# Patient Record
Sex: MALE | Race: WHITE | NOT HISPANIC OR LATINO | Employment: UNEMPLOYED
[De-identification: names, ages, dates, MRNs, and addresses within clinical notes are randomized per-mention and may not be internally consistent; named-entity substitution may affect disease eponyms.]

---

## 2017-02-20 ENCOUNTER — BH HISTORICAL (OUTPATIENT)
Dept: OTHER | Age: 42
End: 2017-02-20

## 2017-03-08 ENCOUNTER — HOSPITAL (OUTPATIENT)
Dept: OTHER | Age: 42
End: 2017-03-08

## 2017-03-11 ENCOUNTER — HOSPITAL (OUTPATIENT)
Dept: OTHER | Age: 42
End: 2017-03-11
Attending: PSYCHIATRY & NEUROLOGY

## 2017-03-11 ENCOUNTER — DIAGNOSTIC TRANS (OUTPATIENT)
Dept: OTHER | Age: 42
End: 2017-03-11

## 2017-03-11 ENCOUNTER — IMAGING SERVICES (OUTPATIENT)
Dept: OTHER | Age: 42
End: 2017-03-11

## 2017-03-11 LAB
ALBUMIN SERPL-MCNC: 4 GM/DL (ref 3.6–5.1)
ALBUMIN SERPL-MCNC: 4 GM/DL (ref 3.6–5.1)
ALBUMIN/GLOB SERPL: 1.2 {RATIO} (ref 1–2.4)
ALP SERPL-CCNC: 110 UNIT/L (ref 45–117)
ALP SERPL-CCNC: 110 UNIT/L (ref 45–117)
ALT SERPL-CCNC: 128 UNIT/L
ALT SERPL-CCNC: 129 UNIT/L
AMPHETAMINES UR QL SCN>500 NG/ML: NEGATIVE
ANALYZER ANC (IANC): ABNORMAL
ANION GAP SERPL CALC-SCNC: 14 MMOL/L (ref 10–20)
ANION GAP SERPL CALC-SCNC: 16 MMOL/L (ref 10–20)
AST SERPL-CCNC: 290 UNIT/L
AST SERPL-CCNC: 294 UNIT/L
BARBITURATES UR QL SCN>200 NG/ML: NEGATIVE
BASOPHILS # BLD: 0 THOUSAND/MCL (ref 0–0.3)
BASOPHILS NFR BLD: 0 %
BENZODIAZ UR QL SCN>200 NG/ML: NEGATIVE
BILIRUB CONJ SERPL-MCNC: 0.1 MG/DL (ref 0–0.2)
BILIRUB SERPL-MCNC: 0.4 MG/DL (ref 0.2–1)
BILIRUB SERPL-MCNC: 0.4 MG/DL (ref 0.2–1)
BUN SERPL-MCNC: 13 MG/DL (ref 6–20)
BUN SERPL-MCNC: 13 MG/DL (ref 6–20)
BUN/CREAT SERPL: 13 (ref 7–25)
BUN/CREAT SERPL: 14 (ref 7–25)
BZE UR QL SCN>150 NG/ML: NEGATIVE
CALCIUM SERPL-MCNC: 8.8 MG/DL (ref 8.4–10.2)
CALCIUM SERPL-MCNC: 8.9 MG/DL (ref 8.4–10.2)
CANNABINOIDS UR QL SCN>50 NG/ML: NEGATIVE
CHLORIDE: 103 MMOL/L (ref 98–107)
CHLORIDE: 103 MMOL/L (ref 98–107)
CHOLEST SERPL-MCNC: 132 MG/DL
CHOLEST/HDLC SERPL: 2.6 {RATIO}
CO2 SERPL-SCNC: 24 MMOL/L (ref 21–32)
CO2 SERPL-SCNC: 26 MMOL/L (ref 21–32)
CREAT SERPL-MCNC: 0.95 MG/DL (ref 0.67–1.17)
CREAT SERPL-MCNC: 0.98 MG/DL (ref 0.67–1.17)
D DIMER PPP FEU-MCNC: 0.83 MG/L FEU
DIFFERENTIAL METHOD BLD: ABNORMAL
EOSINOPHIL # BLD: 0.1 THOUSAND/MCL (ref 0.1–0.5)
EOSINOPHIL NFR BLD: 1 %
ERYTHROCYTE [DISTWIDTH] IN BLOOD: 12.7 % (ref 11–15)
ETHANOL SERPL-MCNC: NORMAL MG/DL
GLOBULIN SER-MCNC: 3.3 GM/DL (ref 2–4)
GLUCOSE SERPL-MCNC: 102 MG/DL (ref 65–99)
GLUCOSE SERPL-MCNC: 105 MG/DL (ref 65–99)
GLYCOHEMOGLOBIN: 5.1 % (ref 4.5–5.6)
HDLC SERPL-MCNC: 51 MG/DL
HEMATOCRIT: 39 % (ref 39–51)
HGB BLD-MCNC: 13.5 GM/DL (ref 13–17)
LDLC SERPL CALC-MCNC: 68 MG/DL
LYMPHOCYTES # BLD: 1.7 THOUSAND/MCL (ref 1–4.8)
LYMPHOCYTES NFR BLD: 15 %
MCH RBC QN AUTO: 32.8 PG (ref 26–34)
MCHC RBC AUTO-ENTMCNC: 34.6 GM/DL (ref 32–36.5)
MCV RBC AUTO: 94.7 FL (ref 78–100)
MONOCYTES # BLD: 1.1 THOUSAND/MCL (ref 0.3–0.9)
MONOCYTES NFR BLD: 10 %
NEUTROPHILS # BLD: 8.3 THOUSAND/MCL (ref 1.8–7.7)
NEUTROPHILS NFR BLD: 74 %
NEUTS SEG NFR BLD: ABNORMAL %
NONHDLC SERPL-MCNC: 81 MG/DL
OPIATES UR QL SCN>300 NG/ML: POSITIVE
PCP UR QL SCN>25 NG/ML: NEGATIVE
PERCENT NRBC: ABNORMAL
PLATELET # BLD: 232 THOUSAND/MCL (ref 140–450)
POTASSIUM SERPL-SCNC: 3.9 MMOL/L (ref 3.4–5.1)
POTASSIUM SERPL-SCNC: 4 MMOL/L (ref 3.4–5.1)
PROT SERPL-MCNC: 7.3 GM/DL (ref 6.4–8.2)
PROT SERPL-MCNC: 7.3 GM/DL (ref 6.4–8.2)
RBC # BLD: 4.12 MILLION/MCL (ref 4.5–5.9)
SODIUM SERPL-SCNC: 139 MMOL/L (ref 135–145)
SODIUM SERPL-SCNC: 139 MMOL/L (ref 135–145)
T3 SERPL-MCNC: 1.16 NG/ML (ref 0.6–1.81)
T4 SERPL-MCNC: 8.6 MCG/DL (ref 4.7–13.3)
TRIGL SERPL-MCNC: 65 MG/DL
TROPONIN I SERPL HS-MCNC: <0.02 NG/ML
TSH SERPL-ACNC: 0.78 MCUNIT/ML (ref 0.35–5)
WBC # BLD: 11.2 THOUSAND/MCL (ref 4.2–11)

## 2017-03-12 LAB
ALBUMIN SERPL-MCNC: 3.6 GM/DL (ref 3.6–5.1)
ALBUMIN/GLOB SERPL: 1.1 {RATIO} (ref 1–2.4)
ALP SERPL-CCNC: 95 UNIT/L (ref 45–117)
ALT SERPL-CCNC: 101 UNIT/L
ANION GAP SERPL CALC-SCNC: 13 MMOL/L (ref 10–20)
AST SERPL-CCNC: 148 UNIT/L
BILIRUB SERPL-MCNC: 0.4 MG/DL (ref 0.2–1)
BUN SERPL-MCNC: 11 MG/DL (ref 6–20)
BUN/CREAT SERPL: 11 (ref 7–25)
CALCIUM SERPL-MCNC: 8.8 MG/DL (ref 8.4–10.2)
CHLORIDE: 104 MMOL/L (ref 98–107)
CO2 SERPL-SCNC: 27 MMOL/L (ref 21–32)
CREAT SERPL-MCNC: 0.97 MG/DL (ref 0.67–1.17)
GLOBULIN SER-MCNC: 3.4 GM/DL (ref 2–4)
GLUCOSE SERPL-MCNC: 98 MG/DL (ref 65–99)
POTASSIUM SERPL-SCNC: 4.5 MMOL/L (ref 3.4–5.1)
PROT SERPL-MCNC: 7 GM/DL (ref 6.4–8.2)
SODIUM SERPL-SCNC: 139 MMOL/L (ref 135–145)

## 2017-03-13 ENCOUNTER — IMAGING SERVICES (OUTPATIENT)
Dept: OTHER | Age: 42
End: 2017-03-13

## 2017-03-16 LAB
ALBUMIN SERPL-MCNC: 4 GM/DL (ref 3.6–5.1)
ALP SERPL-CCNC: 116 UNIT/L (ref 45–117)
ALT SERPL-CCNC: 87 UNIT/L
AST SERPL-CCNC: 57 UNIT/L
BILIRUB CONJ SERPL-MCNC: 0.1 MG/DL (ref 0–0.2)
BILIRUB SERPL-MCNC: 0.3 MG/DL (ref 0.2–1)
HBV SURFACE AB SER QL: NEGATIVE
HBV SURFACE AG SER QL: NEGATIVE
HCV AB SERPL QL IA: NEGATIVE
PROT SERPL-MCNC: 7.4 GM/DL (ref 6.4–8.2)

## 2017-03-23 ENCOUNTER — HOSPITAL (OUTPATIENT)
Dept: OTHER | Age: 42
End: 2017-03-23
Attending: EMERGENCY MEDICINE

## 2017-04-13 ENCOUNTER — HOSPITAL (OUTPATIENT)
Dept: OTHER | Age: 42
End: 2017-04-13
Attending: PSYCHIATRY & NEUROLOGY

## 2017-04-13 LAB
AMPHETAMINES UR QL SCN>500 NG/ML: NEGATIVE
ANALYZER ANC (IANC): ABNORMAL
ANION GAP SERPL CALC-SCNC: 14 MMOL/L (ref 10–20)
BARBITURATES UR QL SCN>200 NG/ML: NEGATIVE
BASOPHILS # BLD: 0 THOUSAND/MCL (ref 0–0.3)
BASOPHILS NFR BLD: 0 %
BENZODIAZ UR QL SCN>200 NG/ML: NEGATIVE
BUN SERPL-MCNC: 13 MG/DL (ref 6–20)
BUN/CREAT SERPL: 14 (ref 7–25)
BZE UR QL SCN>150 NG/ML: NEGATIVE
CALCIUM SERPL-MCNC: 8.6 MG/DL (ref 8.4–10.2)
CANNABINOIDS UR QL SCN>50 NG/ML: NEGATIVE
CHLORIDE: 108 MMOL/L (ref 98–107)
CHOLEST SERPL-MCNC: 162 MG/DL
CHOLEST/HDLC SERPL: 4.3 {RATIO}
CO2 SERPL-SCNC: 25 MMOL/L (ref 21–32)
CREAT SERPL-MCNC: 0.9 MG/DL (ref 0.67–1.17)
DIFFERENTIAL METHOD BLD: ABNORMAL
EOSINOPHIL # BLD: 0.1 THOUSAND/MCL (ref 0.1–0.5)
EOSINOPHIL NFR BLD: 1 %
ERYTHROCYTE [DISTWIDTH] IN BLOOD: 13.5 % (ref 11–15)
ETHANOL SERPL-MCNC: NORMAL MG/DL
GLUCOSE SERPL-MCNC: 105 MG/DL (ref 65–99)
HDLC SERPL-MCNC: 38 MG/DL
HEMATOCRIT: 40.1 % (ref 39–51)
HGB BLD-MCNC: 13.2 GM/DL (ref 13–17)
LDLC SERPL CALC-MCNC: 112 MG/DL
LYMPHOCYTES # BLD: 1.6 THOUSAND/MCL (ref 1–4.8)
LYMPHOCYTES NFR BLD: 16 %
MCH RBC QN AUTO: 31.8 PG (ref 26–34)
MCHC RBC AUTO-ENTMCNC: 32.9 GM/DL (ref 32–36.5)
MCV RBC AUTO: 96.6 FL (ref 78–100)
MONOCYTES # BLD: 0.7 THOUSAND/MCL (ref 0.3–0.9)
MONOCYTES NFR BLD: 7 %
NEUTROPHILS # BLD: 7.7 THOUSAND/MCL (ref 1.8–7.7)
NEUTROPHILS NFR BLD: 76 %
NEUTS SEG NFR BLD: ABNORMAL %
NONHDLC SERPL-MCNC: 124 MG/DL
OPIATES UR QL SCN>300 NG/ML: NEGATIVE
PCP UR QL SCN>25 NG/ML: NEGATIVE
PERCENT NRBC: ABNORMAL
PLATELET # BLD: 264 THOUSAND/MCL (ref 140–450)
POTASSIUM SERPL-SCNC: 4.1 MMOL/L (ref 3.4–5.1)
RBC # BLD: 4.15 MILLION/MCL (ref 4.5–5.9)
SODIUM SERPL-SCNC: 143 MMOL/L (ref 135–145)
T3 SERPL-MCNC: 0.82 NG/ML (ref 0.6–1.81)
T4 SERPL-MCNC: 6.7 MCG/DL (ref 4.7–13.3)
TRIGLYCERIDE (TRIGP): 59 MG/DL
TSH SERPL-ACNC: 0.79 MCUNIT/ML (ref 0.35–5)
WBC # BLD: 10.1 THOUSAND/MCL (ref 4.2–11)

## 2017-04-16 LAB
AMORPH SED URNS QL MICRO: ABNORMAL
APPEARANCE UR: CLEAR
BILIRUB UR QL: NEGATIVE
CAOX CRY URNS QL MICRO: ABNORMAL
COLOR UR: ABNORMAL
EPITH CASTS #/AREA URNS LPF: ABNORMAL /[LPF]
FATTY CASTS #/AREA URNS LPF: ABNORMAL /[LPF]
GLUCOSE UR-MCNC: NEGATIVE MG/DL
GRAN CASTS #/AREA URNS LPF: ABNORMAL /[LPF]
HGB UR QL: NEGATIVE
HYALINE CASTS #/AREA URNS LPF: ABNORMAL /[LPF]
KETONES UR-MCNC: NEGATIVE MG/DL
LEUKOCYTE ESTERASE UR QL STRIP: NEGATIVE
MICROSCOPIC (MT): ABNORMAL
MIXED CELL CASTS #/AREA URNS LPF: ABNORMAL /[LPF]
MUCOUS THREADS URNS QL MICRO: ABNORMAL
NITRITE UR QL: NEGATIVE
PH UR: 8 UNIT (ref 5–7)
PROT UR QL: NEGATIVE MG/DL
RBC CASTS #/AREA URNS LPF: ABNORMAL /[LPF]
RENAL EPI CELLS #/AREA URNS HPF: ABNORMAL /[HPF]
SP GR UR: 1.01 (ref 1–1.03)
SPECIMEN SOURCE: ABNORMAL
SPERM URNS QL MICRO: ABNORMAL
T VAGINALIS URNS QL MICRO: ABNORMAL
TRI-PHOS CRY URNS QL MICRO: ABNORMAL
URATE CRY URNS QL MICRO: ABNORMAL
URNS CMNT MICRO: ABNORMAL
UROBILINOGEN UR QL: 0.2 MG/DL (ref 0–1)
WAXY CASTS #/AREA URNS LPF: ABNORMAL /[LPF]
WBC CASTS #/AREA URNS LPF: ABNORMAL /[LPF]
YEAST HYPHAE URNS QL MICRO: ABNORMAL
YEAST URNS QL MICRO: ABNORMAL

## 2017-04-24 ENCOUNTER — HOSPITAL (OUTPATIENT)
Dept: OTHER | Age: 42
End: 2017-04-24
Attending: EMERGENCY MEDICINE

## 2017-04-24 LAB
ALBUMIN SERPL-MCNC: 4.3 GM/DL (ref 3.6–5.1)
ALP SERPL-CCNC: 153 UNIT/L (ref 45–117)
ALT SERPL-CCNC: 37 UNIT/L
AMPHETAMINES UR QL SCN>500 NG/ML: NEGATIVE
ANALYZER ANC (IANC): ABNORMAL
ANION GAP SERPL CALC-SCNC: 16 MMOL/L (ref 10–20)
APAP SERPL-MCNC: <2 MCG/ML (ref 10–30)
AST SERPL-CCNC: 28 UNIT/L
BARBITURATES UR QL SCN>200 NG/ML: NEGATIVE
BASOPHILS # BLD: 0 THOUSAND/MCL (ref 0–0.3)
BASOPHILS NFR BLD: 0 %
BENZODIAZ UR QL SCN>200 NG/ML: POSITIVE
BILIRUB CONJ SERPL-MCNC: 0.1 MG/DL (ref 0–0.2)
BILIRUB SERPL-MCNC: 0.4 MG/DL (ref 0.2–1)
BUN SERPL-MCNC: 14 MG/DL (ref 6–20)
BUN/CREAT SERPL: 17 (ref 7–25)
BZE UR QL SCN>150 NG/ML: NEGATIVE
CALCIUM SERPL-MCNC: 9.7 MG/DL (ref 8.4–10.2)
CANNABINOIDS UR QL SCN>50 NG/ML: NEGATIVE
CHLORIDE: 101 MMOL/L (ref 98–107)
CO2 SERPL-SCNC: 26 MMOL/L (ref 21–32)
CREAT SERPL-MCNC: 0.82 MG/DL (ref 0.67–1.17)
DIFFERENTIAL METHOD BLD: ABNORMAL
EOSINOPHIL # BLD: 0 THOUSAND/MCL (ref 0.1–0.5)
EOSINOPHIL NFR BLD: 0 %
ERYTHROCYTE [DISTWIDTH] IN BLOOD: 13 % (ref 11–15)
ETHANOL SERPL-MCNC: NORMAL MG/DL
GLUCOSE SERPL-MCNC: 102 MG/DL (ref 65–99)
HEMATOCRIT: 46.7 % (ref 39–51)
HGB BLD-MCNC: 16.5 GM/DL (ref 13–17)
LIPASE SERPL-CCNC: 110 UNIT/L (ref 73–393)
LYMPHOCYTES # BLD: 1.8 THOUSAND/MCL (ref 1–4.8)
LYMPHOCYTES NFR BLD: 18 %
MCH RBC QN AUTO: 32.9 PG (ref 26–34)
MCHC RBC AUTO-ENTMCNC: 35.3 GM/DL (ref 32–36.5)
MCV RBC AUTO: 93.2 FL (ref 78–100)
MONOCYTES # BLD: 1.2 THOUSAND/MCL (ref 0.3–0.9)
MONOCYTES NFR BLD: 12 %
NEUTROPHILS # BLD: 7.1 THOUSAND/MCL (ref 1.8–7.7)
NEUTROPHILS NFR BLD: 70 %
NEUTS SEG NFR BLD: ABNORMAL %
OPIATES UR QL SCN>300 NG/ML: NEGATIVE
PCP UR QL SCN>25 NG/ML: NEGATIVE
PERCENT NRBC: ABNORMAL
PLATELET # BLD: 276 THOUSAND/MCL (ref 140–450)
POTASSIUM SERPL-SCNC: 4.3 MMOL/L (ref 3.4–5.1)
PROT SERPL-MCNC: 8.1 GM/DL (ref 6.4–8.2)
RBC # BLD: 5.01 MILLION/MCL (ref 4.5–5.9)
SALICYLATES SERPL-MCNC: 3.7 MG/DL
SODIUM SERPL-SCNC: 139 MMOL/L (ref 135–145)
WBC # BLD: 10.2 THOUSAND/MCL (ref 4.2–11)

## 2017-04-26 ENCOUNTER — CHARTING TRANS (OUTPATIENT)
Dept: OTHER | Age: 42
End: 2017-04-26

## 2017-05-02 ENCOUNTER — BH HISTORICAL (OUTPATIENT)
Dept: OTHER | Age: 42
End: 2017-05-02

## 2017-05-03 ENCOUNTER — HOSPITAL (OUTPATIENT)
Dept: OTHER | Age: 42
End: 2017-05-03
Attending: EMERGENCY MEDICINE

## 2017-05-14 ENCOUNTER — HOSPITAL (OUTPATIENT)
Dept: OTHER | Age: 42
End: 2017-05-14
Attending: EMERGENCY MEDICINE

## 2017-05-14 LAB
ANALYZER ANC (IANC): ABNORMAL
ANION GAP SERPL CALC-SCNC: 14 MMOL/L (ref 10–20)
BASOPHILS # BLD: 0 THOUSAND/MCL (ref 0–0.3)
BASOPHILS NFR BLD: 0 %
BUN SERPL-MCNC: 9 MG/DL (ref 6–20)
BUN/CREAT SERPL: 12 (ref 7–25)
CALCIUM SERPL-MCNC: 9.1 MG/DL (ref 8.4–10.2)
CHLORIDE: 99 MMOL/L (ref 98–107)
CO2 SERPL-SCNC: 25 MMOL/L (ref 21–32)
CREAT SERPL-MCNC: 0.78 MG/DL (ref 0.67–1.17)
DIFFERENTIAL METHOD BLD: ABNORMAL
EOSINOPHIL # BLD: 0 THOUSAND/MCL (ref 0.1–0.5)
EOSINOPHIL NFR BLD: 0 %
ERYTHROCYTE [DISTWIDTH] IN BLOOD: 13.6 % (ref 11–15)
ETHANOL SERPL-MCNC: NORMAL MG/DL
GLUCOSE SERPL-MCNC: 96 MG/DL (ref 65–99)
HEMATOCRIT: 44.7 % (ref 39–51)
HGB BLD-MCNC: 16.3 GM/DL (ref 13–17)
LYMPHOCYTES # BLD: 1.6 THOUSAND/MCL (ref 1–4.8)
LYMPHOCYTES NFR BLD: 17 %
MCH RBC QN AUTO: 34.1 PG (ref 26–34)
MCHC RBC AUTO-ENTMCNC: 36.5 GM/DL (ref 32–36.5)
MCV RBC AUTO: 93.5 FL (ref 78–100)
MONOCYTES # BLD: 0.7 THOUSAND/MCL (ref 0.3–0.9)
MONOCYTES NFR BLD: 7 %
NEUTROPHILS # BLD: 6.9 THOUSAND/MCL (ref 1.8–7.7)
NEUTROPHILS NFR BLD: 76 %
NEUTS SEG NFR BLD: ABNORMAL %
PERCENT NRBC: ABNORMAL
PLATELET # BLD: 279 THOUSAND/MCL (ref 140–450)
POTASSIUM SERPL-SCNC: 4.1 MMOL/L (ref 3.4–5.1)
RBC # BLD: 4.78 MILLION/MCL (ref 4.5–5.9)
SODIUM SERPL-SCNC: 134 MMOL/L (ref 135–145)
WBC # BLD: 9.2 THOUSAND/MCL (ref 4.2–11)

## 2017-05-20 ENCOUNTER — CHARTING TRANS (OUTPATIENT)
Dept: OTHER | Age: 42
End: 2017-05-20

## 2017-05-30 ENCOUNTER — BH HISTORICAL (OUTPATIENT)
Dept: OTHER | Age: 42
End: 2017-05-30

## 2017-06-06 ENCOUNTER — HOSPITAL (OUTPATIENT)
Dept: OTHER | Age: 42
End: 2017-06-06
Attending: EMERGENCY MEDICINE

## 2017-06-06 LAB
ALBUMIN SERPL-MCNC: 3.6 GM/DL (ref 3.6–5.1)
ALBUMIN/GLOB SERPL: 1.1 {RATIO} (ref 1–2.4)
ALP SERPL-CCNC: 121 UNIT/L (ref 45–117)
ALT SERPL-CCNC: 34 UNIT/L
ANALYZER ANC (IANC): ABNORMAL
ANION GAP SERPL CALC-SCNC: 10 MMOL/L (ref 10–20)
AST SERPL-CCNC: 27 UNIT/L
BASOPHILS # BLD: 0 THOUSAND/MCL (ref 0–0.3)
BASOPHILS NFR BLD: 0 %
BILIRUB SERPL-MCNC: 0.4 MG/DL (ref 0.2–1)
BUN SERPL-MCNC: 7 MG/DL (ref 6–20)
BUN/CREAT SERPL: 8 (ref 7–25)
CALCIUM SERPL-MCNC: 8.7 MG/DL (ref 8.4–10.2)
CHLORIDE: 105 MMOL/L (ref 98–107)
CO2 SERPL-SCNC: 27 MMOL/L (ref 21–32)
CREAT SERPL-MCNC: 0.88 MG/DL (ref 0.67–1.17)
DIFFERENTIAL METHOD BLD: ABNORMAL
EOSINOPHIL # BLD: 0.1 THOUSAND/MCL (ref 0.1–0.5)
EOSINOPHIL NFR BLD: 1 %
ERYTHROCYTE [DISTWIDTH] IN BLOOD: 13.1 % (ref 11–15)
ETHANOL SERPL-MCNC: NORMAL MG/DL
GLOBULIN SER-MCNC: 3.3 GM/DL (ref 2–4)
GLUCOSE SERPL-MCNC: 128 MG/DL (ref 65–99)
HEMATOCRIT: 41.3 % (ref 39–51)
HGB BLD-MCNC: 14.6 GM/DL (ref 13–17)
LYMPHOCYTES # BLD: 1.6 THOUSAND/MCL (ref 1–4.8)
LYMPHOCYTES NFR BLD: 20 %
MCH RBC QN AUTO: 33.2 PG (ref 26–34)
MCHC RBC AUTO-ENTMCNC: 35.4 GM/DL (ref 32–36.5)
MCV RBC AUTO: 93.9 FL (ref 78–100)
MONOCYTES # BLD: 0.7 THOUSAND/MCL (ref 0.3–0.9)
MONOCYTES NFR BLD: 8 %
NEUTROPHILS # BLD: 5.5 THOUSAND/MCL (ref 1.8–7.7)
NEUTROPHILS NFR BLD: 71 %
NEUTS SEG NFR BLD: ABNORMAL %
PERCENT NRBC: ABNORMAL
PLATELET # BLD: 274 THOUSAND/MCL (ref 140–450)
POTASSIUM SERPL-SCNC: 4.1 MMOL/L (ref 3.4–5.1)
PROT SERPL-MCNC: 6.9 GM/DL (ref 6.4–8.2)
RBC # BLD: 4.4 MILLION/MCL (ref 4.5–5.9)
SODIUM SERPL-SCNC: 138 MMOL/L (ref 135–145)
WBC # BLD: 7.8 THOUSAND/MCL (ref 4.2–11)

## 2017-06-07 LAB
AMPHETAMINES UR QL SCN>500 NG/ML: NEGATIVE
BARBITURATES UR QL SCN>200 NG/ML: NEGATIVE
BENZODIAZ UR QL SCN>200 NG/ML: NEGATIVE
BZE UR QL SCN>150 NG/ML: NEGATIVE
CANNABINOIDS UR QL SCN>50 NG/ML: NEGATIVE
OPIATES UR QL SCN>300 NG/ML: NEGATIVE
PCP UR QL SCN>25 NG/ML: NEGATIVE

## 2017-06-14 ENCOUNTER — BH HISTORICAL (OUTPATIENT)
Dept: OTHER | Age: 42
End: 2017-06-14

## 2017-07-10 ENCOUNTER — BH HISTORICAL (OUTPATIENT)
Dept: OTHER | Age: 42
End: 2017-07-10

## 2017-07-15 ENCOUNTER — HOSPITAL (OUTPATIENT)
Dept: OTHER | Age: 42
End: 2017-07-15
Attending: EMERGENCY MEDICINE

## 2017-07-15 LAB
ALBUMIN SERPL-MCNC: 3.5 GM/DL (ref 3.6–5.1)
ALBUMIN/GLOB SERPL: 1.1 {RATIO} (ref 1–2.4)
ALP SERPL-CCNC: 121 UNIT/L (ref 45–117)
ALT SERPL-CCNC: 41 UNIT/L
AMPHETAMINES UR QL SCN>500 NG/ML: NEGATIVE
ANALYZER ANC (IANC): ABNORMAL
ANION GAP SERPL CALC-SCNC: 11 MMOL/L (ref 10–20)
AST SERPL-CCNC: 31 UNIT/L
BARBITURATES UR QL SCN>200 NG/ML: NEGATIVE
BASOPHILS # BLD: 0 THOUSAND/MCL (ref 0–0.3)
BASOPHILS NFR BLD: 0 %
BENZODIAZ UR QL SCN>200 NG/ML: NEGATIVE
BILIRUB SERPL-MCNC: 0.3 MG/DL (ref 0.2–1)
BUN SERPL-MCNC: 10 MG/DL (ref 6–20)
BUN/CREAT SERPL: 10 (ref 7–25)
BZE UR QL SCN>150 NG/ML: NEGATIVE
CALCIUM SERPL-MCNC: 8.7 MG/DL (ref 8.4–10.2)
CANNABINOIDS UR QL SCN>50 NG/ML: NEGATIVE
CHLORIDE: 107 MMOL/L (ref 98–107)
CO2 SERPL-SCNC: 27 MMOL/L (ref 21–32)
CREAT SERPL-MCNC: 0.98 MG/DL (ref 0.67–1.17)
DIFFERENTIAL METHOD BLD: ABNORMAL
EOSINOPHIL # BLD: 0.2 THOUSAND/MCL (ref 0.1–0.5)
EOSINOPHIL NFR BLD: 2 %
ERYTHROCYTE [DISTWIDTH] IN BLOOD: 14.1 % (ref 11–15)
ETHANOL SERPL-MCNC: NORMAL MG/DL
GLOBULIN SER-MCNC: 3.2 GM/DL (ref 2–4)
GLUCOSE SERPL-MCNC: 99 MG/DL (ref 65–99)
HEMATOCRIT: 39 % (ref 39–51)
HGB BLD-MCNC: 13.2 GM/DL (ref 13–17)
LYMPHOCYTES # BLD: 1.9 THOUSAND/MCL (ref 1–4.8)
LYMPHOCYTES NFR BLD: 24 %
MAGNESIUM SERPL-MCNC: 2.1 MG/DL (ref 1.7–2.4)
MCH RBC QN AUTO: 33 PG (ref 26–34)
MCHC RBC AUTO-ENTMCNC: 33.8 GM/DL (ref 32–36.5)
MCV RBC AUTO: 97.5 FL (ref 78–100)
MONOCYTES # BLD: 0.5 THOUSAND/MCL (ref 0.3–0.9)
MONOCYTES NFR BLD: 6 %
NEUTROPHILS # BLD: 5.3 THOUSAND/MCL (ref 1.8–7.7)
NEUTROPHILS NFR BLD: 68 %
NEUTS SEG NFR BLD: ABNORMAL %
OPIATES UR QL SCN>300 NG/ML: NEGATIVE
PCP UR QL SCN>25 NG/ML: NEGATIVE
PERCENT NRBC: ABNORMAL
PLATELET # BLD: 220 THOUSAND/MCL (ref 140–450)
POTASSIUM SERPL-SCNC: 4.1 MMOL/L (ref 3.4–5.1)
PROT SERPL-MCNC: 6.7 GM/DL (ref 6.4–8.2)
RBC # BLD: 4 MILLION/MCL (ref 4.5–5.9)
SODIUM SERPL-SCNC: 141 MMOL/L (ref 135–145)
WBC # BLD: 7.8 THOUSAND/MCL (ref 4.2–11)

## 2017-08-08 LAB
ALBUMIN SERPL-MCNC: 4 GM/DL (ref 3.6–5.1)
ALBUMIN/GLOB SERPL: 1.3 {RATIO} (ref 1–2.4)
ALP SERPL-CCNC: 95 UNIT/L (ref 45–117)
ALT SERPL-CCNC: 44 UNIT/L
ANALYZER ANC (IANC): ABNORMAL
ANION GAP SERPL CALC-SCNC: 12 MMOL/L (ref 10–20)
AST SERPL-CCNC: 28 UNIT/L
BASOPHILS # BLD: 0 THOUSAND/MCL (ref 0–0.3)
BASOPHILS NFR BLD: 0 %
BILIRUB SERPL-MCNC: 0.7 MG/DL (ref 0.2–1)
BUN SERPL-MCNC: 8 MG/DL (ref 6–20)
BUN/CREAT SERPL: 7 (ref 7–25)
CALCIUM SERPL-MCNC: 8.9 MG/DL (ref 8.4–10.2)
CHLORIDE: 103 MMOL/L (ref 98–107)
CO2 SERPL-SCNC: 30 MMOL/L (ref 21–32)
CREAT SERPL-MCNC: 1.1 MG/DL (ref 0.67–1.17)
DIFFERENTIAL METHOD BLD: ABNORMAL
EOSINOPHIL # BLD: 0.1 THOUSAND/MCL (ref 0.1–0.5)
EOSINOPHIL NFR BLD: 1 %
ERYTHROCYTE [DISTWIDTH] IN BLOOD: 13.2 % (ref 11–15)
ETHANOL SERPL-MCNC: NORMAL MG/DL
GLOBULIN SER-MCNC: 3.1 GM/DL (ref 2–4)
GLUCOSE SERPL-MCNC: 105 MG/DL (ref 65–99)
HEMATOCRIT: 43.5 % (ref 39–51)
HGB BLD-MCNC: 15 GM/DL (ref 13–17)
LYMPHOCYTES # BLD: 2.4 THOUSAND/MCL (ref 1–4.8)
LYMPHOCYTES NFR BLD: 25 %
MCH RBC QN AUTO: 32.8 PG (ref 26–34)
MCHC RBC AUTO-ENTMCNC: 34.5 GM/DL (ref 32–36.5)
MCV RBC AUTO: 95.2 FL (ref 78–100)
MONOCYTES # BLD: 1 THOUSAND/MCL (ref 0.3–0.9)
MONOCYTES NFR BLD: 10 %
NEUTROPHILS # BLD: 6.1 THOUSAND/MCL (ref 1.8–7.7)
NEUTROPHILS NFR BLD: 64 %
NEUTS SEG NFR BLD: ABNORMAL %
PERCENT NRBC: ABNORMAL
PLATELET # BLD: 242 THOUSAND/MCL (ref 140–450)
POTASSIUM SERPL-SCNC: 3.7 MMOL/L (ref 3.4–5.1)
PROT SERPL-MCNC: 7.1 GM/DL (ref 6.4–8.2)
RBC # BLD: 4.57 MILLION/MCL (ref 4.5–5.9)
SODIUM SERPL-SCNC: 141 MMOL/L (ref 135–145)
WBC # BLD: 9.5 THOUSAND/MCL (ref 4.2–11)

## 2017-08-09 ENCOUNTER — IMAGING SERVICES (OUTPATIENT)
Dept: OTHER | Age: 42
End: 2017-08-09

## 2017-08-09 ENCOUNTER — HOSPITAL (OUTPATIENT)
Dept: OTHER | Age: 42
End: 2017-08-09
Attending: PSYCHIATRY & NEUROLOGY

## 2017-08-09 LAB
AMORPH SED URNS QL MICRO: ABNORMAL
AMPHETAMINES UR QL SCN>500 NG/ML: NEGATIVE
APPEARANCE UR: CLEAR
BARBITURATES UR QL SCN>200 NG/ML: NEGATIVE
BENZODIAZ UR QL SCN>200 NG/ML: NEGATIVE
BILIRUB UR QL: NEGATIVE
BZE UR QL SCN>150 NG/ML: NEGATIVE
CANNABINOIDS UR QL SCN>50 NG/ML: NEGATIVE
CAOX CRY URNS QL MICRO: ABNORMAL
COLOR UR: YELLOW
EPITH CASTS #/AREA URNS LPF: ABNORMAL /[LPF]
FATTY CASTS #/AREA URNS LPF: ABNORMAL /[LPF]
GLUCOSE UR-MCNC: NEGATIVE MG/DL
GLYCOHEMOGLOBIN: 5.5 % (ref 4.5–5.6)
GRAN CASTS #/AREA URNS LPF: ABNORMAL /[LPF]
HGB UR QL: NEGATIVE
HYALINE CASTS #/AREA URNS LPF: ABNORMAL /[LPF]
KETONES UR-MCNC: NEGATIVE MG/DL
LEUKOCYTE ESTERASE UR QL STRIP: NEGATIVE
MICROSCOPIC (MT): ABNORMAL
MIXED CELL CASTS #/AREA URNS LPF: ABNORMAL /[LPF]
MUCOUS THREADS URNS QL MICRO: ABNORMAL
NITRITE UR QL: NEGATIVE
OPIATES UR QL SCN>300 NG/ML: NEGATIVE
PCP UR QL SCN>25 NG/ML: NEGATIVE
PH UR: 6 UNIT (ref 5–7)
PROT UR QL: NEGATIVE MG/DL
RBC CASTS #/AREA URNS LPF: ABNORMAL /[LPF]
RENAL EPI CELLS #/AREA URNS HPF: ABNORMAL /[HPF]
SP GR UR: 1.02 (ref 1–1.03)
SPECIMEN SOURCE: ABNORMAL
SPERM URNS QL MICRO: ABNORMAL
T VAGINALIS URNS QL MICRO: ABNORMAL
T3 SERPL-MCNC: 0.77 NG/ML (ref 0.6–1.81)
T4 SERPL-MCNC: 8.1 MCG/DL (ref 4.7–13.3)
TRI-PHOS CRY URNS QL MICRO: ABNORMAL
TSH SERPL-ACNC: 1.52 MCUNIT/ML (ref 0.35–5)
URATE CRY URNS QL MICRO: ABNORMAL
URNS CMNT MICRO: ABNORMAL
UROBILINOGEN UR QL: 2 MG/DL (ref 0–1)
WAXY CASTS #/AREA URNS LPF: ABNORMAL /[LPF]
WBC CASTS #/AREA URNS LPF: ABNORMAL /[LPF]
YEAST HYPHAE URNS QL MICRO: ABNORMAL
YEAST URNS QL MICRO: ABNORMAL

## 2017-08-10 ENCOUNTER — DIAGNOSTIC TRANS (OUTPATIENT)
Dept: OTHER | Age: 42
End: 2017-08-10

## 2017-08-10 LAB
CHOLEST SERPL-MCNC: 127 MG/DL
CHOLEST/HDLC SERPL: 3.1 {RATIO}
HDLC SERPL-MCNC: 41 MG/DL
LDLC SERPL CALC-MCNC: 75 MG/DL
NONHDLC SERPL-MCNC: 86 MG/DL
TRIGLYCERIDE (TRIGP): 55 MG/DL

## 2017-08-11 ENCOUNTER — CHARTING TRANS (OUTPATIENT)
Dept: OTHER | Age: 42
End: 2017-08-11

## 2017-08-17 ENCOUNTER — HOSPITAL (OUTPATIENT)
Dept: OTHER | Age: 42
End: 2017-08-17
Attending: PSYCHIATRY & NEUROLOGY

## 2017-08-21 ENCOUNTER — BH HISTORICAL (OUTPATIENT)
Dept: OTHER | Age: 42
End: 2017-08-21

## 2017-08-28 ENCOUNTER — BH HISTORICAL (OUTPATIENT)
Dept: OTHER | Age: 42
End: 2017-08-28

## 2017-09-19 ENCOUNTER — BH HISTORICAL (OUTPATIENT)
Dept: OTHER | Age: 42
End: 2017-09-19

## 2017-10-12 ENCOUNTER — BH HISTORICAL (OUTPATIENT)
Dept: OTHER | Age: 42
End: 2017-10-12

## 2017-10-12 ENCOUNTER — CHARTING TRANS (OUTPATIENT)
Dept: OTHER | Age: 42
End: 2017-10-12

## 2017-10-14 ENCOUNTER — BH HISTORICAL (OUTPATIENT)
Dept: OTHER | Age: 42
End: 2017-10-14

## 2017-10-16 ENCOUNTER — BH HISTORICAL (OUTPATIENT)
Dept: OTHER | Age: 42
End: 2017-10-16

## 2017-10-19 ENCOUNTER — BH HISTORICAL (OUTPATIENT)
Dept: OTHER | Age: 42
End: 2017-10-19

## 2017-10-20 ENCOUNTER — LAB SERVICES (OUTPATIENT)
Dept: OTHER | Age: 42
End: 2017-10-20

## 2017-10-23 LAB — LITHIUM SERPL-SCNC: 0.4 MMOL/L (ref 0.6–1.2)

## 2017-11-13 ENCOUNTER — BH HISTORICAL (OUTPATIENT)
Dept: OTHER | Age: 42
End: 2017-11-13

## 2017-11-16 ENCOUNTER — BH HISTORICAL (OUTPATIENT)
Dept: OTHER | Age: 42
End: 2017-11-16

## 2017-11-19 LAB
AMPHETAMINES UR QL SCN>500 NG/ML: NEGATIVE
ANALYZER ANC (IANC): NORMAL
ANION GAP SERPL CALC-SCNC: 11 MMOL/L (ref 10–20)
BARBITURATES UR QL SCN>200 NG/ML: NEGATIVE
BASOPHILS # BLD: 0 THOUSAND/MCL (ref 0–0.3)
BASOPHILS NFR BLD: 0 %
BENZODIAZ UR QL SCN>200 NG/ML: NEGATIVE
BUN SERPL-MCNC: 11 MG/DL (ref 6–20)
BUN/CREAT SERPL: 12 (ref 7–25)
BZE UR QL SCN>150 NG/ML: NEGATIVE
CALCIUM SERPL-MCNC: 8.7 MG/DL (ref 8.4–10.2)
CANNABINOIDS UR QL SCN>50 NG/ML: NEGATIVE
CHLORIDE: 103 MMOL/L (ref 98–107)
CO2 SERPL-SCNC: 31 MMOL/L (ref 21–32)
CREAT SERPL-MCNC: 0.89 MG/DL (ref 0.67–1.17)
DIFFERENTIAL METHOD BLD: NORMAL
EOSINOPHIL # BLD: 0.3 THOUSAND/MCL (ref 0.1–0.5)
EOSINOPHIL NFR BLD: 5 %
ERYTHROCYTE [DISTWIDTH] IN BLOOD: 13 % (ref 11–15)
ETHANOL SERPL-MCNC: NORMAL MG/DL
GLUCOSE SERPL-MCNC: 75 MG/DL (ref 65–99)
HEMATOCRIT: 44.6 % (ref 39–51)
HGB BLD-MCNC: 15.9 GM/DL (ref 13–17)
LITHIUM SERPL-SCNC: 0.7 MMOL/L (ref 0.6–1.2)
LYMPHOCYTES # BLD: 2.2 THOUSAND/MCL (ref 1–4.8)
LYMPHOCYTES NFR BLD: 33 %
MCH RBC QN AUTO: 34 PG (ref 26–34)
MCHC RBC AUTO-ENTMCNC: 35.7 GM/DL (ref 32–36.5)
MCV RBC AUTO: 95.3 FL (ref 78–100)
MONOCYTES # BLD: 0.5 THOUSAND/MCL (ref 0.3–0.9)
MONOCYTES NFR BLD: 8 %
NEUTROPHILS # BLD: 3.7 THOUSAND/MCL (ref 1.8–7.7)
NEUTROPHILS NFR BLD: 54 %
NEUTS SEG NFR BLD: NORMAL %
OPIATES UR QL SCN>300 NG/ML: NEGATIVE
PCP UR QL SCN>25 NG/ML: NEGATIVE
PERCENT NRBC: NORMAL
PLATELET # BLD: 177 THOUSAND/MCL (ref 140–450)
POTASSIUM SERPL-SCNC: 3.8 MMOL/L (ref 3.4–5.1)
RBC # BLD: 4.68 MILLION/MCL (ref 4.5–5.9)
SODIUM SERPL-SCNC: 141 MMOL/L (ref 135–145)
WBC # BLD: 6.8 THOUSAND/MCL (ref 4.2–11)

## 2017-11-20 ENCOUNTER — HOSPITAL (OUTPATIENT)
Dept: OTHER | Age: 42
End: 2017-11-20
Attending: PSYCHIATRY & NEUROLOGY

## 2017-11-20 LAB
ALBUMIN SERPL-MCNC: 3.6 GM/DL (ref 3.6–5.1)
ALBUMIN/GLOB SERPL: 1.2 {RATIO} (ref 1–2.4)
ALP SERPL-CCNC: 85 UNIT/L (ref 45–117)
ALT SERPL-CCNC: 18 UNIT/L
ANALYZER ANC (IANC): ABNORMAL
ANION GAP SERPL CALC-SCNC: 10 MMOL/L (ref 10–20)
AST SERPL-CCNC: 12 UNIT/L
BASOPHILS # BLD: 0 THOUSAND/MCL (ref 0–0.3)
BASOPHILS NFR BLD: 1 %
BILIRUB SERPL-MCNC: 0.4 MG/DL (ref 0.2–1)
BUN SERPL-MCNC: 8 MG/DL (ref 6–20)
BUN/CREAT SERPL: 9 (ref 7–25)
CALCIUM SERPL-MCNC: 8.6 MG/DL (ref 8.4–10.2)
CHLORIDE: 107 MMOL/L (ref 98–107)
CO2 SERPL-SCNC: 27 MMOL/L (ref 21–32)
CREAT SERPL-MCNC: 0.93 MG/DL (ref 0.67–1.17)
DIFFERENTIAL METHOD BLD: ABNORMAL
EOSINOPHIL # BLD: 0.2 THOUSAND/MCL (ref 0.1–0.5)
EOSINOPHIL NFR BLD: 4 %
ERYTHROCYTE [DISTWIDTH] IN BLOOD: 13 % (ref 11–15)
GLOBULIN SER-MCNC: 3 GM/DL (ref 2–4)
GLUCOSE SERPL-MCNC: 90 MG/DL (ref 65–99)
GLYCOHEMOGLOBIN: 5.8 % (ref 4.5–5.6)
HEMATOCRIT: 41 % (ref 39–51)
HGB BLD-MCNC: 14.5 GM/DL (ref 13–17)
LITHIUM SERPL-SCNC: 0.6 MMOL/L (ref 0.6–1.2)
LYMPHOCYTES # BLD: 1.2 THOUSAND/MCL (ref 1–4.8)
LYMPHOCYTES NFR BLD: 23 %
MCH RBC QN AUTO: 33.5 PG (ref 26–34)
MCHC RBC AUTO-ENTMCNC: 35.4 GM/DL (ref 32–36.5)
MCV RBC AUTO: 94.7 FL (ref 78–100)
MONOCYTES # BLD: 0.6 THOUSAND/MCL (ref 0.3–0.9)
MONOCYTES NFR BLD: 11 %
NEUTROPHILS # BLD: 3.2 THOUSAND/MCL (ref 1.8–7.7)
NEUTROPHILS NFR BLD: 61 %
NEUTS SEG NFR BLD: ABNORMAL %
PERCENT NRBC: ABNORMAL
PLATELET # BLD: 162 THOUSAND/MCL (ref 140–450)
POTASSIUM SERPL-SCNC: 4.3 MMOL/L (ref 3.4–5.1)
PROT SERPL-MCNC: 6.6 GM/DL (ref 6.4–8.2)
RBC # BLD: 4.33 MILLION/MCL (ref 4.5–5.9)
SODIUM SERPL-SCNC: 140 MMOL/L (ref 135–145)
T3 SERPL-MCNC: 0.74 NG/ML (ref 0.6–1.81)
T4 SERPL-MCNC: 9.3 MCG/DL (ref 4.7–13.3)
TSH SERPL-ACNC: 2.05 MCUNIT/ML (ref 0.35–5)
WBC # BLD: 5.2 THOUSAND/MCL (ref 4.2–11)

## 2017-11-21 LAB
CHOLEST SERPL-MCNC: 154 MG/DL
CHOLEST/HDLC SERPL: 6.4 {RATIO}
HDLC SERPL-MCNC: 24 MG/DL
LDLC SERPL CALC-MCNC: 115 MG/DL
NONHDLC SERPL-MCNC: 130 MG/DL
TRIGLYCERIDE (TRIGP): 75 MG/DL

## 2017-11-26 LAB
AMORPH SED URNS QL MICRO: ABNORMAL
AMPHETAMINES UR QL SCN>500 NG/ML: NEGATIVE
APPEARANCE UR: CLEAR
BARBITURATES UR QL SCN>200 NG/ML: NEGATIVE
BENZODIAZ UR QL SCN>200 NG/ML: NEGATIVE
BILIRUB UR QL: NEGATIVE
BZE UR QL SCN>150 NG/ML: NEGATIVE
CANNABINOIDS UR QL SCN>50 NG/ML: NEGATIVE
CAOX CRY URNS QL MICRO: ABNORMAL
COLOR UR: ABNORMAL
EPITH CASTS #/AREA URNS LPF: ABNORMAL /[LPF]
FATTY CASTS #/AREA URNS LPF: ABNORMAL /[LPF]
GLUCOSE UR-MCNC: NEGATIVE MG/DL
GRAN CASTS #/AREA URNS LPF: ABNORMAL /[LPF]
HGB UR QL: NEGATIVE
HYALINE CASTS #/AREA URNS LPF: ABNORMAL /[LPF]
KETONES UR-MCNC: NEGATIVE MG/DL
LEUKOCYTE ESTERASE UR QL STRIP: NEGATIVE
MICROSCOPIC (MT): ABNORMAL
MIXED CELL CASTS #/AREA URNS LPF: ABNORMAL /[LPF]
MUCOUS THREADS URNS QL MICRO: ABNORMAL
NITRITE UR QL: NEGATIVE
OPIATES UR QL SCN>300 NG/ML: NEGATIVE
PCP UR QL SCN>25 NG/ML: NEGATIVE
PH UR: 6 UNIT (ref 5–7)
PROT UR QL: NEGATIVE MG/DL
RBC CASTS #/AREA URNS LPF: ABNORMAL /[LPF]
RENAL EPI CELLS #/AREA URNS HPF: ABNORMAL /[HPF]
SP GR UR: 1.01 (ref 1–1.03)
SPECIMEN SOURCE: ABNORMAL
SPERM URNS QL MICRO: ABNORMAL
T VAGINALIS URNS QL MICRO: ABNORMAL
TRI-PHOS CRY URNS QL MICRO: ABNORMAL
URATE CRY URNS QL MICRO: ABNORMAL
URNS CMNT MICRO: ABNORMAL
UROBILINOGEN UR QL: 0.2 MG/DL (ref 0–1)
WAXY CASTS #/AREA URNS LPF: ABNORMAL /[LPF]
WBC CASTS #/AREA URNS LPF: ABNORMAL /[LPF]
YEAST HYPHAE URNS QL MICRO: ABNORMAL
YEAST URNS QL MICRO: ABNORMAL

## 2017-12-14 ENCOUNTER — BH HISTORICAL (OUTPATIENT)
Dept: OTHER | Age: 42
End: 2017-12-14

## 2018-01-11 ENCOUNTER — BH HISTORICAL (OUTPATIENT)
Dept: OTHER | Age: 43
End: 2018-01-11

## 2018-02-08 ENCOUNTER — BH HISTORICAL (OUTPATIENT)
Dept: OTHER | Age: 43
End: 2018-02-08

## 2018-03-20 ENCOUNTER — BH HISTORICAL (OUTPATIENT)
Dept: OTHER | Age: 43
End: 2018-03-20

## 2018-04-17 ENCOUNTER — BH HISTORICAL (OUTPATIENT)
Dept: OTHER | Age: 43
End: 2018-04-17

## 2018-05-24 ENCOUNTER — BH HISTORICAL (OUTPATIENT)
Dept: OTHER | Age: 43
End: 2018-05-24

## 2018-06-21 ENCOUNTER — BH HISTORICAL (OUTPATIENT)
Dept: OTHER | Age: 43
End: 2018-06-21

## 2018-07-24 ENCOUNTER — BH HISTORICAL (OUTPATIENT)
Dept: OTHER | Age: 43
End: 2018-07-24

## 2018-08-23 ENCOUNTER — BH HISTORICAL (OUTPATIENT)
Dept: OTHER | Age: 43
End: 2018-08-23

## 2018-09-20 ENCOUNTER — BH HISTORICAL (OUTPATIENT)
Dept: OTHER | Age: 43
End: 2018-09-20

## 2018-10-19 ENCOUNTER — BH HISTORICAL (OUTPATIENT)
Dept: OTHER | Age: 43
End: 2018-10-19

## 2018-11-19 ENCOUNTER — BH HISTORICAL (OUTPATIENT)
Dept: OTHER | Age: 43
End: 2018-11-19

## 2018-12-15 RX ORDER — MIRTAZAPINE 15 MG/1
TABLET, FILM COATED ORAL
COMMUNITY
Start: 2018-08-23 | End: 2019-02-11 | Stop reason: SDUPTHER

## 2018-12-15 RX ORDER — LITHIUM CARBONATE 600 MG/1
CAPSULE ORAL
COMMUNITY
Start: 2018-10-01 | End: 2018-12-26 | Stop reason: SDUPTHER

## 2018-12-18 ENCOUNTER — APPOINTMENT (OUTPATIENT)
Dept: BEHAVIORAL HEALTH | Age: 43
End: 2018-12-18

## 2018-12-20 ENCOUNTER — APPOINTMENT (OUTPATIENT)
Dept: BEHAVIORAL HEALTH | Age: 43
End: 2018-12-20

## 2018-12-20 ENCOUNTER — OFFICE VISIT (OUTPATIENT)
Dept: BEHAVIORAL HEALTH | Age: 43
End: 2018-12-20

## 2018-12-20 DIAGNOSIS — F25.9 SCHIZOAFFECTIVE DISORDER, UNSPECIFIED TYPE (CMD): Primary | ICD-10-CM

## 2018-12-20 PROCEDURE — 99213 OFFICE O/P EST LOW 20 MIN: CPT | Performed by: PSYCHIATRY & NEUROLOGY

## 2018-12-26 ENCOUNTER — TELEPHONE (OUTPATIENT)
Dept: BEHAVIORAL HEALTH | Age: 43
End: 2018-12-26

## 2018-12-26 RX ORDER — LITHIUM CARBONATE 600 MG/1
600 CAPSULE ORAL 2 TIMES DAILY
Qty: 60 CAPSULE | Refills: 1 | Status: SHIPPED | OUTPATIENT
Start: 2018-12-26 | End: 2019-02-21 | Stop reason: SDUPTHER

## 2019-01-07 RX ORDER — ARIPIPRAZOLE 400 MG
KIT INTRAMUSCULAR
Qty: 1 EACH | Refills: 6 | Status: SHIPPED | OUTPATIENT
Start: 2019-01-07 | End: 2019-06-27 | Stop reason: SDUPTHER

## 2019-01-23 ENCOUNTER — TELEPHONE (OUTPATIENT)
Dept: BEHAVIORAL HEALTH | Age: 44
End: 2019-01-23

## 2019-01-23 ENCOUNTER — OFFICE VISIT (OUTPATIENT)
Dept: BEHAVIORAL HEALTH | Age: 44
End: 2019-01-23

## 2019-01-23 DIAGNOSIS — F31.9 BIPOLAR 1 DISORDER (CMD): Primary | ICD-10-CM

## 2019-01-23 PROCEDURE — 96372 THER/PROPH/DIAG INJ SC/IM: CPT | Performed by: PSYCHIATRY & NEUROLOGY

## 2019-01-23 PROCEDURE — 99213 OFFICE O/P EST LOW 20 MIN: CPT | Performed by: PSYCHIATRY & NEUROLOGY

## 2019-01-23 SDOH — HEALTH STABILITY: MENTAL HEALTH: HOW OFTEN DO YOU HAVE A DRINK CONTAINING ALCOHOL?: NEVER

## 2019-02-01 ENCOUNTER — TELEPHONE (OUTPATIENT)
Dept: BEHAVIORAL HEALTH | Age: 44
End: 2019-02-01

## 2019-02-11 RX ORDER — MIRTAZAPINE 15 MG/1
TABLET, FILM COATED ORAL
Qty: 30 TABLET | Refills: 2 | Status: SHIPPED | OUTPATIENT
Start: 2019-02-11 | End: 2019-02-21 | Stop reason: SDUPTHER

## 2019-02-20 ENCOUNTER — TELEPHONE (OUTPATIENT)
Dept: BEHAVIORAL HEALTH | Age: 44
End: 2019-02-20

## 2019-02-20 ENCOUNTER — OFFICE VISIT (OUTPATIENT)
Dept: BEHAVIORAL HEALTH | Age: 44
End: 2019-02-20

## 2019-02-20 DIAGNOSIS — F31.9 BIPOLAR 1 DISORDER (CMD): Primary | ICD-10-CM

## 2019-02-20 PROCEDURE — 96372 THER/PROPH/DIAG INJ SC/IM: CPT | Performed by: PSYCHIATRY & NEUROLOGY

## 2019-02-20 PROCEDURE — 99212 OFFICE O/P EST SF 10 MIN: CPT | Performed by: PSYCHIATRY & NEUROLOGY

## 2019-02-20 RX ORDER — LAMOTRIGINE 25 MG/1
50 TABLET ORAL DAILY
Qty: 60 TABLET | Refills: 0 | Status: SHIPPED | OUTPATIENT
Start: 2019-02-20 | End: 2019-02-20 | Stop reason: SDUPTHER

## 2019-02-20 RX ORDER — LAMOTRIGINE 25 MG/1
50 TABLET ORAL DAILY
Qty: 60 TABLET | Refills: 0 | Status: SHIPPED | OUTPATIENT
Start: 2019-02-20 | End: 2019-02-21 | Stop reason: SDUPTHER

## 2019-02-21 RX ORDER — LAMOTRIGINE 25 MG/1
50 TABLET ORAL DAILY
Qty: 60 TABLET | Refills: 0 | Status: SHIPPED | OUTPATIENT
Start: 2019-02-21 | End: 2019-03-20 | Stop reason: SDUPTHER

## 2019-02-21 RX ORDER — LITHIUM CARBONATE 600 MG/1
600 CAPSULE ORAL 2 TIMES DAILY
Qty: 60 CAPSULE | Refills: 1 | Status: SHIPPED | OUTPATIENT
Start: 2019-02-21 | End: 2019-04-19 | Stop reason: SDUPTHER

## 2019-02-21 RX ORDER — MIRTAZAPINE 15 MG/1
15 TABLET, FILM COATED ORAL NIGHTLY
Qty: 30 TABLET | Refills: 2 | Status: SHIPPED | OUTPATIENT
Start: 2019-02-21 | End: 2019-03-23

## 2019-03-13 ENCOUNTER — LAB SERVICES (OUTPATIENT)
Dept: LAB | Age: 44
End: 2019-03-13

## 2019-03-13 DIAGNOSIS — F31.9 BIPOLAR 1 DISORDER (CMD): ICD-10-CM

## 2019-03-13 LAB
ALBUMIN SERPL-MCNC: 4.2 G/DL (ref 3.6–5.1)
ALBUMIN/GLOB SERPL: 1.5 {RATIO} (ref 1–2.4)
ALP SERPL-CCNC: 90 UNITS/L (ref 45–117)
ALT SERPL-CCNC: 38 UNITS/L
ANION GAP SERPL CALC-SCNC: 14 MMOL/L (ref 10–20)
AST SERPL-CCNC: 25 UNITS/L
BILIRUB SERPL-MCNC: 0.4 MG/DL (ref 0.2–1)
BUN SERPL-MCNC: 10 MG/DL (ref 6–20)
BUN/CREAT SERPL: 12 (ref 7–25)
CALCIUM SERPL-MCNC: 9.2 MG/DL (ref 8.4–10.2)
CHLORIDE SERPL-SCNC: 104 MMOL/L (ref 98–107)
CO2 SERPL-SCNC: 29 MMOL/L (ref 21–32)
CREAT SERPL-MCNC: 0.83 MG/DL (ref 0.67–1.17)
FASTING STATUS PATIENT QL REPORTED: 12 HRS
GLOBULIN SER-MCNC: 2.8 G/DL (ref 2–4)
GLUCOSE SERPL-MCNC: 95 MG/DL (ref 65–99)
LITHIUM SERPL-SCNC: 1 MMOL/L (ref 0.6–1.2)
POTASSIUM SERPL-SCNC: 4.4 MMOL/L (ref 3.4–5.1)
PROT SERPL-MCNC: 7 G/DL (ref 6.4–8.2)
SODIUM SERPL-SCNC: 143 MMOL/L (ref 135–145)

## 2019-03-13 PROCEDURE — 36415 COLL VENOUS BLD VENIPUNCTURE: CPT | Performed by: PSYCHIATRY & NEUROLOGY

## 2019-03-13 PROCEDURE — 80178 ASSAY OF LITHIUM: CPT | Performed by: PSYCHIATRY & NEUROLOGY

## 2019-03-13 PROCEDURE — 80053 COMPREHEN METABOLIC PANEL: CPT | Performed by: PSYCHIATRY & NEUROLOGY

## 2019-03-15 ENCOUNTER — TELEPHONE (OUTPATIENT)
Dept: BEHAVIORAL HEALTH | Age: 44
End: 2019-03-15

## 2019-03-20 ENCOUNTER — OFFICE VISIT (OUTPATIENT)
Dept: BEHAVIORAL HEALTH | Age: 44
End: 2019-03-20

## 2019-03-20 DIAGNOSIS — F31.9 BIPOLAR 1 DISORDER (CMD): Primary | ICD-10-CM

## 2019-03-20 PROCEDURE — 96372 THER/PROPH/DIAG INJ SC/IM: CPT | Performed by: PSYCHIATRY & NEUROLOGY

## 2019-03-20 RX ORDER — CLONAZEPAM 1 MG/1
1 TABLET ORAL DAILY
Qty: 30 TABLET | Refills: 1 | Status: SHIPPED | OUTPATIENT
Start: 2019-03-20 | End: 2019-05-16 | Stop reason: ALTCHOICE

## 2019-03-20 RX ORDER — LAMOTRIGINE 25 MG/1
50 TABLET ORAL 2 TIMES DAILY
Qty: 120 TABLET | Refills: 0 | Status: SHIPPED | OUTPATIENT
Start: 2019-03-20 | End: 2019-04-18 | Stop reason: ALTCHOICE

## 2019-04-16 RX ORDER — LAMOTRIGINE 25 MG/1
50 TABLET ORAL DAILY
Qty: 60 TABLET | Refills: 0 | Status: SHIPPED | OUTPATIENT
Start: 2019-04-16 | End: 2019-05-08 | Stop reason: SDUPTHER

## 2019-04-18 ENCOUNTER — OFFICE VISIT (OUTPATIENT)
Dept: BEHAVIORAL HEALTH | Age: 44
End: 2019-04-18

## 2019-04-18 DIAGNOSIS — F31.9 BIPOLAR 1 DISORDER (CMD): Primary | ICD-10-CM

## 2019-04-18 PROCEDURE — 96372 THER/PROPH/DIAG INJ SC/IM: CPT | Performed by: NURSE PRACTITIONER

## 2019-04-18 PROCEDURE — 99213 OFFICE O/P EST LOW 20 MIN: CPT | Performed by: NURSE PRACTITIONER

## 2019-04-18 RX ORDER — MIRTAZAPINE 15 MG/1
15 TABLET, FILM COATED ORAL NIGHTLY
Status: SHIPPED | COMMUNITY
Start: 2019-04-18 | End: 2019-05-16 | Stop reason: ALTCHOICE

## 2019-04-18 ASSESSMENT — ABNORMAL INVOLUNTARY MOVEMENT SCALE (AIMS)
FACIAL_EXPRESSION_MUSCLES: NONE, NORMAL
PATIENT_WEARS_DENTURES: NO
TONGUE: NONE, NORMAL
AIMS_PATIENT_INCAPACITATION: NONE, NORMAL
NECK_SHOULDER_HIPS: NONE, NORMAL
LIPS_PARIETAL: NONE, NORMAL
JAW: NONE, NORMAL
LOWER_BODY_EXTREMITIES: NONE, NORMAL
AIMS_PATIENT_AWARENESS: NO AWARENESS
UPPER_BODY_EXTREMITIES: NONE, NORMAL
CURRENT_PROBLEMS_TEETH_DENTURES: NO

## 2019-04-19 RX ORDER — LITHIUM CARBONATE 600 MG/1
600 CAPSULE ORAL 2 TIMES DAILY
Qty: 60 CAPSULE | Refills: 0 | Status: SHIPPED | OUTPATIENT
Start: 2019-04-19 | End: 2019-05-11 | Stop reason: SDUPTHER

## 2019-05-08 RX ORDER — LAMOTRIGINE 25 MG/1
TABLET ORAL
Qty: 60 TABLET | Refills: 0 | Status: SHIPPED | OUTPATIENT
Start: 2019-05-08 | End: 2019-06-27 | Stop reason: SDUPTHER

## 2019-05-13 RX ORDER — LITHIUM CARBONATE 600 MG/1
CAPSULE ORAL
Qty: 60 CAPSULE | Refills: 0 | Status: SHIPPED | OUTPATIENT
Start: 2019-05-13 | End: 2019-06-07 | Stop reason: SDUPTHER

## 2019-05-16 ENCOUNTER — OFFICE VISIT (OUTPATIENT)
Dept: BEHAVIORAL HEALTH | Age: 44
End: 2019-05-16

## 2019-05-16 DIAGNOSIS — F31.9 BIPOLAR 1 DISORDER (CMD): Primary | ICD-10-CM

## 2019-05-16 PROCEDURE — 99213 OFFICE O/P EST LOW 20 MIN: CPT | Performed by: NURSE PRACTITIONER

## 2019-05-16 PROCEDURE — 96372 THER/PROPH/DIAG INJ SC/IM: CPT | Performed by: NURSE PRACTITIONER

## 2019-05-16 ASSESSMENT — ABNORMAL INVOLUNTARY MOVEMENT SCALE (AIMS)
CURRENT_PROBLEMS_TEETH_DENTURES: NO
PATIENT_WEARS_DENTURES: NO
AIMS_PATIENT_INCAPACITATION: NONE, NORMAL
FACIAL_EXPRESSION_MUSCLES: NONE, NORMAL
UPPER_BODY_EXTREMITIES: NONE, NORMAL
TONGUE: NONE, NORMAL
LOWER_BODY_EXTREMITIES: NONE, NORMAL
LIPS_PARIETAL: NONE, NORMAL
NECK_SHOULDER_HIPS: NONE, NORMAL
JAW: NONE, NORMAL
AIMS_PATIENT_AWARENESS: NO AWARENESS

## 2019-06-10 RX ORDER — LITHIUM CARBONATE 600 MG/1
CAPSULE ORAL
Qty: 60 CAPSULE | Refills: 0 | Status: SHIPPED | OUTPATIENT
Start: 2019-06-10 | End: 2019-07-10 | Stop reason: SDUPTHER

## 2019-06-13 ENCOUNTER — OFFICE VISIT (OUTPATIENT)
Dept: BEHAVIORAL HEALTH | Age: 44
End: 2019-06-13

## 2019-06-13 DIAGNOSIS — F31.9 BIPOLAR 1 DISORDER (CMD): Primary | ICD-10-CM

## 2019-06-13 PROCEDURE — 99213 OFFICE O/P EST LOW 20 MIN: CPT | Performed by: NURSE PRACTITIONER

## 2019-06-13 PROCEDURE — 96372 THER/PROPH/DIAG INJ SC/IM: CPT | Performed by: NURSE PRACTITIONER

## 2019-06-24 ENCOUNTER — APPOINTMENT (OUTPATIENT)
Dept: BEHAVIORAL HEALTH | Age: 44
End: 2019-06-24

## 2019-06-27 ENCOUNTER — OFFICE VISIT (OUTPATIENT)
Dept: BEHAVIORAL HEALTH | Age: 44
End: 2019-06-27

## 2019-06-27 DIAGNOSIS — F25.9 SCHIZOAFFECTIVE DISORDER, UNSPECIFIED TYPE (CMD): ICD-10-CM

## 2019-06-27 DIAGNOSIS — F31.9 BIPOLAR 1 DISORDER (CMD): Primary | ICD-10-CM

## 2019-06-27 PROCEDURE — 99212 OFFICE O/P EST SF 10 MIN: CPT | Performed by: PSYCHIATRY & NEUROLOGY

## 2019-06-27 RX ORDER — VARENICLINE TARTRATE 1 MG/1
1 TABLET, FILM COATED ORAL 2 TIMES DAILY
Qty: 60 TABLET | Refills: 3 | Status: SHIPPED | OUTPATIENT
Start: 2019-06-27 | End: 2019-08-08

## 2019-06-27 RX ORDER — LAMOTRIGINE 100 MG/1
100 TABLET ORAL DAILY
Qty: 30 TABLET | Refills: 0 | Status: SHIPPED | OUTPATIENT
Start: 2019-06-27 | End: 2019-07-11 | Stop reason: SDUPTHER

## 2019-07-10 RX ORDER — LITHIUM CARBONATE 600 MG/1
CAPSULE ORAL
Qty: 60 CAPSULE | Refills: 0 | Status: SHIPPED | OUTPATIENT
Start: 2019-07-10 | End: 2019-07-11

## 2019-07-11 ENCOUNTER — OFFICE VISIT (OUTPATIENT)
Dept: BEHAVIORAL HEALTH | Age: 44
End: 2019-07-11

## 2019-07-11 DIAGNOSIS — F31.9 BIPOLAR 1 DISORDER (CMD): Primary | ICD-10-CM

## 2019-07-11 PROCEDURE — 99212 OFFICE O/P EST SF 10 MIN: CPT | Performed by: PSYCHIATRY & NEUROLOGY

## 2019-07-11 PROCEDURE — 96372 THER/PROPH/DIAG INJ SC/IM: CPT | Performed by: PSYCHIATRY & NEUROLOGY

## 2019-07-11 RX ORDER — LAMOTRIGINE 100 MG/1
100 TABLET ORAL 2 TIMES DAILY
Qty: 30 TABLET | Refills: 0 | Status: SHIPPED | OUTPATIENT
Start: 2019-07-11 | End: 2019-07-22 | Stop reason: SDUPTHER

## 2019-07-22 RX ORDER — LAMOTRIGINE 100 MG/1
TABLET ORAL
Qty: 30 TABLET | Refills: 0 | Status: SHIPPED | OUTPATIENT
Start: 2019-07-22 | End: 2019-09-05 | Stop reason: SDUPTHER

## 2019-07-22 RX ORDER — LAMOTRIGINE 100 MG/1
TABLET ORAL
Qty: 30 TABLET | Refills: 0 | Status: SHIPPED | OUTPATIENT
Start: 2019-07-22 | End: 2019-08-20 | Stop reason: SDUPTHER

## 2019-08-05 RX ORDER — LITHIUM CARBONATE 600 MG/1
CAPSULE ORAL
Qty: 60 CAPSULE | Refills: 0 | OUTPATIENT
Start: 2019-08-05

## 2019-08-05 RX ORDER — LAMOTRIGINE 100 MG/1
TABLET ORAL
Qty: 30 TABLET | Refills: 0 | OUTPATIENT
Start: 2019-08-05

## 2019-08-08 ENCOUNTER — OFFICE VISIT (OUTPATIENT)
Dept: BEHAVIORAL HEALTH | Age: 44
End: 2019-08-08

## 2019-08-08 DIAGNOSIS — F31.9 BIPOLAR 1 DISORDER (CMD): Primary | ICD-10-CM

## 2019-08-08 PROCEDURE — 96372 THER/PROPH/DIAG INJ SC/IM: CPT | Performed by: PSYCHIATRY & NEUROLOGY

## 2019-08-20 RX ORDER — LAMOTRIGINE 100 MG/1
TABLET ORAL
Qty: 30 TABLET | Refills: 0 | Status: SHIPPED | OUTPATIENT
Start: 2019-08-20 | End: 2019-08-31 | Stop reason: SDUPTHER

## 2019-08-20 RX ORDER — LITHIUM CARBONATE 600 MG/1
CAPSULE ORAL
Qty: 60 CAPSULE | Refills: 0 | Status: SHIPPED | OUTPATIENT
Start: 2019-08-20 | End: 2019-09-05 | Stop reason: CLARIF

## 2019-09-03 RX ORDER — LAMOTRIGINE 100 MG/1
TABLET ORAL
Qty: 30 TABLET | Refills: 0 | Status: SHIPPED | OUTPATIENT
Start: 2019-09-03 | End: 2019-09-05 | Stop reason: SDUPTHER

## 2019-09-05 ENCOUNTER — OFFICE VISIT (OUTPATIENT)
Dept: BEHAVIORAL HEALTH | Age: 44
End: 2019-09-05

## 2019-09-05 DIAGNOSIS — F31.9 BIPOLAR 1 DISORDER (CMD): Primary | ICD-10-CM

## 2019-09-05 PROCEDURE — 96372 THER/PROPH/DIAG INJ SC/IM: CPT | Performed by: PSYCHIATRY & NEUROLOGY

## 2019-09-05 RX ORDER — LAMOTRIGINE 100 MG/1
100 TABLET ORAL 2 TIMES DAILY
Qty: 60 TABLET | Refills: 2 | Status: SHIPPED | OUTPATIENT
Start: 2019-09-05 | End: 2019-11-12 | Stop reason: SDUPTHER

## 2019-09-23 RX ORDER — VARENICLINE TARTRATE 1 MG/1
TABLET, FILM COATED ORAL
Qty: 180 TABLET | Refills: 1 | Status: ON HOLD | OUTPATIENT
Start: 2019-09-23 | End: 2020-04-12

## 2019-10-08 ENCOUNTER — APPOINTMENT (OUTPATIENT)
Dept: BEHAVIORAL HEALTH | Age: 44
End: 2019-10-08

## 2019-10-10 ENCOUNTER — OFFICE VISIT (OUTPATIENT)
Dept: BEHAVIORAL HEALTH | Age: 44
End: 2019-10-10

## 2019-10-10 DIAGNOSIS — F31.9 BIPOLAR 1 DISORDER (CMD): Primary | ICD-10-CM

## 2019-10-10 PROCEDURE — 99214 OFFICE O/P EST MOD 30 MIN: CPT | Performed by: NURSE PRACTITIONER

## 2019-10-10 PROCEDURE — 96372 THER/PROPH/DIAG INJ SC/IM: CPT | Performed by: NURSE PRACTITIONER

## 2019-10-10 ASSESSMENT — ENCOUNTER SYMPTOMS
SPEECH DIFFICULTY: 0
WEAKNESS: 0
NUMBNESS: 0
ABDOMINAL PAIN: 0
SEIZURES: 0
TREMORS: 0
CONFUSION: 0
AGITATION: 0
NERVOUS/ANXIOUS: 0
FACIAL ASYMMETRY: 0
APPETITE CHANGE: 0
SHORTNESS OF BREATH: 0
HEADACHES: 0
DIZZINESS: 0
LIGHT-HEADEDNESS: 0

## 2019-10-10 ASSESSMENT — ABNORMAL INVOLUNTARY MOVEMENT SCALE (AIMS)
JAW: NONE, NORMAL
AIMS_PATIENT_AWARENESS: NO AWARENESS
FACIAL_EXPRESSION_MUSCLES: NONE, NORMAL
LOWER_BODY_EXTREMITIES: NONE, NORMAL
NECK_SHOULDER_HIPS: NONE, NORMAL
TONGUE: NONE, NORMAL
AIMS_PATIENT_INCAPACITATION: NONE, NORMAL
CURRENT_PROBLEMS_TEETH_DENTURES: NO
UPPER_BODY_EXTREMITIES: NONE, NORMAL
LIPS_PARIETAL: NONE, NORMAL
PATIENT_WEARS_DENTURES: NO

## 2019-11-12 ENCOUNTER — OFFICE VISIT (OUTPATIENT)
Dept: BEHAVIORAL HEALTH | Age: 44
End: 2019-11-12

## 2019-11-12 DIAGNOSIS — F31.9 BIPOLAR 1 DISORDER (CMD): Primary | ICD-10-CM

## 2019-11-12 DIAGNOSIS — F25.9 SCHIZOAFFECTIVE DISORDER, UNSPECIFIED TYPE (CMD): ICD-10-CM

## 2019-11-12 PROCEDURE — 96372 THER/PROPH/DIAG INJ SC/IM: CPT | Performed by: PSYCHIATRY & NEUROLOGY

## 2019-11-12 PROCEDURE — 99212 OFFICE O/P EST SF 10 MIN: CPT | Performed by: PSYCHIATRY & NEUROLOGY

## 2019-11-12 RX ORDER — HYDROXYZINE HYDROCHLORIDE 25 MG/1
25 TABLET, FILM COATED ORAL 3 TIMES DAILY
Qty: 30 TABLET | Refills: 0 | Status: SHIPPED | OUTPATIENT
Start: 2019-11-12 | End: 2019-12-17 | Stop reason: SDUPTHER

## 2019-11-12 RX ORDER — LAMOTRIGINE 200 MG/1
200 TABLET ORAL 2 TIMES DAILY
Qty: 30 TABLET | Refills: 1 | Status: SHIPPED | OUTPATIENT
Start: 2019-11-12 | End: 2020-02-13 | Stop reason: SDUPTHER

## 2019-12-10 RX ORDER — LAMOTRIGINE 100 MG/1
TABLET ORAL
Qty: 180 TABLET | Refills: 0 | Status: SHIPPED | OUTPATIENT
Start: 2019-12-10 | End: 2019-12-17 | Stop reason: SDUPTHER

## 2019-12-17 ENCOUNTER — OFFICE VISIT (OUTPATIENT)
Dept: BEHAVIORAL HEALTH | Age: 44
End: 2019-12-17

## 2019-12-17 DIAGNOSIS — F31.9 BIPOLAR 1 DISORDER (CMD): Primary | ICD-10-CM

## 2019-12-17 PROCEDURE — 99212 OFFICE O/P EST SF 10 MIN: CPT | Performed by: PSYCHIATRY & NEUROLOGY

## 2019-12-17 PROCEDURE — 96372 THER/PROPH/DIAG INJ SC/IM: CPT | Performed by: PSYCHIATRY & NEUROLOGY

## 2019-12-17 RX ORDER — HYDROXYZINE 50 MG/1
25 TABLET, FILM COATED ORAL 2 TIMES DAILY
Qty: 60 TABLET | Refills: 1 | Status: SHIPPED | OUTPATIENT
Start: 2019-12-17 | End: 2020-01-16

## 2020-01-16 ENCOUNTER — OFFICE VISIT (OUTPATIENT)
Dept: BEHAVIORAL HEALTH | Age: 45
End: 2020-01-16

## 2020-01-16 DIAGNOSIS — F31.9 BIPOLAR 1 DISORDER (CMD): Primary | ICD-10-CM

## 2020-01-16 PROCEDURE — 96372 THER/PROPH/DIAG INJ SC/IM: CPT | Performed by: PSYCHIATRY & NEUROLOGY

## 2020-01-16 PROCEDURE — 99212 OFFICE O/P EST SF 10 MIN: CPT | Performed by: PSYCHIATRY & NEUROLOGY

## 2020-01-16 RX ORDER — CLONAZEPAM 1 MG/1
1 TABLET ORAL 2 TIMES DAILY
Qty: 30 TABLET | Refills: 2 | Status: SHIPPED | OUTPATIENT
Start: 2020-01-16 | End: 2020-02-13 | Stop reason: SDUPTHER

## 2020-02-13 ENCOUNTER — OFFICE VISIT (OUTPATIENT)
Dept: BEHAVIORAL HEALTH | Age: 45
End: 2020-02-13

## 2020-02-13 DIAGNOSIS — F25.9 SCHIZOAFFECTIVE DISORDER, UNSPECIFIED TYPE (CMD): Primary | ICD-10-CM

## 2020-02-13 PROCEDURE — 99212 OFFICE O/P EST SF 10 MIN: CPT | Performed by: PSYCHIATRY & NEUROLOGY

## 2020-02-13 PROCEDURE — 96372 THER/PROPH/DIAG INJ SC/IM: CPT | Performed by: PSYCHIATRY & NEUROLOGY

## 2020-02-13 RX ORDER — CLONAZEPAM 1 MG/1
0.5 TABLET ORAL 2 TIMES DAILY
Qty: 30 TABLET | Refills: 2 | Status: ON HOLD | OUTPATIENT
Start: 2020-02-13 | End: 2020-04-17 | Stop reason: HOSPADM

## 2020-02-13 RX ORDER — LAMOTRIGINE 200 MG/1
200 TABLET ORAL 2 TIMES DAILY
Qty: 30 TABLET | Refills: 1 | Status: SHIPPED | OUTPATIENT
Start: 2020-02-13 | End: 2020-03-17

## 2020-03-12 ENCOUNTER — APPOINTMENT (OUTPATIENT)
Dept: BEHAVIORAL HEALTH | Age: 45
End: 2020-03-12

## 2020-03-12 ENCOUNTER — OFFICE VISIT (OUTPATIENT)
Dept: BEHAVIORAL HEALTH | Age: 45
End: 2020-03-12

## 2020-03-12 DIAGNOSIS — F25.9 SCHIZOAFFECTIVE DISORDER, UNSPECIFIED TYPE (CMD): Primary | ICD-10-CM

## 2020-03-12 PROCEDURE — 96372 THER/PROPH/DIAG INJ SC/IM: CPT | Performed by: NURSE PRACTITIONER

## 2020-03-12 PROCEDURE — 99213 OFFICE O/P EST LOW 20 MIN: CPT | Performed by: NURSE PRACTITIONER

## 2020-03-17 RX ORDER — LAMOTRIGINE 200 MG/1
200 TABLET ORAL 2 TIMES DAILY
Qty: 180 TABLET | Refills: 0 | Status: ON HOLD | OUTPATIENT
Start: 2020-03-17 | End: 2020-04-12

## 2020-04-09 ENCOUNTER — APPOINTMENT (OUTPATIENT)
Dept: BEHAVIORAL HEALTH | Age: 45
End: 2020-04-09

## 2020-04-10 ENCOUNTER — HOSPITAL ENCOUNTER (EMERGENCY)
Age: 45
Discharge: HOME | DRG: 885 | End: 2020-04-10
Attending: EMERGENCY MEDICINE

## 2020-04-10 VITALS
TEMPERATURE: 98.8 F | OXYGEN SATURATION: 97 % | RESPIRATION RATE: 18 BRPM | SYSTOLIC BLOOD PRESSURE: 140 MMHG | HEART RATE: 100 BPM | WEIGHT: 260.58 LBS | DIASTOLIC BLOOD PRESSURE: 89 MMHG

## 2020-04-10 DIAGNOSIS — F41.9 ANXIETY: Primary | ICD-10-CM

## 2020-04-10 LAB
ALBUMIN SERPL-MCNC: 4.1 G/DL (ref 3.6–5.1)
ALBUMIN/GLOB SERPL: 1.1 {RATIO} (ref 1–2.4)
ALP SERPL-CCNC: 105 UNITS/L (ref 45–117)
ALT SERPL-CCNC: 47 UNITS/L
ANION GAP SERPL CALC-SCNC: 13 MMOL/L (ref 10–20)
AST SERPL-CCNC: 32 UNITS/L
BASOPHILS # BLD: 0 K/MCL (ref 0–0.3)
BASOPHILS NFR BLD: 0 %
BILIRUB SERPL-MCNC: 0.8 MG/DL (ref 0.2–1)
BUN SERPL-MCNC: 8 MG/DL (ref 6–20)
BUN/CREAT SERPL: 10 (ref 7–25)
CALCIUM SERPL-MCNC: 9 MG/DL (ref 8.4–10.2)
CHLORIDE SERPL-SCNC: 105 MMOL/L (ref 98–107)
CO2 SERPL-SCNC: 22 MMOL/L (ref 21–32)
CREAT SERPL-MCNC: 0.84 MG/DL (ref 0.67–1.17)
DIFFERENTIAL METHOD BLD: NORMAL
EOSINOPHIL # BLD: 0.1 K/MCL (ref 0.1–0.5)
EOSINOPHIL NFR BLD: 1 %
ERYTHROCYTE [DISTWIDTH] IN BLOOD: 12.4 % (ref 11–15)
GLOBULIN SER-MCNC: 3.6 G/DL (ref 2–4)
GLUCOSE SERPL-MCNC: 100 MG/DL (ref 65–99)
HCT VFR BLD CALC: 44.6 % (ref 39–51)
HGB BLD-MCNC: 15.7 G/DL (ref 13–17)
IMM GRANULOCYTES # BLD AUTO: 0 K/MCL (ref 0–0.2)
IMM GRANULOCYTES NFR BLD: 0 %
LIPASE SERPL-CCNC: 76 UNITS/L (ref 73–393)
LYMPHOCYTES # BLD: 1.6 K/MCL (ref 1–4.8)
LYMPHOCYTES NFR BLD: 20 %
MCH RBC QN AUTO: 32.5 PG (ref 26–34)
MCHC RBC AUTO-ENTMCNC: 35.2 G/DL (ref 32–36.5)
MCV RBC AUTO: 92.3 FL (ref 78–100)
MONOCYTES # BLD: 0.7 K/MCL (ref 0.3–0.9)
MONOCYTES NFR BLD: 8 %
NEUTROPHILS # BLD: 5.9 K/MCL (ref 1.8–7.7)
NEUTROPHILS NFR BLD: 71 %
NRBC BLD MANUAL-RTO: 0 /100 WBC
PLATELET # BLD: 232 K/MCL (ref 140–450)
POTASSIUM SERPL-SCNC: 3.8 MMOL/L (ref 3.4–5.1)
PROT SERPL-MCNC: 7.7 G/DL (ref 6.4–8.2)
RBC # BLD: 4.83 MIL/MCL (ref 4.5–5.9)
SODIUM SERPL-SCNC: 136 MMOL/L (ref 135–145)
WBC # BLD: 8.3 K/MCL (ref 4.2–11)

## 2020-04-10 PROCEDURE — 85025 COMPLETE CBC W/AUTO DIFF WBC: CPT

## 2020-04-10 PROCEDURE — 80053 COMPREHEN METABOLIC PANEL: CPT

## 2020-04-10 PROCEDURE — 99284 EMERGENCY DEPT VISIT MOD MDM: CPT

## 2020-04-10 PROCEDURE — 90839 PSYTX CRISIS INITIAL 60 MIN: CPT

## 2020-04-10 PROCEDURE — 96374 THER/PROPH/DIAG INJ IV PUSH: CPT

## 2020-04-10 PROCEDURE — 10002800 HB RX 250 W HCPCS: Performed by: EMERGENCY MEDICINE

## 2020-04-10 PROCEDURE — 83690 ASSAY OF LIPASE: CPT

## 2020-04-10 RX ORDER — LORAZEPAM 2 MG/ML
1 INJECTION INTRAMUSCULAR ONCE
Status: COMPLETED | OUTPATIENT
Start: 2020-04-10 | End: 2020-04-10

## 2020-04-10 RX ADMIN — LORAZEPAM 1 MG: 2 INJECTION, SOLUTION INTRAMUSCULAR; INTRAVENOUS at 12:18

## 2020-04-10 ASSESSMENT — LIFESTYLE VARIABLES
ALCOHOL_USE_STATUS: NO OR LOW RISK WITH VALIDATED TOOL
AUDIT-C TOTAL SCORE: 0
HOW OFTEN DO YOU HAVE 6 OR MORE DRINKS ON ONE OCCASION: NEVER
HOW MANY STANDARD DRINKS CONTAINING ALCOHOL DO YOU HAVE ON A TYPICAL DAY: 0,1 OR 2
HOW OFTEN DO YOU HAVE A DRINK CONTAINING ALCOHOL: NEVER

## 2020-04-10 ASSESSMENT — COGNITIVE AND FUNCTIONAL STATUS - GENERAL
PERCEPTUAL_MISINTERPRETATIONS_HALLUCINATIONS: AUDITORY
MEMORY: INTACT
SPEECH: CLEAR/UNDERSTANDABLE;RAMBLING SPEECH
MOOD: HAPPY
AFFECT: APPROPRIATE TO SITUATION;EUPHORIC
BEHAVIOR: APPROPRIATE TO SITUATION
ORIENTATION: ORIENTED (PERSON/PLACE/TIME)

## 2020-04-10 ASSESSMENT — ENCOUNTER SYMPTOMS
HALLUCINATIONS: 1
NERVOUS/ANXIOUS: 1
CONSTITUTIONAL NEGATIVE: 1
RESPIRATORY NEGATIVE: 1
GASTROINTESTINAL NEGATIVE: 1
EYES NEGATIVE: 1
NEUROLOGICAL NEGATIVE: 1

## 2020-04-12 ENCOUNTER — HOSPITAL ENCOUNTER (INPATIENT)
Age: 45
LOS: 5 days | Discharge: HOME | DRG: 885 | End: 2020-04-17
Attending: EMERGENCY MEDICINE | Admitting: PSYCHIATRY & NEUROLOGY

## 2020-04-12 DIAGNOSIS — R45.851 SUICIDAL IDEATION: Primary | ICD-10-CM

## 2020-04-12 LAB
ALBUMIN SERPL-MCNC: 4.4 G/DL (ref 3.6–5.1)
ALP SERPL-CCNC: 106 UNITS/L (ref 45–117)
ALT SERPL-CCNC: 49 UNITS/L
AMPHETAMINES UR QL SCN>500 NG/ML: NEGATIVE
ANION GAP SERPL CALC-SCNC: 14 MMOL/L (ref 10–20)
AST SERPL-CCNC: 57 UNITS/L
BARBITURATES UR QL SCN>200 NG/ML: NEGATIVE
BASOPHILS # BLD: 0 K/MCL (ref 0–0.3)
BASOPHILS NFR BLD: 1 %
BENZODIAZ UR QL SCN>200 NG/ML: NEGATIVE
BILIRUB CONJ SERPL-MCNC: 0.1 MG/DL (ref 0–0.2)
BILIRUB SERPL-MCNC: 0.5 MG/DL (ref 0.2–1)
BUN SERPL-MCNC: 13 MG/DL (ref 6–20)
BUN/CREAT SERPL: 15 (ref 7–25)
BZE UR QL SCN>150 NG/ML: NEGATIVE
CALCIUM SERPL-MCNC: 8.9 MG/DL (ref 8.4–10.2)
CANNABINOIDS UR QL SCN>50 NG/ML: NEGATIVE
CHLORIDE SERPL-SCNC: 106 MMOL/L (ref 98–107)
CO2 SERPL-SCNC: 23 MMOL/L (ref 21–32)
CREAT SERPL-MCNC: 0.87 MG/DL (ref 0.67–1.17)
DIFFERENTIAL METHOD BLD: ABNORMAL
EOSINOPHIL # BLD: 0 K/MCL (ref 0.1–0.5)
EOSINOPHIL NFR BLD: 0 %
ERYTHROCYTE [DISTWIDTH] IN BLOOD: 12.4 % (ref 11–15)
ETHANOL SERPL-MCNC: NORMAL MG/DL
GLUCOSE SERPL-MCNC: 95 MG/DL (ref 65–99)
HCT VFR BLD CALC: 46.9 % (ref 39–51)
HGB BLD-MCNC: 16 G/DL (ref 13–17)
IMM GRANULOCYTES # BLD AUTO: 0 K/MCL (ref 0–0.2)
IMM GRANULOCYTES NFR BLD: 0 %
LYMPHOCYTES # BLD: 1.3 K/MCL (ref 1–4.8)
LYMPHOCYTES NFR BLD: 17 %
MAGNESIUM SERPL-MCNC: 2.6 MG/DL (ref 1.7–2.4)
MCH RBC QN AUTO: 32.5 PG (ref 26–34)
MCHC RBC AUTO-ENTMCNC: 34.1 G/DL (ref 32–36.5)
MCV RBC AUTO: 95.1 FL (ref 78–100)
MONOCYTES # BLD: 0.7 K/MCL (ref 0.3–0.9)
MONOCYTES NFR BLD: 9 %
NEUTROPHILS # BLD: 5.9 K/MCL (ref 1.8–7.7)
NEUTROPHILS NFR BLD: 73 %
NRBC BLD MANUAL-RTO: 0 /100 WBC
OPIATES UR QL SCN>300 NG/ML: NEGATIVE
PCP UR QL SCN>25 NG/ML: NEGATIVE
PLATELET # BLD: 223 K/MCL (ref 140–450)
POTASSIUM SERPL-SCNC: 3.9 MMOL/L (ref 3.4–5.1)
PROT SERPL-MCNC: 8.1 G/DL (ref 6.4–8.2)
RBC # BLD: 4.93 MIL/MCL (ref 4.5–5.9)
SODIUM SERPL-SCNC: 139 MMOL/L (ref 135–145)
TSH SERPL-ACNC: 1.04 MCUNITS/ML (ref 0.35–5)
WBC # BLD: 8.1 K/MCL (ref 4.2–11)

## 2020-04-12 PROCEDURE — 10002800 HB RX 250 W HCPCS: Performed by: EMERGENCY MEDICINE

## 2020-04-12 PROCEDURE — 10002807 HB RX 258: Performed by: EMERGENCY MEDICINE

## 2020-04-12 PROCEDURE — 83735 ASSAY OF MAGNESIUM: CPT

## 2020-04-12 PROCEDURE — 85025 COMPLETE CBC W/AUTO DIFF WBC: CPT

## 2020-04-12 PROCEDURE — 99285 EMERGENCY DEPT VISIT HI MDM: CPT

## 2020-04-12 PROCEDURE — 13003244 HB ROOM CHARGE INTENSIVE PSYCH

## 2020-04-12 PROCEDURE — 10002800 HB RX 250 W HCPCS

## 2020-04-12 PROCEDURE — 80320 DRUG SCREEN QUANTALCOHOLS: CPT

## 2020-04-12 PROCEDURE — 84443 ASSAY THYROID STIM HORMONE: CPT

## 2020-04-12 PROCEDURE — 80307 DRUG TEST PRSMV CHEM ANLYZR: CPT

## 2020-04-12 PROCEDURE — 80061 LIPID PANEL: CPT

## 2020-04-12 PROCEDURE — 96374 THER/PROPH/DIAG INJ IV PUSH: CPT

## 2020-04-12 PROCEDURE — 10002800 HB RX 250 W HCPCS: Performed by: PSYCHIATRY & NEUROLOGY

## 2020-04-12 PROCEDURE — 10002803 HB RX 637: Performed by: PSYCHIATRY & NEUROLOGY

## 2020-04-12 PROCEDURE — 90839 PSYTX CRISIS INITIAL 60 MIN: CPT

## 2020-04-12 PROCEDURE — 96375 TX/PRO/DX INJ NEW DRUG ADDON: CPT

## 2020-04-12 PROCEDURE — 80076 HEPATIC FUNCTION PANEL: CPT

## 2020-04-12 PROCEDURE — 80048 BASIC METABOLIC PNL TOTAL CA: CPT

## 2020-04-12 PROCEDURE — 96361 HYDRATE IV INFUSION ADD-ON: CPT

## 2020-04-12 PROCEDURE — 83036 HEMOGLOBIN GLYCOSYLATED A1C: CPT

## 2020-04-12 RX ORDER — OXCARBAZEPINE 150 MG/1
200 TABLET, FILM COATED ORAL 2 TIMES DAILY
Status: ON HOLD | COMMUNITY
End: 2020-04-17 | Stop reason: HOSPADM

## 2020-04-12 RX ORDER — LORAZEPAM 1 MG/1
1 TABLET ORAL EVERY 6 HOURS PRN
Status: DISCONTINUED | OUTPATIENT
Start: 2020-04-12 | End: 2020-04-17 | Stop reason: HOSPADM

## 2020-04-12 RX ORDER — NICOTINE 21 MG/24HR
1 PATCH, TRANSDERMAL 24 HOURS TRANSDERMAL DAILY
Status: DISCONTINUED | OUTPATIENT
Start: 2020-04-12 | End: 2020-04-17 | Stop reason: HOSPADM

## 2020-04-12 RX ORDER — LORAZEPAM 2 MG/ML
1 INJECTION INTRAMUSCULAR EVERY 6 HOURS PRN
Status: DISCONTINUED | OUTPATIENT
Start: 2020-04-12 | End: 2020-04-17 | Stop reason: HOSPADM

## 2020-04-12 RX ORDER — LORAZEPAM 2 MG/ML
INJECTION INTRAMUSCULAR
Status: COMPLETED
Start: 2020-04-12 | End: 2020-04-12

## 2020-04-12 RX ORDER — LORAZEPAM 2 MG/ML
2 INJECTION INTRAMUSCULAR ONCE
Status: COMPLETED | OUTPATIENT
Start: 2020-04-12 | End: 2020-04-12

## 2020-04-12 RX ORDER — OXCARBAZEPINE 300 MG/1
300 TABLET, FILM COATED ORAL EVERY 12 HOURS SCHEDULED
Status: DISCONTINUED | OUTPATIENT
Start: 2020-04-12 | End: 2020-04-17 | Stop reason: HOSPADM

## 2020-04-12 RX ORDER — HALOPERIDOL 5 MG/ML
5 INJECTION INTRAMUSCULAR EVERY 6 HOURS PRN
Status: DISCONTINUED | OUTPATIENT
Start: 2020-04-12 | End: 2020-04-17 | Stop reason: HOSPADM

## 2020-04-12 RX ORDER — CLONAZEPAM 0.5 MG/1
0.5 TABLET ORAL 2 TIMES DAILY
Status: DISCONTINUED | OUTPATIENT
Start: 2020-04-12 | End: 2020-04-17

## 2020-04-12 RX ORDER — HALOPERIDOL 5 MG/ML
2 INJECTION INTRAMUSCULAR ONCE
Status: COMPLETED | OUTPATIENT
Start: 2020-04-12 | End: 2020-04-12

## 2020-04-12 RX ORDER — HALOPERIDOL 5 MG/1
5 TABLET ORAL EVERY 6 HOURS PRN
Status: DISCONTINUED | OUTPATIENT
Start: 2020-04-12 | End: 2020-04-17 | Stop reason: HOSPADM

## 2020-04-12 RX ORDER — BENZTROPINE MESYLATE 1 MG/ML
1 INJECTION INTRAMUSCULAR; INTRAVENOUS EVERY 4 HOURS PRN
Status: DISCONTINUED | OUTPATIENT
Start: 2020-04-12 | End: 2020-04-17 | Stop reason: HOSPADM

## 2020-04-12 RX ORDER — BENZTROPINE MESYLATE 1 MG/1
1 TABLET ORAL EVERY 4 HOURS PRN
Status: DISCONTINUED | OUTPATIENT
Start: 2020-04-12 | End: 2020-04-17 | Stop reason: HOSPADM

## 2020-04-12 RX ORDER — DIPHENHYDRAMINE HYDROCHLORIDE 50 MG/ML
50 INJECTION INTRAMUSCULAR; INTRAVENOUS ONCE
Status: COMPLETED | OUTPATIENT
Start: 2020-04-12 | End: 2020-04-12

## 2020-04-12 RX ORDER — HYDROXYZINE HYDROCHLORIDE 25 MG/1
50 TABLET, FILM COATED ORAL EVERY 4 HOURS PRN
Status: DISCONTINUED | OUTPATIENT
Start: 2020-04-12 | End: 2020-04-16

## 2020-04-12 RX ORDER — DIPHENHYDRAMINE HYDROCHLORIDE 50 MG/ML
INJECTION INTRAMUSCULAR; INTRAVENOUS
Status: COMPLETED
Start: 2020-04-12 | End: 2020-04-12

## 2020-04-12 RX ORDER — HALOPERIDOL 5 MG/ML
5 INJECTION INTRAMUSCULAR ONCE
Status: COMPLETED | OUTPATIENT
Start: 2020-04-12 | End: 2020-04-12

## 2020-04-12 RX ADMIN — CLONAZEPAM 0.5 MG: 0.5 TABLET ORAL at 20:00

## 2020-04-12 RX ADMIN — DIPHENHYDRAMINE HYDROCHLORIDE 50 MG: 50 INJECTION, SOLUTION INTRAMUSCULAR; INTRAVENOUS at 05:43

## 2020-04-12 RX ADMIN — HALOPERIDOL LACTATE 5 MG: 5 INJECTION, SOLUTION INTRAMUSCULAR at 05:19

## 2020-04-12 RX ADMIN — HALOPERIDOL LACTATE 5 MG: 5 INJECTION, SOLUTION INTRAMUSCULAR at 21:00

## 2020-04-12 RX ADMIN — LORAZEPAM 2 MG: 2 INJECTION INTRAMUSCULAR at 05:43

## 2020-04-12 RX ADMIN — LORAZEPAM 2 MG: 2 INJECTION INTRAMUSCULAR; INTRAVENOUS at 10:35

## 2020-04-12 RX ADMIN — LORAZEPAM 1 MG: 2 INJECTION INTRAMUSCULAR; INTRAVENOUS at 21:00

## 2020-04-12 RX ADMIN — LORAZEPAM 2 MG: 2 INJECTION INTRAMUSCULAR; INTRAVENOUS at 05:43

## 2020-04-12 RX ADMIN — SODIUM CHLORIDE, SODIUM LACTATE, POTASSIUM CHLORIDE, AND CALCIUM CHLORIDE 1000 ML: .6; .31; .03; .02 INJECTION, SOLUTION INTRAVENOUS at 05:27

## 2020-04-12 RX ADMIN — DIPHENHYDRAMINE HYDROCHLORIDE 50 MG: 50 INJECTION INTRAMUSCULAR; INTRAVENOUS at 05:43

## 2020-04-12 RX ADMIN — HALOPERIDOL LACTATE 2 MG: 5 INJECTION INTRAMUSCULAR at 10:36

## 2020-04-12 RX ADMIN — OXCARBAZEPINE 300 MG: 300 TABLET, FILM COATED ORAL at 20:00

## 2020-04-12 SDOH — HEALTH STABILITY: MENTAL HEALTH: HOW OFTEN DO YOU HAVE A DRINK CONTAINING ALCOHOL?: NEVER

## 2020-04-12 ASSESSMENT — ACTIVITIES OF DAILY LIVING (ADL): ADL_SCORE: 12

## 2020-04-12 ASSESSMENT — LIFESTYLE VARIABLES
ARE YOU DEAF OR DO YOU HAVE SERIOUS DIFFICULTY  HEARING: NO
ALCOHOL_USE: DENIES
IS PATIENT ABLE TO COMPLETE ASSESSMENT AT THIS TIME: YES
HOW OFTEN DO YOU HAVE 6 OR MORE DRINKS ON ONE OCCASION: NEVER
HOW OFTEN DO YOU HAVE A DRINK CONTAINING ALCOHOL: NEVER
AUDIT-C TOTAL SCORE: 0
SHORT OF BREATH OR FATIGUE WITH ADLS: NO
ADL BEFORE ADMISSION: INDEPENDENT
RECENT DECLINE IN ADLS: NO
HAVE YOU EVER BEEN VERBALLY, EMOTIONALLY, PHYSICALLY OR SEXUALLY ABUSED, OR ABUSED VIA SOCIAL MEDIA?: NO
HOW OFTEN DO YOU HAVE A DRINK CONTAINING ALCOHOL: NEVER
ADL NEEDS ASSIST: NO
RECENTLY LOST WEIGHT WITHOUT TRYING: 0
HAVE YOU EVER BEEN EXPOSED TO OTHER VERY DISTURBING, TRAUMATIC OR ANXIETY PROVOKING EVENTS IN YOUR LIFETIME?: NO
HOW OFTEN DO YOU HAVE 6 OR MORE DRINKS ON ONE OCCASION: NEVER
HAVE YOU BEEN EATING POORLY BECAUSE OF A DECREASED APPETITE: 0
ALCOHOL_USE_STATUS: NO OR LOW RISK WITH VALIDATED TOOL
ALCOHOL_USE_STATUS: NO OR LOW RISK WITH VALIDATED TOOL
AUDIT-C TOTAL SCORE: 0
ARE YOU BLIND OR DO YOU HAVE SERIOUS DIFFICULTY SEEING, EVEN WHEN WEARING GLASSES: NO
HOW MANY STANDARD DRINKS CONTAINING ALCOHOL DO YOU HAVE ON A TYPICAL DAY: 0,1 OR 2
CHRONIC/CANCER PAIN PRESENT: NO
HOW MANY STANDARD DRINKS CONTAINING ALCOHOL DO YOU HAVE ON A TYPICAL DAY: 0,1 OR 2

## 2020-04-12 ASSESSMENT — PAIN SCALES - GENERAL
PAINLEVEL_OUTOF10: 0
PAINLEVEL_OUTOF10: 0

## 2020-04-12 ASSESSMENT — COLUMBIA-SUICIDE SEVERITY RATING SCALE - C-SSRS
MOST LETHAL DATE: 62457
6. HAVE YOU EVER DONE ANYTHING, STARTED TO DO ANYTHING, OR PREPARED TO DO ANYTHING TO END YOUR LIFE?: NO
FIRST ATTEMPT DATE: 62457
TOTAL  NUMBER OF ACTUAL ATTEMPTS LIFETIME: 1
REASONS FOR IDEATION LIFETIME: COMPLETELY TO END OR STOP THE PAIN (YOU COULDN'T GO ON LIVING WITH THE PAIN OR HOW YOU WERE FEELING)
6. HAVE YOU EVER DONE ANYTHING, STARTED TO DO ANYTHING, OR PREPARED TO DO ANYTHING TO END YOUR LIFE?: NO
TOTAL  NUMBER OF ABORTED OR SELF INTERRUPTED ATTEMPTS PAST 3 MONTHS: NO
REASONS FOR IDEATION PAST MONTH: COMPLETELY TO END OR STOP THE PAIN (YOU COULDN'T GO ON LIVING WITH THE PAIN OR HOW YOU WERE FEELING)
LETHALITY/MEDICAL DAMAGE CODE MOST LETHAL ACTUAL ATTEMPT: MODERATE PHYSICAL DAMAGE, MEDICAL ATTENTION NEEDED
ATTEMPT PAST THREE MONTHS: NO
LETHALITY/MEDICAL DAMAGE CODE MOST RECENT ACTUAL ATTEMPT: MODERATE PHYSICAL DAMAGE, MEDICAL ATTENTION NEEDED
TOTAL  NUMBER OF INTERRUPTED ATTEMPTS LIFETIME: NO
ATTEMPT LIFETIME: YES
MOST RECENT DATE: 62457
TOTAL  NUMBER OF INTERRUPTED ATTEMPTS PAST 3 MONTHS: NO
LETHALITY/MEDICAL DAMAGE CODE FIRST ACTUAL ATTEMPT: MODERATE PHYSICAL DAMAGE, MEDICAL ATTENTION NEEDED

## 2020-04-12 ASSESSMENT — COGNITIVE AND FUNCTIONAL STATUS - GENERAL
MOOD: BLUNTED
LEVEL_OF_CONSCIOUSNESS_CALCULATED: HYPERALERT
BEHAVIOR: SUICIDAL/SUICIDAL IDEATION;INAPPROPRIATE
MEMORY: INTACT
PERCEPTUAL_MISINTERPRETATIONS_HALLUCINATIONS: AUDITORY
ORIENTATION: ORIENTED (PERSON/PLACE/TIME)
SPEECH: CLEAR/UNDERSTANDABLE;RAMBLING SPEECH

## 2020-04-13 LAB
CHOLEST SERPL-MCNC: 196 MG/DL
CHOLEST/HDLC SERPL: 4.7 {RATIO}
HBA1C MFR BLD: 5 % (ref 4.5–5.6)
HDLC SERPL-MCNC: 42 MG/DL
LDLC SERPL CALC-MCNC: 135 MG/DL
NONHDLC SERPL-MCNC: 154 MG/DL
TRIGL SERPL-MCNC: 97 MG/DL

## 2020-04-13 PROCEDURE — 99221 1ST HOSP IP/OBS SF/LOW 40: CPT | Performed by: INTERNAL MEDICINE

## 2020-04-13 PROCEDURE — 13003244 HB ROOM CHARGE INTENSIVE PSYCH

## 2020-04-13 PROCEDURE — 10002803 HB RX 637: Performed by: PSYCHIATRY & NEUROLOGY

## 2020-04-13 PROCEDURE — 90792 PSYCH DIAG EVAL W/MED SRVCS: CPT | Performed by: PSYCHIATRY & NEUROLOGY

## 2020-04-13 RX ORDER — ARIPIPRAZOLE 10 MG/1
10 TABLET ORAL DAILY
Status: DISCONTINUED | OUTPATIENT
Start: 2020-04-13 | End: 2020-04-17 | Stop reason: HOSPADM

## 2020-04-13 RX ADMIN — HALOPERIDOL 5 MG: 5 TABLET ORAL at 22:06

## 2020-04-13 RX ADMIN — CLONAZEPAM 0.5 MG: 0.5 TABLET ORAL at 08:38

## 2020-04-13 RX ADMIN — CLONAZEPAM 0.5 MG: 0.5 TABLET ORAL at 21:06

## 2020-04-13 RX ADMIN — LORAZEPAM 1 MG: 1 TABLET ORAL at 13:36

## 2020-04-13 RX ADMIN — OXCARBAZEPINE 300 MG: 300 TABLET, FILM COATED ORAL at 08:37

## 2020-04-13 RX ADMIN — OXCARBAZEPINE 300 MG: 300 TABLET, FILM COATED ORAL at 21:06

## 2020-04-13 RX ADMIN — ARIPIPRAZOLE 10 MG: 10 TABLET ORAL at 17:01

## 2020-04-13 RX ADMIN — LORAZEPAM 1 MG: 1 TABLET ORAL at 19:30

## 2020-04-13 ASSESSMENT — PAIN SCALES - GENERAL: PAINLEVEL_OUTOF10: 0

## 2020-04-14 PROCEDURE — 10002800 HB RX 250 W HCPCS: Performed by: PSYCHIATRY & NEUROLOGY

## 2020-04-14 PROCEDURE — 10002803 HB RX 637: Performed by: PSYCHIATRY & NEUROLOGY

## 2020-04-14 PROCEDURE — 99232 SBSQ HOSP IP/OBS MODERATE 35: CPT | Performed by: PSYCHIATRY & NEUROLOGY

## 2020-04-14 PROCEDURE — 13003244 HB ROOM CHARGE INTENSIVE PSYCH

## 2020-04-14 RX ADMIN — HYDROXYZINE HYDROCHLORIDE 50 MG: 25 TABLET ORAL at 00:42

## 2020-04-14 RX ADMIN — ARIPIPRAZOLE 10 MG: 10 TABLET ORAL at 08:42

## 2020-04-14 RX ADMIN — CLONAZEPAM 0.5 MG: 0.5 TABLET ORAL at 08:42

## 2020-04-14 RX ADMIN — LORAZEPAM 1 MG: 2 INJECTION INTRAMUSCULAR; INTRAVENOUS at 04:15

## 2020-04-14 RX ADMIN — HALOPERIDOL LACTATE 5 MG: 5 INJECTION, SOLUTION INTRAMUSCULAR at 23:25

## 2020-04-14 RX ADMIN — BENZTROPINE MESYLATE 1 MG: 1 TABLET ORAL at 00:43

## 2020-04-14 RX ADMIN — HALOPERIDOL LACTATE 5 MG: 5 INJECTION, SOLUTION INTRAMUSCULAR at 04:15

## 2020-04-14 RX ADMIN — CLONAZEPAM 0.5 MG: 0.5 TABLET ORAL at 20:54

## 2020-04-14 RX ADMIN — OXCARBAZEPINE 300 MG: 300 TABLET, FILM COATED ORAL at 08:42

## 2020-04-14 RX ADMIN — OXCARBAZEPINE 300 MG: 300 TABLET, FILM COATED ORAL at 20:54

## 2020-04-14 ASSESSMENT — PAIN SCALES - GENERAL
PAINLEVEL_OUTOF10: 0

## 2020-04-15 PROCEDURE — 99232 SBSQ HOSP IP/OBS MODERATE 35: CPT | Performed by: PSYCHIATRY & NEUROLOGY

## 2020-04-15 PROCEDURE — 13003244 HB ROOM CHARGE INTENSIVE PSYCH

## 2020-04-15 PROCEDURE — 10002803 HB RX 637: Performed by: PSYCHIATRY & NEUROLOGY

## 2020-04-15 RX ORDER — TRAZODONE HYDROCHLORIDE 50 MG/1
100 TABLET ORAL NIGHTLY
Status: DISCONTINUED | OUTPATIENT
Start: 2020-04-15 | End: 2020-04-17 | Stop reason: HOSPADM

## 2020-04-15 RX ADMIN — CLONAZEPAM 0.5 MG: 0.5 TABLET ORAL at 21:34

## 2020-04-15 RX ADMIN — LORAZEPAM 1 MG: 1 TABLET ORAL at 06:31

## 2020-04-15 RX ADMIN — TRAZODONE HYDROCHLORIDE 100 MG: 50 TABLET ORAL at 21:34

## 2020-04-15 RX ADMIN — OXCARBAZEPINE 300 MG: 300 TABLET, FILM COATED ORAL at 08:40

## 2020-04-15 RX ADMIN — HALOPERIDOL 5 MG: 5 TABLET ORAL at 06:31

## 2020-04-15 RX ADMIN — OXCARBAZEPINE 300 MG: 300 TABLET, FILM COATED ORAL at 21:34

## 2020-04-15 RX ADMIN — CLONAZEPAM 0.5 MG: 0.5 TABLET ORAL at 08:42

## 2020-04-15 RX ADMIN — HYDROXYZINE HYDROCHLORIDE 50 MG: 25 TABLET ORAL at 04:41

## 2020-04-15 RX ADMIN — ARIPIPRAZOLE 10 MG: 10 TABLET ORAL at 08:42

## 2020-04-15 ASSESSMENT — PAIN SCALES - GENERAL
PAINLEVEL_OUTOF10: 0

## 2020-04-16 PROCEDURE — 10002803 HB RX 637: Performed by: PSYCHIATRY & NEUROLOGY

## 2020-04-16 PROCEDURE — 10002803 HB RX 637: Performed by: INTERNAL MEDICINE

## 2020-04-16 PROCEDURE — 99232 SBSQ HOSP IP/OBS MODERATE 35: CPT | Performed by: PSYCHIATRY & NEUROLOGY

## 2020-04-16 PROCEDURE — 13003244 HB ROOM CHARGE INTENSIVE PSYCH

## 2020-04-16 RX ORDER — HYDROXYZINE HYDROCHLORIDE 25 MG/1
100 TABLET, FILM COATED ORAL 4 TIMES DAILY PRN
Status: DISCONTINUED | OUTPATIENT
Start: 2020-04-16 | End: 2020-04-17 | Stop reason: HOSPADM

## 2020-04-16 RX ORDER — HYDROXYZINE HYDROCHLORIDE 25 MG/1
50 TABLET, FILM COATED ORAL ONCE
Status: COMPLETED | OUTPATIENT
Start: 2020-04-16 | End: 2020-04-16

## 2020-04-16 RX ORDER — CITALOPRAM 20 MG/1
20 TABLET ORAL DAILY
Status: DISCONTINUED | OUTPATIENT
Start: 2020-04-16 | End: 2020-04-17 | Stop reason: HOSPADM

## 2020-04-16 RX ORDER — MAGNESIUM HYDROXIDE/ALUMINUM HYDROXICE/SIMETHICONE 120; 1200; 1200 MG/30ML; MG/30ML; MG/30ML
15 SUSPENSION ORAL EVERY 4 HOURS PRN
Status: DISCONTINUED | OUTPATIENT
Start: 2020-04-16 | End: 2020-04-17 | Stop reason: HOSPADM

## 2020-04-16 RX ADMIN — LORAZEPAM 1 MG: 1 TABLET ORAL at 01:59

## 2020-04-16 RX ADMIN — ALUMINUM HYDROXIDE, MAGNESIUM HYDROXIDE, AND SIMETHICONE 15 ML: 200; 200; 20 SUSPENSION ORAL at 02:03

## 2020-04-16 RX ADMIN — ARIPIPRAZOLE 10 MG: 10 TABLET ORAL at 09:01

## 2020-04-16 RX ADMIN — HYDROXYZINE HYDROCHLORIDE 50 MG: 25 TABLET ORAL at 15:06

## 2020-04-16 RX ADMIN — TRAZODONE HYDROCHLORIDE 100 MG: 50 TABLET ORAL at 20:43

## 2020-04-16 RX ADMIN — CITALOPRAM HYDROBROMIDE 20 MG: 20 TABLET ORAL at 13:31

## 2020-04-16 RX ADMIN — HYDROXYZINE HYDROCHLORIDE 50 MG: 25 TABLET ORAL at 14:27

## 2020-04-16 RX ADMIN — OXCARBAZEPINE 300 MG: 300 TABLET, FILM COATED ORAL at 09:01

## 2020-04-16 RX ADMIN — CLONAZEPAM 0.5 MG: 0.5 TABLET ORAL at 20:43

## 2020-04-16 RX ADMIN — HYDROXYZINE HYDROCHLORIDE 50 MG: 25 TABLET ORAL at 02:00

## 2020-04-16 RX ADMIN — CLONAZEPAM 0.5 MG: 0.5 TABLET ORAL at 09:01

## 2020-04-16 RX ADMIN — HALOPERIDOL 5 MG: 5 TABLET ORAL at 01:59

## 2020-04-16 RX ADMIN — HALOPERIDOL 5 MG: 5 TABLET ORAL at 01:58

## 2020-04-16 RX ADMIN — OXCARBAZEPINE 300 MG: 300 TABLET, FILM COATED ORAL at 20:43

## 2020-04-16 ASSESSMENT — PAIN SCALES - GENERAL
PAINLEVEL_OUTOF10: 0

## 2020-04-17 VITALS
DIASTOLIC BLOOD PRESSURE: 78 MMHG | BODY MASS INDEX: 35.47 KG/M2 | RESPIRATION RATE: 15 BRPM | HEART RATE: 61 BPM | OXYGEN SATURATION: 99 % | SYSTOLIC BLOOD PRESSURE: 123 MMHG | TEMPERATURE: 97.7 F | WEIGHT: 267.64 LBS | HEIGHT: 73 IN

## 2020-04-17 PROCEDURE — 10002803 HB RX 637: Performed by: PSYCHIATRY & NEUROLOGY

## 2020-04-17 PROCEDURE — 99239 HOSP IP/OBS DSCHRG MGMT >30: CPT | Performed by: PSYCHIATRY & NEUROLOGY

## 2020-04-17 RX ORDER — CLONAZEPAM 1 MG/1
1 TABLET ORAL EVERY 12 HOURS SCHEDULED
Status: DISCONTINUED | OUTPATIENT
Start: 2020-04-17 | End: 2020-04-17 | Stop reason: HOSPADM

## 2020-04-17 RX ORDER — OXCARBAZEPINE 300 MG/1
300 TABLET, FILM COATED ORAL EVERY 12 HOURS SCHEDULED
Qty: 60 TABLET | Refills: 0 | Status: SHIPPED | OUTPATIENT
Start: 2020-04-17 | End: 2020-04-20 | Stop reason: CLARIF

## 2020-04-17 RX ORDER — ARIPIPRAZOLE 10 MG/1
10 TABLET ORAL DAILY
Qty: 30 TABLET | Refills: 0 | Status: SHIPPED | OUTPATIENT
Start: 2020-04-18 | End: 2020-04-20 | Stop reason: SDUPTHER

## 2020-04-17 RX ORDER — CITALOPRAM 20 MG/1
20 TABLET ORAL DAILY
Qty: 30 TABLET | Refills: 0 | Status: SHIPPED | OUTPATIENT
Start: 2020-04-18 | End: 2020-04-20 | Stop reason: SDUPTHER

## 2020-04-17 RX ORDER — TRAZODONE HYDROCHLORIDE 100 MG/1
100 TABLET ORAL NIGHTLY
Qty: 30 TABLET | Refills: 0 | Status: SHIPPED | OUTPATIENT
Start: 2020-04-17 | End: 2020-04-20 | Stop reason: CLARIF

## 2020-04-17 RX ORDER — NICOTINE 21 MG/24HR
1 PATCH, TRANSDERMAL 24 HOURS TRANSDERMAL DAILY
Qty: 30 PATCH | Refills: 0 | Status: SHIPPED | OUTPATIENT
Start: 2020-04-18 | End: 2020-04-20

## 2020-04-17 RX ORDER — CLONAZEPAM 1 MG/1
1 TABLET ORAL EVERY 12 HOURS SCHEDULED
Qty: 60 TABLET | Refills: 0 | Status: SHIPPED | OUTPATIENT
Start: 2020-04-17 | End: 2020-04-20 | Stop reason: SDUPTHER

## 2020-04-17 RX ADMIN — OXCARBAZEPINE 300 MG: 300 TABLET, FILM COATED ORAL at 08:09

## 2020-04-17 RX ADMIN — ARIPIPRAZOLE 10 MG: 10 TABLET ORAL at 08:09

## 2020-04-17 RX ADMIN — CITALOPRAM HYDROBROMIDE 20 MG: 20 TABLET ORAL at 08:09

## 2020-04-17 RX ADMIN — CLONAZEPAM 0.5 MG: 0.5 TABLET ORAL at 08:10

## 2020-04-17 ASSESSMENT — PAIN SCALES - GENERAL: PAINLEVEL_OUTOF10: 0

## 2020-04-19 ENCOUNTER — TELEPHONE (OUTPATIENT)
Dept: SCHEDULING | Age: 45
End: 2020-04-19

## 2020-04-19 ENCOUNTER — TELEPHONE (OUTPATIENT)
Dept: BEHAVIORAL HEALTH | Age: 45
End: 2020-04-19

## 2020-04-20 ENCOUNTER — BEHAVIORAL HEALTH (OUTPATIENT)
Dept: BEHAVIORAL HEALTH | Age: 45
End: 2020-04-20

## 2020-04-20 DIAGNOSIS — F25.0 SCHIZOAFFECTIVE DISORDER, BIPOLAR TYPE (CMD): Primary | ICD-10-CM

## 2020-04-20 DIAGNOSIS — F41.1 GENERALIZED ANXIETY DISORDER: ICD-10-CM

## 2020-04-20 PROCEDURE — 99214 OFFICE O/P EST MOD 30 MIN: CPT | Performed by: PSYCHIATRY & NEUROLOGY

## 2020-04-20 RX ORDER — CLONAZEPAM 0.5 MG/1
0.5 TABLET ORAL 3 TIMES DAILY PRN
Qty: 270 TABLET | Refills: 0 | Status: ON HOLD | OUTPATIENT
Start: 2020-04-20 | End: 2020-05-01 | Stop reason: HOSPADM

## 2020-04-20 RX ORDER — LAMOTRIGINE 200 MG/1
TABLET, ORALLY DISINTEGRATING ORAL
Qty: 180 TABLET | Refills: 0 | Status: ON HOLD | OUTPATIENT
Start: 2020-04-20 | End: 2020-05-01 | Stop reason: HOSPADM

## 2020-04-20 RX ORDER — ARIPIPRAZOLE 10 MG/1
10 TABLET ORAL DAILY
Qty: 90 TABLET | Refills: 0 | Status: ON HOLD | OUTPATIENT
Start: 2020-04-20 | End: 2020-05-01 | Stop reason: HOSPADM

## 2020-04-20 RX ORDER — CITALOPRAM 20 MG/1
20 TABLET ORAL DAILY
Qty: 90 TABLET | Refills: 0 | Status: ON HOLD | OUTPATIENT
Start: 2020-04-20 | End: 2020-05-01 | Stop reason: HOSPADM

## 2020-04-21 ENCOUNTER — TELEPHONE (OUTPATIENT)
Dept: BEHAVIORAL HEALTH | Age: 45
End: 2020-04-21

## 2020-04-21 ENCOUNTER — OFFICE VISIT (OUTPATIENT)
Dept: BEHAVIORAL HEALTH | Age: 45
End: 2020-04-21

## 2020-04-21 DIAGNOSIS — F20.0 PARANOID SCHIZOPHRENIA (CMD): ICD-10-CM

## 2020-04-21 DIAGNOSIS — F41.1 GENERALIZED ANXIETY DISORDER: Primary | ICD-10-CM

## 2020-04-21 PROCEDURE — 99213 OFFICE O/P EST LOW 20 MIN: CPT | Performed by: PSYCHIATRY & NEUROLOGY

## 2020-04-21 RX ORDER — QUETIAPINE FUMARATE 100 MG/1
TABLET, FILM COATED ORAL
Qty: 30 TABLET | Refills: 0 | Status: ON HOLD | OUTPATIENT
Start: 2020-04-21 | End: 2020-05-01 | Stop reason: HOSPADM

## 2020-04-22 ENCOUNTER — HOSPITAL ENCOUNTER (INPATIENT)
Age: 45
LOS: 8 days | Discharge: HOME | DRG: 885 | End: 2020-05-01
Attending: EMERGENCY MEDICINE | Admitting: PSYCHIATRY & NEUROLOGY

## 2020-04-22 DIAGNOSIS — F22 PARANOIA (PSYCHOSIS) (CMD): Primary | ICD-10-CM

## 2020-04-22 DIAGNOSIS — F25.8 OTHER SCHIZOAFFECTIVE DISORDERS (CMD): ICD-10-CM

## 2020-04-22 LAB
AMPHETAMINES UR QL SCN>500 NG/ML: NEGATIVE
ANION GAP SERPL CALC-SCNC: 11 MMOL/L (ref 10–20)
BARBITURATES UR QL SCN>200 NG/ML: NEGATIVE
BASOPHILS # BLD: 0 K/MCL (ref 0–0.3)
BASOPHILS NFR BLD: 0 %
BENZODIAZ UR QL SCN>200 NG/ML: NEGATIVE
BUN SERPL-MCNC: 13 MG/DL (ref 6–20)
BUN/CREAT SERPL: 14 (ref 7–25)
BZE UR QL SCN>150 NG/ML: NEGATIVE
CALCIUM SERPL-MCNC: 9.1 MG/DL (ref 8.4–10.2)
CANNABINOIDS UR QL SCN>50 NG/ML: NEGATIVE
CHLORIDE SERPL-SCNC: 103 MMOL/L (ref 98–107)
CO2 SERPL-SCNC: 26 MMOL/L (ref 21–32)
CREAT SERPL-MCNC: 0.95 MG/DL (ref 0.67–1.17)
DIFFERENTIAL METHOD BLD: ABNORMAL
EOSINOPHIL # BLD: 0.2 K/MCL (ref 0.1–0.5)
EOSINOPHIL NFR BLD: 2 %
ERYTHROCYTE [DISTWIDTH] IN BLOOD: 12.2 % (ref 11–15)
ETHANOL SERPL-MCNC: NORMAL MG/DL
GLUCOSE SERPL-MCNC: 90 MG/DL (ref 65–99)
HCT VFR BLD CALC: 43.2 % (ref 39–51)
HGB BLD-MCNC: 15 G/DL (ref 13–17)
IMM GRANULOCYTES # BLD AUTO: 0 K/MCL (ref 0–0.2)
IMM GRANULOCYTES NFR BLD: 0 %
LYMPHOCYTES # BLD: 3.1 K/MCL (ref 1–4.8)
LYMPHOCYTES NFR BLD: 32 %
MCH RBC QN AUTO: 32.3 PG (ref 26–34)
MCHC RBC AUTO-ENTMCNC: 34.7 G/DL (ref 32–36.5)
MCV RBC AUTO: 93.1 FL (ref 78–100)
MONOCYTES # BLD: 1 K/MCL (ref 0.3–0.9)
MONOCYTES NFR BLD: 11 %
NEUTROPHILS # BLD: 5.3 K/MCL (ref 1.8–7.7)
NEUTROPHILS NFR BLD: 55 %
NRBC BLD MANUAL-RTO: 0 /100 WBC
OPIATES UR QL SCN>300 NG/ML: NEGATIVE
PCP UR QL SCN>25 NG/ML: NEGATIVE
PLATELET # BLD: 230 K/MCL (ref 140–450)
POTASSIUM SERPL-SCNC: 4.1 MMOL/L (ref 3.4–5.1)
RBC # BLD: 4.64 MIL/MCL (ref 4.5–5.9)
SODIUM SERPL-SCNC: 136 MMOL/L (ref 135–145)
WBC # BLD: 9.6 K/MCL (ref 4.2–11)

## 2020-04-22 PROCEDURE — 80320 DRUG SCREEN QUANTALCOHOLS: CPT

## 2020-04-22 PROCEDURE — 80048 BASIC METABOLIC PNL TOTAL CA: CPT

## 2020-04-22 PROCEDURE — 80307 DRUG TEST PRSMV CHEM ANLYZR: CPT

## 2020-04-22 PROCEDURE — 90839 PSYTX CRISIS INITIAL 60 MIN: CPT

## 2020-04-22 PROCEDURE — 36415 COLL VENOUS BLD VENIPUNCTURE: CPT

## 2020-04-22 PROCEDURE — 85025 COMPLETE CBC W/AUTO DIFF WBC: CPT

## 2020-04-22 PROCEDURE — 99285 EMERGENCY DEPT VISIT HI MDM: CPT

## 2020-04-22 RX ORDER — HALOPERIDOL 5 MG/ML
5 INJECTION INTRAMUSCULAR ONCE
Status: DISCONTINUED | OUTPATIENT
Start: 2020-04-22 | End: 2020-04-27

## 2020-04-22 RX ORDER — LORAZEPAM 2 MG/ML
2 INJECTION INTRAMUSCULAR ONCE
Status: DISCONTINUED | OUTPATIENT
Start: 2020-04-22 | End: 2020-04-25

## 2020-04-22 ASSESSMENT — LIFESTYLE VARIABLES
HAVE YOU BEEN EATING POORLY BECAUSE OF A DECREASED APPETITE: 0
TOBACCO_USE_STATUS_IN_THE_LAST_30_DAYS: USED TOBACCO IN THE LAST 30 DAYS
COCAINE USE: DENIES
AMOUNT_OF_TOBACCO_USED: FIVE OR MORE CIGARETTES PER DAY AND/OR CIGAR OR PIPE DAILY
OPIATES USE: DENIES
IS PATIENT ABLE TO COMPLETE ASSESSMENT AT THIS TIME: YES
ROUTE OF BENZODIAZEPINES/SEDATIVES: PO
ARE YOU DEAF OR DO YOU HAVE SERIOUS DIFFICULTY  HEARING: NO
PRIOR ALCOHOL TREATMENT: NO
CHRONIC/CANCER PAIN PRESENT: NO
TYPE_OF_TOBACCO_USED: CIGARETTES
HAVE YOU EVER BEEN EXPOSED TO OTHER VERY DISTURBING, TRAUMATIC OR ANXIETY PROVOKING EVENTS IN YOUR LIFETIME?: NO
HOW OFTEN DO YOU HAVE 6 OR MORE DRINKS ON ONE OCCASION: NEVER
SHORT OF BREATH OR FATIGUE WITH ADLS: NO
HOW MANY STANDARD DRINKS CONTAINING ALCOHOL DO YOU HAVE ON A TYPICAL DAY: 0,1 OR 2
AUDIT-C TOTAL SCORE: 0
ADL BEFORE ADMISSION: INDEPENDENT
ADL NEEDS ASSIST: NO
RECENTLY LOST WEIGHT WITHOUT TRYING: 0
RECENT DECLINE IN ADLS: NO
E-CIGARETTE_USE: NO E-CIGARETTES USE IN THE LAST 30 DAYS
AMOUNT OF CAFFEINE: 100
HOW MANY STANDARD DRINKS CONTAINING ALCOHOL DO YOU HAVE ON A TYPICAL DAY: 0,1 OR 2
HOW OFTEN DO YOU HAVE A DRINK CONTAINING ALCOHOL: NEVER
ARE YOU BLIND OR DO YOU HAVE SERIOUS DIFFICULTY SEEING, EVEN WHEN WEARING GLASSES: NO
HOW OFTEN DO YOU HAVE A DRINK CONTAINING ALCOHOL: NEVER
AUDIT-C TOTAL SCORE: 0
ALCOHOL_USE_STATUS: NO OR LOW RISK WITH VALIDATED TOOL
HOW MANY CIGARETTES HAVE YOU SMOKED PER DAY ON AVERAGE?: YES
CAFFEINE: YES
AMPHETAMINES/STIMULANTS USE: DENIES
VOLATILE SOLVENTS/INHALANTS USE: DENIES
HOW OFTEN DO YOU HAVE 6 OR MORE DRINKS ON ONE OCCASION: NEVER
HAVE YOU EVER BEEN VERBALLY, EMOTIONALLY, PHYSICALLY OR SEXUALLY ABUSED, OR ABUSED VIA SOCIAL MEDIA?: NO

## 2020-04-22 ASSESSMENT — PAIN SCALES - GENERAL
PAINLEVEL_OUTOF10: 0
PAINLEVEL_OUTOF10: 0

## 2020-04-22 ASSESSMENT — COGNITIVE AND FUNCTIONAL STATUS - GENERAL
SPEECH: CLEAR/UNDERSTANDABLE;RAMBLING SPEECH
BEHAVIOR: APPROPRIATE TO SITUATION
MOOD: CONSTRICTED;FLAT
ORIENTATION: ORIENTED (PERSON/PLACE/TIME)

## 2020-04-22 ASSESSMENT — ENCOUNTER SYMPTOMS
CHILLS: 0
ANAL BLEEDING: 1
ABDOMINAL PAIN: 0
NEUROLOGICAL NEGATIVE: 1
FEVER: 0
RESPIRATORY NEGATIVE: 1
VOMITING: 0
NAUSEA: 0
HALLUCINATIONS: 1
AGITATION: 0
DIARRHEA: 0

## 2020-04-22 ASSESSMENT — ACTIVITIES OF DAILY LIVING (ADL): ADL_SCORE: 12

## 2020-04-23 PROBLEM — F25.9 SCHIZOAFFECTIVE DISORDER (CMD): Status: ACTIVE | Noted: 2020-04-23

## 2020-04-23 LAB
ALBUMIN SERPL-MCNC: 4.2 G/DL (ref 3.6–5.1)
ALBUMIN/GLOB SERPL: 1.2 {RATIO} (ref 1–2.4)
ALP SERPL-CCNC: 117 UNITS/L (ref 45–117)
ALT SERPL-CCNC: 48 UNITS/L
ANION GAP SERPL CALC-SCNC: 10 MMOL/L (ref 10–20)
AST SERPL-CCNC: 23 UNITS/L
BILIRUB SERPL-MCNC: 0.5 MG/DL (ref 0.2–1)
BUN SERPL-MCNC: 9 MG/DL (ref 6–20)
BUN/CREAT SERPL: 9 (ref 7–25)
CALCIUM SERPL-MCNC: 9.2 MG/DL (ref 8.4–10.2)
CHLORIDE SERPL-SCNC: 104 MMOL/L (ref 98–107)
CO2 SERPL-SCNC: 29 MMOL/L (ref 21–32)
CREAT SERPL-MCNC: 0.96 MG/DL (ref 0.67–1.17)
GLOBULIN SER-MCNC: 3.6 G/DL (ref 2–4)
GLUCOSE SERPL-MCNC: 88 MG/DL (ref 65–99)
HBA1C MFR BLD: 5 % (ref 4.5–5.6)
POTASSIUM SERPL-SCNC: 4.3 MMOL/L (ref 3.4–5.1)
PROT SERPL-MCNC: 7.8 G/DL (ref 6.4–8.2)
SODIUM SERPL-SCNC: 139 MMOL/L (ref 135–145)
T3FREE SERPL-MCNC: 2.8 PG/ML (ref 2.2–4)
T4 FREE SERPL-MCNC: 0.9 NG/DL (ref 0.8–1.5)
TSH SERPL-ACNC: 1.21 MCUNITS/ML (ref 0.35–5)

## 2020-04-23 PROCEDURE — 84439 ASSAY OF FREE THYROXINE: CPT

## 2020-04-23 PROCEDURE — 84443 ASSAY THYROID STIM HORMONE: CPT

## 2020-04-23 PROCEDURE — 13003244 HB ROOM CHARGE INTENSIVE PSYCH

## 2020-04-23 PROCEDURE — 36415 COLL VENOUS BLD VENIPUNCTURE: CPT

## 2020-04-23 PROCEDURE — 99223 1ST HOSP IP/OBS HIGH 75: CPT | Performed by: PSYCHIATRY & NEUROLOGY

## 2020-04-23 PROCEDURE — 10002803 HB RX 637: Performed by: PSYCHIATRY & NEUROLOGY

## 2020-04-23 PROCEDURE — 80053 COMPREHEN METABOLIC PANEL: CPT

## 2020-04-23 PROCEDURE — 84481 FREE ASSAY (FT-3): CPT

## 2020-04-23 PROCEDURE — 83036 HEMOGLOBIN GLYCOSYLATED A1C: CPT

## 2020-04-23 PROCEDURE — 80061 LIPID PANEL: CPT

## 2020-04-23 RX ORDER — BENZTROPINE MESYLATE 1 MG/1
1 TABLET ORAL EVERY 8 HOURS PRN
Status: DISCONTINUED | OUTPATIENT
Start: 2020-04-23 | End: 2020-05-01 | Stop reason: HOSPADM

## 2020-04-23 RX ORDER — LORAZEPAM 2 MG/ML
1 INJECTION INTRAMUSCULAR EVERY 4 HOURS PRN
Status: DISCONTINUED | OUTPATIENT
Start: 2020-04-23 | End: 2020-05-01 | Stop reason: HOSPADM

## 2020-04-23 RX ORDER — HALOPERIDOL 5 MG/ML
5 INJECTION INTRAMUSCULAR EVERY 4 HOURS PRN
Status: DISCONTINUED | OUTPATIENT
Start: 2020-04-23 | End: 2020-04-27

## 2020-04-23 RX ORDER — CITALOPRAM 20 MG/1
20 TABLET ORAL DAILY
Status: DISCONTINUED | OUTPATIENT
Start: 2020-04-23 | End: 2020-04-30

## 2020-04-23 RX ORDER — HALOPERIDOL 5 MG/1
10 TABLET ORAL EVERY 4 HOURS PRN
Status: DISCONTINUED | OUTPATIENT
Start: 2020-04-23 | End: 2020-04-27

## 2020-04-23 RX ORDER — LORAZEPAM 1 MG/1
1 TABLET ORAL EVERY 4 HOURS PRN
Status: DISCONTINUED | OUTPATIENT
Start: 2020-04-23 | End: 2020-05-01 | Stop reason: HOSPADM

## 2020-04-23 RX ORDER — LAMOTRIGINE 100 MG/1
200 TABLET ORAL 2 TIMES DAILY
Status: DISCONTINUED | OUTPATIENT
Start: 2020-04-23 | End: 2020-05-01 | Stop reason: HOSPADM

## 2020-04-23 RX ORDER — ARIPIPRAZOLE 10 MG/1
10 TABLET ORAL DAILY
Status: DISCONTINUED | OUTPATIENT
Start: 2020-04-23 | End: 2020-04-27

## 2020-04-23 RX ORDER — TRAZODONE HYDROCHLORIDE 50 MG/1
50 TABLET ORAL NIGHTLY PRN
Status: DISCONTINUED | OUTPATIENT
Start: 2020-04-23 | End: 2020-05-01 | Stop reason: HOSPADM

## 2020-04-23 RX ORDER — ACETAMINOPHEN 325 MG/1
650 TABLET ORAL EVERY 6 HOURS PRN
Status: DISCONTINUED | OUTPATIENT
Start: 2020-04-23 | End: 2020-05-01 | Stop reason: HOSPADM

## 2020-04-23 RX ORDER — NICOTINE 21 MG/24HR
1 PATCH, TRANSDERMAL 24 HOURS TRANSDERMAL DAILY
Status: DISCONTINUED | OUTPATIENT
Start: 2020-04-23 | End: 2020-04-25

## 2020-04-23 RX ORDER — QUETIAPINE FUMARATE 100 MG/1
100 TABLET, FILM COATED ORAL NIGHTLY
Status: DISCONTINUED | OUTPATIENT
Start: 2020-04-23 | End: 2020-04-26

## 2020-04-23 RX ORDER — MAGNESIUM HYDROXIDE/ALUMINUM HYDROXICE/SIMETHICONE 120; 1200; 1200 MG/30ML; MG/30ML; MG/30ML
20 SUSPENSION ORAL 4 TIMES DAILY PRN
Status: DISCONTINUED | OUTPATIENT
Start: 2020-04-23 | End: 2020-05-01 | Stop reason: HOSPADM

## 2020-04-23 RX ORDER — BENZTROPINE MESYLATE 1 MG/ML
1 INJECTION INTRAMUSCULAR; INTRAVENOUS EVERY 8 HOURS PRN
Status: DISCONTINUED | OUTPATIENT
Start: 2020-04-23 | End: 2020-05-01 | Stop reason: HOSPADM

## 2020-04-23 RX ADMIN — LORAZEPAM 1 MG: 1 TABLET ORAL at 18:06

## 2020-04-23 RX ADMIN — LAMOTRIGINE 200 MG: 100 TABLET ORAL at 22:38

## 2020-04-23 RX ADMIN — QUETIAPINE 100 MG: 100 TABLET, FILM COATED ORAL at 22:38

## 2020-04-23 ASSESSMENT — PAIN SCALES - GENERAL
PAINLEVEL_OUTOF10: 0
PAINLEVEL_OUTOF10: 0

## 2020-04-24 LAB
CHOLEST SERPL-MCNC: 181 MG/DL
CHOLEST/HDLC SERPL: 3.9 {RATIO}
HDLC SERPL-MCNC: 46 MG/DL
LDLC SERPL CALC-MCNC: 120 MG/DL
NONHDLC SERPL-MCNC: 135 MG/DL
TRIGL SERPL-MCNC: 77 MG/DL

## 2020-04-24 PROCEDURE — 13003244 HB ROOM CHARGE INTENSIVE PSYCH

## 2020-04-24 PROCEDURE — 10002803 HB RX 637: Performed by: PSYCHIATRY & NEUROLOGY

## 2020-04-24 PROCEDURE — 10004651 HB RX, NO CHARGE ITEM: Performed by: PSYCHIATRY & NEUROLOGY

## 2020-04-24 PROCEDURE — 99233 SBSQ HOSP IP/OBS HIGH 50: CPT | Performed by: PSYCHIATRY & NEUROLOGY

## 2020-04-24 RX ADMIN — LORAZEPAM 1 MG: 1 TABLET ORAL at 16:35

## 2020-04-24 RX ADMIN — ACETAMINOPHEN 650 MG: 325 TABLET ORAL at 09:55

## 2020-04-24 RX ADMIN — LORAZEPAM 1 MG: 1 TABLET ORAL at 04:14

## 2020-04-24 RX ADMIN — QUETIAPINE 100 MG: 100 TABLET, FILM COATED ORAL at 21:23

## 2020-04-24 RX ADMIN — LORAZEPAM 1 MG: 1 TABLET ORAL at 18:17

## 2020-04-24 RX ADMIN — BENZTROPINE MESYLATE 1 MG: 1 TABLET ORAL at 00:26

## 2020-04-24 RX ADMIN — CITALOPRAM HYDROBROMIDE 20 MG: 20 TABLET ORAL at 08:40

## 2020-04-24 RX ADMIN — LAMOTRIGINE 200 MG: 100 TABLET ORAL at 21:23

## 2020-04-24 RX ADMIN — HALOPERIDOL 10 MG: 5 TABLET ORAL at 18:17

## 2020-04-24 RX ADMIN — LAMOTRIGINE 200 MG: 100 TABLET ORAL at 08:40

## 2020-04-24 RX ADMIN — ARIPIPRAZOLE 10 MG: 10 TABLET ORAL at 08:38

## 2020-04-24 RX ADMIN — HALOPERIDOL 10 MG: 5 TABLET ORAL at 00:26

## 2020-04-24 ASSESSMENT — PAIN SCALES - GENERAL
PAINLEVEL_OUTOF10: 3
PAINLEVEL_OUTOF10: 0
PAINLEVEL_OUTOF10: 4

## 2020-04-25 PROCEDURE — 13003244 HB ROOM CHARGE INTENSIVE PSYCH

## 2020-04-25 PROCEDURE — 10002803 HB RX 637: Performed by: PSYCHIATRY & NEUROLOGY

## 2020-04-25 PROCEDURE — 10004651 HB RX, NO CHARGE ITEM: Performed by: PSYCHIATRY & NEUROLOGY

## 2020-04-25 PROCEDURE — 10002800 HB RX 250 W HCPCS: Performed by: PSYCHIATRY & NEUROLOGY

## 2020-04-25 PROCEDURE — 99232 SBSQ HOSP IP/OBS MODERATE 35: CPT | Performed by: PSYCHIATRY & NEUROLOGY

## 2020-04-25 RX ORDER — CLONAZEPAM 0.5 MG/1
0.5 TABLET ORAL 3 TIMES DAILY
Status: DISCONTINUED | OUTPATIENT
Start: 2020-04-25 | End: 2020-05-01 | Stop reason: HOSPADM

## 2020-04-25 RX ADMIN — LAMOTRIGINE 200 MG: 100 TABLET ORAL at 20:28

## 2020-04-25 RX ADMIN — CITALOPRAM HYDROBROMIDE 20 MG: 20 TABLET ORAL at 09:00

## 2020-04-25 RX ADMIN — HALOPERIDOL LACTATE 5 MG: 5 INJECTION, SOLUTION INTRAMUSCULAR at 00:00

## 2020-04-25 RX ADMIN — LORAZEPAM 1 MG: 1 TABLET ORAL at 10:37

## 2020-04-25 RX ADMIN — LORAZEPAM 1 MG: 2 INJECTION INTRAMUSCULAR; INTRAVENOUS at 23:45

## 2020-04-25 RX ADMIN — HALOPERIDOL 10 MG: 5 TABLET ORAL at 23:11

## 2020-04-25 RX ADMIN — NICOTINE POLACRILEX 2 MG: 2 GUM, CHEWING BUCCAL at 13:20

## 2020-04-25 RX ADMIN — ACETAMINOPHEN 650 MG: 325 TABLET ORAL at 13:20

## 2020-04-25 RX ADMIN — CLONAZEPAM 0.5 MG: 0.5 TABLET ORAL at 20:28

## 2020-04-25 RX ADMIN — LAMOTRIGINE 200 MG: 100 TABLET ORAL at 08:59

## 2020-04-25 RX ADMIN — QUETIAPINE 100 MG: 100 TABLET, FILM COATED ORAL at 20:27

## 2020-04-25 RX ADMIN — ACETAMINOPHEN 650 MG: 325 TABLET ORAL at 07:29

## 2020-04-25 RX ADMIN — NICOTINE POLACRILEX 2 MG: 2 GUM, CHEWING BUCCAL at 16:46

## 2020-04-25 RX ADMIN — LORAZEPAM 1 MG: 1 TABLET ORAL at 17:52

## 2020-04-25 RX ADMIN — ARIPIPRAZOLE 10 MG: 10 TABLET ORAL at 08:59

## 2020-04-25 ASSESSMENT — PAIN SCALES - GENERAL
PAINLEVEL_OUTOF10: 1
PAINLEVEL_OUTOF10: 0
PAINLEVEL_OUTOF10: 7
PAINLEVEL_OUTOF10: 3

## 2020-04-25 ASSESSMENT — PAIN DESCRIPTION - PAIN TYPE
TYPE: ACUTE PAIN
TYPE: ACUTE PAIN

## 2020-04-26 PROCEDURE — 13003244 HB ROOM CHARGE INTENSIVE PSYCH

## 2020-04-26 PROCEDURE — 10002803 HB RX 637: Performed by: PSYCHIATRY & NEUROLOGY

## 2020-04-26 PROCEDURE — 99232 SBSQ HOSP IP/OBS MODERATE 35: CPT | Performed by: PSYCHIATRY & NEUROLOGY

## 2020-04-26 PROCEDURE — 10004651 HB RX, NO CHARGE ITEM: Performed by: PSYCHIATRY & NEUROLOGY

## 2020-04-26 PROCEDURE — 10002800 HB RX 250 W HCPCS: Performed by: PSYCHIATRY & NEUROLOGY

## 2020-04-26 RX ADMIN — NICOTINE POLACRILEX 2 MG: 2 GUM, CHEWING BUCCAL at 18:14

## 2020-04-26 RX ADMIN — CLONAZEPAM 0.5 MG: 0.5 TABLET ORAL at 21:28

## 2020-04-26 RX ADMIN — LORAZEPAM 1 MG: 2 INJECTION INTRAMUSCULAR; INTRAVENOUS at 22:00

## 2020-04-26 RX ADMIN — HALOPERIDOL LACTATE 5 MG: 5 INJECTION, SOLUTION INTRAMUSCULAR at 22:00

## 2020-04-26 RX ADMIN — CITALOPRAM HYDROBROMIDE 20 MG: 20 TABLET ORAL at 09:05

## 2020-04-26 RX ADMIN — LAMOTRIGINE 200 MG: 100 TABLET ORAL at 20:08

## 2020-04-26 RX ADMIN — LORAZEPAM 1 MG: 1 TABLET ORAL at 09:59

## 2020-04-26 RX ADMIN — CLONAZEPAM 0.5 MG: 0.5 TABLET ORAL at 13:01

## 2020-04-26 RX ADMIN — CLONAZEPAM 0.5 MG: 0.5 TABLET ORAL at 09:06

## 2020-04-26 RX ADMIN — LAMOTRIGINE 200 MG: 100 TABLET ORAL at 09:06

## 2020-04-26 RX ADMIN — HALOPERIDOL LACTATE 10 MG: 5 INJECTION, SOLUTION INTRAMUSCULAR at 00:10

## 2020-04-26 RX ADMIN — ACETAMINOPHEN 650 MG: 325 TABLET ORAL at 15:08

## 2020-04-26 RX ADMIN — HALOPERIDOL 10 MG: 5 TABLET ORAL at 09:58

## 2020-04-26 RX ADMIN — ARIPIPRAZOLE 10 MG: 10 TABLET ORAL at 09:05

## 2020-04-26 RX ADMIN — TRAZODONE HYDROCHLORIDE 50 MG: 50 TABLET ORAL at 23:59

## 2020-04-26 ASSESSMENT — PAIN SCALES - GENERAL
PAINLEVEL_OUTOF10: 0
PAINLEVEL_OUTOF10: 5

## 2020-04-27 PROCEDURE — 10002803 HB RX 637: Performed by: PSYCHIATRY & NEUROLOGY

## 2020-04-27 PROCEDURE — 13003244 HB ROOM CHARGE INTENSIVE PSYCH

## 2020-04-27 PROCEDURE — 99232 SBSQ HOSP IP/OBS MODERATE 35: CPT | Performed by: PSYCHIATRY & NEUROLOGY

## 2020-04-27 PROCEDURE — 10004651 HB RX, NO CHARGE ITEM: Performed by: PSYCHIATRY & NEUROLOGY

## 2020-04-27 RX ADMIN — LORAZEPAM 1 MG: 1 TABLET ORAL at 22:44

## 2020-04-27 RX ADMIN — ACETAMINOPHEN 650 MG: 325 TABLET ORAL at 13:20

## 2020-04-27 RX ADMIN — CLONAZEPAM 0.5 MG: 0.5 TABLET ORAL at 21:22

## 2020-04-27 RX ADMIN — ALUMINUM HYDROXIDE, MAGNESIUM HYDROXIDE, AND SIMETHICONE 20 ML: 200; 200; 20 SUSPENSION ORAL at 11:09

## 2020-04-27 RX ADMIN — ACETAMINOPHEN 650 MG: 325 TABLET ORAL at 19:51

## 2020-04-27 RX ADMIN — LAMOTRIGINE 200 MG: 100 TABLET ORAL at 21:23

## 2020-04-27 RX ADMIN — TRAZODONE HYDROCHLORIDE 50 MG: 50 TABLET ORAL at 21:24

## 2020-04-27 RX ADMIN — LORAZEPAM 1 MG: 1 TABLET ORAL at 05:14

## 2020-04-27 RX ADMIN — ARIPIPRAZOLE 10 MG: 10 TABLET ORAL at 08:40

## 2020-04-27 RX ADMIN — LORAZEPAM 1 MG: 1 TABLET ORAL at 17:39

## 2020-04-27 RX ADMIN — CLONAZEPAM 0.5 MG: 0.5 TABLET ORAL at 08:40

## 2020-04-27 RX ADMIN — CLONAZEPAM 0.5 MG: 0.5 TABLET ORAL at 13:20

## 2020-04-27 RX ADMIN — ACETAMINOPHEN 650 MG: 325 TABLET ORAL at 05:50

## 2020-04-27 RX ADMIN — CITALOPRAM HYDROBROMIDE 20 MG: 20 TABLET ORAL at 08:40

## 2020-04-27 RX ADMIN — HALOPERIDOL 10 MG: 5 TABLET ORAL at 05:14

## 2020-04-27 RX ADMIN — LAMOTRIGINE 200 MG: 100 TABLET ORAL at 08:40

## 2020-04-27 ASSESSMENT — PAIN SCALES - GENERAL
PAINLEVEL_OUTOF10: 5
PAINLEVEL_OUTOF10: 5
PAINLEVEL_OUTOF10: 2
PAINLEVEL_OUTOF10: 0
PAINLEVEL_OUTOF10: 7

## 2020-04-28 PROCEDURE — 10004651 HB RX, NO CHARGE ITEM: Performed by: PSYCHIATRY & NEUROLOGY

## 2020-04-28 PROCEDURE — 99232 SBSQ HOSP IP/OBS MODERATE 35: CPT | Performed by: PSYCHIATRY & NEUROLOGY

## 2020-04-28 PROCEDURE — 13003244 HB ROOM CHARGE INTENSIVE PSYCH

## 2020-04-28 PROCEDURE — 10002800 HB RX 250 W HCPCS: Performed by: PSYCHIATRY & NEUROLOGY

## 2020-04-28 PROCEDURE — 10002803 HB RX 637: Performed by: PSYCHIATRY & NEUROLOGY

## 2020-04-28 RX ADMIN — ACETAMINOPHEN 650 MG: 325 TABLET ORAL at 13:48

## 2020-04-28 RX ADMIN — CITALOPRAM HYDROBROMIDE 20 MG: 20 TABLET ORAL at 08:01

## 2020-04-28 RX ADMIN — CLONAZEPAM 0.5 MG: 0.5 TABLET ORAL at 20:57

## 2020-04-28 RX ADMIN — LORAZEPAM 1 MG: 1 TABLET ORAL at 02:32

## 2020-04-28 RX ADMIN — ALUMINUM HYDROXIDE, MAGNESIUM HYDROXIDE, AND SIMETHICONE 20 ML: 200; 200; 20 SUSPENSION ORAL at 05:12

## 2020-04-28 RX ADMIN — ACETAMINOPHEN 650 MG: 325 TABLET ORAL at 19:51

## 2020-04-28 RX ADMIN — NICOTINE POLACRILEX 2 MG: 2 GUM, CHEWING BUCCAL at 23:19

## 2020-04-28 RX ADMIN — ACETAMINOPHEN 650 MG: 325 TABLET ORAL at 05:20

## 2020-04-28 RX ADMIN — LAMOTRIGINE 200 MG: 100 TABLET ORAL at 20:57

## 2020-04-28 RX ADMIN — BENZTROPINE MESYLATE 1 MG: 1 TABLET ORAL at 17:09

## 2020-04-28 RX ADMIN — ALUMINUM HYDROXIDE, MAGNESIUM HYDROXIDE, AND SIMETHICONE 20 ML: 200; 200; 20 SUSPENSION ORAL at 00:40

## 2020-04-28 RX ADMIN — LAMOTRIGINE 200 MG: 100 TABLET ORAL at 08:01

## 2020-04-28 RX ADMIN — LORAZEPAM 1 MG: 2 INJECTION INTRAMUSCULAR; INTRAVENOUS at 22:07

## 2020-04-28 ASSESSMENT — PAIN SCALES - GENERAL
PAINLEVEL_OUTOF10: 4
PAINLEVEL_OUTOF10: 6
PAINLEVEL_OUTOF10: 0
PAINLEVEL_OUTOF10: 5
PAINLEVEL_OUTOF10: 0

## 2020-04-28 ASSESSMENT — PAIN SCALES - WONG BAKER: WONGBAKER_NUMERICALRESPONSE: 1

## 2020-04-29 PROCEDURE — 10002800 HB RX 250 W HCPCS: Performed by: PSYCHIATRY & NEUROLOGY

## 2020-04-29 PROCEDURE — 10004651 HB RX, NO CHARGE ITEM: Performed by: PSYCHIATRY & NEUROLOGY

## 2020-04-29 PROCEDURE — 10002803 HB RX 637: Performed by: PSYCHIATRY & NEUROLOGY

## 2020-04-29 PROCEDURE — 99232 SBSQ HOSP IP/OBS MODERATE 35: CPT | Performed by: PSYCHIATRY & NEUROLOGY

## 2020-04-29 PROCEDURE — 10004577 HB ROOM CHARGE PSYCH

## 2020-04-29 RX ADMIN — TRAZODONE HYDROCHLORIDE 50 MG: 50 TABLET ORAL at 00:50

## 2020-04-29 RX ADMIN — ACETAMINOPHEN 650 MG: 325 TABLET ORAL at 15:03

## 2020-04-29 RX ADMIN — LORAZEPAM 1 MG: 2 INJECTION INTRAMUSCULAR; INTRAVENOUS at 13:45

## 2020-04-29 RX ADMIN — CITALOPRAM HYDROBROMIDE 20 MG: 20 TABLET ORAL at 07:58

## 2020-04-29 RX ADMIN — CLONAZEPAM 0.5 MG: 0.5 TABLET ORAL at 21:16

## 2020-04-29 RX ADMIN — LAMOTRIGINE 200 MG: 100 TABLET ORAL at 21:16

## 2020-04-29 RX ADMIN — CLONAZEPAM 0.5 MG: 0.5 TABLET ORAL at 07:58

## 2020-04-29 RX ADMIN — ACETAMINOPHEN 650 MG: 325 TABLET ORAL at 04:58

## 2020-04-29 RX ADMIN — BENZTROPINE MESYLATE 1 MG: 1 TABLET ORAL at 11:01

## 2020-04-29 RX ADMIN — CLONAZEPAM 0.5 MG: 0.5 TABLET ORAL at 12:51

## 2020-04-29 RX ADMIN — LAMOTRIGINE 200 MG: 100 TABLET ORAL at 07:57

## 2020-04-29 RX ADMIN — LORAZEPAM 1 MG: 1 TABLET ORAL at 05:02

## 2020-04-29 ASSESSMENT — PAIN SCALES - GENERAL
PAINLEVEL_OUTOF10: 5
PAINLEVEL_OUTOF10: 0
PAINLEVEL_OUTOF10: 5

## 2020-04-30 PROCEDURE — 10002803 HB RX 637: Performed by: PSYCHIATRY & NEUROLOGY

## 2020-04-30 PROCEDURE — 10004577 HB ROOM CHARGE PSYCH

## 2020-04-30 PROCEDURE — 99232 SBSQ HOSP IP/OBS MODERATE 35: CPT | Performed by: PSYCHIATRY & NEUROLOGY

## 2020-04-30 PROCEDURE — 10002800 HB RX 250 W HCPCS: Performed by: PSYCHIATRY & NEUROLOGY

## 2020-04-30 PROCEDURE — 10004651 HB RX, NO CHARGE ITEM: Performed by: PSYCHIATRY & NEUROLOGY

## 2020-04-30 RX ADMIN — LAMOTRIGINE 200 MG: 100 TABLET ORAL at 08:32

## 2020-04-30 RX ADMIN — CITALOPRAM HYDROBROMIDE 20 MG: 20 TABLET ORAL at 08:32

## 2020-04-30 RX ADMIN — ACETAMINOPHEN 650 MG: 325 TABLET ORAL at 02:39

## 2020-04-30 RX ADMIN — BENZTROPINE MESYLATE 1 MG: 1 TABLET ORAL at 12:49

## 2020-04-30 RX ADMIN — CLONAZEPAM 0.5 MG: 0.5 TABLET ORAL at 08:32

## 2020-04-30 RX ADMIN — CLONAZEPAM 0.5 MG: 0.5 TABLET ORAL at 12:47

## 2020-04-30 RX ADMIN — LORAZEPAM 1 MG: 1 TABLET ORAL at 23:47

## 2020-04-30 RX ADMIN — CLONAZEPAM 0.5 MG: 0.5 TABLET ORAL at 20:59

## 2020-04-30 RX ADMIN — LORAZEPAM 1 MG: 2 INJECTION INTRAMUSCULAR; INTRAVENOUS at 00:04

## 2020-04-30 RX ADMIN — TRAZODONE HYDROCHLORIDE 50 MG: 50 TABLET ORAL at 20:59

## 2020-04-30 RX ADMIN — LAMOTRIGINE 200 MG: 100 TABLET ORAL at 20:59

## 2020-04-30 RX ADMIN — ACETAMINOPHEN 650 MG: 325 TABLET ORAL at 22:10

## 2020-04-30 ASSESSMENT — PAIN SCALES - GENERAL
PAINLEVEL_OUTOF10: 4
PAINLEVEL_OUTOF10: 3
PAINLEVEL_OUTOF10: 0
PAINLEVEL_OUTOF10: 1

## 2020-04-30 ASSESSMENT — PAIN SCALES - WONG BAKER
WONGBAKER_NUMERICALRESPONSE: 0
WONGBAKER_NUMERICALRESPONSE: 0

## 2020-05-01 VITALS
BODY MASS INDEX: 34.69 KG/M2 | OXYGEN SATURATION: 97 % | RESPIRATION RATE: 16 BRPM | DIASTOLIC BLOOD PRESSURE: 78 MMHG | HEART RATE: 80 BPM | TEMPERATURE: 98.2 F | SYSTOLIC BLOOD PRESSURE: 131 MMHG | WEIGHT: 270.28 LBS | HEIGHT: 74 IN

## 2020-05-01 PROCEDURE — 10002803 HB RX 637: Performed by: PSYCHIATRY & NEUROLOGY

## 2020-05-01 PROCEDURE — 10004651 HB RX, NO CHARGE ITEM: Performed by: PSYCHIATRY & NEUROLOGY

## 2020-05-01 PROCEDURE — 99238 HOSP IP/OBS DSCHRG MGMT 30/<: CPT | Performed by: PSYCHIATRY & NEUROLOGY

## 2020-05-01 RX ORDER — BENZTROPINE MESYLATE 1 MG/1
1 TABLET ORAL EVERY 8 HOURS PRN
Qty: 60 TABLET | Refills: 1 | Status: ON HOLD | OUTPATIENT
Start: 2020-05-01 | End: 2020-05-22 | Stop reason: SDUPTHER

## 2020-05-01 RX ORDER — LAMOTRIGINE 200 MG/1
200 TABLET ORAL 2 TIMES DAILY
Qty: 60 TABLET | Refills: 1 | Status: ON HOLD | OUTPATIENT
Start: 2020-05-01 | End: 2020-05-22 | Stop reason: SDUPTHER

## 2020-05-01 RX ORDER — TRAZODONE HYDROCHLORIDE 50 MG/1
50 TABLET ORAL NIGHTLY PRN
Qty: 30 TABLET | Refills: 1 | Status: ON HOLD | OUTPATIENT
Start: 2020-05-01 | End: 2020-05-22 | Stop reason: HOSPADM

## 2020-05-01 RX ORDER — CLONAZEPAM 0.5 MG/1
0.5 TABLET ORAL 3 TIMES DAILY
Qty: 90 TABLET | Refills: 1 | Status: ON HOLD | OUTPATIENT
Start: 2020-05-01 | End: 2020-05-22 | Stop reason: SDUPTHER

## 2020-05-01 RX ADMIN — CLONAZEPAM 0.5 MG: 0.5 TABLET ORAL at 13:23

## 2020-05-01 RX ADMIN — LAMOTRIGINE 200 MG: 100 TABLET ORAL at 08:45

## 2020-05-01 RX ADMIN — BENZTROPINE MESYLATE 1 MG: 1 TABLET ORAL at 08:53

## 2020-05-01 RX ADMIN — ACETAMINOPHEN 650 MG: 325 TABLET ORAL at 13:23

## 2020-05-01 RX ADMIN — CLONAZEPAM 0.5 MG: 0.5 TABLET ORAL at 08:44

## 2020-05-01 ASSESSMENT — PAIN SCALES - GENERAL
PAINLEVEL_OUTOF10: 2
PAINLEVEL_OUTOF10: 0
PAINLEVEL_OUTOF10: 5
PAINLEVEL_OUTOF10: 0

## 2020-05-01 ASSESSMENT — PAIN SCALES - WONG BAKER
WONGBAKER_NUMERICALRESPONSE: 0

## 2020-05-04 ENCOUNTER — V-VISIT (OUTPATIENT)
Dept: BEHAVIORAL HEALTH | Age: 45
End: 2020-05-04

## 2020-05-04 DIAGNOSIS — F31.77 BIPOLAR DISORDER, IN PARTIAL REMISSION, MOST RECENT EPISODE MIXED (CMD): Primary | ICD-10-CM

## 2020-05-04 PROCEDURE — 99213 OFFICE O/P EST LOW 20 MIN: CPT | Performed by: PSYCHIATRY & NEUROLOGY

## 2020-05-05 ENCOUNTER — HOSPITAL ENCOUNTER (OUTPATIENT)
Dept: BEHAVIORAL HEALTH | Age: 45
Discharge: STILL ADMITTED OR ADMINISTRATIVE | End: 2020-05-05
Attending: PSYCHIATRY & NEUROLOGY

## 2020-05-05 PROCEDURE — 90853 GROUP PSYCHOTHERAPY: CPT

## 2020-05-05 PROCEDURE — G0410 GRP PSYCH PARTIAL HOSP 45-50: HCPCS

## 2020-05-05 PROCEDURE — G0177 OPPS/PHP; TRAIN & EDUC SERV: HCPCS

## 2020-05-05 PROCEDURE — 99232 SBSQ HOSP IP/OBS MODERATE 35: CPT | Performed by: PSYCHIATRY & NEUROLOGY

## 2020-05-06 ENCOUNTER — HOSPITAL ENCOUNTER (OUTPATIENT)
Dept: BEHAVIORAL HEALTH | Age: 45
Discharge: STILL ADMITTED OR ADMINISTRATIVE | End: 2020-05-06
Attending: PSYCHIATRY & NEUROLOGY

## 2020-05-06 PROCEDURE — G0410 GRP PSYCH PARTIAL HOSP 45-50: HCPCS

## 2020-05-06 PROCEDURE — G0177 OPPS/PHP; TRAIN & EDUC SERV: HCPCS

## 2020-05-07 ENCOUNTER — HOSPITAL ENCOUNTER (OUTPATIENT)
Dept: BEHAVIORAL HEALTH | Age: 45
Discharge: STILL ADMITTED OR ADMINISTRATIVE | End: 2020-05-07
Attending: PSYCHIATRY & NEUROLOGY

## 2020-05-07 PROCEDURE — G0177 OPPS/PHP; TRAIN & EDUC SERV: HCPCS

## 2020-05-07 PROCEDURE — G0410 GRP PSYCH PARTIAL HOSP 45-50: HCPCS

## 2020-05-08 ENCOUNTER — HOSPITAL ENCOUNTER (OUTPATIENT)
Dept: BEHAVIORAL HEALTH | Age: 45
Discharge: STILL ADMITTED OR ADMINISTRATIVE | End: 2020-05-08
Attending: PSYCHIATRY & NEUROLOGY

## 2020-05-08 PROCEDURE — 99232 SBSQ HOSP IP/OBS MODERATE 35: CPT | Performed by: PSYCHIATRY & NEUROLOGY

## 2020-05-08 PROCEDURE — G0177 OPPS/PHP; TRAIN & EDUC SERV: HCPCS

## 2020-05-08 PROCEDURE — G0410 GRP PSYCH PARTIAL HOSP 45-50: HCPCS

## 2020-05-09 ENCOUNTER — HOSPITAL ENCOUNTER (INPATIENT)
Age: 45
LOS: 12 days | Discharge: HOME | DRG: 885 | End: 2020-05-22
Attending: EMERGENCY MEDICINE | Admitting: PSYCHIATRY & NEUROLOGY

## 2020-05-09 DIAGNOSIS — F25.0 SCHIZOAFFECTIVE DISORDER, BIPOLAR TYPE (CMD): Primary | ICD-10-CM

## 2020-05-09 DIAGNOSIS — F25.8 OTHER SCHIZOAFFECTIVE DISORDERS (CMD): ICD-10-CM

## 2020-05-09 DIAGNOSIS — F25.9 SCHIZOAFFECTIVE DISORDER, UNSPECIFIED TYPE (CMD): ICD-10-CM

## 2020-05-09 LAB
ALBUMIN SERPL-MCNC: 3.8 G/DL (ref 3.6–5.1)
ALBUMIN/GLOB SERPL: 1.3 {RATIO} (ref 1–2.4)
ALP SERPL-CCNC: 100 UNITS/L (ref 45–117)
ALT SERPL-CCNC: 41 UNITS/L
ANION GAP SERPL CALC-SCNC: 11 MMOL/L (ref 10–20)
AST SERPL-CCNC: 24 UNITS/L
BASOPHILS # BLD: 0 K/MCL (ref 0–0.3)
BASOPHILS NFR BLD: 0 %
BILIRUB SERPL-MCNC: 0.4 MG/DL (ref 0.2–1)
BUN SERPL-MCNC: 14 MG/DL (ref 6–20)
BUN/CREAT SERPL: 15 (ref 7–25)
CALCIUM SERPL-MCNC: 8.4 MG/DL (ref 8.4–10.2)
CHLORIDE SERPL-SCNC: 108 MMOL/L (ref 98–107)
CO2 SERPL-SCNC: 27 MMOL/L (ref 21–32)
CREAT SERPL-MCNC: 0.91 MG/DL (ref 0.67–1.17)
DIFFERENTIAL METHOD BLD: ABNORMAL
EOSINOPHIL # BLD: 0.1 K/MCL (ref 0.1–0.5)
EOSINOPHIL NFR BLD: 2 %
ERYTHROCYTE [DISTWIDTH] IN BLOOD: 12.7 % (ref 11–15)
ETHANOL SERPL-MCNC: NORMAL MG/DL
GLOBULIN SER-MCNC: 2.9 G/DL (ref 2–4)
GLUCOSE SERPL-MCNC: 96 MG/DL (ref 65–99)
HCT VFR BLD CALC: 37.7 % (ref 39–51)
HGB BLD-MCNC: 13.1 G/DL (ref 13–17)
IMM GRANULOCYTES # BLD AUTO: 0 K/MCL (ref 0–0.2)
IMM GRANULOCYTES NFR BLD: 0 %
LYMPHOCYTES # BLD: 1.5 K/MCL (ref 1–4.8)
LYMPHOCYTES NFR BLD: 23 %
MCH RBC QN AUTO: 32.3 PG (ref 26–34)
MCHC RBC AUTO-ENTMCNC: 34.7 G/DL (ref 32–36.5)
MCV RBC AUTO: 93.1 FL (ref 78–100)
MONOCYTES # BLD: 0.6 K/MCL (ref 0.3–0.9)
MONOCYTES NFR BLD: 9 %
NEUTROPHILS # BLD: 4.3 K/MCL (ref 1.8–7.7)
NEUTROPHILS NFR BLD: 66 %
NRBC BLD MANUAL-RTO: 0 /100 WBC
PLATELET # BLD: 186 K/MCL (ref 140–450)
POTASSIUM SERPL-SCNC: 3.8 MMOL/L (ref 3.4–5.1)
PROT SERPL-MCNC: 6.7 G/DL (ref 6.4–8.2)
RBC # BLD: 4.05 MIL/MCL (ref 4.5–5.9)
SODIUM SERPL-SCNC: 142 MMOL/L (ref 135–145)
WBC # BLD: 6.6 K/MCL (ref 4.2–11)

## 2020-05-09 PROCEDURE — 85025 COMPLETE CBC W/AUTO DIFF WBC: CPT

## 2020-05-09 PROCEDURE — 87635 SARS-COV-2 COVID-19 AMP PRB: CPT

## 2020-05-09 PROCEDURE — 80053 COMPREHEN METABOLIC PANEL: CPT

## 2020-05-09 PROCEDURE — 84439 ASSAY OF FREE THYROXINE: CPT

## 2020-05-09 PROCEDURE — 99285 EMERGENCY DEPT VISIT HI MDM: CPT

## 2020-05-09 PROCEDURE — 80320 DRUG SCREEN QUANTALCOHOLS: CPT

## 2020-05-09 PROCEDURE — 80061 LIPID PANEL: CPT

## 2020-05-09 PROCEDURE — 84481 FREE ASSAY (FT-3): CPT

## 2020-05-09 PROCEDURE — 84443 ASSAY THYROID STIM HORMONE: CPT

## 2020-05-09 PROCEDURE — 83036 HEMOGLOBIN GLYCOSYLATED A1C: CPT

## 2020-05-09 SDOH — HEALTH STABILITY: MENTAL HEALTH: HOW OFTEN DO YOU HAVE A DRINK CONTAINING ALCOHOL?: NEVER

## 2020-05-09 ASSESSMENT — COGNITIVE AND FUNCTIONAL STATUS - GENERAL
SPEECH: BREATHY VOICE;GUARDED
ORIENTATION: ORIENTED (PERSON/PLACE/TIME)
MOTOR_BEHAVIOR-RETARDATION_CALCULATED: 1;3
PERCEPTUAL_MISINTERPRETATIONS_HALLUCINATIONS: AUDITORY;VISUAL
LEVEL_OF_CONSCIOUSNESS_CALCULATED: LETHARGIC
BEHAVIOR: APPROPRIATE TO SITUATION
MEMORY: INTACT

## 2020-05-09 ASSESSMENT — LIFESTYLE VARIABLES
HOW OFTEN DO YOU HAVE 6 OR MORE DRINKS ON ONE OCCASION: NEVER
HOW OFTEN DO YOU HAVE A DRINK CONTAINING ALCOHOL: MONTHLY OR LESS
ALCOHOL_USE: DENIES
AUDIT-C TOTAL SCORE: 1
HOW MANY STANDARD DRINKS CONTAINING ALCOHOL DO YOU HAVE ON A TYPICAL DAY: 0,1 OR 2
ALCOHOL_USE_STATUS: NO OR LOW RISK WITH VALIDATED TOOL

## 2020-05-09 ASSESSMENT — PAIN SCALES - GENERAL: PAINLEVEL_OUTOF10: 0

## 2020-05-10 LAB
AMPHETAMINES UR QL SCN>500 NG/ML: NEGATIVE
BARBITURATES UR QL SCN>200 NG/ML: NEGATIVE
BENZODIAZ UR QL SCN>200 NG/ML: NEGATIVE
BZE UR QL SCN>150 NG/ML: NEGATIVE
CANNABINOIDS UR QL SCN>50 NG/ML: POSITIVE
OPIATES UR QL SCN>300 NG/ML: NEGATIVE
PCP UR QL SCN>25 NG/ML: NEGATIVE
SARS-COV-2 RNA RESP QL NAA+PROBE: NOT DETECTED
SERVICE CMNT-IMP: NORMAL
SPECIMEN SOURCE: NORMAL

## 2020-05-10 PROCEDURE — 10002800 HB RX 250 W HCPCS: Performed by: PSYCHIATRY & NEUROLOGY

## 2020-05-10 PROCEDURE — 90839 PSYTX CRISIS INITIAL 60 MIN: CPT

## 2020-05-10 PROCEDURE — 10002803 HB RX 637: Performed by: PSYCHIATRY & NEUROLOGY

## 2020-05-10 PROCEDURE — 80307 DRUG TEST PRSMV CHEM ANLYZR: CPT

## 2020-05-10 PROCEDURE — 99223 1ST HOSP IP/OBS HIGH 75: CPT | Performed by: PSYCHIATRY & NEUROLOGY

## 2020-05-10 PROCEDURE — 13003244 HB ROOM CHARGE INTENSIVE PSYCH

## 2020-05-10 PROCEDURE — 10002803 HB RX 637: Performed by: HOSPITALIST

## 2020-05-10 RX ORDER — LORAZEPAM 2 MG/ML
1 INJECTION INTRAMUSCULAR EVERY 4 HOURS PRN
Status: DISCONTINUED | OUTPATIENT
Start: 2020-05-10 | End: 2020-05-22 | Stop reason: HOSPADM

## 2020-05-10 RX ORDER — HALOPERIDOL 5 MG/ML
5 INJECTION INTRAMUSCULAR EVERY 4 HOURS PRN
Status: DISCONTINUED | OUTPATIENT
Start: 2020-05-10 | End: 2020-05-22 | Stop reason: HOSPADM

## 2020-05-10 RX ORDER — FLUTICASONE PROPIONATE 50 MCG
2 SPRAY, SUSPENSION (ML) NASAL
Status: DISCONTINUED | OUTPATIENT
Start: 2020-05-10 | End: 2020-05-22 | Stop reason: HOSPADM

## 2020-05-10 RX ORDER — BENZTROPINE MESYLATE 1 MG/1
1 TABLET ORAL EVERY 8 HOURS PRN
Status: DISCONTINUED | OUTPATIENT
Start: 2020-05-10 | End: 2020-05-22 | Stop reason: HOSPADM

## 2020-05-10 RX ORDER — NICOTINE 21 MG/24HR
1 PATCH, TRANSDERMAL 24 HOURS TRANSDERMAL DAILY
Status: DISCONTINUED | OUTPATIENT
Start: 2020-05-10 | End: 2020-05-18

## 2020-05-10 RX ORDER — BENZTROPINE MESYLATE 1 MG/1
1 TABLET ORAL EVERY 8 HOURS PRN
Status: DISCONTINUED | OUTPATIENT
Start: 2020-05-10 | End: 2020-05-10 | Stop reason: SDUPTHER

## 2020-05-10 RX ORDER — MAGNESIUM HYDROXIDE/ALUMINUM HYDROXICE/SIMETHICONE 120; 1200; 1200 MG/30ML; MG/30ML; MG/30ML
20 SUSPENSION ORAL EVERY 4 HOURS PRN
Status: DISCONTINUED | OUTPATIENT
Start: 2020-05-10 | End: 2020-05-22 | Stop reason: HOSPADM

## 2020-05-10 RX ORDER — TRAZODONE HYDROCHLORIDE 50 MG/1
50 TABLET ORAL NIGHTLY PRN
Status: DISCONTINUED | OUTPATIENT
Start: 2020-05-10 | End: 2020-05-22 | Stop reason: HOSPADM

## 2020-05-10 RX ORDER — HYDRALAZINE HYDROCHLORIDE 25 MG/1
25 TABLET, FILM COATED ORAL EVERY 8 HOURS PRN
Status: DISCONTINUED | OUTPATIENT
Start: 2020-05-10 | End: 2020-05-22 | Stop reason: HOSPADM

## 2020-05-10 RX ORDER — BENZTROPINE MESYLATE 1 MG/ML
1 INJECTION INTRAMUSCULAR; INTRAVENOUS EVERY 8 HOURS PRN
Status: DISCONTINUED | OUTPATIENT
Start: 2020-05-10 | End: 2020-05-22 | Stop reason: HOSPADM

## 2020-05-10 RX ORDER — PALIPERIDONE 6 MG/1
6 TABLET, EXTENDED RELEASE ORAL AT BEDTIME
Status: DISCONTINUED | OUTPATIENT
Start: 2020-05-10 | End: 2020-05-11

## 2020-05-10 RX ORDER — LAMOTRIGINE 100 MG/1
200 TABLET ORAL 2 TIMES DAILY
Status: DISCONTINUED | OUTPATIENT
Start: 2020-05-10 | End: 2020-05-17

## 2020-05-10 RX ORDER — HALOPERIDOL 5 MG/1
5 TABLET ORAL EVERY 4 HOURS PRN
Status: DISCONTINUED | OUTPATIENT
Start: 2020-05-10 | End: 2020-05-22 | Stop reason: HOSPADM

## 2020-05-10 RX ORDER — LORAZEPAM 1 MG/1
1 TABLET ORAL EVERY 4 HOURS PRN
Status: DISCONTINUED | OUTPATIENT
Start: 2020-05-10 | End: 2020-05-22 | Stop reason: HOSPADM

## 2020-05-10 RX ORDER — ACETAMINOPHEN 325 MG/1
650 TABLET ORAL EVERY 4 HOURS PRN
Status: DISCONTINUED | OUTPATIENT
Start: 2020-05-10 | End: 2020-05-22 | Stop reason: HOSPADM

## 2020-05-10 RX ADMIN — LAMOTRIGINE 200 MG: 100 TABLET ORAL at 11:03

## 2020-05-10 RX ADMIN — HALOPERIDOL 5 MG: 5 TABLET ORAL at 10:39

## 2020-05-10 RX ADMIN — PALIPERIDONE 6 MG: 6 TABLET, EXTENDED RELEASE ORAL at 20:43

## 2020-05-10 RX ADMIN — LORAZEPAM 1 MG: 1 TABLET ORAL at 10:39

## 2020-05-10 RX ADMIN — LORAZEPAM 1 MG: 2 INJECTION INTRAMUSCULAR; INTRAVENOUS at 22:08

## 2020-05-10 RX ADMIN — LAMOTRIGINE 200 MG: 100 TABLET ORAL at 20:43

## 2020-05-10 ASSESSMENT — LIFESTYLE VARIABLES
E-CIGARETTE_USE: NO E-CIGARETTES USE IN THE LAST 30 DAYS
ALCOHOL_USE_STATUS: NO OR LOW RISK WITH VALIDATED TOOL
HOW OFTEN DO YOU HAVE A DRINK CONTAINING ALCOHOL: NEVER
HAVE YOU EVER BEEN EXPOSED TO OTHER VERY DISTURBING, TRAUMATIC OR ANXIETY PROVOKING EVENTS IN YOUR LIFETIME?: NO
HAVE YOU EVER BEEN VERBALLY, EMOTIONALLY, PHYSICALLY OR SEXUALLY ABUSED, OR ABUSED VIA SOCIAL MEDIA?: NO
HOW OFTEN DO YOU HAVE 6 OR MORE DRINKS ON ONE OCCASION: NEVER
PRIOR ALCOHOL TREATMENT: NO

## 2020-05-10 ASSESSMENT — COLUMBIA-SUICIDE SEVERITY RATING SCALE - C-SSRS
4. HAVE YOU HAD THESE THOUGHTS AND HAD SOME INTENTION OF ACTING ON THEM?: NO
1. WITHIN THE PAST MONTH, HAVE YOU WISHED YOU WERE DEAD OR WISHED YOU COULD GO TO SLEEP AND NOT WAKE UP?: NO
IS THE PATIENT ABLE TO COMPLETE C-SSRS: YES
5. HAVE YOU STARTED TO WORK OUT OR WORKED OUT THE DETAILS OF HOW TO KILL YOURSELF? DO YOU INTEND TO CARRY OUT THIS PLAN?: NO
3. HAVE YOU BEEN THINKING ABOUT HOW YOU MIGHT KILL YOURSELF?: NO
6. HAVE YOU EVER DONE ANYTHING, STARTED TO DO ANYTHING, OR PREPARED TO DO ANYTHING TO END YOUR LIFE?: NO
2. HAVE YOU ACTUALLY HAD ANY THOUGHTS OF KILLING YOURSELF?: NO

## 2020-05-10 ASSESSMENT — COGNITIVE AND FUNCTIONAL STATUS - GENERAL
ARE YOU DEAF OR DO YOU HAVE SERIOUS DIFFICULTY  HEARING: NO
BECAUSE OF A PHYSICAL, MENTAL, OR EMOTIONAL CONDITION, DO YOU HAVE DIFFICULTY DOING ERRANDS ALONE: YES
ARE YOU BLIND OR DO YOU HAVE SERIOUS DIFFICULTY SEEING, EVEN WHEN WEARING GLASSES: NO
DO YOU HAVE SERIOUS DIFFICULTY WALKING OR CLIMBING STAIRS: NO
DO YOU HAVE DIFFICULTY DRESSING OR BATHING: NO
BECAUSE OF A PHYSICAL, MENTAL, OR EMOTIONAL CONDITION, DO YOU HAVE SERIOUS DIFFICULTY CONCENTRATING, REMEMBERING OR MAKING DECISIONS: YES

## 2020-05-10 ASSESSMENT — PAIN SCALES - GENERAL
PAINLEVEL_OUTOF10: 0

## 2020-05-10 ASSESSMENT — PATIENT HEALTH QUESTIONNAIRE - PHQ9
IS PATIENT ABLE TO COMPLETE PHQ2 OR PHQ9: YES
2. FEELING DOWN, DEPRESSED OR HOPELESS: SEVERAL DAYS
SUM OF ALL RESPONSES TO PHQ9 QUESTIONS 1 AND 2: 2
SUM OF ALL RESPONSES TO PHQ9 QUESTIONS 1 AND 2: 2
1. LITTLE INTEREST OR PLEASURE IN DOING THINGS: SEVERAL DAYS

## 2020-05-10 ASSESSMENT — ACTIVITIES OF DAILY LIVING (ADL)
RECENT_DECLINE_ADL: NO
CHRONIC_PAIN_PRESENT: NO
ADL_BEFORE_ADMISSION: INDEPENDENT
ADL_SHORT_OF_BREATH: NO
ADL_SCORE: 12

## 2020-05-10 ASSESSMENT — PAIN SCALES - WONG BAKER: WONGBAKER_NUMERICALRESPONSE: 0

## 2020-05-11 ENCOUNTER — APPOINTMENT (OUTPATIENT)
Dept: BEHAVIORAL HEALTH | Age: 45
End: 2020-05-11
Attending: PSYCHIATRY & NEUROLOGY

## 2020-05-11 LAB
CHOLEST SERPL-MCNC: 175 MG/DL
CHOLEST/HDLC SERPL: 4.9 {RATIO}
HBA1C MFR BLD: 5.2 % (ref 4.5–5.6)
HDLC SERPL-MCNC: 36 MG/DL
LDLC SERPL CALC-MCNC: 124 MG/DL
NONHDLC SERPL-MCNC: 139 MG/DL
T3FREE SERPL-MCNC: 2.9 PG/ML (ref 2.2–4)
T4 FREE SERPL-MCNC: 0.9 NG/DL (ref 0.8–1.5)
TRIGL SERPL-MCNC: 75 MG/DL
TSH SERPL-ACNC: 1.39 MCUNITS/ML (ref 0.35–5)

## 2020-05-11 PROCEDURE — 13003244 HB ROOM CHARGE INTENSIVE PSYCH

## 2020-05-11 PROCEDURE — 10002803 HB RX 637: Performed by: PSYCHIATRY & NEUROLOGY

## 2020-05-11 PROCEDURE — 10004651 HB RX, NO CHARGE ITEM: Performed by: PSYCHIATRY & NEUROLOGY

## 2020-05-11 PROCEDURE — 99233 SBSQ HOSP IP/OBS HIGH 50: CPT | Performed by: PSYCHIATRY & NEUROLOGY

## 2020-05-11 RX ORDER — OXCARBAZEPINE 300 MG/1
300 TABLET, FILM COATED ORAL EVERY 12 HOURS SCHEDULED
Qty: 60 TABLET | Refills: 0 | OUTPATIENT
Start: 2020-05-11 | End: 2020-05-22 | Stop reason: HOSPADM

## 2020-05-11 RX ORDER — TRAZODONE HYDROCHLORIDE 100 MG/1
TABLET ORAL
Qty: 30 TABLET | Refills: 0 | Status: SHIPPED | OUTPATIENT
Start: 2020-05-11 | End: 2020-05-22 | Stop reason: SDUPTHER

## 2020-05-11 RX ADMIN — LAMOTRIGINE 200 MG: 100 TABLET ORAL at 20:21

## 2020-05-11 RX ADMIN — BENZTROPINE MESYLATE 1 MG: 1 TABLET ORAL at 08:29

## 2020-05-11 RX ADMIN — ACETAMINOPHEN 650 MG: 325 TABLET ORAL at 14:37

## 2020-05-11 RX ADMIN — TRAZODONE HYDROCHLORIDE 50 MG: 50 TABLET ORAL at 21:16

## 2020-05-11 RX ADMIN — LAMOTRIGINE 200 MG: 100 TABLET ORAL at 08:28

## 2020-05-11 RX ADMIN — LORAZEPAM 1 MG: 1 TABLET ORAL at 21:16

## 2020-05-11 RX ADMIN — LORAZEPAM 1 MG: 1 TABLET ORAL at 14:36

## 2020-05-11 RX ADMIN — ACETAMINOPHEN 650 MG: 325 TABLET ORAL at 22:41

## 2020-05-11 RX ADMIN — ACETAMINOPHEN 650 MG: 325 TABLET ORAL at 00:48

## 2020-05-11 ASSESSMENT — PAIN SCALES - GENERAL
PAINLEVEL_OUTOF10: 0
PAINLEVEL_OUTOF10: 5
PAINLEVEL_OUTOF10: 4
PAINLEVEL_OUTOF10: 1

## 2020-05-11 ASSESSMENT — PAIN SCALES - PAIN ASSESSMENT IN ADVANCED DEMENTIA (PAINAD)
NEGVOCALIZATION: OCCASIONAL MOAN OR GROAN, LOW LEVELS OF SPEECH WITH A NEGATIVE OR DISAPPROVING QUALITY
BREATHING: NORMAL

## 2020-05-11 ASSESSMENT — PAIN SCALES - WONG BAKER
WONGBAKER_NUMERICALRESPONSE: 0
WONGBAKER_NUMERICALRESPONSE: 1
WONGBAKER_NUMERICALRESPONSE: 0

## 2020-05-12 PROCEDURE — 10002803 HB RX 637: Performed by: PSYCHIATRY & NEUROLOGY

## 2020-05-12 PROCEDURE — 99233 SBSQ HOSP IP/OBS HIGH 50: CPT | Performed by: PSYCHIATRY & NEUROLOGY

## 2020-05-12 PROCEDURE — 13003244 HB ROOM CHARGE INTENSIVE PSYCH

## 2020-05-12 RX ADMIN — LORAZEPAM 1 MG: 1 TABLET ORAL at 03:14

## 2020-05-12 RX ADMIN — LAMOTRIGINE 200 MG: 100 TABLET ORAL at 20:32

## 2020-05-12 RX ADMIN — HALOPERIDOL 5 MG: 5 TABLET ORAL at 22:34

## 2020-05-12 RX ADMIN — LORAZEPAM 1 MG: 1 TABLET ORAL at 11:31

## 2020-05-12 RX ADMIN — TRAZODONE HYDROCHLORIDE 50 MG: 50 TABLET ORAL at 23:12

## 2020-05-12 RX ADMIN — LAMOTRIGINE 200 MG: 100 TABLET ORAL at 08:30

## 2020-05-12 RX ADMIN — LORAZEPAM 1 MG: 1 TABLET ORAL at 22:34

## 2020-05-12 RX ADMIN — BENZTROPINE MESYLATE 1 MG: 1 TABLET ORAL at 14:11

## 2020-05-12 RX ADMIN — HALOPERIDOL 5 MG: 5 TABLET ORAL at 12:47

## 2020-05-12 ASSESSMENT — PAIN SCALES - GENERAL
PAINLEVEL_OUTOF10: 0
PAINLEVEL_OUTOF10: 0

## 2020-05-13 PROCEDURE — 10002803 HB RX 637: Performed by: PSYCHIATRY & NEUROLOGY

## 2020-05-13 PROCEDURE — 13003244 HB ROOM CHARGE INTENSIVE PSYCH

## 2020-05-13 PROCEDURE — 99233 SBSQ HOSP IP/OBS HIGH 50: CPT | Performed by: PSYCHIATRY & NEUROLOGY

## 2020-05-13 RX ADMIN — LAMOTRIGINE 200 MG: 100 TABLET ORAL at 20:46

## 2020-05-13 RX ADMIN — HALOPERIDOL 5 MG: 5 TABLET ORAL at 20:46

## 2020-05-13 RX ADMIN — LORAZEPAM 1 MG: 1 TABLET ORAL at 05:01

## 2020-05-13 RX ADMIN — LAMOTRIGINE 200 MG: 100 TABLET ORAL at 08:32

## 2020-05-13 RX ADMIN — LORAZEPAM 1 MG: 1 TABLET ORAL at 20:46

## 2020-05-13 ASSESSMENT — PAIN SCALES - GENERAL
PAINLEVEL_OUTOF10: 0

## 2020-05-14 PROCEDURE — 13003244 HB ROOM CHARGE INTENSIVE PSYCH

## 2020-05-14 PROCEDURE — 99233 SBSQ HOSP IP/OBS HIGH 50: CPT | Performed by: PSYCHIATRY & NEUROLOGY

## 2020-05-14 PROCEDURE — 10002803 HB RX 637: Performed by: PSYCHIATRY & NEUROLOGY

## 2020-05-14 RX ORDER — QUETIAPINE FUMARATE 100 MG/1
TABLET, FILM COATED ORAL
Qty: 30 TABLET | Refills: 0 | OUTPATIENT
Start: 2020-05-14 | End: 2020-05-22 | Stop reason: HOSPADM

## 2020-05-14 RX ORDER — PALIPERIDONE 6 MG/1
6 TABLET, EXTENDED RELEASE ORAL AT BEDTIME
Status: DISCONTINUED | OUTPATIENT
Start: 2020-05-14 | End: 2020-05-20

## 2020-05-14 RX ADMIN — LAMOTRIGINE 200 MG: 100 TABLET ORAL at 08:31

## 2020-05-14 RX ADMIN — LAMOTRIGINE 200 MG: 100 TABLET ORAL at 20:59

## 2020-05-14 RX ADMIN — HALOPERIDOL 5 MG: 5 TABLET ORAL at 11:34

## 2020-05-14 RX ADMIN — PALIPERIDONE 6 MG: 6 TABLET, EXTENDED RELEASE ORAL at 20:59

## 2020-05-14 RX ADMIN — LORAZEPAM 1 MG: 1 TABLET ORAL at 11:34

## 2020-05-14 ASSESSMENT — PAIN SCALES - GENERAL
PAINLEVEL_OUTOF10: 0

## 2020-05-15 PROCEDURE — 10002803 HB RX 637: Performed by: PSYCHIATRY & NEUROLOGY

## 2020-05-15 PROCEDURE — 99233 SBSQ HOSP IP/OBS HIGH 50: CPT | Performed by: PSYCHIATRY & NEUROLOGY

## 2020-05-15 PROCEDURE — 13003244 HB ROOM CHARGE INTENSIVE PSYCH

## 2020-05-15 PROCEDURE — 10004651 HB RX, NO CHARGE ITEM: Performed by: PSYCHIATRY & NEUROLOGY

## 2020-05-15 RX ADMIN — LAMOTRIGINE 200 MG: 100 TABLET ORAL at 21:02

## 2020-05-15 RX ADMIN — PALIPERIDONE 6 MG: 6 TABLET, EXTENDED RELEASE ORAL at 21:02

## 2020-05-15 RX ADMIN — LAMOTRIGINE 200 MG: 100 TABLET ORAL at 08:30

## 2020-05-15 RX ADMIN — ACETAMINOPHEN 650 MG: 325 TABLET ORAL at 17:00

## 2020-05-15 RX ADMIN — ACETAMINOPHEN 650 MG: 325 TABLET ORAL at 05:33

## 2020-05-15 ASSESSMENT — PAIN SCALES - GENERAL
PAINLEVEL_OUTOF10: 5
PAINLEVEL_OUTOF10: 0
PAINLEVEL_OUTOF10: 3
PAINLEVEL_OUTOF10: 0

## 2020-05-16 PROCEDURE — 13003244 HB ROOM CHARGE INTENSIVE PSYCH

## 2020-05-16 PROCEDURE — 10002800 HB RX 250 W HCPCS: Performed by: PSYCHIATRY & NEUROLOGY

## 2020-05-16 PROCEDURE — 10002803 HB RX 637: Performed by: PSYCHIATRY & NEUROLOGY

## 2020-05-16 PROCEDURE — 99232 SBSQ HOSP IP/OBS MODERATE 35: CPT | Performed by: PSYCHIATRY & NEUROLOGY

## 2020-05-16 RX ADMIN — HALOPERIDOL LACTATE 5 MG: 5 INJECTION, SOLUTION INTRAMUSCULAR at 00:06

## 2020-05-16 RX ADMIN — LAMOTRIGINE 200 MG: 100 TABLET ORAL at 08:51

## 2020-05-16 RX ADMIN — LAMOTRIGINE 200 MG: 100 TABLET ORAL at 21:08

## 2020-05-16 RX ADMIN — PALIPERIDONE 6 MG: 6 TABLET, EXTENDED RELEASE ORAL at 21:08

## 2020-05-16 RX ADMIN — LORAZEPAM 1 MG: 2 INJECTION INTRAMUSCULAR; INTRAVENOUS at 00:06

## 2020-05-16 ASSESSMENT — PAIN SCALES - GENERAL
PAINLEVEL_OUTOF10: 0
PAINLEVEL_OUTOF10: 0

## 2020-05-17 PROCEDURE — 10002803 HB RX 637: Performed by: PSYCHIATRY & NEUROLOGY

## 2020-05-17 PROCEDURE — 10004651 HB RX, NO CHARGE ITEM: Performed by: PSYCHIATRY & NEUROLOGY

## 2020-05-17 PROCEDURE — 13003244 HB ROOM CHARGE INTENSIVE PSYCH

## 2020-05-17 PROCEDURE — 99232 SBSQ HOSP IP/OBS MODERATE 35: CPT | Performed by: PSYCHIATRY & NEUROLOGY

## 2020-05-17 RX ORDER — LAMOTRIGINE 150 MG/1
300 TABLET ORAL 2 TIMES DAILY
Status: DISCONTINUED | OUTPATIENT
Start: 2020-05-17 | End: 2020-05-22 | Stop reason: HOSPADM

## 2020-05-17 RX ADMIN — PALIPERIDONE 6 MG: 6 TABLET, EXTENDED RELEASE ORAL at 21:11

## 2020-05-17 RX ADMIN — ACETAMINOPHEN 650 MG: 325 TABLET ORAL at 12:29

## 2020-05-17 RX ADMIN — LAMOTRIGINE 300 MG: 150 TABLET ORAL at 21:11

## 2020-05-17 RX ADMIN — BENZTROPINE MESYLATE 1 MG: 1 TABLET ORAL at 14:02

## 2020-05-17 RX ADMIN — LAMOTRIGINE 200 MG: 100 TABLET ORAL at 08:47

## 2020-05-17 RX ADMIN — LORAZEPAM 1 MG: 1 TABLET ORAL at 14:45

## 2020-05-17 RX ADMIN — HALOPERIDOL 5 MG: 5 TABLET ORAL at 14:45

## 2020-05-17 ASSESSMENT — PAIN SCALES - GENERAL
PAINLEVEL_OUTOF10: 0
PAINLEVEL_OUTOF10: 5

## 2020-05-18 PROCEDURE — 10004577 HB ROOM CHARGE PSYCH

## 2020-05-18 PROCEDURE — 99232 SBSQ HOSP IP/OBS MODERATE 35: CPT | Performed by: PSYCHIATRY & NEUROLOGY

## 2020-05-18 PROCEDURE — 10002800 HB RX 250 W HCPCS: Performed by: PSYCHIATRY & NEUROLOGY

## 2020-05-18 PROCEDURE — 10002803 HB RX 637: Performed by: PSYCHIATRY & NEUROLOGY

## 2020-05-18 RX ADMIN — HALOPERIDOL LACTATE 5 MG: 5 INJECTION, SOLUTION INTRAMUSCULAR at 13:02

## 2020-05-18 RX ADMIN — LORAZEPAM 1 MG: 2 INJECTION INTRAMUSCULAR; INTRAVENOUS at 13:44

## 2020-05-18 RX ADMIN — PALIPERIDONE 6 MG: 6 TABLET, EXTENDED RELEASE ORAL at 21:51

## 2020-05-18 RX ADMIN — LAMOTRIGINE 300 MG: 150 TABLET ORAL at 08:26

## 2020-05-18 RX ADMIN — LAMOTRIGINE 300 MG: 150 TABLET ORAL at 21:51

## 2020-05-18 ASSESSMENT — PAIN SCALES - GENERAL
PAINLEVEL_OUTOF10: 0

## 2020-05-19 PROCEDURE — 10002803 HB RX 637: Performed by: PSYCHIATRY & NEUROLOGY

## 2020-05-19 PROCEDURE — 10004577 HB ROOM CHARGE PSYCH

## 2020-05-19 PROCEDURE — 10004651 HB RX, NO CHARGE ITEM: Performed by: PSYCHIATRY & NEUROLOGY

## 2020-05-19 PROCEDURE — 99232 SBSQ HOSP IP/OBS MODERATE 35: CPT | Performed by: PSYCHIATRY & NEUROLOGY

## 2020-05-19 RX ADMIN — LORAZEPAM 1 MG: 1 TABLET ORAL at 10:16

## 2020-05-19 RX ADMIN — PALIPERIDONE 6 MG: 6 TABLET, EXTENDED RELEASE ORAL at 21:32

## 2020-05-19 RX ADMIN — HALOPERIDOL 5 MG: 5 TABLET ORAL at 10:16

## 2020-05-19 RX ADMIN — ACETAMINOPHEN 650 MG: 325 TABLET ORAL at 05:37

## 2020-05-19 RX ADMIN — LAMOTRIGINE 300 MG: 150 TABLET ORAL at 08:15

## 2020-05-19 RX ADMIN — LAMOTRIGINE 300 MG: 150 TABLET ORAL at 21:32

## 2020-05-19 ASSESSMENT — PAIN SCALES - GENERAL
PAINLEVEL_OUTOF10: 0
PAINLEVEL_OUTOF10: 0
PAINLEVEL_OUTOF10: 4
PAINLEVEL_OUTOF10: 0

## 2020-05-19 ASSESSMENT — PAIN SCALES - WONG BAKER
WONGBAKER_NUMERICALRESPONSE: 0
WONGBAKER_NUMERICALRESPONSE: 0

## 2020-05-19 ASSESSMENT — PAIN SCALES - PAIN ASSESSMENT IN ADVANCED DEMENTIA (PAINAD): BREATHING: NORMAL

## 2020-05-20 PROCEDURE — 10004651 HB RX, NO CHARGE ITEM: Performed by: PSYCHIATRY & NEUROLOGY

## 2020-05-20 PROCEDURE — 10004577 HB ROOM CHARGE PSYCH

## 2020-05-20 PROCEDURE — 99232 SBSQ HOSP IP/OBS MODERATE 35: CPT | Performed by: PSYCHIATRY & NEUROLOGY

## 2020-05-20 PROCEDURE — 10002803 HB RX 637: Performed by: PSYCHIATRY & NEUROLOGY

## 2020-05-20 RX ADMIN — LORAZEPAM 1 MG: 1 TABLET ORAL at 01:56

## 2020-05-20 RX ADMIN — HALOPERIDOL 5 MG: 5 TABLET ORAL at 12:44

## 2020-05-20 RX ADMIN — BENZTROPINE MESYLATE 1 MG: 1 TABLET ORAL at 07:21

## 2020-05-20 RX ADMIN — LAMOTRIGINE 300 MG: 150 TABLET ORAL at 09:57

## 2020-05-20 RX ADMIN — ACETAMINOPHEN 650 MG: 325 TABLET ORAL at 06:05

## 2020-05-20 RX ADMIN — LORAZEPAM 1 MG: 1 TABLET ORAL at 12:44

## 2020-05-20 RX ADMIN — LAMOTRIGINE 300 MG: 150 TABLET ORAL at 21:19

## 2020-05-20 RX ADMIN — ACETAMINOPHEN 650 MG: 325 TABLET ORAL at 18:30

## 2020-05-20 ASSESSMENT — PAIN SCALES - WONG BAKER
WONGBAKER_NUMERICALRESPONSE: 0
WONGBAKER_NUMERICALRESPONSE: 0

## 2020-05-20 ASSESSMENT — PAIN SCALES - GENERAL
PAINLEVEL_OUTOF10: 4
PAINLEVEL_OUTOF10: 0
PAINLEVEL_OUTOF10: 4

## 2020-05-21 PROCEDURE — 10002803 HB RX 637: Performed by: PSYCHIATRY & NEUROLOGY

## 2020-05-21 PROCEDURE — 99232 SBSQ HOSP IP/OBS MODERATE 35: CPT | Performed by: PSYCHIATRY & NEUROLOGY

## 2020-05-21 PROCEDURE — 10004651 HB RX, NO CHARGE ITEM: Performed by: PSYCHIATRY & NEUROLOGY

## 2020-05-21 PROCEDURE — 10004577 HB ROOM CHARGE PSYCH

## 2020-05-21 RX ADMIN — ACETAMINOPHEN 650 MG: 325 TABLET ORAL at 12:59

## 2020-05-21 RX ADMIN — ACETAMINOPHEN 650 MG: 325 TABLET ORAL at 21:14

## 2020-05-21 RX ADMIN — HALOPERIDOL 5 MG: 5 TABLET ORAL at 10:26

## 2020-05-21 RX ADMIN — ACETAMINOPHEN 650 MG: 325 TABLET ORAL at 01:31

## 2020-05-21 RX ADMIN — BENZTROPINE MESYLATE 1 MG: 1 TABLET ORAL at 06:35

## 2020-05-21 RX ADMIN — LORAZEPAM 1 MG: 1 TABLET ORAL at 10:26

## 2020-05-21 RX ADMIN — ACETAMINOPHEN 650 MG: 325 TABLET ORAL at 05:31

## 2020-05-21 RX ADMIN — LAMOTRIGINE 300 MG: 150 TABLET ORAL at 08:54

## 2020-05-21 RX ADMIN — LAMOTRIGINE 300 MG: 150 TABLET ORAL at 21:14

## 2020-05-21 ASSESSMENT — PAIN SCALES - GENERAL
PAINLEVEL_OUTOF10: 2
PAINLEVEL_OUTOF10: 2
PAINLEVEL_OUTOF10: 6
PAINLEVEL_OUTOF10: 7
PAINLEVEL_OUTOF10: 0
PAINLEVEL_OUTOF10: 4
PAINLEVEL_OUTOF10: 4

## 2020-05-22 VITALS
WEIGHT: 261.47 LBS | DIASTOLIC BLOOD PRESSURE: 81 MMHG | HEIGHT: 73 IN | RESPIRATION RATE: 17 BRPM | BODY MASS INDEX: 34.65 KG/M2 | HEART RATE: 80 BPM | OXYGEN SATURATION: 97 % | TEMPERATURE: 97.9 F | SYSTOLIC BLOOD PRESSURE: 143 MMHG

## 2020-05-22 PROCEDURE — 10002803 HB RX 637: Performed by: PSYCHIATRY & NEUROLOGY

## 2020-05-22 PROCEDURE — 99238 HOSP IP/OBS DSCHRG MGMT 30/<: CPT | Performed by: PSYCHIATRY & NEUROLOGY

## 2020-05-22 PROCEDURE — 10004651 HB RX, NO CHARGE ITEM: Performed by: PSYCHIATRY & NEUROLOGY

## 2020-05-22 RX ORDER — CLONAZEPAM 0.5 MG/1
0.5 TABLET ORAL 3 TIMES DAILY PRN
Qty: 90 TABLET | Refills: 1 | Status: ON HOLD | OUTPATIENT
Start: 2020-05-22 | End: 2020-08-13 | Stop reason: HOSPADM

## 2020-05-22 RX ORDER — TRAZODONE HYDROCHLORIDE 100 MG/1
100 TABLET ORAL NIGHTLY
Qty: 30 TABLET | Refills: 1 | Status: ON HOLD | OUTPATIENT
Start: 2020-05-22 | End: 2020-06-21 | Stop reason: HOSPADM

## 2020-05-22 RX ORDER — LAMOTRIGINE 200 MG/1
200 TABLET ORAL 2 TIMES DAILY
Qty: 60 TABLET | Refills: 1 | Status: ON HOLD | OUTPATIENT
Start: 2020-05-22 | End: 2020-06-20

## 2020-05-22 RX ORDER — BENZTROPINE MESYLATE 1 MG/1
1 TABLET ORAL EVERY 8 HOURS PRN
Qty: 60 TABLET | Refills: 1 | Status: ON HOLD | OUTPATIENT
Start: 2020-05-22 | End: 2020-06-21 | Stop reason: HOSPADM

## 2020-05-22 RX ADMIN — LAMOTRIGINE 300 MG: 150 TABLET ORAL at 08:24

## 2020-05-22 RX ADMIN — TRAZODONE HYDROCHLORIDE 50 MG: 50 TABLET ORAL at 02:08

## 2020-05-22 RX ADMIN — ACETAMINOPHEN 650 MG: 325 TABLET ORAL at 09:52

## 2020-05-22 RX ADMIN — ACETAMINOPHEN 650 MG: 325 TABLET ORAL at 05:52

## 2020-05-22 ASSESSMENT — PAIN SCALES - GENERAL
PAINLEVEL_OUTOF10: 5
PAINLEVEL_OUTOF10: 0
PAINLEVEL_OUTOF10: 2
PAINLEVEL_OUTOF10: 6
PAINLEVEL_OUTOF10: 0

## 2020-05-26 ENCOUNTER — APPOINTMENT (OUTPATIENT)
Dept: BEHAVIORAL HEALTH | Age: 45
End: 2020-05-26

## 2020-05-26 RX ORDER — TRAZODONE HYDROCHLORIDE 50 MG/1
TABLET ORAL
Qty: 30 TABLET | Refills: 0 | Status: ON HOLD | OUTPATIENT
Start: 2020-05-26 | End: 2020-06-17

## 2020-05-26 RX ORDER — HYDROXYZINE 50 MG/1
TABLET, FILM COATED ORAL
Qty: 60 TABLET | Refills: 1 | OUTPATIENT
Start: 2020-05-26

## 2020-05-28 ENCOUNTER — OFFICE VISIT (OUTPATIENT)
Dept: BEHAVIORAL HEALTH | Age: 45
End: 2020-05-28

## 2020-05-28 DIAGNOSIS — F25.9 SCHIZOAFFECTIVE DISORDER, UNSPECIFIED TYPE (CMD): Primary | ICD-10-CM

## 2020-05-28 PROCEDURE — 96372 THER/PROPH/DIAG INJ SC/IM: CPT | Performed by: PSYCHIATRY & NEUROLOGY

## 2020-05-28 PROCEDURE — 99213 OFFICE O/P EST LOW 20 MIN: CPT | Performed by: PSYCHIATRY & NEUROLOGY

## 2020-06-15 RX ORDER — PALIPERIDONE PALMITATE 156 MG/ML
INJECTION INTRAMUSCULAR
Qty: 1 SYRINGE | Refills: 11 | Status: ON HOLD | OUTPATIENT
Start: 2020-06-15 | End: 2020-06-22 | Stop reason: SDUPTHER

## 2020-06-16 ENCOUNTER — HOSPITAL ENCOUNTER (INPATIENT)
Age: 45
LOS: 6 days | Discharge: HOME | DRG: 885 | End: 2020-06-22
Attending: EMERGENCY MEDICINE | Admitting: PSYCHIATRY & NEUROLOGY

## 2020-06-16 DIAGNOSIS — R44.0 AUDITORY HALLUCINATIONS: ICD-10-CM

## 2020-06-16 DIAGNOSIS — F25.0 SCHIZOAFFECTIVE DISORDER, BIPOLAR TYPE (CMD): Primary | ICD-10-CM

## 2020-06-16 DIAGNOSIS — F20.9 SCHIZOPHRENIA, UNSPECIFIED TYPE (CMD): ICD-10-CM

## 2020-06-16 LAB
AMPHETAMINES UR QL SCN>500 NG/ML: NEGATIVE
ANION GAP SERPL CALC-SCNC: 10 MMOL/L (ref 10–20)
BARBITURATES UR QL SCN>200 NG/ML: NEGATIVE
BASOPHILS # BLD: 0 K/MCL (ref 0–0.3)
BASOPHILS NFR BLD: 0 %
BENZODIAZ UR QL SCN>200 NG/ML: NEGATIVE
BUN SERPL-MCNC: 11 MG/DL (ref 6–20)
BUN/CREAT SERPL: 13 (ref 7–25)
BZE UR QL SCN>150 NG/ML: NEGATIVE
CALCIUM SERPL-MCNC: 8.8 MG/DL (ref 8.4–10.2)
CANNABINOIDS UR QL SCN>50 NG/ML: NEGATIVE
CHLORIDE SERPL-SCNC: 107 MMOL/L (ref 98–107)
CO2 SERPL-SCNC: 26 MMOL/L (ref 21–32)
CREAT SERPL-MCNC: 0.86 MG/DL (ref 0.67–1.17)
DIFFERENTIAL METHOD BLD: ABNORMAL
EOSINOPHIL # BLD: 0 K/MCL (ref 0.1–0.5)
EOSINOPHIL NFR BLD: 1 %
ERYTHROCYTE [DISTWIDTH] IN BLOOD: 12.5 % (ref 11–15)
ETHANOL SERPL-MCNC: NORMAL MG/DL
GLUCOSE SERPL-MCNC: 125 MG/DL (ref 65–99)
HCT VFR BLD CALC: 40 % (ref 39–51)
HGB BLD-MCNC: 13.9 G/DL (ref 13–17)
IMM GRANULOCYTES # BLD AUTO: 0 K/MCL (ref 0–0.2)
IMM GRANULOCYTES NFR BLD: 0 %
LYMPHOCYTES # BLD: 1.4 K/MCL (ref 1–4.8)
LYMPHOCYTES NFR BLD: 21 %
MCH RBC QN AUTO: 32.3 PG (ref 26–34)
MCHC RBC AUTO-ENTMCNC: 34.8 G/DL (ref 32–36.5)
MCV RBC AUTO: 92.8 FL (ref 78–100)
MONOCYTES # BLD: 0.6 K/MCL (ref 0.3–0.9)
MONOCYTES NFR BLD: 9 %
NEUTROPHILS # BLD: 4.6 K/MCL (ref 1.8–7.7)
NEUTROPHILS NFR BLD: 69 %
NRBC BLD MANUAL-RTO: 0 /100 WBC
OPIATES UR QL SCN>300 NG/ML: NEGATIVE
PCP UR QL SCN>25 NG/ML: NEGATIVE
PLATELET # BLD: 158 K/MCL (ref 140–450)
POTASSIUM SERPL-SCNC: 3.5 MMOL/L (ref 3.4–5.1)
RBC # BLD: 4.31 MIL/MCL (ref 4.5–5.9)
SARS-COV-2 RNA RESP QL NAA+PROBE: NOT DETECTED
SERVICE CMNT-IMP: NORMAL
SODIUM SERPL-SCNC: 139 MMOL/L (ref 135–145)
SPECIMEN SOURCE: NORMAL
WBC # BLD: 6.7 K/MCL (ref 4.2–11)

## 2020-06-16 PROCEDURE — 80076 HEPATIC FUNCTION PANEL: CPT

## 2020-06-16 PROCEDURE — 99283 EMERGENCY DEPT VISIT LOW MDM: CPT

## 2020-06-16 PROCEDURE — 90839 PSYTX CRISIS INITIAL 60 MIN: CPT

## 2020-06-16 PROCEDURE — 83036 HEMOGLOBIN GLYCOSYLATED A1C: CPT

## 2020-06-16 PROCEDURE — 87635 SARS-COV-2 COVID-19 AMP PRB: CPT

## 2020-06-16 PROCEDURE — 85025 COMPLETE CBC W/AUTO DIFF WBC: CPT

## 2020-06-16 PROCEDURE — 80048 BASIC METABOLIC PNL TOTAL CA: CPT

## 2020-06-16 PROCEDURE — 13003244 HB ROOM CHARGE INTENSIVE PSYCH

## 2020-06-16 PROCEDURE — 84443 ASSAY THYROID STIM HORMONE: CPT

## 2020-06-16 PROCEDURE — 80320 DRUG SCREEN QUANTALCOHOLS: CPT

## 2020-06-16 PROCEDURE — 80307 DRUG TEST PRSMV CHEM ANLYZR: CPT

## 2020-06-16 RX ORDER — HALOPERIDOL 1 MG/1
1 TABLET ORAL EVERY 4 HOURS PRN
Status: DISCONTINUED | OUTPATIENT
Start: 2020-06-16 | End: 2020-06-17 | Stop reason: SDUPTHER

## 2020-06-16 RX ORDER — HYDROXYZINE HYDROCHLORIDE 25 MG/1
50 TABLET, FILM COATED ORAL EVERY 4 HOURS PRN
Status: DISCONTINUED | OUTPATIENT
Start: 2020-06-16 | End: 2020-06-22 | Stop reason: HOSPADM

## 2020-06-16 RX ORDER — BENZTROPINE MESYLATE 1 MG/ML
1 INJECTION INTRAMUSCULAR; INTRAVENOUS EVERY 8 HOURS PRN
Status: DISCONTINUED | OUTPATIENT
Start: 2020-06-16 | End: 2020-06-22 | Stop reason: HOSPADM

## 2020-06-16 RX ORDER — ACETAMINOPHEN 325 MG/1
650 TABLET ORAL EVERY 4 HOURS PRN
Status: DISCONTINUED | OUTPATIENT
Start: 2020-06-16 | End: 2020-06-22 | Stop reason: HOSPADM

## 2020-06-16 RX ORDER — BENZTROPINE MESYLATE 1 MG/1
1 TABLET ORAL EVERY 8 HOURS PRN
Status: DISCONTINUED | OUTPATIENT
Start: 2020-06-16 | End: 2020-06-22 | Stop reason: HOSPADM

## 2020-06-16 RX ORDER — MAGNESIUM HYDROXIDE/ALUMINUM HYDROXICE/SIMETHICONE 120; 1200; 1200 MG/30ML; MG/30ML; MG/30ML
20 SUSPENSION ORAL EVERY 4 HOURS PRN
Status: DISCONTINUED | OUTPATIENT
Start: 2020-06-16 | End: 2020-06-22 | Stop reason: HOSPADM

## 2020-06-16 RX ORDER — LORAZEPAM 1 MG/1
1 TABLET ORAL
Status: DISCONTINUED | OUTPATIENT
Start: 2020-06-16 | End: 2020-06-16

## 2020-06-16 RX ORDER — HALOPERIDOL 5 MG/ML
5 INJECTION INTRAMUSCULAR EVERY 4 HOURS PRN
Status: DISCONTINUED | OUTPATIENT
Start: 2020-06-16 | End: 2020-06-22 | Stop reason: HOSPADM

## 2020-06-16 RX ORDER — LORAZEPAM 2 MG/ML
1 INJECTION INTRAMUSCULAR EVERY 4 HOURS PRN
Status: DISCONTINUED | OUTPATIENT
Start: 2020-06-16 | End: 2020-06-22 | Stop reason: HOSPADM

## 2020-06-16 RX ORDER — POLYETHYLENE GLYCOL 3350 17 G/17G
17 POWDER, FOR SOLUTION ORAL DAILY PRN
Status: DISCONTINUED | OUTPATIENT
Start: 2020-06-16 | End: 2020-06-22 | Stop reason: HOSPADM

## 2020-06-16 RX ORDER — TRAZODONE HYDROCHLORIDE 50 MG/1
50 TABLET ORAL NIGHTLY PRN
Status: DISCONTINUED | OUTPATIENT
Start: 2020-06-16 | End: 2020-06-20

## 2020-06-16 RX ORDER — LORAZEPAM 1 MG/1
1 TABLET ORAL EVERY 4 HOURS PRN
Status: DISCONTINUED | OUTPATIENT
Start: 2020-06-16 | End: 2020-06-22 | Stop reason: HOSPADM

## 2020-06-16 SDOH — HEALTH STABILITY: MENTAL HEALTH: HOW OFTEN DO YOU HAVE A DRINK CONTAINING ALCOHOL?: NEVER

## 2020-06-16 ASSESSMENT — PAIN SCALES - GENERAL
PAINLEVEL_OUTOF10: 0
PAINLEVEL_OUTOF10: 0

## 2020-06-16 ASSESSMENT — COGNITIVE AND FUNCTIONAL STATUS - GENERAL
BEHAVIOR: APPROPRIATE TO SITUATION
MEMORY: INTACT
SPEECH: RAMBLING SPEECH
MOOD: FLAT
ORIENTATION: ORIENTED (PERSON/PLACE/TIME)
PERCEPTUAL_MISINTERPRETATIONS_HALLUCINATIONS: AUDITORY;VISUAL
MOTOR_BEHAVIOR-RETARDATION_CALCULATED: DECREASED MOVEMENT

## 2020-06-17 LAB
ALBUMIN SERPL-MCNC: 4.1 G/DL (ref 3.6–5.1)
ALP SERPL-CCNC: 94 UNITS/L (ref 45–117)
ALT SERPL-CCNC: 58 UNITS/L
AST SERPL-CCNC: 53 UNITS/L
BILIRUB CONJ SERPL-MCNC: 0.1 MG/DL (ref 0–0.2)
BILIRUB SERPL-MCNC: 0.4 MG/DL (ref 0.2–1)
HBA1C MFR BLD: 5.2 % (ref 4.5–5.6)
PROT SERPL-MCNC: 7.2 G/DL (ref 6.4–8.2)
TSH SERPL-ACNC: 1.28 MCUNITS/ML (ref 0.35–5)

## 2020-06-17 PROCEDURE — 99223 1ST HOSP IP/OBS HIGH 75: CPT | Performed by: INTERNAL MEDICINE

## 2020-06-17 PROCEDURE — 10002803 HB RX 637: Performed by: PSYCHIATRY & NEUROLOGY

## 2020-06-17 PROCEDURE — 10002803 HB RX 637

## 2020-06-17 PROCEDURE — 10002801 HB RX 250 W/O HCPCS: Performed by: PSYCHIATRY & NEUROLOGY

## 2020-06-17 PROCEDURE — 90792 PSYCH DIAG EVAL W/MED SRVCS: CPT | Performed by: PSYCHIATRY & NEUROLOGY

## 2020-06-17 PROCEDURE — 13003244 HB ROOM CHARGE INTENSIVE PSYCH

## 2020-06-17 RX ORDER — HALOPERIDOL 5 MG/1
5 TABLET ORAL EVERY 4 HOURS PRN
Status: DISCONTINUED | OUTPATIENT
Start: 2020-06-17 | End: 2020-06-22 | Stop reason: HOSPADM

## 2020-06-17 RX ORDER — ASENAPINE 5 MG/1
10 TABLET SUBLINGUAL EVERY 12 HOURS SCHEDULED
Status: DISCONTINUED | OUTPATIENT
Start: 2020-06-17 | End: 2020-06-21

## 2020-06-17 RX ORDER — TRAZODONE HYDROCHLORIDE 50 MG/1
TABLET ORAL
Qty: 30 TABLET | Refills: 0 | Status: SHIPPED | OUTPATIENT
Start: 2020-06-17 | End: 2020-06-21 | Stop reason: HOSPADM

## 2020-06-17 RX ORDER — HALOPERIDOL 5 MG/1
TABLET ORAL
Status: COMPLETED
Start: 2020-06-17 | End: 2020-06-17

## 2020-06-17 RX ORDER — LAMOTRIGINE 100 MG/1
200 TABLET ORAL 2 TIMES DAILY
Status: DISCONTINUED | OUTPATIENT
Start: 2020-06-17 | End: 2020-06-22 | Stop reason: HOSPADM

## 2020-06-17 RX ADMIN — HYDROXYZINE HYDROCHLORIDE 50 MG: 25 TABLET ORAL at 14:41

## 2020-06-17 RX ADMIN — HALOPERIDOL 5 MG: 1 TABLET ORAL at 08:11

## 2020-06-17 RX ADMIN — LAMOTRIGINE 200 MG: 100 TABLET ORAL at 22:27

## 2020-06-17 RX ADMIN — HALOPERIDOL 5 MG: 5 TABLET ORAL at 08:11

## 2020-06-17 RX ADMIN — LORAZEPAM 1 MG: 1 TABLET ORAL at 14:41

## 2020-06-17 RX ADMIN — HYDROXYZINE HYDROCHLORIDE 50 MG: 25 TABLET ORAL at 08:03

## 2020-06-17 RX ADMIN — HALOPERIDOL 5 MG: 5 TABLET ORAL at 14:43

## 2020-06-17 RX ADMIN — LORAZEPAM 1 MG: 1 TABLET ORAL at 08:03

## 2020-06-17 RX ADMIN — ASENAPINE MALEATE 10 MG: 5 TABLET SUBLINGUAL at 22:15

## 2020-06-17 SDOH — HEALTH STABILITY: MENTAL HEALTH: HOW OFTEN DO YOU HAVE A DRINK CONTAINING ALCOHOL?: NEVER

## 2020-06-17 ASSESSMENT — COLUMBIA-SUICIDE SEVERITY RATING SCALE - C-SSRS
LETHALITY/MEDICAL DAMAGE CODE MOST RECENT POTENTIAL ATTEMPT: BEHAVIOR NOT LIKELY TO RESULT IN INJURY
LETHALITY/MEDICAL DAMAGE CODE FIRST ACTUAL ATTEMPT: NO PHYSICAL DAMAGE OR VERY MINOR PHYSICAL DAMAGE
LETHALITY/MEDICAL DAMAGE CODE MOST RECENT ACTUAL ATTEMPT: NO PHYSICAL DAMAGE OR VERY MINOR PHYSICAL DAMAGE
LETHALITY/MEDICAL DAMAGE CODE MOST LETHAL ACTUAL ATTEMPT: NO PHYSICAL DAMAGE OR VERY MINOR PHYSICAL DAMAGE
ATTEMPT LIFETIME: NO
TOTAL  NUMBER OF ABORTED OR SELF INTERRUPTED ATTEMPTS PAST 3 MONTHS: NO
6. HAVE YOU EVER DONE ANYTHING, STARTED TO DO ANYTHING, OR PREPARED TO DO ANYTHING TO END YOUR LIFE?: NO
TOTAL  NUMBER OF INTERRUPTED ATTEMPTS LIFETIME: NO
ATTEMPT PAST THREE MONTHS: NO
REASONS FOR IDEATION LIFETIME: EQUALLY TO GET ATTENTION, REVENGE OR A REACTION FROM OTHERS AND TO END/STOP THE PAIN
TOTAL  NUMBER OF INTERRUPTED ATTEMPTS PAST 3 MONTHS: NO
6. HAVE YOU EVER DONE ANYTHING, STARTED TO DO ANYTHING, OR PREPARED TO DO ANYTHING TO END YOUR LIFE?: NO

## 2020-06-17 ASSESSMENT — LIFESTYLE VARIABLES
ADL NEEDS ASSIST: NO
HAVE YOU EVER BEEN EXPOSED TO OTHER VERY DISTURBING, TRAUMATIC OR ANXIETY PROVOKING EVENTS IN YOUR LIFETIME?: NO
ADL BEFORE ADMISSION: INDEPENDENT
RECENTLY LOST WEIGHT WITHOUT TRYING: 0
HOW OFTEN DO YOU HAVE A DRINK CONTAINING ALCOHOL: NEVER
ARE YOU BLIND OR DO YOU HAVE SERIOUS DIFFICULTY SEEING, EVEN WHEN WEARING GLASSES: NO
IS PATIENT ABLE TO COMPLETE ASSESSMENT AT THIS TIME: YES
HOW OFTEN DO YOU HAVE 6 OR MORE DRINKS ON ONE OCCASION: NEVER
HAVE YOU BEEN EATING POORLY BECAUSE OF A DECREASED APPETITE: 0
ARE YOU DEAF OR DO YOU HAVE SERIOUS DIFFICULTY  HEARING: NO
SHORT OF BREATH OR FATIGUE WITH ADLS: NO
ALCOHOL_USE: DENIES
AT WHAT AGE STARTED USING: BOTH
ALCOHOL_USE_STATUS: NO OR LOW RISK WITH VALIDATED TOOL
RECENT DECLINE IN ADLS: NO
HAVE YOU EVER BEEN VERBALLY, EMOTIONALLY, PHYSICALLY OR SEXUALLY ABUSED, OR ABUSED VIA SOCIAL MEDIA?: YES
MOBILITY ASSIST DEVICES: STANDARD WALKER
CHRONIC/CANCER PAIN PRESENT: NO

## 2020-06-17 ASSESSMENT — PAIN SCALES - GENERAL
PAINLEVEL_OUTOF10: 0
PAINLEVEL_OUTOF10: 0

## 2020-06-17 ASSESSMENT — ACTIVITIES OF DAILY LIVING (ADL): ADL_SCORE: 12

## 2020-06-18 PROCEDURE — 10002803 HB RX 637: Performed by: PSYCHIATRY & NEUROLOGY

## 2020-06-18 PROCEDURE — 10002801 HB RX 250 W/O HCPCS: Performed by: PSYCHIATRY & NEUROLOGY

## 2020-06-18 PROCEDURE — 10004651 HB RX, NO CHARGE ITEM: Performed by: PSYCHIATRY & NEUROLOGY

## 2020-06-18 PROCEDURE — 99232 SBSQ HOSP IP/OBS MODERATE 35: CPT | Performed by: PSYCHIATRY & NEUROLOGY

## 2020-06-18 PROCEDURE — 13003244 HB ROOM CHARGE INTENSIVE PSYCH

## 2020-06-18 RX ADMIN — HALOPERIDOL 5 MG: 5 TABLET ORAL at 18:09

## 2020-06-18 RX ADMIN — ALUMINUM HYDROXIDE, MAGNESIUM HYDROXIDE, SIMETHICONE 20 ML: 400; 400; 40 SUSPENSION ORAL at 09:44

## 2020-06-18 RX ADMIN — ACETAMINOPHEN 650 MG: 325 TABLET ORAL at 18:35

## 2020-06-18 RX ADMIN — LAMOTRIGINE 200 MG: 100 TABLET ORAL at 21:31

## 2020-06-18 RX ADMIN — LORAZEPAM 1 MG: 1 TABLET ORAL at 18:09

## 2020-06-18 RX ADMIN — ASENAPINE MALEATE 10 MG: 5 TABLET SUBLINGUAL at 08:41

## 2020-06-18 RX ADMIN — ASENAPINE MALEATE 10 MG: 5 TABLET SUBLINGUAL at 22:05

## 2020-06-18 RX ADMIN — TRAZODONE HYDROCHLORIDE 50 MG: 50 TABLET ORAL at 21:00

## 2020-06-18 RX ADMIN — LAMOTRIGINE 200 MG: 100 TABLET ORAL at 08:40

## 2020-06-18 RX ADMIN — BENZTROPINE MESYLATE 1 MG: 1 TABLET ORAL at 18:09

## 2020-06-18 RX ADMIN — ACETAMINOPHEN 650 MG: 325 TABLET ORAL at 09:05

## 2020-06-18 ASSESSMENT — PAIN SCALES - GENERAL
PAINLEVEL_OUTOF10: 0
PAINLEVEL_OUTOF10: 0
PAINLEVEL_OUTOF10: 5
PAINLEVEL_OUTOF10: 2
PAINLEVEL_OUTOF10: 0
PAINLEVEL_OUTOF10: 7

## 2020-06-19 PROCEDURE — 10002801 HB RX 250 W/O HCPCS: Performed by: PSYCHIATRY & NEUROLOGY

## 2020-06-19 PROCEDURE — 99232 SBSQ HOSP IP/OBS MODERATE 35: CPT | Performed by: PSYCHIATRY & NEUROLOGY

## 2020-06-19 PROCEDURE — 10004651 HB RX, NO CHARGE ITEM: Performed by: PSYCHIATRY & NEUROLOGY

## 2020-06-19 PROCEDURE — 10002803 HB RX 637: Performed by: PSYCHIATRY & NEUROLOGY

## 2020-06-19 PROCEDURE — 13003244 HB ROOM CHARGE INTENSIVE PSYCH

## 2020-06-19 RX ADMIN — LAMOTRIGINE 200 MG: 100 TABLET ORAL at 21:00

## 2020-06-19 RX ADMIN — HALOPERIDOL 5 MG: 5 TABLET ORAL at 21:21

## 2020-06-19 RX ADMIN — LORAZEPAM 1 MG: 1 TABLET ORAL at 08:16

## 2020-06-19 RX ADMIN — ASENAPINE MALEATE 10 MG: 5 TABLET SUBLINGUAL at 08:17

## 2020-06-19 RX ADMIN — LORAZEPAM 1 MG: 1 TABLET ORAL at 03:48

## 2020-06-19 RX ADMIN — LAMOTRIGINE 200 MG: 100 TABLET ORAL at 08:16

## 2020-06-19 RX ADMIN — HALOPERIDOL 5 MG: 5 TABLET ORAL at 03:47

## 2020-06-19 RX ADMIN — LORAZEPAM 1 MG: 1 TABLET ORAL at 21:21

## 2020-06-19 RX ADMIN — HALOPERIDOL 5 MG: 5 TABLET ORAL at 08:16

## 2020-06-19 RX ADMIN — ASENAPINE MALEATE 10 MG: 5 TABLET SUBLINGUAL at 21:01

## 2020-06-19 RX ADMIN — ACETAMINOPHEN 650 MG: 325 TABLET ORAL at 18:08

## 2020-06-19 ASSESSMENT — PAIN SCALES - GENERAL
PAINLEVEL_OUTOF10: 0
PAINLEVEL_OUTOF10: 1
PAINLEVEL_OUTOF10: 6

## 2020-06-20 PROCEDURE — 13003244 HB ROOM CHARGE INTENSIVE PSYCH

## 2020-06-20 PROCEDURE — 99233 SBSQ HOSP IP/OBS HIGH 50: CPT | Performed by: PSYCHIATRY & NEUROLOGY

## 2020-06-20 PROCEDURE — 10002803 HB RX 637: Performed by: PSYCHIATRY & NEUROLOGY

## 2020-06-20 PROCEDURE — 10002801 HB RX 250 W/O HCPCS: Performed by: PSYCHIATRY & NEUROLOGY

## 2020-06-20 RX ORDER — LAMOTRIGINE 200 MG/1
TABLET ORAL
Qty: 60 TABLET | Refills: 1 | OUTPATIENT
Start: 2020-06-20 | End: 2020-06-22 | Stop reason: SDUPTHER

## 2020-06-20 RX ORDER — TRAZODONE HYDROCHLORIDE 50 MG/1
50 TABLET ORAL NIGHTLY
Status: DISCONTINUED | OUTPATIENT
Start: 2020-06-20 | End: 2020-06-22 | Stop reason: HOSPADM

## 2020-06-20 RX ADMIN — LAMOTRIGINE 200 MG: 100 TABLET ORAL at 21:14

## 2020-06-20 RX ADMIN — ASENAPINE MALEATE 10 MG: 5 TABLET SUBLINGUAL at 08:30

## 2020-06-20 RX ADMIN — HALOPERIDOL 5 MG: 5 TABLET ORAL at 18:41

## 2020-06-20 RX ADMIN — ASENAPINE MALEATE 10 MG: 5 TABLET SUBLINGUAL at 21:14

## 2020-06-20 RX ADMIN — LAMOTRIGINE 200 MG: 100 TABLET ORAL at 08:30

## 2020-06-20 RX ADMIN — LORAZEPAM 1 MG: 1 TABLET ORAL at 18:41

## 2020-06-20 RX ADMIN — TRAZODONE HYDROCHLORIDE 50 MG: 50 TABLET ORAL at 21:14

## 2020-06-20 ASSESSMENT — PAIN SCALES - GENERAL
PAINLEVEL_OUTOF10: 0

## 2020-06-21 PROCEDURE — 10002803 HB RX 637: Performed by: PSYCHIATRY & NEUROLOGY

## 2020-06-21 PROCEDURE — 10002801 HB RX 250 W/O HCPCS: Performed by: PSYCHIATRY & NEUROLOGY

## 2020-06-21 PROCEDURE — 13003244 HB ROOM CHARGE INTENSIVE PSYCH

## 2020-06-21 PROCEDURE — 99233 SBSQ HOSP IP/OBS HIGH 50: CPT | Performed by: PSYCHIATRY & NEUROLOGY

## 2020-06-21 RX ORDER — PALIPERIDONE 6 MG/1
6 TABLET, EXTENDED RELEASE ORAL DAILY
Status: DISCONTINUED | OUTPATIENT
Start: 2020-06-21 | End: 2020-06-22 | Stop reason: HOSPADM

## 2020-06-21 RX ORDER — HYDROXYZINE 50 MG/1
50 TABLET, FILM COATED ORAL EVERY 4 HOURS PRN
Qty: 100 TABLET | Refills: 1 | Status: SHIPPED | OUTPATIENT
Start: 2020-06-21 | End: 2020-07-26

## 2020-06-21 RX ORDER — TRAZODONE HYDROCHLORIDE 50 MG/1
50 TABLET ORAL NIGHTLY
Qty: 50 TABLET | Refills: 1 | Status: SHIPPED | OUTPATIENT
Start: 2020-06-21 | End: 2020-08-05 | Stop reason: CLARIF

## 2020-06-21 RX ORDER — LAMOTRIGINE 200 MG/1
200 TABLET ORAL 2 TIMES DAILY
Qty: 60 TABLET | Refills: 1 | Status: ON HOLD | OUTPATIENT
Start: 2020-06-21 | End: 2020-08-13 | Stop reason: HOSPADM

## 2020-06-21 RX ORDER — PALIPERIDONE 6 MG/1
6 TABLET, EXTENDED RELEASE ORAL DAILY
Qty: 30 TABLET | Refills: 1 | Status: SHIPPED | OUTPATIENT
Start: 2020-06-21 | End: 2020-07-14

## 2020-06-21 RX ADMIN — LORAZEPAM 1 MG: 1 TABLET ORAL at 23:35

## 2020-06-21 RX ADMIN — LAMOTRIGINE 200 MG: 100 TABLET ORAL at 21:06

## 2020-06-21 RX ADMIN — HALOPERIDOL 5 MG: 5 TABLET ORAL at 23:35

## 2020-06-21 RX ADMIN — LAMOTRIGINE 200 MG: 100 TABLET ORAL at 08:06

## 2020-06-21 RX ADMIN — ASENAPINE MALEATE 10 MG: 5 TABLET SUBLINGUAL at 08:07

## 2020-06-21 RX ADMIN — PALIPERIDONE 6 MG: 6 TABLET, EXTENDED RELEASE ORAL at 17:06

## 2020-06-21 RX ADMIN — TRAZODONE HYDROCHLORIDE 50 MG: 50 TABLET ORAL at 21:06

## 2020-06-21 ASSESSMENT — PAIN SCALES - GENERAL
PAINLEVEL_OUTOF10: 0
PAINLEVEL_OUTOF10: 0

## 2020-06-22 VITALS
DIASTOLIC BLOOD PRESSURE: 77 MMHG | TEMPERATURE: 98.4 F | SYSTOLIC BLOOD PRESSURE: 138 MMHG | HEART RATE: 59 BPM | BODY MASS INDEX: 31.88 KG/M2 | RESPIRATION RATE: 16 BRPM | OXYGEN SATURATION: 97 % | HEIGHT: 73 IN | WEIGHT: 240.52 LBS

## 2020-06-22 PROCEDURE — 99232 SBSQ HOSP IP/OBS MODERATE 35: CPT | Performed by: PSYCHIATRY & NEUROLOGY

## 2020-06-22 PROCEDURE — 10002803 HB RX 637: Performed by: PSYCHIATRY & NEUROLOGY

## 2020-06-22 PROCEDURE — X1094 NO CHARGE VISIT: HCPCS | Performed by: PSYCHIATRY & NEUROLOGY

## 2020-06-22 RX ADMIN — PALIPERIDONE 6 MG: 6 TABLET, EXTENDED RELEASE ORAL at 08:38

## 2020-06-22 RX ADMIN — LAMOTRIGINE 200 MG: 100 TABLET ORAL at 08:38

## 2020-06-22 ASSESSMENT — PAIN SCALES - GENERAL
PAINLEVEL_OUTOF10: 0

## 2020-06-23 ENCOUNTER — TELEPHONE (OUTPATIENT)
Dept: SCHEDULING | Age: 45
End: 2020-06-23

## 2020-06-23 RX ORDER — LAMOTRIGINE 200 MG/1
TABLET ORAL
Qty: 180 TABLET | Refills: 0 | Status: ON HOLD | OUTPATIENT
Start: 2020-06-23 | End: 2020-08-13 | Stop reason: HOSPADM

## 2020-06-25 ENCOUNTER — OFFICE VISIT (OUTPATIENT)
Dept: BEHAVIORAL HEALTH | Age: 45
End: 2020-06-25

## 2020-06-25 DIAGNOSIS — F25.0 SCHIZOAFFECTIVE DISORDER, BIPOLAR TYPE (CMD): Primary | ICD-10-CM

## 2020-06-25 PROCEDURE — 96372 THER/PROPH/DIAG INJ SC/IM: CPT | Performed by: PSYCHIATRY & NEUROLOGY

## 2020-06-25 PROCEDURE — 99213 OFFICE O/P EST LOW 20 MIN: CPT | Performed by: PSYCHIATRY & NEUROLOGY

## 2020-06-28 ENCOUNTER — TELEPHONE (OUTPATIENT)
Dept: SCHEDULING | Age: 45
End: 2020-06-28

## 2020-06-30 PROBLEM — F25.9 SCHIZOAFFECTIVE DISORDER (HCC): Status: ACTIVE | Noted: 2020-06-30

## 2020-07-02 ENCOUNTER — TELEPHONE (OUTPATIENT)
Dept: SCHEDULING | Age: 45
End: 2020-07-02

## 2020-07-03 ENCOUNTER — HOSPITAL ENCOUNTER (EMERGENCY)
Age: 45
Discharge: HOME | End: 2020-07-03
Attending: EMERGENCY MEDICINE

## 2020-07-03 ENCOUNTER — TELEPHONE (OUTPATIENT)
Dept: SCHEDULING | Age: 45
End: 2020-07-03

## 2020-07-03 VITALS
OXYGEN SATURATION: 98 % | DIASTOLIC BLOOD PRESSURE: 76 MMHG | TEMPERATURE: 97.8 F | SYSTOLIC BLOOD PRESSURE: 134 MMHG | RESPIRATION RATE: 16 BRPM | HEIGHT: 73 IN | WEIGHT: 250 LBS | HEART RATE: 87 BPM | BODY MASS INDEX: 33.13 KG/M2

## 2020-07-03 DIAGNOSIS — K12.0 ORAL APHTHAE: Primary | ICD-10-CM

## 2020-07-03 PROCEDURE — 36415 COLL VENOUS BLD VENIPUNCTURE: CPT

## 2020-07-03 PROCEDURE — 99283 EMERGENCY DEPT VISIT LOW MDM: CPT

## 2020-07-03 SDOH — HEALTH STABILITY: MENTAL HEALTH: HOW OFTEN DO YOU HAVE A DRINK CONTAINING ALCOHOL?: NEVER

## 2020-07-03 ASSESSMENT — PAIN SCALES - GENERAL: PAINLEVEL_OUTOF10: 3

## 2020-07-13 ENCOUNTER — HOSPITAL ENCOUNTER (EMERGENCY)
Age: 45
Discharge: PSYCHIATRIC OR SUBSTANCE USE FACILITY | End: 2020-07-13
Attending: EMERGENCY MEDICINE

## 2020-07-13 VITALS
WEIGHT: 237.22 LBS | SYSTOLIC BLOOD PRESSURE: 106 MMHG | OXYGEN SATURATION: 95 % | RESPIRATION RATE: 16 BRPM | DIASTOLIC BLOOD PRESSURE: 61 MMHG | HEART RATE: 62 BPM | HEIGHT: 74 IN | TEMPERATURE: 98.4 F | BODY MASS INDEX: 30.44 KG/M2

## 2020-07-13 DIAGNOSIS — F20.9 SCHIZOPHRENIA, UNSPECIFIED TYPE (CMD): Primary | ICD-10-CM

## 2020-07-13 LAB
ALBUMIN SERPL-MCNC: 4.5 G/DL (ref 3.6–5.1)
ALBUMIN/GLOB SERPL: 1.3 {RATIO} (ref 1–2.4)
ALP SERPL-CCNC: 106 UNITS/L (ref 45–117)
ALT SERPL-CCNC: 45 UNITS/L
ANION GAP SERPL CALC-SCNC: 10 MMOL/L (ref 10–20)
AST SERPL-CCNC: 37 UNITS/L
BASOPHILS # BLD: 0 K/MCL (ref 0–0.3)
BASOPHILS NFR BLD: 0 %
BILIRUB SERPL-MCNC: 0.6 MG/DL (ref 0.2–1)
BUN SERPL-MCNC: 13 MG/DL (ref 6–20)
BUN/CREAT SERPL: 15 (ref 7–25)
CALCIUM SERPL-MCNC: 9 MG/DL (ref 8.4–10.2)
CHLORIDE SERPL-SCNC: 106 MMOL/L (ref 98–107)
CO2 SERPL-SCNC: 25 MMOL/L (ref 21–32)
CREAT SERPL-MCNC: 0.87 MG/DL (ref 0.67–1.17)
DIFFERENTIAL METHOD BLD: ABNORMAL
EOSINOPHIL # BLD: 0 K/MCL (ref 0.1–0.5)
EOSINOPHIL NFR BLD: 0 %
ERYTHROCYTE [DISTWIDTH] IN BLOOD: 12.1 % (ref 11–15)
ETHANOL SERPL-MCNC: NORMAL MG/DL
GLOBULIN SER-MCNC: 3.5 G/DL (ref 2–4)
GLUCOSE SERPL-MCNC: 99 MG/DL (ref 65–99)
HCT VFR BLD CALC: 41.4 % (ref 39–51)
HGB BLD-MCNC: 14.9 G/DL (ref 13–17)
IMM GRANULOCYTES # BLD AUTO: 0 K/MCL (ref 0–0.2)
IMM GRANULOCYTES NFR BLD: 0 %
LYMPHOCYTES # BLD: 1.6 K/MCL (ref 1–4.8)
LYMPHOCYTES NFR BLD: 18 %
MCH RBC QN AUTO: 32.7 PG (ref 26–34)
MCHC RBC AUTO-ENTMCNC: 36 G/DL (ref 32–36.5)
MCV RBC AUTO: 91 FL (ref 78–100)
MONOCYTES # BLD: 0.7 K/MCL (ref 0.3–0.9)
MONOCYTES NFR BLD: 8 %
NEUTROPHILS # BLD: 6.2 K/MCL (ref 1.8–7.7)
NEUTROPHILS NFR BLD: 74 %
NRBC BLD MANUAL-RTO: 0 /100 WBC
PLATELET # BLD: 193 K/MCL (ref 140–450)
POTASSIUM SERPL-SCNC: 3.7 MMOL/L (ref 3.4–5.1)
PROT SERPL-MCNC: 8 G/DL (ref 6.4–8.2)
RBC # BLD: 4.55 MIL/MCL (ref 4.5–5.9)
SARS-COV-2 RNA RESP QL NAA+PROBE: NOT DETECTED
SERVICE CMNT-IMP: NORMAL
SODIUM SERPL-SCNC: 137 MMOL/L (ref 135–145)
SPECIMEN SOURCE: NORMAL
WBC # BLD: 8.5 K/MCL (ref 4.2–11)

## 2020-07-13 PROCEDURE — 80053 COMPREHEN METABOLIC PANEL: CPT

## 2020-07-13 PROCEDURE — 85025 COMPLETE CBC W/AUTO DIFF WBC: CPT

## 2020-07-13 PROCEDURE — 80320 DRUG SCREEN QUANTALCOHOLS: CPT

## 2020-07-13 PROCEDURE — 87635 SARS-COV-2 COVID-19 AMP PRB: CPT

## 2020-07-13 PROCEDURE — 96374 THER/PROPH/DIAG INJ IV PUSH: CPT

## 2020-07-13 PROCEDURE — 96375 TX/PRO/DX INJ NEW DRUG ADDON: CPT

## 2020-07-13 PROCEDURE — 99283 EMERGENCY DEPT VISIT LOW MDM: CPT

## 2020-07-13 PROCEDURE — C9803 HOPD COVID-19 SPEC COLLECT: HCPCS

## 2020-07-13 PROCEDURE — 10002800 HB RX 250 W HCPCS: Performed by: EMERGENCY MEDICINE

## 2020-07-13 RX ORDER — LORAZEPAM 2 MG/ML
2 INJECTION INTRAMUSCULAR ONCE
Status: COMPLETED | OUTPATIENT
Start: 2020-07-13 | End: 2020-07-13

## 2020-07-13 RX ORDER — HALOPERIDOL 5 MG/ML
5 INJECTION INTRAMUSCULAR ONCE
Status: COMPLETED | OUTPATIENT
Start: 2020-07-13 | End: 2020-07-13

## 2020-07-13 RX ADMIN — LORAZEPAM 2 MG: 2 INJECTION INTRAMUSCULAR; INTRAVENOUS at 15:20

## 2020-07-13 RX ADMIN — HALOPERIDOL LACTATE 5 MG: 5 INJECTION INTRAMUSCULAR at 15:20

## 2020-07-13 SDOH — HEALTH STABILITY: MENTAL HEALTH: HOW OFTEN DO YOU HAVE A DRINK CONTAINING ALCOHOL?: NEVER

## 2020-07-14 RX ORDER — PALIPERIDONE 6 MG/1
TABLET, EXTENDED RELEASE ORAL
Qty: 30 TABLET | Refills: 1 | Status: ON HOLD | OUTPATIENT
Start: 2020-07-14 | End: 2020-08-13 | Stop reason: HOSPADM

## 2020-07-16 ENCOUNTER — APPOINTMENT (OUTPATIENT)
Dept: BEHAVIORAL HEALTH | Age: 45
End: 2020-07-16

## 2020-07-23 ENCOUNTER — TELEPHONE (OUTPATIENT)
Dept: SCHEDULING | Age: 45
End: 2020-07-23

## 2020-07-23 ENCOUNTER — APPOINTMENT (OUTPATIENT)
Dept: BEHAVIORAL HEALTH | Age: 45
End: 2020-07-23

## 2020-07-26 RX ORDER — HYDROXYZINE 50 MG/1
50 TABLET, FILM COATED ORAL EVERY 4 HOURS PRN
Qty: 100 TABLET | Refills: 0 | Status: SHIPPED | OUTPATIENT
Start: 2020-07-26 | End: 2020-08-05

## 2020-07-28 ENCOUNTER — APPOINTMENT (OUTPATIENT)
Dept: BEHAVIORAL HEALTH | Age: 45
End: 2020-07-28

## 2020-07-28 ENCOUNTER — HOSPITAL ENCOUNTER (EMERGENCY)
Age: 45
Discharge: HOME | End: 2020-07-29
Attending: EMERGENCY MEDICINE

## 2020-07-28 DIAGNOSIS — F22 DELUSIONAL DISORDER (CMD): Primary | ICD-10-CM

## 2020-07-28 LAB
ALBUMIN SERPL-MCNC: 4.7 G/DL (ref 3.6–5.1)
ALP SERPL-CCNC: 111 UNITS/L (ref 45–117)
ALT SERPL-CCNC: 37 UNITS/L
ANION GAP SERPL CALC-SCNC: 11 MMOL/L (ref 10–20)
AST SERPL-CCNC: 36 UNITS/L
BASOPHILS # BLD: 0.1 K/MCL (ref 0–0.3)
BASOPHILS NFR BLD: 1 %
BILIRUB CONJ SERPL-MCNC: 0.1 MG/DL (ref 0–0.2)
BILIRUB SERPL-MCNC: 0.4 MG/DL (ref 0.2–1)
BUN SERPL-MCNC: 13 MG/DL (ref 6–20)
BUN/CREAT SERPL: 15 (ref 7–25)
CALCIUM SERPL-MCNC: 9.3 MG/DL (ref 8.4–10.2)
CHLORIDE SERPL-SCNC: 109 MMOL/L (ref 98–107)
CO2 SERPL-SCNC: 25 MMOL/L (ref 21–32)
CREAT SERPL-MCNC: 0.88 MG/DL (ref 0.67–1.17)
DIFFERENTIAL METHOD BLD: NORMAL
EOSINOPHIL # BLD: 0.1 K/MCL (ref 0.1–0.5)
EOSINOPHIL NFR BLD: 1 %
ERYTHROCYTE [DISTWIDTH] IN BLOOD: 12.6 % (ref 11–15)
ETHANOL SERPL-MCNC: NORMAL MG/DL
GLUCOSE SERPL-MCNC: 110 MG/DL (ref 65–99)
HCT VFR BLD CALC: 45.9 % (ref 39–51)
HGB BLD-MCNC: 15.6 G/DL (ref 13–17)
IMM GRANULOCYTES # BLD AUTO: 0 K/MCL (ref 0–0.2)
IMM GRANULOCYTES NFR BLD: 0 %
LYMPHOCYTES # BLD: 1.9 K/MCL (ref 1–4.8)
LYMPHOCYTES NFR BLD: 24 %
MAGNESIUM SERPL-MCNC: 2.3 MG/DL (ref 1.7–2.4)
MCH RBC QN AUTO: 31.4 PG (ref 26–34)
MCHC RBC AUTO-ENTMCNC: 34 G/DL (ref 32–36.5)
MCV RBC AUTO: 92.4 FL (ref 78–100)
MONOCYTES # BLD: 0.8 K/MCL (ref 0.3–0.9)
MONOCYTES NFR BLD: 11 %
NEUTROPHILS # BLD: 5 K/MCL (ref 1.8–7.7)
NEUTROPHILS NFR BLD: 63 %
NRBC BLD MANUAL-RTO: 0 /100 WBC
PLATELET # BLD: 213 K/MCL (ref 140–450)
POTASSIUM SERPL-SCNC: 4.2 MMOL/L (ref 3.4–5.1)
PROT SERPL-MCNC: 8.1 G/DL (ref 6.4–8.2)
RBC # BLD: 4.97 MIL/MCL (ref 4.5–5.9)
SODIUM SERPL-SCNC: 141 MMOL/L (ref 135–145)
WBC # BLD: 8 K/MCL (ref 4.2–11)

## 2020-07-28 PROCEDURE — 99285 EMERGENCY DEPT VISIT HI MDM: CPT

## 2020-07-28 PROCEDURE — 85025 COMPLETE CBC W/AUTO DIFF WBC: CPT

## 2020-07-28 PROCEDURE — 80076 HEPATIC FUNCTION PANEL: CPT

## 2020-07-28 PROCEDURE — 87635 SARS-COV-2 COVID-19 AMP PRB: CPT

## 2020-07-28 PROCEDURE — 80320 DRUG SCREEN QUANTALCOHOLS: CPT

## 2020-07-28 PROCEDURE — 36415 COLL VENOUS BLD VENIPUNCTURE: CPT

## 2020-07-28 PROCEDURE — 83735 ASSAY OF MAGNESIUM: CPT

## 2020-07-28 PROCEDURE — 80048 BASIC METABOLIC PNL TOTAL CA: CPT

## 2020-07-28 SDOH — HEALTH STABILITY: MENTAL HEALTH: HOW OFTEN DO YOU HAVE A DRINK CONTAINING ALCOHOL?: NEVER

## 2020-07-28 ASSESSMENT — PAIN SCALES - GENERAL: PAINLEVEL_OUTOF10: 0

## 2020-07-29 ENCOUNTER — TELEPHONE (OUTPATIENT)
Dept: SCHEDULING | Age: 45
End: 2020-07-29

## 2020-07-29 VITALS
BODY MASS INDEX: 29.75 KG/M2 | RESPIRATION RATE: 16 BRPM | DIASTOLIC BLOOD PRESSURE: 58 MMHG | WEIGHT: 231.7 LBS | SYSTOLIC BLOOD PRESSURE: 104 MMHG | TEMPERATURE: 98.1 F | HEART RATE: 65 BPM | OXYGEN SATURATION: 98 %

## 2020-07-29 LAB
AMPHETAMINES UR QL SCN>500 NG/ML: NEGATIVE
BARBITURATES UR QL SCN>200 NG/ML: NEGATIVE
BENZODIAZ UR QL SCN>200 NG/ML: NEGATIVE
BZE UR QL SCN>150 NG/ML: NEGATIVE
CANNABINOIDS UR QL SCN>50 NG/ML: NEGATIVE
OPIATES UR QL SCN>300 NG/ML: NEGATIVE
PCP UR QL SCN>25 NG/ML: NEGATIVE
SARS-COV-2 RNA RESP QL NAA+PROBE: NOT DETECTED
SERVICE CMNT-IMP: NORMAL
SPECIMEN SOURCE: NORMAL

## 2020-07-29 PROCEDURE — 80307 DRUG TEST PRSMV CHEM ANLYZR: CPT

## 2020-07-29 ASSESSMENT — COGNITIVE AND FUNCTIONAL STATUS - GENERAL
ORIENTATION: ORIENTED (PERSON/PLACE/TIME)
MEMORY: INTACT
AFFECT: APPROPRIATE TO SITUATION
BEHAVIOR: APPROPRIATE TO SITUATION
LEVEL_OF_CONSCIOUSNESS_CALCULATED: HYPERALERT
SPEECH: CLEAR/UNDERSTANDABLE

## 2020-08-05 ENCOUNTER — HOSPITAL ENCOUNTER (EMERGENCY)
Age: 45
Discharge: PSYCHIATRIC OR SUBSTANCE USE FACILITY | End: 2020-08-05
Attending: EMERGENCY MEDICINE

## 2020-08-05 ENCOUNTER — HOSPITAL ENCOUNTER (INPATIENT)
Age: 45
LOS: 8 days | Discharge: HOME | DRG: 885 | End: 2020-08-13
Attending: PSYCHIATRY & NEUROLOGY | Admitting: PSYCHIATRY & NEUROLOGY

## 2020-08-05 VITALS
TEMPERATURE: 98 F | RESPIRATION RATE: 18 BRPM | BODY MASS INDEX: 29.86 KG/M2 | HEIGHT: 73 IN | DIASTOLIC BLOOD PRESSURE: 70 MMHG | SYSTOLIC BLOOD PRESSURE: 114 MMHG | WEIGHT: 225.31 LBS | OXYGEN SATURATION: 100 % | HEART RATE: 88 BPM

## 2020-08-05 DIAGNOSIS — F25.9 SCHIZOAFFECTIVE DISORDER, UNSPECIFIED TYPE (CMD): ICD-10-CM

## 2020-08-05 DIAGNOSIS — R45.851 SUICIDAL IDEATION: Primary | ICD-10-CM

## 2020-08-05 PROBLEM — F43.10 POST TRAUMATIC STRESS DISORDER (PTSD): Status: ACTIVE | Noted: 2020-08-05

## 2020-08-05 PROBLEM — F32.A DEPRESSION: Status: ACTIVE | Noted: 2020-08-05

## 2020-08-05 PROBLEM — G47.33 OBSTRUCTIVE SLEEP APNEA: Status: ACTIVE | Noted: 2020-08-05

## 2020-08-05 PROBLEM — R53.83 EXHAUSTION: Status: ACTIVE | Noted: 2020-08-05

## 2020-08-05 LAB
ALBUMIN SERPL-MCNC: 3.7 G/DL (ref 3.6–5.1)
ALBUMIN/GLOB SERPL: 1.1 {RATIO} (ref 1–2.4)
ALP SERPL-CCNC: 105 UNITS/L (ref 45–117)
ALT SERPL-CCNC: 42 UNITS/L
AMPHETAMINES UR QL SCN>500 NG/ML: NEGATIVE
ANION GAP SERPL CALC-SCNC: 12 MMOL/L (ref 10–20)
AST SERPL-CCNC: 33 UNITS/L
ATRIAL RATE (BPM): 68
BARBITURATES UR QL SCN>200 NG/ML: NEGATIVE
BASOPHILS # BLD: 0 K/MCL (ref 0–0.3)
BASOPHILS NFR BLD: 0 %
BENZODIAZ UR QL SCN>200 NG/ML: NEGATIVE
BILIRUB SERPL-MCNC: 0.3 MG/DL (ref 0.2–1)
BUN SERPL-MCNC: 11 MG/DL (ref 6–20)
BUN/CREAT SERPL: 14 (ref 7–25)
BZE UR QL SCN>150 NG/ML: NEGATIVE
CALCIUM SERPL-MCNC: 8.2 MG/DL (ref 8.4–10.2)
CANNABINOIDS UR QL SCN>50 NG/ML: NEGATIVE
CHLORIDE SERPL-SCNC: 108 MMOL/L (ref 98–107)
CO2 SERPL-SCNC: 24 MMOL/L (ref 21–32)
CREAT SERPL-MCNC: 0.78 MG/DL (ref 0.67–1.17)
DIFFERENTIAL METHOD BLD: ABNORMAL
EOSINOPHIL # BLD: 0.1 K/MCL (ref 0.1–0.5)
EOSINOPHIL NFR BLD: 2 %
ERYTHROCYTE [DISTWIDTH] IN BLOOD: 12.7 % (ref 11–15)
ETHANOL SERPL-MCNC: NORMAL MG/DL
GLOBULIN SER-MCNC: 3.3 G/DL (ref 2–4)
GLUCOSE SERPL-MCNC: 95 MG/DL (ref 65–99)
HCT VFR BLD CALC: 40.7 % (ref 39–51)
HGB BLD-MCNC: 13.6 G/DL (ref 13–17)
IMM GRANULOCYTES # BLD AUTO: 0 K/MCL (ref 0–0.2)
IMM GRANULOCYTES NFR BLD: 0 %
LYMPHOCYTES # BLD: 1.4 K/MCL (ref 1–4.8)
LYMPHOCYTES NFR BLD: 22 %
MAGNESIUM SERPL-MCNC: 2.1 MG/DL (ref 1.7–2.4)
MCH RBC QN AUTO: 31.3 PG (ref 26–34)
MCHC RBC AUTO-ENTMCNC: 33.4 G/DL (ref 32–36.5)
MCV RBC AUTO: 93.6 FL (ref 78–100)
MONOCYTES # BLD: 0.5 K/MCL (ref 0.3–0.9)
MONOCYTES NFR BLD: 9 %
NEUTROPHILS # BLD: 4.2 K/MCL (ref 1.8–7.7)
NEUTROPHILS NFR BLD: 67 %
NRBC BLD MANUAL-RTO: 0 /100 WBC
OPIATES UR QL SCN>300 NG/ML: NEGATIVE
P AXIS (DEGREES): 51
PCP UR QL SCN>25 NG/ML: NEGATIVE
PLATELET # BLD: 185 K/MCL (ref 140–450)
POTASSIUM SERPL-SCNC: 3.8 MMOL/L (ref 3.4–5.1)
PR-INTERVAL (MSEC): 146
PROT SERPL-MCNC: 7 G/DL (ref 6.4–8.2)
QRS-INTERVAL (MSEC): 106
QT-INTERVAL (MSEC): 424
QTC: 451
R AXIS (DEGREES): 42
RBC # BLD: 4.35 MIL/MCL (ref 4.5–5.9)
REPORT TEXT: NORMAL
SARS-COV-2 RNA RESP QL NAA+PROBE: NOT DETECTED
SERVICE CMNT-IMP: NORMAL
SODIUM SERPL-SCNC: 140 MMOL/L (ref 135–145)
SPECIMEN SOURCE: NORMAL
T AXIS (DEGREES): 46
VENTRICULAR RATE EKG/MIN (BPM): 68
WBC # BLD: 6.2 K/MCL (ref 4.2–11)

## 2020-08-05 PROCEDURE — 10002803 HB RX 637: Performed by: PSYCHIATRY & NEUROLOGY

## 2020-08-05 PROCEDURE — 93005 ELECTROCARDIOGRAM TRACING: CPT | Performed by: EMERGENCY MEDICINE

## 2020-08-05 PROCEDURE — 83735 ASSAY OF MAGNESIUM: CPT

## 2020-08-05 PROCEDURE — 87635 SARS-COV-2 COVID-19 AMP PRB: CPT

## 2020-08-05 PROCEDURE — 99284 EMERGENCY DEPT VISIT MOD MDM: CPT

## 2020-08-05 PROCEDURE — 80320 DRUG SCREEN QUANTALCOHOLS: CPT

## 2020-08-05 PROCEDURE — 96374 THER/PROPH/DIAG INJ IV PUSH: CPT

## 2020-08-05 PROCEDURE — 10002800 HB RX 250 W HCPCS: Performed by: EMERGENCY MEDICINE

## 2020-08-05 PROCEDURE — C9803 HOPD COVID-19 SPEC COLLECT: HCPCS

## 2020-08-05 PROCEDURE — 10004577 HB ROOM CHARGE PSYCH

## 2020-08-05 PROCEDURE — 80307 DRUG TEST PRSMV CHEM ANLYZR: CPT

## 2020-08-05 PROCEDURE — 80053 COMPREHEN METABOLIC PANEL: CPT

## 2020-08-05 PROCEDURE — 85025 COMPLETE CBC W/AUTO DIFF WBC: CPT

## 2020-08-05 PROCEDURE — 96375 TX/PRO/DX INJ NEW DRUG ADDON: CPT

## 2020-08-05 RX ORDER — TRAZODONE HYDROCHLORIDE 100 MG/1
100 TABLET ORAL NIGHTLY
COMMUNITY
End: 2020-08-05

## 2020-08-05 RX ORDER — ACETAMINOPHEN 325 MG/1
650 TABLET ORAL EVERY 4 HOURS PRN
Status: DISCONTINUED | OUTPATIENT
Start: 2020-08-05 | End: 2020-08-13 | Stop reason: HOSPADM

## 2020-08-05 RX ORDER — BENZTROPINE MESYLATE 1 MG/1
1 TABLET ORAL DAILY PRN
Status: DISCONTINUED | OUTPATIENT
Start: 2020-08-05 | End: 2020-08-13 | Stop reason: HOSPADM

## 2020-08-05 RX ORDER — LORAZEPAM 2 MG/ML
2 INJECTION INTRAMUSCULAR ONCE
Status: COMPLETED | OUTPATIENT
Start: 2020-08-05 | End: 2020-08-05

## 2020-08-05 RX ORDER — LAMOTRIGINE 100 MG/1
200 TABLET ORAL EVERY 12 HOURS SCHEDULED
Status: DISCONTINUED | OUTPATIENT
Start: 2020-08-05 | End: 2020-08-06

## 2020-08-05 RX ORDER — HALOPERIDOL 5 MG/ML
5 INJECTION INTRAMUSCULAR ONCE
Status: COMPLETED | OUTPATIENT
Start: 2020-08-05 | End: 2020-08-05

## 2020-08-05 RX ORDER — BENZTROPINE MESYLATE 1 MG/1
1 TABLET ORAL EVERY 8 HOURS PRN
Status: ON HOLD | COMMUNITY
Start: 2020-07-08 | End: 2020-08-13 | Stop reason: HOSPADM

## 2020-08-05 RX ORDER — CLONAZEPAM 0.5 MG/1
0.5 TABLET ORAL 3 TIMES DAILY PRN
Status: DISCONTINUED | OUTPATIENT
Start: 2020-08-05 | End: 2020-08-09

## 2020-08-05 RX ADMIN — LAMOTRIGINE 200 MG: 100 TABLET ORAL at 22:07

## 2020-08-05 RX ADMIN — HALOPERIDOL LACTATE 5 MG: 5 INJECTION, SOLUTION INTRAMUSCULAR at 01:35

## 2020-08-05 RX ADMIN — LORAZEPAM 2 MG: 2 INJECTION INTRAMUSCULAR; INTRAVENOUS at 01:35

## 2020-08-05 SDOH — HEALTH STABILITY: MENTAL HEALTH: HOW OFTEN DO YOU HAVE A DRINK CONTAINING ALCOHOL?: NEVER

## 2020-08-05 ASSESSMENT — LIFESTYLE VARIABLES
CHRONIC/CANCER PAIN PRESENT: NO
HOW OFTEN DO YOU HAVE 6 OR MORE DRINKS ON ONE OCCASION: NEVER
HOW MANY STANDARD DRINKS CONTAINING ALCOHOL DO YOU HAVE ON A TYPICAL DAY: 0,1 OR 2
RECENT DECLINE IN ADLS: NO
HAVE YOU EVER BEEN VERBALLY, EMOTIONALLY, PHYSICALLY OR SEXUALLY ABUSED, OR ABUSED VIA SOCIAL MEDIA?: YES
HOW OFTEN DO YOU HAVE A DRINK CONTAINING ALCOHOL: NEVER
ARE YOU BLIND OR DO YOU HAVE SERIOUS DIFFICULTY SEEING, EVEN WHEN WEARING GLASSES: NO
ALCOHOL_USE_STATUS: NO OR LOW RISK WITH VALIDATED TOOL
AUDIT-C TOTAL SCORE: 0
HAVE YOU EVER BEEN EXPOSED TO OTHER VERY DISTURBING, TRAUMATIC OR ANXIETY PROVOKING EVENTS IN YOUR LIFETIME?: NO
IS PATIENT ABLE TO COMPLETE ASSESSMENT AT THIS TIME: NO (T)
ARE YOU DEAF OR DO YOU HAVE SERIOUS DIFFICULTY  HEARING: NO
ALCOHOL_USE: DENIES
ADL BEFORE ADMISSION: INDEPENDENT
AT WHAT AGE STARTED USING: CHILDHOOD(<18 ONLY)

## 2020-08-05 ASSESSMENT — ACTIVITIES OF DAILY LIVING (ADL): ADL_SCORE: 12

## 2020-08-05 ASSESSMENT — COGNITIVE AND FUNCTIONAL STATUS - GENERAL
ORIENTATION: ORIENTED (PERSON/PLACE/TIME)
MEMORY: INTACT
AFFECT: FLAT;OTHER (COMMENT)
BEHAVIOR: SUICIDAL/SUICIDAL IDEATION;POOR EYE CONTACT
MOOD: FLAT
SPEECH: CLEAR/UNDERSTANDABLE
MOTOR_BEHAVIOR-RETARDATION_CALCULATED: DECREASED MOVEMENT
ATTENTION_CALCULATED: UNABLE TO ASSESS

## 2020-08-05 ASSESSMENT — PAIN SCALES - GENERAL
PAINLEVEL_OUTOF10: 0
PAINLEVEL_OUTOF10: 0

## 2020-08-06 ENCOUNTER — APPOINTMENT (OUTPATIENT)
Dept: BEHAVIORAL HEALTH | Age: 45
End: 2020-08-06

## 2020-08-06 PROCEDURE — 10004577 HB ROOM CHARGE PSYCH

## 2020-08-06 PROCEDURE — 10002803 HB RX 637: Performed by: PSYCHIATRY & NEUROLOGY

## 2020-08-06 PROCEDURE — 10002800 HB RX 250 W HCPCS: Performed by: PSYCHIATRY & NEUROLOGY

## 2020-08-06 RX ORDER — LAMOTRIGINE 100 MG/1
200 TABLET ORAL AT BEDTIME
Status: DISCONTINUED | OUTPATIENT
Start: 2020-08-06 | End: 2020-08-13 | Stop reason: HOSPADM

## 2020-08-06 RX ADMIN — CLONAZEPAM 0.5 MG: 0.5 TABLET ORAL at 17:52

## 2020-08-06 RX ADMIN — PALIPERIDONE PALMITATE 156 MG: 156 INJECTION INTRAMUSCULAR at 19:54

## 2020-08-06 RX ADMIN — CLONAZEPAM 0.5 MG: 0.5 TABLET ORAL at 09:05

## 2020-08-06 RX ADMIN — LAMOTRIGINE 200 MG: 100 TABLET ORAL at 09:02

## 2020-08-06 RX ADMIN — LAMOTRIGINE 200 MG: 100 TABLET ORAL at 19:55

## 2020-08-06 ASSESSMENT — PAIN SCALES - GENERAL: PAINLEVEL_OUTOF10: 0

## 2020-08-07 PROCEDURE — 10004577 HB ROOM CHARGE PSYCH

## 2020-08-07 PROCEDURE — 10002803 HB RX 637: Performed by: PSYCHIATRY & NEUROLOGY

## 2020-08-07 RX ADMIN — LAMOTRIGINE 200 MG: 100 TABLET ORAL at 21:17

## 2020-08-07 RX ADMIN — CLONAZEPAM 0.5 MG: 0.5 TABLET ORAL at 12:56

## 2020-08-07 ASSESSMENT — PAIN SCALES - GENERAL: PAINLEVEL_OUTOF10: 0

## 2020-08-08 PROCEDURE — 10002803 HB RX 637: Performed by: PSYCHIATRY & NEUROLOGY

## 2020-08-08 PROCEDURE — 10004577 HB ROOM CHARGE PSYCH

## 2020-08-08 RX ADMIN — CLONAZEPAM 0.5 MG: 0.5 TABLET ORAL at 16:00

## 2020-08-08 RX ADMIN — LAMOTRIGINE 200 MG: 100 TABLET ORAL at 21:24

## 2020-08-08 ASSESSMENT — PAIN SCALES - GENERAL: PAINLEVEL_OUTOF10: 0

## 2020-08-09 PROCEDURE — 10002803 HB RX 637: Performed by: PSYCHIATRY & NEUROLOGY

## 2020-08-09 PROCEDURE — 10004577 HB ROOM CHARGE PSYCH

## 2020-08-09 RX ORDER — CLONAZEPAM 0.5 MG/1
0.5 TABLET ORAL 2 TIMES DAILY PRN
Status: DISCONTINUED | OUTPATIENT
Start: 2020-08-09 | End: 2020-08-13 | Stop reason: HOSPADM

## 2020-08-09 RX ORDER — CLONAZEPAM 0.5 MG/1
0.5 TABLET ORAL DAILY
Status: DISCONTINUED | OUTPATIENT
Start: 2020-08-10 | End: 2020-08-13 | Stop reason: HOSPADM

## 2020-08-09 RX ADMIN — CLONAZEPAM 0.5 MG: 0.5 TABLET ORAL at 17:45

## 2020-08-09 RX ADMIN — LAMOTRIGINE 200 MG: 100 TABLET ORAL at 21:19

## 2020-08-09 RX ADMIN — CLONAZEPAM 0.5 MG: 0.5 TABLET ORAL at 05:15

## 2020-08-09 ASSESSMENT — PAIN SCALES - GENERAL
PAINLEVEL_OUTOF10: 0
PAINLEVEL_OUTOF10: 0

## 2020-08-10 ENCOUNTER — APPOINTMENT (OUTPATIENT)
Dept: BEHAVIORAL HEALTH | Age: 45
End: 2020-08-10

## 2020-08-10 PROCEDURE — 10002803 HB RX 637: Performed by: PSYCHIATRY & NEUROLOGY

## 2020-08-10 PROCEDURE — 10004577 HB ROOM CHARGE PSYCH

## 2020-08-10 RX ADMIN — CLONAZEPAM 0.5 MG: 0.5 TABLET ORAL at 08:49

## 2020-08-10 RX ADMIN — CLONAZEPAM 0.5 MG: 0.5 TABLET ORAL at 19:01

## 2020-08-10 RX ADMIN — LAMOTRIGINE 200 MG: 100 TABLET ORAL at 20:24

## 2020-08-10 ASSESSMENT — PAIN SCALES - GENERAL: PAINLEVEL_OUTOF10: 0

## 2020-08-11 ENCOUNTER — APPOINTMENT (OUTPATIENT)
Dept: BEHAVIORAL HEALTH | Age: 45
End: 2020-08-11

## 2020-08-11 PROCEDURE — 10004577 HB ROOM CHARGE PSYCH

## 2020-08-11 PROCEDURE — 10002803 HB RX 637: Performed by: PSYCHIATRY & NEUROLOGY

## 2020-08-11 RX ADMIN — CLONAZEPAM 0.5 MG: 0.5 TABLET ORAL at 08:28

## 2020-08-11 RX ADMIN — LAMOTRIGINE 200 MG: 100 TABLET ORAL at 20:39

## 2020-08-11 ASSESSMENT — ACTIVITIES OF DAILY LIVING (ADL)
FEEDING: INDEPENDENT
TOILETING: INDEPENDENT
BATHING: INDEPENDENT
ADL_SCORE: 12
DRESSING: INDEPENDENT

## 2020-08-11 ASSESSMENT — PAIN SCALES - GENERAL
PAINLEVEL_OUTOF10: 0
PAINLEVEL_OUTOF10: 0

## 2020-08-12 ENCOUNTER — TELEPHONE (OUTPATIENT)
Dept: BEHAVIORAL HEALTH | Age: 45
End: 2020-08-12

## 2020-08-12 PROCEDURE — 10004577 HB ROOM CHARGE PSYCH

## 2020-08-12 PROCEDURE — 10002803 HB RX 637: Performed by: PSYCHIATRY & NEUROLOGY

## 2020-08-12 RX ADMIN — CLONAZEPAM 0.5 MG: 0.5 TABLET ORAL at 08:40

## 2020-08-12 RX ADMIN — LAMOTRIGINE 200 MG: 100 TABLET ORAL at 22:19

## 2020-08-12 ASSESSMENT — PAIN SCALES - GENERAL: PAINLEVEL_OUTOF10: 0

## 2020-08-13 VITALS
OXYGEN SATURATION: 98 % | TEMPERATURE: 98.2 F | BODY MASS INDEX: 31.59 KG/M2 | RESPIRATION RATE: 16 BRPM | DIASTOLIC BLOOD PRESSURE: 71 MMHG | HEIGHT: 73 IN | WEIGHT: 238.32 LBS | SYSTOLIC BLOOD PRESSURE: 105 MMHG | HEART RATE: 55 BPM

## 2020-08-13 LAB
CHOLEST SERPL-MCNC: 215 MG/DL
CHOLEST/HDLC SERPL: 5.4 {RATIO}
HDLC SERPL-MCNC: 40 MG/DL
LDLC SERPL CALC-MCNC: 160 MG/DL
NONHDLC SERPL-MCNC: 175 MG/DL
TRIGL SERPL-MCNC: 76 MG/DL

## 2020-08-13 PROCEDURE — 10002803 HB RX 637: Performed by: PSYCHIATRY & NEUROLOGY

## 2020-08-13 PROCEDURE — 80061 LIPID PANEL: CPT

## 2020-08-13 PROCEDURE — 36415 COLL VENOUS BLD VENIPUNCTURE: CPT

## 2020-08-13 RX ORDER — LAMOTRIGINE 200 MG/1
200 TABLET ORAL AT BEDTIME
Qty: 30 TABLET | Refills: 0 | Status: ON HOLD | OUTPATIENT
Start: 2020-08-13 | End: 2020-08-27 | Stop reason: HOSPADM

## 2020-08-13 RX ORDER — CLONAZEPAM 0.5 MG/1
0.5 TABLET ORAL DAILY
Qty: 30 TABLET | Refills: 0 | Status: ON HOLD | OUTPATIENT
Start: 2020-08-14 | End: 2020-08-27 | Stop reason: HOSPADM

## 2020-08-13 RX ADMIN — CLONAZEPAM 0.5 MG: 0.5 TABLET ORAL at 08:16

## 2020-08-13 RX ADMIN — CLONAZEPAM 0.5 MG: 0.5 TABLET ORAL at 04:01

## 2020-08-13 ASSESSMENT — PAIN SCALES - GENERAL: PAINLEVEL_OUTOF10: 0

## 2020-08-16 ENCOUNTER — HOSPITAL ENCOUNTER (INPATIENT)
Age: 45
LOS: 11 days | Discharge: HOME | DRG: 885 | End: 2020-08-27
Attending: EMERGENCY MEDICINE | Admitting: PSYCHIATRY & NEUROLOGY

## 2020-08-16 DIAGNOSIS — R45.851 SUICIDAL IDEATION: ICD-10-CM

## 2020-08-16 DIAGNOSIS — F25.0 SCHIZOAFFECTIVE DISORDER, BIPOLAR TYPE (CMD): Primary | ICD-10-CM

## 2020-08-16 LAB
ALBUMIN SERPL-MCNC: 4 G/DL (ref 3.6–5.1)
ALBUMIN/GLOB SERPL: 1.2 {RATIO} (ref 1–2.4)
ALP SERPL-CCNC: 130 UNITS/L (ref 45–117)
ALT SERPL-CCNC: 54 UNITS/L
AMPHETAMINES UR QL SCN>500 NG/ML: NEGATIVE
ANION GAP SERPL CALC-SCNC: 11 MMOL/L (ref 10–20)
APPEARANCE UR: ABNORMAL
AST SERPL-CCNC: 52 UNITS/L
BACTERIA #/AREA URNS HPF: ABNORMAL /HPF
BARBITURATES UR QL SCN>200 NG/ML: NEGATIVE
BASOPHILS # BLD: 0 K/MCL (ref 0–0.3)
BASOPHILS NFR BLD: 0 %
BENZODIAZ UR QL SCN>200 NG/ML: NEGATIVE
BILIRUB SERPL-MCNC: 0.3 MG/DL (ref 0.2–1)
BILIRUB UR QL STRIP: NEGATIVE
BUN SERPL-MCNC: 14 MG/DL (ref 6–20)
BUN/CREAT SERPL: 18 (ref 7–25)
BZE UR QL SCN>150 NG/ML: NEGATIVE
CALCIUM SERPL-MCNC: 9 MG/DL (ref 8.4–10.2)
CANNABINOIDS UR QL SCN>50 NG/ML: NEGATIVE
CHLORIDE SERPL-SCNC: 108 MMOL/L (ref 98–107)
CO2 SERPL-SCNC: 25 MMOL/L (ref 21–32)
COLOR UR: YELLOW
CREAT SERPL-MCNC: 0.79 MG/DL (ref 0.67–1.17)
DIFFERENTIAL METHOD BLD: NORMAL
EOSINOPHIL # BLD: 0.1 K/MCL (ref 0.1–0.5)
EOSINOPHIL NFR BLD: 2 %
ERYTHROCYTE [DISTWIDTH] IN BLOOD: 12.5 % (ref 11–15)
ETHANOL SERPL-MCNC: NORMAL MG/DL
GLOBULIN SER-MCNC: 3.4 G/DL (ref 2–4)
GLUCOSE SERPL-MCNC: 101 MG/DL (ref 65–99)
GLUCOSE UR STRIP-MCNC: NEGATIVE MG/DL
HCT VFR BLD CALC: 42.5 % (ref 39–51)
HGB BLD-MCNC: 14.1 G/DL (ref 13–17)
HGB UR QL STRIP: NEGATIVE
HYALINE CASTS #/AREA URNS LPF: ABNORMAL /LPF (ref 0–5)
IMM GRANULOCYTES # BLD AUTO: 0 K/MCL (ref 0–0.2)
IMM GRANULOCYTES NFR BLD: 0 %
KETONES UR STRIP-MCNC: NEGATIVE MG/DL
LEUKOCYTE ESTERASE UR QL STRIP: ABNORMAL
LYMPHOCYTES # BLD: 2.2 K/MCL (ref 1–4.8)
LYMPHOCYTES NFR BLD: 30 %
MCH RBC QN AUTO: 31.1 PG (ref 26–34)
MCHC RBC AUTO-ENTMCNC: 33.2 G/DL (ref 32–36.5)
MCV RBC AUTO: 93.6 FL (ref 78–100)
MONOCYTES # BLD: 0.6 K/MCL (ref 0.3–0.9)
MONOCYTES NFR BLD: 8 %
MUCOUS THREADS URNS QL MICRO: PRESENT
NEUTROPHILS # BLD: 4.5 K/MCL (ref 1.8–7.7)
NEUTROPHILS NFR BLD: 60 %
NITRITE UR QL STRIP: NEGATIVE
NRBC BLD MANUAL-RTO: 0 /100 WBC
OPIATES UR QL SCN>300 NG/ML: NEGATIVE
PCP UR QL SCN>25 NG/ML: NEGATIVE
PH UR STRIP: 5 UNITS (ref 5–7)
PLATELET # BLD: 176 K/MCL (ref 140–450)
POTASSIUM SERPL-SCNC: 3.7 MMOL/L (ref 3.4–5.1)
PROT SERPL-MCNC: 7.4 G/DL (ref 6.4–8.2)
PROT UR STRIP-MCNC: NEGATIVE MG/DL
RBC # BLD: 4.54 MIL/MCL (ref 4.5–5.9)
RBC #/AREA URNS HPF: ABNORMAL /HPF (ref 0–2)
SARS-COV-2 RNA RESP QL NAA+PROBE: NOT DETECTED
SERVICE CMNT-IMP: NORMAL
SODIUM SERPL-SCNC: 140 MMOL/L (ref 135–145)
SP GR UR STRIP: 1.03 (ref 1–1.03)
SPECIMEN SOURCE: ABNORMAL
SPECIMEN SOURCE: NORMAL
SQUAMOUS #/AREA URNS HPF: ABNORMAL /HPF (ref 0–5)
UROBILINOGEN UR STRIP-MCNC: 2 MG/DL (ref 0–1)
WBC # BLD: 7.5 K/MCL (ref 4.2–11)
WBC #/AREA URNS HPF: ABNORMAL /HPF (ref 0–5)

## 2020-08-16 PROCEDURE — 36415 COLL VENOUS BLD VENIPUNCTURE: CPT

## 2020-08-16 PROCEDURE — 87086 URINE CULTURE/COLONY COUNT: CPT

## 2020-08-16 PROCEDURE — 10002803 HB RX 637: Performed by: EMERGENCY MEDICINE

## 2020-08-16 PROCEDURE — 13003244 HB ROOM CHARGE INTENSIVE PSYCH

## 2020-08-16 PROCEDURE — 80053 COMPREHEN METABOLIC PANEL: CPT

## 2020-08-16 PROCEDURE — 80320 DRUG SCREEN QUANTALCOHOLS: CPT

## 2020-08-16 PROCEDURE — 83036 HEMOGLOBIN GLYCOSYLATED A1C: CPT

## 2020-08-16 PROCEDURE — 80307 DRUG TEST PRSMV CHEM ANLYZR: CPT

## 2020-08-16 PROCEDURE — C9803 HOPD COVID-19 SPEC COLLECT: HCPCS

## 2020-08-16 PROCEDURE — 85025 COMPLETE CBC W/AUTO DIFF WBC: CPT

## 2020-08-16 PROCEDURE — 87635 SARS-COV-2 COVID-19 AMP PRB: CPT

## 2020-08-16 PROCEDURE — 84443 ASSAY THYROID STIM HORMONE: CPT

## 2020-08-16 PROCEDURE — 81001 URINALYSIS AUTO W/SCOPE: CPT

## 2020-08-16 PROCEDURE — 99283 EMERGENCY DEPT VISIT LOW MDM: CPT

## 2020-08-16 PROCEDURE — 90839 PSYTX CRISIS INITIAL 60 MIN: CPT

## 2020-08-16 RX ORDER — LORAZEPAM 2 MG/1
2 TABLET ORAL ONCE
Status: COMPLETED | OUTPATIENT
Start: 2020-08-16 | End: 2020-08-16

## 2020-08-16 RX ADMIN — LORAZEPAM 2 MG: 2 TABLET ORAL at 20:46

## 2020-08-16 ASSESSMENT — COGNITIVE AND FUNCTIONAL STATUS - GENERAL
BEHAVIOR: SUICIDAL/SUICIDAL IDEATION;POOR EYE CONTACT
MEMORY: INTACT
ORIENTATION: ORIENTED (PERSON/PLACE/TIME)
MOTOR_BEHAVIOR-AGITATION_CALCULATED: RESTLESS
MOOD: BLUNTED
PERCEPTUAL_MISINTERPRETATIONS_HALLUCINATIONS: AUDITORY;COMMAND HALLUCINATIONS
LEVEL_OF_CONSCIOUSNESS_CALCULATED: LETHARGIC

## 2020-08-16 ASSESSMENT — ENCOUNTER SYMPTOMS
BACK PAIN: 0
ADENOPATHY: 0
ABDOMINAL PAIN: 0
DIZZINESS: 0
EYE REDNESS: 0
FEVER: 0
COUGH: 0

## 2020-08-16 ASSESSMENT — PAIN SCALES - GENERAL: PAINLEVEL_OUTOF10: 0

## 2020-08-17 PROBLEM — R45.851 SUICIDAL IDEATION: Status: ACTIVE | Noted: 2020-08-17

## 2020-08-17 LAB
HBA1C MFR BLD: 5.4 % (ref 4.5–5.6)
TSH SERPL-ACNC: 1.33 MCUNITS/ML (ref 0.35–5)

## 2020-08-17 PROCEDURE — 10002800 HB RX 250 W HCPCS: Performed by: PSYCHIATRY & NEUROLOGY

## 2020-08-17 PROCEDURE — 13003244 HB ROOM CHARGE INTENSIVE PSYCH

## 2020-08-17 PROCEDURE — 99221 1ST HOSP IP/OBS SF/LOW 40: CPT | Performed by: INTERNAL MEDICINE

## 2020-08-17 PROCEDURE — 99223 1ST HOSP IP/OBS HIGH 75: CPT | Performed by: PSYCHIATRY & NEUROLOGY

## 2020-08-17 PROCEDURE — 10002803 HB RX 637: Performed by: PSYCHIATRY & NEUROLOGY

## 2020-08-17 RX ORDER — LORAZEPAM 2 MG/ML
1 INJECTION INTRAMUSCULAR EVERY 4 HOURS PRN
Status: DISCONTINUED | OUTPATIENT
Start: 2020-08-17 | End: 2020-08-18

## 2020-08-17 RX ORDER — HALOPERIDOL 5 MG/1
5 TABLET ORAL EVERY 4 HOURS PRN
Status: DISCONTINUED | OUTPATIENT
Start: 2020-08-17 | End: 2020-08-18

## 2020-08-17 RX ORDER — BENZTROPINE MESYLATE 1 MG/1
1 TABLET ORAL EVERY 8 HOURS PRN
Status: DISCONTINUED | OUTPATIENT
Start: 2020-08-17 | End: 2020-08-27 | Stop reason: HOSPADM

## 2020-08-17 RX ORDER — LORAZEPAM 1 MG/1
1 TABLET ORAL EVERY 4 HOURS PRN
Status: DISCONTINUED | OUTPATIENT
Start: 2020-08-17 | End: 2020-08-18

## 2020-08-17 RX ORDER — NICOTINE 21 MG/24HR
1 PATCH, TRANSDERMAL 24 HOURS TRANSDERMAL DAILY
Status: DISCONTINUED | OUTPATIENT
Start: 2020-08-17 | End: 2020-08-18

## 2020-08-17 RX ORDER — CLONAZEPAM 0.5 MG/1
0.5 TABLET ORAL DAILY
Status: DISCONTINUED | OUTPATIENT
Start: 2020-08-17 | End: 2020-08-24

## 2020-08-17 RX ORDER — QUETIAPINE FUMARATE 100 MG/1
100 TABLET, FILM COATED ORAL
Status: DISCONTINUED | OUTPATIENT
Start: 2020-08-17 | End: 2020-08-27 | Stop reason: HOSPADM

## 2020-08-17 RX ORDER — LAMOTRIGINE 100 MG/1
200 TABLET ORAL AT BEDTIME
Status: DISCONTINUED | OUTPATIENT
Start: 2020-08-17 | End: 2020-08-18

## 2020-08-17 RX ORDER — HALOPERIDOL 5 MG/ML
5 INJECTION INTRAMUSCULAR EVERY 4 HOURS PRN
Status: DISCONTINUED | OUTPATIENT
Start: 2020-08-17 | End: 2020-08-18

## 2020-08-17 RX ORDER — ACETAMINOPHEN 325 MG/1
650 TABLET ORAL EVERY 4 HOURS PRN
Status: DISCONTINUED | OUTPATIENT
Start: 2020-08-17 | End: 2020-08-27 | Stop reason: HOSPADM

## 2020-08-17 RX ORDER — TRAZODONE HYDROCHLORIDE 50 MG/1
50 TABLET ORAL NIGHTLY PRN
Status: DISCONTINUED | OUTPATIENT
Start: 2020-08-17 | End: 2020-08-26

## 2020-08-17 RX ORDER — BENZTROPINE MESYLATE 1 MG/ML
1 INJECTION INTRAMUSCULAR; INTRAVENOUS EVERY 8 HOURS PRN
Status: DISCONTINUED | OUTPATIENT
Start: 2020-08-17 | End: 2020-08-27 | Stop reason: HOSPADM

## 2020-08-17 RX ORDER — MAGNESIUM HYDROXIDE/ALUMINUM HYDROXICE/SIMETHICONE 120; 1200; 1200 MG/30ML; MG/30ML; MG/30ML
20 SUSPENSION ORAL 4 TIMES DAILY PRN
Status: DISCONTINUED | OUTPATIENT
Start: 2020-08-17 | End: 2020-08-27 | Stop reason: HOSPADM

## 2020-08-17 RX ADMIN — CLONAZEPAM 0.5 MG: 0.5 TABLET ORAL at 08:16

## 2020-08-17 RX ADMIN — HALOPERIDOL LACTATE 5 MG: 5 INJECTION, SOLUTION INTRAMUSCULAR at 04:53

## 2020-08-17 RX ADMIN — LAMOTRIGINE 200 MG: 100 TABLET ORAL at 23:01

## 2020-08-17 RX ADMIN — LORAZEPAM 1 MG: 1 TABLET ORAL at 18:54

## 2020-08-17 RX ADMIN — QUETIAPINE 100 MG: 100 TABLET, FILM COATED ORAL at 23:01

## 2020-08-17 RX ADMIN — BENZTROPINE MESYLATE 1 MG: 1 TABLET ORAL at 19:25

## 2020-08-17 RX ADMIN — HALOPERIDOL 5 MG: 5 TABLET ORAL at 19:25

## 2020-08-17 RX ADMIN — LORAZEPAM 1 MG: 2 INJECTION INTRAMUSCULAR; INTRAVENOUS at 04:53

## 2020-08-17 SDOH — HEALTH STABILITY: MENTAL HEALTH: HOW OFTEN DO YOU HAVE A DRINK CONTAINING ALCOHOL?: NEVER

## 2020-08-17 ASSESSMENT — ACTIVITIES OF DAILY LIVING (ADL)
RECENT_DECLINE_ADL: NO
ADL_SCORE: 24
BATHING: INDEPENDENT
CHRONIC_PAIN_PRESENT: NO
DRESSING YOURSELF: INDEPENDENT
ADL_SHORT_OF_BREATH: NO
TOILETING: INDEPENDENT
CONTINENCE: INDEPENDENT
TRANSFERRING: INDEPENDENT
ADL_BEFORE_ADMISSION: INDEPENDENT
FEEDING YOURSELF: INDEPENDENT

## 2020-08-17 ASSESSMENT — COLUMBIA-SUICIDE SEVERITY RATING SCALE - C-SSRS: IS THE PATIENT ABLE TO COMPLETE C-SSRS: NO, DEFER FOR ACUTE CONFUSION OR ALTERED MENTAL STATUS

## 2020-08-17 ASSESSMENT — COGNITIVE AND FUNCTIONAL STATUS - GENERAL
BECAUSE OF A PHYSICAL, MENTAL, OR EMOTIONAL CONDITION, DO YOU HAVE DIFFICULTY DOING ERRANDS ALONE: YES
BECAUSE OF A PHYSICAL, MENTAL, OR EMOTIONAL CONDITION, DO YOU HAVE SERIOUS DIFFICULTY CONCENTRATING, REMEMBERING OR MAKING DECISIONS: YES
DO YOU HAVE SERIOUS DIFFICULTY WALKING OR CLIMBING STAIRS: NO
ARE YOU DEAF OR DO YOU HAVE SERIOUS DIFFICULTY  HEARING: NO
DO YOU HAVE DIFFICULTY DRESSING OR BATHING: NO
ARE YOU BLIND OR DO YOU HAVE SERIOUS DIFFICULTY SEEING, EVEN WHEN WEARING GLASSES: NO

## 2020-08-17 ASSESSMENT — PAIN SCALES - GENERAL
PAINLEVEL_OUTOF10: 0
PAINLEVEL_OUTOF10: 0

## 2020-08-17 ASSESSMENT — PATIENT HEALTH QUESTIONNAIRE - PHQ9: IS PATIENT ABLE TO COMPLETE PHQ2 OR PHQ9: NO, DEFER TO LATER TIME

## 2020-08-17 ASSESSMENT — LIFESTYLE VARIABLES
HAVE YOU EVER BEEN EXPOSED TO OTHER VERY DISTURBING, TRAUMATIC OR ANXIETY PROVOKING EVENTS IN YOUR LIFETIME?: NO
HAVE YOU EVER BEEN VERBALLY, EMOTIONALLY, PHYSICALLY OR SEXUALLY ABUSED, OR ABUSED VIA SOCIAL MEDIA?: NO
ALCOHOL_USE_STATUS: REFUSED SCREEN

## 2020-08-18 LAB
BACTERIA UR CULT: NORMAL
REPORT STATUS (RPT): NORMAL
SPECIMEN SOURCE: NORMAL

## 2020-08-18 PROCEDURE — 10002803 HB RX 637: Performed by: PSYCHIATRY & NEUROLOGY

## 2020-08-18 PROCEDURE — 13003244 HB ROOM CHARGE INTENSIVE PSYCH

## 2020-08-18 PROCEDURE — 99233 SBSQ HOSP IP/OBS HIGH 50: CPT | Performed by: PSYCHIATRY & NEUROLOGY

## 2020-08-18 PROCEDURE — 10002800 HB RX 250 W HCPCS: Performed by: PSYCHIATRY & NEUROLOGY

## 2020-08-18 RX ORDER — HALOPERIDOL 5 MG/1
5 TABLET ORAL 3 TIMES DAILY
Status: DISCONTINUED | OUTPATIENT
Start: 2020-08-18 | End: 2020-08-19

## 2020-08-18 RX ORDER — LAMOTRIGINE 100 MG/1
300 TABLET ORAL AT BEDTIME
Status: DISCONTINUED | OUTPATIENT
Start: 2020-08-18 | End: 2020-08-25

## 2020-08-18 RX ORDER — LORAZEPAM 1 MG/1
1 TABLET ORAL EVERY 4 HOURS PRN
Status: DISCONTINUED | OUTPATIENT
Start: 2020-08-18 | End: 2020-08-27 | Stop reason: HOSPADM

## 2020-08-18 RX ORDER — LORAZEPAM 2 MG/ML
2 INJECTION INTRAMUSCULAR EVERY 4 HOURS PRN
Status: DISCONTINUED | OUTPATIENT
Start: 2020-08-18 | End: 2020-08-27 | Stop reason: HOSPADM

## 2020-08-18 RX ORDER — HALOPERIDOL 5 MG/ML
5 INJECTION INTRAMUSCULAR EVERY 4 HOURS PRN
Status: DISCONTINUED | OUTPATIENT
Start: 2020-08-18 | End: 2020-08-19

## 2020-08-18 RX ORDER — HALOPERIDOL 5 MG/1
10 TABLET ORAL EVERY 4 HOURS PRN
Status: DISCONTINUED | OUTPATIENT
Start: 2020-08-18 | End: 2020-08-19

## 2020-08-18 RX ADMIN — LAMOTRIGINE 300 MG: 100 TABLET ORAL at 21:29

## 2020-08-18 RX ADMIN — LORAZEPAM 1 MG: 2 INJECTION INTRAMUSCULAR; INTRAVENOUS at 10:05

## 2020-08-18 RX ADMIN — HALOPERIDOL LACTATE 5 MG: 5 INJECTION, SOLUTION INTRAMUSCULAR at 10:04

## 2020-08-18 RX ADMIN — HALOPERIDOL 5 MG: 5 TABLET ORAL at 14:10

## 2020-08-18 RX ADMIN — LORAZEPAM 2 MG: 2 INJECTION INTRAMUSCULAR; INTRAVENOUS at 22:24

## 2020-08-18 RX ADMIN — HALOPERIDOL 5 MG: 5 TABLET ORAL at 21:27

## 2020-08-18 RX ADMIN — TRAZODONE HYDROCHLORIDE 50 MG: 50 TABLET ORAL at 21:53

## 2020-08-18 RX ADMIN — CLONAZEPAM 0.5 MG: 0.5 TABLET ORAL at 08:38

## 2020-08-18 RX ADMIN — HALOPERIDOL LACTATE 5 MG: 5 INJECTION, SOLUTION INTRAMUSCULAR at 22:24

## 2020-08-18 ASSESSMENT — PATIENT HEALTH QUESTIONNAIRE - PHQ9
CLINICAL INTERPRETATION OF PHQ9 SCORE: NO FURTHER SCREENING NEEDED
CLINICAL INTERPRETATION OF PHQ2 SCORE: NO FURTHER SCREENING NEEDED
IS PATIENT ABLE TO COMPLETE PHQ2 OR PHQ9: YES
SUM OF ALL RESPONSES TO PHQ9 QUESTIONS 1 AND 2: 1

## 2020-08-18 ASSESSMENT — PAIN SCALES - GENERAL
PAINLEVEL_OUTOF10: 0

## 2020-08-19 PROCEDURE — 10004651 HB RX, NO CHARGE ITEM: Performed by: PSYCHIATRY & NEUROLOGY

## 2020-08-19 PROCEDURE — 13003244 HB ROOM CHARGE INTENSIVE PSYCH

## 2020-08-19 PROCEDURE — 10002800 HB RX 250 W HCPCS: Performed by: PSYCHIATRY & NEUROLOGY

## 2020-08-19 PROCEDURE — 10002803 HB RX 637: Performed by: PSYCHIATRY & NEUROLOGY

## 2020-08-19 PROCEDURE — 99233 SBSQ HOSP IP/OBS HIGH 50: CPT | Performed by: PSYCHIATRY & NEUROLOGY

## 2020-08-19 RX ORDER — HALOPERIDOL 5 MG/ML
10 INJECTION INTRAMUSCULAR EVERY 6 HOURS PRN
Status: DISCONTINUED | OUTPATIENT
Start: 2020-08-19 | End: 2020-08-27 | Stop reason: HOSPADM

## 2020-08-19 RX ORDER — HALOPERIDOL 5 MG/1
10 TABLET ORAL 3 TIMES DAILY
Status: DISCONTINUED | OUTPATIENT
Start: 2020-08-19 | End: 2020-08-24

## 2020-08-19 RX ORDER — HALOPERIDOL 5 MG/1
10 TABLET ORAL EVERY 4 HOURS PRN
Status: DISCONTINUED | OUTPATIENT
Start: 2020-08-19 | End: 2020-08-27 | Stop reason: HOSPADM

## 2020-08-19 RX ADMIN — HALOPERIDOL 10 MG: 5 TABLET ORAL at 14:18

## 2020-08-19 RX ADMIN — CLONAZEPAM 0.5 MG: 0.5 TABLET ORAL at 08:53

## 2020-08-19 RX ADMIN — HALOPERIDOL 5 MG: 5 TABLET ORAL at 08:57

## 2020-08-19 RX ADMIN — HALOPERIDOL LACTATE 10 MG: 5 INJECTION, SOLUTION INTRAMUSCULAR at 07:36

## 2020-08-19 RX ADMIN — LAMOTRIGINE 300 MG: 100 TABLET ORAL at 22:11

## 2020-08-19 RX ADMIN — BENZTROPINE MESYLATE 1 MG: 1 INJECTION PARENTERAL at 07:36

## 2020-08-19 RX ADMIN — LORAZEPAM 2 MG: 2 INJECTION INTRAMUSCULAR; INTRAVENOUS at 16:00

## 2020-08-19 RX ADMIN — ACETAMINOPHEN 650 MG: 325 TABLET ORAL at 08:58

## 2020-08-19 RX ADMIN — LORAZEPAM 2 MG: 2 INJECTION INTRAMUSCULAR; INTRAVENOUS at 07:37

## 2020-08-19 RX ADMIN — HALOPERIDOL LACTATE 10 MG: 5 INJECTION, SOLUTION INTRAMUSCULAR at 16:00

## 2020-08-19 ASSESSMENT — PAIN SCALES - GENERAL
PAINLEVEL_OUTOF10: 0
PAINLEVEL_OUTOF10: 8

## 2020-08-20 PROCEDURE — 10002803 HB RX 637: Performed by: PSYCHIATRY & NEUROLOGY

## 2020-08-20 PROCEDURE — 10004651 HB RX, NO CHARGE ITEM: Performed by: PSYCHIATRY & NEUROLOGY

## 2020-08-20 PROCEDURE — 13003244 HB ROOM CHARGE INTENSIVE PSYCH

## 2020-08-20 PROCEDURE — 10002800 HB RX 250 W HCPCS: Performed by: PSYCHIATRY & NEUROLOGY

## 2020-08-20 PROCEDURE — 99233 SBSQ HOSP IP/OBS HIGH 50: CPT | Performed by: PSYCHIATRY & NEUROLOGY

## 2020-08-20 RX ADMIN — HALOPERIDOL 10 MG: 5 TABLET ORAL at 18:44

## 2020-08-20 RX ADMIN — LAMOTRIGINE 300 MG: 100 TABLET ORAL at 20:13

## 2020-08-20 RX ADMIN — ACETAMINOPHEN 650 MG: 325 TABLET ORAL at 11:37

## 2020-08-20 RX ADMIN — HALOPERIDOL 10 MG: 5 TABLET ORAL at 14:09

## 2020-08-20 RX ADMIN — HALOPERIDOL LACTATE 10 MG: 5 INJECTION, SOLUTION INTRAMUSCULAR at 21:13

## 2020-08-20 RX ADMIN — QUETIAPINE 100 MG: 100 TABLET, FILM COATED ORAL at 21:19

## 2020-08-20 RX ADMIN — LORAZEPAM 2 MG: 2 INJECTION INTRAMUSCULAR; INTRAVENOUS at 21:13

## 2020-08-20 RX ADMIN — CLONAZEPAM 0.5 MG: 0.5 TABLET ORAL at 08:05

## 2020-08-20 RX ADMIN — HALOPERIDOL 10 MG: 5 TABLET ORAL at 12:43

## 2020-08-20 ASSESSMENT — PAIN SCALES - GENERAL
PAINLEVEL_OUTOF10: 4
PAINLEVEL_OUTOF10: 0

## 2020-08-21 PROCEDURE — 10002800 HB RX 250 W HCPCS: Performed by: PSYCHIATRY & NEUROLOGY

## 2020-08-21 PROCEDURE — 99233 SBSQ HOSP IP/OBS HIGH 50: CPT | Performed by: PSYCHIATRY & NEUROLOGY

## 2020-08-21 PROCEDURE — 13003244 HB ROOM CHARGE INTENSIVE PSYCH

## 2020-08-21 PROCEDURE — 10004651 HB RX, NO CHARGE ITEM: Performed by: PSYCHIATRY & NEUROLOGY

## 2020-08-21 PROCEDURE — 10002803 HB RX 637: Performed by: PSYCHIATRY & NEUROLOGY

## 2020-08-21 RX ORDER — HALOPERIDOL DECANOATE 100 MG/ML
100 INJECTION INTRAMUSCULAR ONCE
Status: COMPLETED | OUTPATIENT
Start: 2020-08-21 | End: 2020-08-21

## 2020-08-21 RX ADMIN — HALOPERIDOL 10 MG: 5 TABLET ORAL at 08:25

## 2020-08-21 RX ADMIN — LAMOTRIGINE 300 MG: 100 TABLET ORAL at 20:14

## 2020-08-21 RX ADMIN — LORAZEPAM 2 MG: 2 INJECTION INTRAMUSCULAR; INTRAVENOUS at 09:29

## 2020-08-21 RX ADMIN — LORAZEPAM 1 MG: 1 TABLET ORAL at 20:14

## 2020-08-21 RX ADMIN — ACETAMINOPHEN 650 MG: 325 TABLET ORAL at 08:35

## 2020-08-21 RX ADMIN — CLONAZEPAM 0.5 MG: 0.5 TABLET ORAL at 08:35

## 2020-08-21 RX ADMIN — HALOPERIDOL 10 MG: 5 TABLET ORAL at 17:03

## 2020-08-21 RX ADMIN — HALOPERIDOL LACTATE 10 MG: 5 INJECTION, SOLUTION INTRAMUSCULAR at 07:00

## 2020-08-21 RX ADMIN — HALOPERIDOL DECANOATE 100 MG: 100 INJECTION INTRAMUSCULAR at 16:27

## 2020-08-21 RX ADMIN — HALOPERIDOL 10 MG: 5 TABLET ORAL at 20:14

## 2020-08-21 ASSESSMENT — PAIN SCALES - GENERAL
PAINLEVEL_OUTOF10: 0
PAINLEVEL_OUTOF10: 6
PAINLEVEL_OUTOF10: 0

## 2020-08-22 PROCEDURE — 10002803 HB RX 637: Performed by: PSYCHIATRY & NEUROLOGY

## 2020-08-22 PROCEDURE — 13003244 HB ROOM CHARGE INTENSIVE PSYCH

## 2020-08-22 PROCEDURE — 99232 SBSQ HOSP IP/OBS MODERATE 35: CPT | Performed by: PSYCHIATRY & NEUROLOGY

## 2020-08-22 RX ADMIN — HALOPERIDOL 10 MG: 5 TABLET ORAL at 08:26

## 2020-08-22 RX ADMIN — HALOPERIDOL 10 MG: 5 TABLET ORAL at 02:18

## 2020-08-22 RX ADMIN — CLONAZEPAM 0.5 MG: 0.5 TABLET ORAL at 08:26

## 2020-08-22 RX ADMIN — HALOPERIDOL 10 MG: 5 TABLET ORAL at 22:01

## 2020-08-22 RX ADMIN — LORAZEPAM 1 MG: 1 TABLET ORAL at 02:19

## 2020-08-22 RX ADMIN — QUETIAPINE 100 MG: 100 TABLET, FILM COATED ORAL at 22:01

## 2020-08-22 RX ADMIN — LAMOTRIGINE 300 MG: 100 TABLET ORAL at 22:01

## 2020-08-22 RX ADMIN — HALOPERIDOL 10 MG: 5 TABLET ORAL at 14:34

## 2020-08-22 ASSESSMENT — PAIN SCALES - GENERAL: PAINLEVEL_OUTOF10: 0

## 2020-08-23 PROCEDURE — 10002803 HB RX 637: Performed by: PSYCHIATRY & NEUROLOGY

## 2020-08-23 PROCEDURE — 13003244 HB ROOM CHARGE INTENSIVE PSYCH

## 2020-08-23 PROCEDURE — 99232 SBSQ HOSP IP/OBS MODERATE 35: CPT | Performed by: PSYCHIATRY & NEUROLOGY

## 2020-08-23 RX ADMIN — LAMOTRIGINE 300 MG: 100 TABLET ORAL at 23:45

## 2020-08-23 RX ADMIN — LORAZEPAM 1 MG: 1 TABLET ORAL at 14:56

## 2020-08-23 RX ADMIN — HALOPERIDOL 10 MG: 5 TABLET ORAL at 23:53

## 2020-08-23 RX ADMIN — QUETIAPINE 100 MG: 100 TABLET, FILM COATED ORAL at 23:55

## 2020-08-23 RX ADMIN — HALOPERIDOL 10 MG: 5 TABLET ORAL at 08:38

## 2020-08-23 RX ADMIN — CLONAZEPAM 0.5 MG: 0.5 TABLET ORAL at 08:38

## 2020-08-23 RX ADMIN — HALOPERIDOL 10 MG: 5 TABLET ORAL at 14:56

## 2020-08-23 ASSESSMENT — PAIN SCALES - GENERAL
PAINLEVEL_OUTOF10: 0
PAINLEVEL_OUTOF10: 0

## 2020-08-24 PROCEDURE — 13003244 HB ROOM CHARGE INTENSIVE PSYCH

## 2020-08-24 PROCEDURE — 10002803 HB RX 637: Performed by: PSYCHIATRY & NEUROLOGY

## 2020-08-24 PROCEDURE — 99233 SBSQ HOSP IP/OBS HIGH 50: CPT | Performed by: PSYCHIATRY & NEUROLOGY

## 2020-08-24 RX ORDER — HALOPERIDOL 5 MG/1
5 TABLET ORAL 4 TIMES DAILY
Status: DISCONTINUED | OUTPATIENT
Start: 2020-08-24 | End: 2020-08-25

## 2020-08-24 RX ADMIN — HALOPERIDOL 5 MG: 5 TABLET ORAL at 12:51

## 2020-08-24 RX ADMIN — LAMOTRIGINE 300 MG: 100 TABLET ORAL at 20:59

## 2020-08-24 RX ADMIN — HALOPERIDOL 5 MG: 5 TABLET ORAL at 17:07

## 2020-08-24 RX ADMIN — HALOPERIDOL 10 MG: 5 TABLET ORAL at 08:20

## 2020-08-24 RX ADMIN — CLONAZEPAM 0.5 MG: 0.5 TABLET ORAL at 08:20

## 2020-08-24 RX ADMIN — HALOPERIDOL 5 MG: 5 TABLET ORAL at 21:00

## 2020-08-24 ASSESSMENT — PAIN SCALES - GENERAL
PAINLEVEL_OUTOF10: 0
PAINLEVEL_OUTOF10: 0

## 2020-08-25 PROCEDURE — 99233 SBSQ HOSP IP/OBS HIGH 50: CPT | Performed by: PSYCHIATRY & NEUROLOGY

## 2020-08-25 PROCEDURE — 10002803 HB RX 637: Performed by: PSYCHIATRY & NEUROLOGY

## 2020-08-25 PROCEDURE — 10004577 HB ROOM CHARGE PSYCH

## 2020-08-25 RX ORDER — HALOPERIDOL 5 MG/1
5 TABLET ORAL 3 TIMES DAILY
Status: DISCONTINUED | OUTPATIENT
Start: 2020-08-25 | End: 2020-08-26

## 2020-08-25 RX ORDER — LAMOTRIGINE 100 MG/1
300 TABLET ORAL 2 TIMES DAILY
Status: DISCONTINUED | OUTPATIENT
Start: 2020-08-25 | End: 2020-08-27 | Stop reason: HOSPADM

## 2020-08-25 RX ADMIN — HALOPERIDOL 5 MG: 5 TABLET ORAL at 13:17

## 2020-08-25 RX ADMIN — HALOPERIDOL 5 MG: 5 TABLET ORAL at 08:40

## 2020-08-25 RX ADMIN — LORAZEPAM 1 MG: 1 TABLET ORAL at 19:13

## 2020-08-25 RX ADMIN — LORAZEPAM 1 MG: 1 TABLET ORAL at 12:34

## 2020-08-25 RX ADMIN — HALOPERIDOL 5 MG: 5 TABLET ORAL at 19:58

## 2020-08-25 RX ADMIN — LAMOTRIGINE 300 MG: 100 TABLET ORAL at 20:00

## 2020-08-25 RX ADMIN — TRAZODONE HYDROCHLORIDE 50 MG: 50 TABLET ORAL at 19:58

## 2020-08-25 ASSESSMENT — PAIN SCALES - GENERAL
PAINLEVEL_OUTOF10: 0

## 2020-08-25 ASSESSMENT — PAIN SCALES - WONG BAKER: WONGBAKER_NUMERICALRESPONSE: 0

## 2020-08-26 PROCEDURE — 99233 SBSQ HOSP IP/OBS HIGH 50: CPT | Performed by: PSYCHIATRY & NEUROLOGY

## 2020-08-26 PROCEDURE — 10002803 HB RX 637: Performed by: PSYCHIATRY & NEUROLOGY

## 2020-08-26 PROCEDURE — 10004577 HB ROOM CHARGE PSYCH

## 2020-08-26 RX ORDER — TRAZODONE HYDROCHLORIDE 50 MG/1
25 TABLET ORAL NIGHTLY PRN
Status: DISCONTINUED | OUTPATIENT
Start: 2020-08-26 | End: 2020-08-27 | Stop reason: HOSPADM

## 2020-08-26 RX ORDER — HALOPERIDOL 5 MG/1
10 TABLET ORAL NIGHTLY
Status: DISCONTINUED | OUTPATIENT
Start: 2020-08-26 | End: 2020-08-27 | Stop reason: HOSPADM

## 2020-08-26 RX ADMIN — LAMOTRIGINE 300 MG: 100 TABLET ORAL at 08:30

## 2020-08-26 RX ADMIN — LAMOTRIGINE 300 MG: 100 TABLET ORAL at 21:12

## 2020-08-26 RX ADMIN — HALOPERIDOL 5 MG: 5 TABLET ORAL at 08:30

## 2020-08-26 RX ADMIN — TRAZODONE HYDROCHLORIDE 25 MG: 50 TABLET ORAL at 21:39

## 2020-08-26 RX ADMIN — HALOPERIDOL 10 MG: 5 TABLET ORAL at 21:12

## 2020-08-26 ASSESSMENT — PAIN SCALES - GENERAL
PAINLEVEL_OUTOF10: 0
PAINLEVEL_OUTOF10: 2
PAINLEVEL_OUTOF10: 0

## 2020-08-27 ENCOUNTER — APPOINTMENT (OUTPATIENT)
Dept: BEHAVIORAL HEALTH | Age: 45
End: 2020-08-27

## 2020-08-27 VITALS
HEART RATE: 63 BPM | WEIGHT: 242.06 LBS | BODY MASS INDEX: 32.08 KG/M2 | TEMPERATURE: 98.2 F | DIASTOLIC BLOOD PRESSURE: 68 MMHG | OXYGEN SATURATION: 97 % | SYSTOLIC BLOOD PRESSURE: 125 MMHG | HEIGHT: 73 IN | RESPIRATION RATE: 16 BRPM

## 2020-08-27 PROCEDURE — 10002803 HB RX 637: Performed by: PSYCHIATRY & NEUROLOGY

## 2020-08-27 PROCEDURE — 99238 HOSP IP/OBS DSCHRG MGMT 30/<: CPT | Performed by: PSYCHIATRY & NEUROLOGY

## 2020-08-27 RX ORDER — LORAZEPAM 1 MG/1
1 TABLET ORAL EVERY 4 HOURS PRN
Qty: 60 TABLET | Refills: 0 | Status: ON HOLD | OUTPATIENT
Start: 2020-08-27 | End: 2020-09-25

## 2020-08-27 RX ORDER — LORAZEPAM 1 MG/1
1 TABLET ORAL EVERY 4 HOURS PRN
Qty: 60 TABLET | Refills: 0 | Status: SHIPPED | OUTPATIENT
Start: 2020-08-27 | End: 2020-08-27

## 2020-08-27 RX ORDER — BENZTROPINE MESYLATE 1 MG/1
1 TABLET ORAL EVERY 8 HOURS PRN
Qty: 100 TABLET | Refills: 0 | Status: SHIPPED | OUTPATIENT
Start: 2020-08-27 | End: 2020-08-27

## 2020-08-27 RX ORDER — LAMOTRIGINE 150 MG/1
300 TABLET ORAL 2 TIMES DAILY
Qty: 60 TABLET | Refills: 3 | Status: SHIPPED | OUTPATIENT
Start: 2020-08-27 | End: 2020-08-27

## 2020-08-27 RX ORDER — HALOPERIDOL 10 MG/1
10 TABLET ORAL NIGHTLY
Qty: 30 TABLET | Refills: 0 | Status: SHIPPED | OUTPATIENT
Start: 2020-08-27 | End: 2020-09-14

## 2020-08-27 RX ORDER — HALOPERIDOL DECANOATE 100 MG/ML
100 INJECTION INTRAMUSCULAR
Qty: 1 ML | Refills: 3 | Status: ON HOLD | OUTPATIENT
Start: 2020-08-27 | End: 2020-10-06 | Stop reason: HOSPADM

## 2020-08-27 RX ORDER — LAMOTRIGINE 150 MG/1
300 TABLET ORAL 2 TIMES DAILY
Qty: 60 TABLET | Refills: 3 | Status: SHIPPED | OUTPATIENT
Start: 2020-08-27 | End: 2020-09-03

## 2020-08-27 RX ORDER — TRAZODONE HYDROCHLORIDE 50 MG/1
25 TABLET ORAL NIGHTLY PRN
Qty: 30 TABLET | Refills: 3 | Status: SHIPPED | OUTPATIENT
Start: 2020-08-27 | End: 2020-09-14

## 2020-08-27 RX ORDER — BENZTROPINE MESYLATE 1 MG/1
1 TABLET ORAL EVERY 8 HOURS PRN
Qty: 100 TABLET | Refills: 0 | Status: SHIPPED | OUTPATIENT
Start: 2020-08-27 | End: 2020-09-14

## 2020-08-27 RX ADMIN — LAMOTRIGINE 300 MG: 100 TABLET ORAL at 08:36

## 2020-08-27 ASSESSMENT — PAIN SCALES - GENERAL
PAINLEVEL_OUTOF10: 0

## 2020-08-31 ENCOUNTER — TELEPHONE (OUTPATIENT)
Dept: BEHAVIORAL HEALTH | Age: 45
End: 2020-08-31

## 2020-08-31 RX ORDER — QUETIAPINE FUMARATE 100 MG/1
100 TABLET, FILM COATED ORAL AT BEDTIME
Qty: 30 TABLET | Refills: 2 | Status: SHIPPED | OUTPATIENT
Start: 2020-08-31 | End: 2020-09-14

## 2020-09-03 DIAGNOSIS — F25.0 SCHIZOAFFECTIVE DISORDER, BIPOLAR TYPE (CMD): ICD-10-CM

## 2020-09-03 RX ORDER — LAMOTRIGINE 150 MG/1
TABLET ORAL
Qty: 60 TABLET | Refills: 3 | Status: SHIPPED | OUTPATIENT
Start: 2020-09-03 | End: 2020-09-10 | Stop reason: SDUPTHER

## 2020-09-10 DIAGNOSIS — F25.0 SCHIZOAFFECTIVE DISORDER, BIPOLAR TYPE (CMD): ICD-10-CM

## 2020-09-10 RX ORDER — LAMOTRIGINE 150 MG/1
300 TABLET ORAL 2 TIMES DAILY
Qty: 60 TABLET | Refills: 0 | Status: ON HOLD | OUTPATIENT
Start: 2020-09-10 | End: 2020-10-06 | Stop reason: HOSPADM

## 2020-09-14 ENCOUNTER — HOSPITAL ENCOUNTER (EMERGENCY)
Age: 45
Discharge: PSYCHIATRIC OR SUBSTANCE USE FACILITY | End: 2020-09-15
Attending: EMERGENCY MEDICINE

## 2020-09-14 ENCOUNTER — TELEPHONE (OUTPATIENT)
Dept: BEHAVIORAL HEALTH | Age: 45
End: 2020-09-14

## 2020-09-14 DIAGNOSIS — F25.0 SCHIZOAFFECTIVE DISORDER, BIPOLAR TYPE (CMD): ICD-10-CM

## 2020-09-14 DIAGNOSIS — F31.81 BIPOLAR 2 DISORDER (CMD): ICD-10-CM

## 2020-09-14 DIAGNOSIS — R45.851 SUICIDAL IDEATION: Primary | ICD-10-CM

## 2020-09-14 LAB
AMPHETAMINES UR QL SCN>500 NG/ML: NEGATIVE
ANION GAP SERPL CALC-SCNC: 8 MMOL/L (ref 10–20)
BARBITURATES UR QL SCN>200 NG/ML: NEGATIVE
BASOPHILS # BLD: 0 K/MCL (ref 0–0.3)
BASOPHILS NFR BLD: 0 %
BENZODIAZ UR QL SCN>200 NG/ML: NEGATIVE
BUN SERPL-MCNC: 10 MG/DL (ref 6–20)
BUN/CREAT SERPL: 12 (ref 7–25)
BZE UR QL SCN>150 NG/ML: NEGATIVE
CALCIUM SERPL-MCNC: 9.1 MG/DL (ref 8.4–10.2)
CANNABINOIDS UR QL SCN>50 NG/ML: NEGATIVE
CHLORIDE SERPL-SCNC: 105 MMOL/L (ref 98–107)
CO2 SERPL-SCNC: 28 MMOL/L (ref 21–32)
CREAT SERPL-MCNC: 0.84 MG/DL (ref 0.67–1.17)
DIFFERENTIAL METHOD BLD: NORMAL
EOSINOPHIL # BLD: 0.1 K/MCL (ref 0.1–0.5)
EOSINOPHIL NFR BLD: 1 %
ERYTHROCYTE [DISTWIDTH] IN BLOOD: 13.7 % (ref 11–15)
ETHANOL SERPL-MCNC: NORMAL MG/DL
GLUCOSE SERPL-MCNC: 96 MG/DL (ref 65–99)
HCT VFR BLD CALC: 42.9 % (ref 39–51)
HGB BLD-MCNC: 14.7 G/DL (ref 13–17)
IMM GRANULOCYTES # BLD AUTO: 0 K/MCL (ref 0–0.2)
IMM GRANULOCYTES NFR BLD: 0 %
LYMPHOCYTES # BLD: 1.3 K/MCL (ref 1–4.8)
LYMPHOCYTES NFR BLD: 19 %
MCH RBC QN AUTO: 32.7 PG (ref 26–34)
MCHC RBC AUTO-ENTMCNC: 34.3 G/DL (ref 32–36.5)
MCV RBC AUTO: 95.3 FL (ref 78–100)
MONOCYTES # BLD: 0.5 K/MCL (ref 0.3–0.9)
MONOCYTES NFR BLD: 7 %
NEUTROPHILS # BLD: 4.8 K/MCL (ref 1.8–7.7)
NEUTROPHILS NFR BLD: 73 %
NRBC BLD MANUAL-RTO: 0 /100 WBC
OPIATES UR QL SCN>300 NG/ML: NEGATIVE
PCP UR QL SCN>25 NG/ML: NEGATIVE
PLATELET # BLD: 214 K/MCL (ref 140–450)
POTASSIUM SERPL-SCNC: 4.2 MMOL/L (ref 3.4–5.1)
RBC # BLD: 4.5 MIL/MCL (ref 4.5–5.9)
SARS-COV-2 RNA RESP QL NAA+PROBE: NOT DETECTED
SERVICE CMNT-IMP: NORMAL
SODIUM SERPL-SCNC: 137 MMOL/L (ref 135–145)
SPECIMEN SOURCE: NORMAL
WBC # BLD: 6.7 K/MCL (ref 4.2–11)

## 2020-09-14 PROCEDURE — C9803 HOPD COVID-19 SPEC COLLECT: HCPCS

## 2020-09-14 PROCEDURE — 80048 BASIC METABOLIC PNL TOTAL CA: CPT

## 2020-09-14 PROCEDURE — 87635 SARS-COV-2 COVID-19 AMP PRB: CPT

## 2020-09-14 PROCEDURE — 80320 DRUG SCREEN QUANTALCOHOLS: CPT

## 2020-09-14 PROCEDURE — 90839 PSYTX CRISIS INITIAL 60 MIN: CPT

## 2020-09-14 PROCEDURE — 10002803 HB RX 637: Performed by: EMERGENCY MEDICINE

## 2020-09-14 PROCEDURE — 36415 COLL VENOUS BLD VENIPUNCTURE: CPT

## 2020-09-14 PROCEDURE — 80307 DRUG TEST PRSMV CHEM ANLYZR: CPT

## 2020-09-14 PROCEDURE — 99285 EMERGENCY DEPT VISIT HI MDM: CPT

## 2020-09-14 PROCEDURE — 85025 COMPLETE CBC W/AUTO DIFF WBC: CPT

## 2020-09-14 RX ORDER — PALIPERIDONE PALMITATE 117 MG/.75ML
INJECTION INTRAMUSCULAR
COMMUNITY
Start: 2020-09-08 | End: 2020-09-14

## 2020-09-14 RX ORDER — HALOPERIDOL 5 MG/1
10 TABLET ORAL ONCE
Status: COMPLETED | OUTPATIENT
Start: 2020-09-14 | End: 2020-09-14

## 2020-09-14 RX ADMIN — HALOPERIDOL 10 MG: 5 TABLET ORAL at 17:59

## 2020-09-14 ASSESSMENT — LIFESTYLE VARIABLES
ALCOHOL_USE: DENIES
HOW OFTEN DO YOU HAVE 6 OR MORE DRINKS ON ONE OCCASION: NEVER
ALCOHOL_USE_STATUS: NO OR LOW RISK WITH VALIDATED TOOL
HOW OFTEN DO YOU HAVE A DRINK CONTAINING ALCOHOL: NEVER
AUDIT-C TOTAL SCORE: 0
HOW MANY STANDARD DRINKS CONTAINING ALCOHOL DO YOU HAVE ON A TYPICAL DAY: 0,1 OR 2

## 2020-09-14 ASSESSMENT — COGNITIVE AND FUNCTIONAL STATUS - GENERAL
MEMORY: INTACT
BEHAVIOR: POOR EYE CONTACT;SUICIDAL/SUICIDAL IDEATION
MOOD: FLAT
ORIENTATION: ORIENTED (PERSON/PLACE/TIME)
MOTOR_BEHAVIOR-RETARDATION_CALCULATED: DECREASED MOVEMENT
SPEECH: CLEAR/UNDERSTANDABLE

## 2020-09-14 ASSESSMENT — ENCOUNTER SYMPTOMS
EYES NEGATIVE: 1
CONSTITUTIONAL NEGATIVE: 1
GASTROINTESTINAL NEGATIVE: 1
ALLERGIC/IMMUNOLOGIC NEGATIVE: 1
HEMATOLOGIC/LYMPHATIC NEGATIVE: 1
SLEEP DISTURBANCE: 1
ENDOCRINE NEGATIVE: 1
RESPIRATORY NEGATIVE: 1
NEUROLOGICAL NEGATIVE: 1
NERVOUS/ANXIOUS: 1

## 2020-09-15 VITALS
OXYGEN SATURATION: 97 % | DIASTOLIC BLOOD PRESSURE: 61 MMHG | BODY MASS INDEX: 27.47 KG/M2 | TEMPERATURE: 97.3 F | RESPIRATION RATE: 18 BRPM | HEIGHT: 73 IN | HEART RATE: 64 BPM | WEIGHT: 207.23 LBS | SYSTOLIC BLOOD PRESSURE: 116 MMHG

## 2020-09-18 RX ORDER — BENZTROPINE MESYLATE 1 MG/1
TABLET ORAL
Qty: 90 TABLET | Refills: 1 | Status: ON HOLD | OUTPATIENT
Start: 2020-09-18 | End: 2020-09-25

## 2020-09-23 ENCOUNTER — TELEPHONE (OUTPATIENT)
Dept: BEHAVIORAL HEALTH | Age: 45
End: 2020-09-23

## 2020-09-24 ENCOUNTER — TELEPHONE (OUTPATIENT)
Dept: BEHAVIORAL HEALTH | Age: 45
End: 2020-09-24

## 2020-09-25 ENCOUNTER — APPOINTMENT (OUTPATIENT)
Dept: NEUROLOGY | Age: 45
End: 2020-09-25
Attending: PSYCHIATRY & NEUROLOGY

## 2020-09-25 ENCOUNTER — APPOINTMENT (OUTPATIENT)
Dept: GENERAL RADIOLOGY | Age: 45
End: 2020-09-25
Attending: EMERGENCY MEDICINE

## 2020-09-25 ENCOUNTER — HOSPITAL ENCOUNTER (OUTPATIENT)
Age: 45
Setting detail: OBSERVATION
Discharge: HOME | End: 2020-09-26
Attending: EMERGENCY MEDICINE

## 2020-09-25 ENCOUNTER — APPOINTMENT (OUTPATIENT)
Dept: CT IMAGING | Age: 45
End: 2020-09-25
Attending: EMERGENCY MEDICINE

## 2020-09-25 ENCOUNTER — APPOINTMENT (OUTPATIENT)
Dept: MRI IMAGING | Age: 45
End: 2020-09-25
Attending: PSYCHIATRY & NEUROLOGY

## 2020-09-25 ENCOUNTER — APPOINTMENT (OUTPATIENT)
Dept: CARDIOLOGY | Age: 45
End: 2020-09-25
Attending: INTERNAL MEDICINE

## 2020-09-25 DIAGNOSIS — R55 SYNCOPE, UNSPECIFIED SYNCOPE TYPE: Primary | ICD-10-CM

## 2020-09-25 LAB
ALBUMIN SERPL-MCNC: 4.2 G/DL (ref 3.6–5.1)
ALP SERPL-CCNC: 118 UNITS/L (ref 45–117)
ALT SERPL-CCNC: 38 UNITS/L
AMPHETAMINES UR QL SCN>500 NG/ML: NEGATIVE
ANION GAP SERPL CALC-SCNC: 10 MMOL/L (ref 10–20)
APAP SERPL-MCNC: <2 MCG/ML (ref 10–30)
AST SERPL-CCNC: 30 UNITS/L
ATRIAL RATE (BPM): 80
BARBITURATES UR QL SCN>200 NG/ML: NEGATIVE
BASOPHILS # BLD: 0 K/MCL (ref 0–0.3)
BASOPHILS NFR BLD: 0 %
BENZODIAZ UR QL SCN>200 NG/ML: NEGATIVE
BILIRUB CONJ SERPL-MCNC: 0.2 MG/DL (ref 0–0.2)
BILIRUB SERPL-MCNC: 0.7 MG/DL (ref 0.2–1)
BUN SERPL-MCNC: 11 MG/DL (ref 6–20)
BUN/CREAT SERPL: 12 (ref 7–25)
BZE UR QL SCN>150 NG/ML: NEGATIVE
CALCIUM SERPL-MCNC: 8.8 MG/DL (ref 8.4–10.2)
CANNABINOIDS UR QL SCN>50 NG/ML: NEGATIVE
CHLORIDE SERPL-SCNC: 104 MMOL/L (ref 98–107)
CK SERPL-CCNC: 594 UNITS/L (ref 39–308)
CO2 SERPL-SCNC: 28 MMOL/L (ref 21–32)
CREAT SERPL-MCNC: 0.9 MG/DL (ref 0.67–1.17)
DIFFERENTIAL METHOD BLD: ABNORMAL
EOSINOPHIL # BLD: 0 K/MCL (ref 0.1–0.5)
EOSINOPHIL NFR BLD: 0 %
ERYTHROCYTE [DISTWIDTH] IN BLOOD: 13.2 % (ref 11–15)
ETHANOL SERPL-MCNC: NORMAL MG/DL
GLUCOSE SERPL-MCNC: 97 MG/DL (ref 65–99)
HCT VFR BLD CALC: 43.3 % (ref 39–51)
HGB BLD-MCNC: 14.3 G/DL (ref 13–17)
IMM GRANULOCYTES # BLD AUTO: 0 K/MCL (ref 0–0.2)
IMM GRANULOCYTES NFR BLD: 0 %
LIPASE SERPL-CCNC: <50 UNITS/L (ref 73–393)
LYMPHOCYTES # BLD: 0.9 K/MCL (ref 1–4.8)
LYMPHOCYTES NFR BLD: 11 %
MAGNESIUM SERPL-MCNC: 2.2 MG/DL (ref 1.7–2.4)
MCH RBC QN AUTO: 31.6 PG (ref 26–34)
MCHC RBC AUTO-ENTMCNC: 33 G/DL (ref 32–36.5)
MCV RBC AUTO: 95.6 FL (ref 78–100)
MONOCYTES # BLD: 0.6 K/MCL (ref 0.3–0.9)
MONOCYTES NFR BLD: 7 %
NEUTROPHILS # BLD: 6.5 K/MCL (ref 1.8–7.7)
NEUTROPHILS NFR BLD: 82 %
NRBC BLD MANUAL-RTO: 0 /100 WBC
OPIATES UR QL SCN>300 NG/ML: NEGATIVE
P AXIS (DEGREES): 34
PCP UR QL SCN>25 NG/ML: NEGATIVE
PLATELET # BLD: 161 K/MCL (ref 140–450)
POTASSIUM SERPL-SCNC: 4.2 MMOL/L (ref 3.4–5.1)
PR-INTERVAL (MSEC): 146
PROT SERPL-MCNC: 7.7 G/DL (ref 6.4–8.2)
QRS-INTERVAL (MSEC): 108
QT-INTERVAL (MSEC): 398
QTC: 459
R AXIS (DEGREES): 6
RBC # BLD: 4.53 MIL/MCL (ref 4.5–5.9)
REPORT TEXT: NORMAL
SALICYLATES SERPL-MCNC: <2.8 MG/DL
SODIUM SERPL-SCNC: 138 MMOL/L (ref 135–145)
T AXIS (DEGREES): 25
TROPONIN I SERPL HS-MCNC: <0.02 NG/ML
TROPONIN I SERPL HS-MCNC: <0.02 NG/ML
VENTRICULAR RATE EKG/MIN (BPM): 80
WBC # BLD: 8 K/MCL (ref 4.2–11)

## 2020-09-25 PROCEDURE — 10002800 HB RX 250 W HCPCS: Performed by: PSYCHIATRY & NEUROLOGY

## 2020-09-25 PROCEDURE — 80307 DRUG TEST PRSMV CHEM ANLYZR: CPT

## 2020-09-25 PROCEDURE — 80048 BASIC METABOLIC PNL TOTAL CA: CPT

## 2020-09-25 PROCEDURE — 96374 THER/PROPH/DIAG INJ IV PUSH: CPT

## 2020-09-25 PROCEDURE — 93005 ELECTROCARDIOGRAM TRACING: CPT | Performed by: EMERGENCY MEDICINE

## 2020-09-25 PROCEDURE — 71045 X-RAY EXAM CHEST 1 VIEW: CPT

## 2020-09-25 PROCEDURE — 80320 DRUG SCREEN QUANTALCOHOLS: CPT

## 2020-09-25 PROCEDURE — 84484 ASSAY OF TROPONIN QUANT: CPT

## 2020-09-25 PROCEDURE — 87635 SARS-COV-2 COVID-19 AMP PRB: CPT

## 2020-09-25 PROCEDURE — 80329 ANALGESICS NON-OPIOID 1 OR 2: CPT

## 2020-09-25 PROCEDURE — 10002807 HB RX 258: Performed by: INTERNAL MEDICINE

## 2020-09-25 PROCEDURE — 83735 ASSAY OF MAGNESIUM: CPT

## 2020-09-25 PROCEDURE — 82550 ASSAY OF CK (CPK): CPT

## 2020-09-25 PROCEDURE — 96376 TX/PRO/DX INJ SAME DRUG ADON: CPT

## 2020-09-25 PROCEDURE — 70496 CT ANGIOGRAPHY HEAD: CPT

## 2020-09-25 PROCEDURE — 95713 VEEG 2-12 HR CONT MNTR: CPT

## 2020-09-25 PROCEDURE — 13003397 CT CERVICAL SPINE 2D REFORMATTED

## 2020-09-25 PROCEDURE — 83690 ASSAY OF LIPASE: CPT

## 2020-09-25 PROCEDURE — 80076 HEPATIC FUNCTION PANEL: CPT

## 2020-09-25 PROCEDURE — G0378 HOSPITAL OBSERVATION PER HR: HCPCS

## 2020-09-25 PROCEDURE — 10002805 HB CONTRAST AGENT: Performed by: EMERGENCY MEDICINE

## 2020-09-25 PROCEDURE — 10004651 HB RX, NO CHARGE ITEM: Performed by: INTERNAL MEDICINE

## 2020-09-25 PROCEDURE — 70480 CT ORBIT/EAR/FOSSA W/O DYE: CPT

## 2020-09-25 PROCEDURE — 36415 COLL VENOUS BLD VENIPUNCTURE: CPT

## 2020-09-25 PROCEDURE — 10002807 HB RX 258: Performed by: PSYCHIATRY & NEUROLOGY

## 2020-09-25 PROCEDURE — 95700 EEG CONT REC W/VID EEG TECH: CPT

## 2020-09-25 PROCEDURE — 10002803 HB RX 637: Performed by: INTERNAL MEDICINE

## 2020-09-25 PROCEDURE — 10002805 HB CONTRAST AGENT: Performed by: PSYCHIATRY & NEUROLOGY

## 2020-09-25 PROCEDURE — 70553 MRI BRAIN STEM W/O & W/DYE: CPT

## 2020-09-25 PROCEDURE — A9585 GADOBUTROL INJECTION: HCPCS | Performed by: PSYCHIATRY & NEUROLOGY

## 2020-09-25 PROCEDURE — 70498 CT ANGIOGRAPHY NECK: CPT

## 2020-09-25 PROCEDURE — 99285 EMERGENCY DEPT VISIT HI MDM: CPT

## 2020-09-25 PROCEDURE — 72131 CT LUMBAR SPINE W/O DYE: CPT

## 2020-09-25 PROCEDURE — 85025 COMPLETE CBC W/AUTO DIFF WBC: CPT

## 2020-09-25 RX ORDER — ENOXAPARIN SODIUM 100 MG/ML
40 INJECTION SUBCUTANEOUS DAILY
Status: DISCONTINUED | OUTPATIENT
Start: 2020-09-26 | End: 2020-09-26 | Stop reason: HOSPADM

## 2020-09-25 RX ORDER — FLUTICASONE PROPIONATE 50 MCG
1 SPRAY, SUSPENSION (ML) NASAL DAILY
Status: ON HOLD | COMMUNITY
End: 2021-06-12

## 2020-09-25 RX ORDER — ACETAMINOPHEN 325 MG/1
650 TABLET ORAL EVERY 4 HOURS PRN
Status: DISCONTINUED | OUTPATIENT
Start: 2020-09-25 | End: 2020-09-26 | Stop reason: HOSPADM

## 2020-09-25 RX ORDER — 0.9 % SODIUM CHLORIDE 0.9 %
2 VIAL (ML) INJECTION EVERY 12 HOURS SCHEDULED
Status: DISCONTINUED | OUTPATIENT
Start: 2020-09-25 | End: 2020-09-26 | Stop reason: HOSPADM

## 2020-09-25 RX ORDER — SODIUM CHLORIDE, SODIUM LACTATE, POTASSIUM CHLORIDE, CALCIUM CHLORIDE 600; 310; 30; 20 MG/100ML; MG/100ML; MG/100ML; MG/100ML
INJECTION, SOLUTION INTRAVENOUS CONTINUOUS
Status: DISCONTINUED | OUTPATIENT
Start: 2020-09-25 | End: 2020-09-25 | Stop reason: CLARIF

## 2020-09-25 RX ORDER — POLYETHYLENE GLYCOL 3350 17 G/17G
17 POWDER, FOR SOLUTION ORAL DAILY PRN
Status: DISCONTINUED | OUTPATIENT
Start: 2020-09-25 | End: 2020-09-26 | Stop reason: HOSPADM

## 2020-09-25 RX ORDER — HALOPERIDOL 5 MG/1
10 TABLET ORAL 2 TIMES DAILY
Status: DISCONTINUED | OUTPATIENT
Start: 2020-09-25 | End: 2020-09-26 | Stop reason: HOSPADM

## 2020-09-25 RX ORDER — LAMOTRIGINE 150 MG/1
300 TABLET ORAL 2 TIMES DAILY
Status: DISCONTINUED | OUTPATIENT
Start: 2020-09-25 | End: 2020-09-26 | Stop reason: HOSPADM

## 2020-09-25 RX ORDER — HALOPERIDOL 10 MG/1
10 TABLET ORAL 2 TIMES DAILY
Status: ON HOLD | COMMUNITY
End: 2020-10-06 | Stop reason: HOSPADM

## 2020-09-25 RX ORDER — HALOPERIDOL 5 MG/ML
5 INJECTION INTRAMUSCULAR EVERY 6 HOURS PRN
Status: DISCONTINUED | OUTPATIENT
Start: 2020-09-25 | End: 2020-09-26 | Stop reason: HOSPADM

## 2020-09-25 RX ORDER — GADOBUTROL 604.72 MG/ML
10 INJECTION INTRAVENOUS ONCE
Status: COMPLETED | OUTPATIENT
Start: 2020-09-25 | End: 2020-09-25

## 2020-09-25 RX ADMIN — IOHEXOL 150 ML: 350 INJECTION, SOLUTION INTRAVENOUS at 08:15

## 2020-09-25 RX ADMIN — SODIUM CHLORIDE, SODIUM LACTATE, POTASSIUM CHLORIDE, AND CALCIUM CHLORIDE: .6; .31; .03; .02 INJECTION, SOLUTION INTRAVENOUS at 15:30

## 2020-09-25 RX ADMIN — LAMOTRIGINE 300 MG: 150 TABLET ORAL at 21:28

## 2020-09-25 RX ADMIN — THIAMINE HYDROCHLORIDE 400 MG: 100 INJECTION, SOLUTION INTRAMUSCULAR; INTRAVENOUS at 21:30

## 2020-09-25 RX ADMIN — THIAMINE HYDROCHLORIDE 400 MG: 100 INJECTION, SOLUTION INTRAMUSCULAR; INTRAVENOUS at 09:51

## 2020-09-25 RX ADMIN — GADOBUTROL 10 ML: 604.72 INJECTION INTRAVENOUS at 14:30

## 2020-09-25 RX ADMIN — SODIUM CHLORIDE, PRESERVATIVE FREE 2 ML: 5 INJECTION INTRAVENOUS at 21:28

## 2020-09-25 RX ADMIN — HALOPERIDOL 10 MG: 5 TABLET ORAL at 23:16

## 2020-09-25 RX ADMIN — SODIUM CHLORIDE, PRESERVATIVE FREE 2 ML: 5 INJECTION INTRAVENOUS at 15:35

## 2020-09-25 RX ADMIN — THIAMINE HYDROCHLORIDE 400 MG: 100 INJECTION, SOLUTION INTRAMUSCULAR; INTRAVENOUS at 15:32

## 2020-09-25 SDOH — HEALTH STABILITY: MENTAL HEALTH: HOW OFTEN DO YOU HAVE A DRINK CONTAINING ALCOHOL?: NEVER

## 2020-09-25 ASSESSMENT — LIFESTYLE VARIABLES
HOW OFTEN DO YOU HAVE A DRINK CONTAINING ALCOHOL: 4 OR MORE TIMES PER WEEK
HOW OFTEN DO YOU HAVE 6 OR MORE DRINKS ON ONE OCCASION: NEVER
AUDIT-C TOTAL SCORE: 4
HOW MANY STANDARD DRINKS CONTAINING ALCOHOL DO YOU HAVE ON A TYPICAL DAY: 0,1 OR 2
ALCOHOL_USE_STATUS: NO OR LOW RISK WITH VALIDATED TOOL

## 2020-09-25 ASSESSMENT — PATIENT HEALTH QUESTIONNAIRE - PHQ9
SUM OF ALL RESPONSES TO PHQ9 QUESTIONS 1 AND 2: 0
CLINICAL INTERPRETATION OF PHQ2 SCORE: NO FURTHER SCREENING NEEDED
1. LITTLE INTEREST OR PLEASURE IN DOING THINGS: NOT AT ALL
IS PATIENT ABLE TO COMPLETE PHQ2 OR PHQ9: NO, DEFER TO LATER TIME
CLINICAL INTERPRETATION OF PHQ9 SCORE: NO FURTHER SCREENING NEEDED
2. FEELING DOWN, DEPRESSED OR HOPELESS: NOT AT ALL
IS PATIENT ABLE TO COMPLETE PHQ2 OR PHQ9: YES
SUM OF ALL RESPONSES TO PHQ9 QUESTIONS 1 AND 2: 0

## 2020-09-25 ASSESSMENT — ENCOUNTER SYMPTOMS
SHORTNESS OF BREATH: 0
NAUSEA: 0
SPEECH DIFFICULTY: 1
CONFUSION: 1
FEVER: 0
CHEST TIGHTNESS: 0
HEADACHES: 1
CHILLS: 0
DIARRHEA: 0
VOMITING: 0
ABDOMINAL PAIN: 0
BACK PAIN: 1
COUGH: 0

## 2020-09-25 ASSESSMENT — COGNITIVE AND FUNCTIONAL STATUS - GENERAL
ARE YOU BLIND OR DO YOU HAVE SERIOUS DIFFICULTY SEEING, EVEN WHEN WEARING GLASSES: NO
DO YOU HAVE SERIOUS DIFFICULTY WALKING OR CLIMBING STAIRS: NO
BECAUSE OF A PHYSICAL, MENTAL, OR EMOTIONAL CONDITION, DO YOU HAVE SERIOUS DIFFICULTY CONCENTRATING, REMEMBERING OR MAKING DECISIONS: NO
ARE YOU DEAF OR DO YOU HAVE SERIOUS DIFFICULTY  HEARING: NO
DO YOU HAVE DIFFICULTY DRESSING OR BATHING: NO
BECAUSE OF A PHYSICAL, MENTAL, OR EMOTIONAL CONDITION, DO YOU HAVE DIFFICULTY DOING ERRANDS ALONE: NO

## 2020-09-25 ASSESSMENT — COLUMBIA-SUICIDE SEVERITY RATING SCALE - C-SSRS
1. WITHIN THE PAST MONTH, HAVE YOU WISHED YOU WERE DEAD OR WISHED YOU COULD GO TO SLEEP AND NOT WAKE UP?: NO
2. HAVE YOU ACTUALLY HAD ANY THOUGHTS OF KILLING YOURSELF?: NO
IS THE PATIENT ABLE TO COMPLETE C-SSRS: YES
6. HAVE YOU EVER DONE ANYTHING, STARTED TO DO ANYTHING, OR PREPARED TO DO ANYTHING TO END YOUR LIFE?: NO

## 2020-09-25 ASSESSMENT — ACTIVITIES OF DAILY LIVING (ADL)
ADL_SCORE: 12
ADL_BEFORE_ADMISSION: INDEPENDENT
ADL_SHORT_OF_BREATH: NO
RECENT_DECLINE_ADL: NO
CHRONIC_PAIN_PRESENT: NO

## 2020-09-25 ASSESSMENT — PAIN SCALES - GENERAL
PAINLEVEL_OUTOF10: 0
PAINLEVEL_OUTOF10: 2

## 2020-09-26 ENCOUNTER — APPOINTMENT (OUTPATIENT)
Dept: CARDIOLOGY | Age: 45
End: 2020-09-26
Attending: INTERNAL MEDICINE

## 2020-09-26 VITALS
DIASTOLIC BLOOD PRESSURE: 69 MMHG | TEMPERATURE: 98.1 F | HEART RATE: 63 BPM | OXYGEN SATURATION: 95 % | SYSTOLIC BLOOD PRESSURE: 115 MMHG | BODY MASS INDEX: 27.62 KG/M2 | RESPIRATION RATE: 18 BRPM | HEIGHT: 77 IN | WEIGHT: 233.91 LBS

## 2020-09-26 LAB
ANION GAP SERPL CALC-SCNC: 9 MMOL/L (ref 10–20)
BASOPHILS # BLD: 0 K/MCL (ref 0–0.3)
BASOPHILS NFR BLD: 0 %
BUN SERPL-MCNC: 12 MG/DL (ref 6–20)
BUN/CREAT SERPL: 13 (ref 7–25)
CALCIUM SERPL-MCNC: 8.3 MG/DL (ref 8.4–10.2)
CHLORIDE SERPL-SCNC: 108 MMOL/L (ref 98–107)
CO2 SERPL-SCNC: 26 MMOL/L (ref 21–32)
CREAT SERPL-MCNC: 0.94 MG/DL (ref 0.67–1.17)
DIFFERENTIAL METHOD BLD: NORMAL
EOSINOPHIL # BLD: 0.1 K/MCL (ref 0.1–0.5)
EOSINOPHIL NFR BLD: 2 %
ERYTHROCYTE [DISTWIDTH] IN BLOOD: 13.2 % (ref 11–15)
GLUCOSE SERPL-MCNC: 109 MG/DL (ref 65–99)
HCT VFR BLD CALC: 42.5 % (ref 39–51)
HGB BLD-MCNC: 14.3 G/DL (ref 13–17)
IMM GRANULOCYTES # BLD AUTO: 0 K/MCL (ref 0–0.2)
IMM GRANULOCYTES NFR BLD: 0 %
LYMPHOCYTES # BLD: 1.1 K/MCL (ref 1–4.8)
LYMPHOCYTES NFR BLD: 12 %
MAGNESIUM SERPL-MCNC: 2.4 MG/DL (ref 1.7–2.4)
MCH RBC QN AUTO: 31.7 PG (ref 26–34)
MCHC RBC AUTO-ENTMCNC: 33.6 G/DL (ref 32–36.5)
MCV RBC AUTO: 94.2 FL (ref 78–100)
MONOCYTES # BLD: 0.9 K/MCL (ref 0.3–0.9)
MONOCYTES NFR BLD: 10 %
NEUTROPHILS # BLD: 6.8 K/MCL (ref 1.8–7.7)
NEUTROPHILS NFR BLD: 76 %
NRBC BLD MANUAL-RTO: 0 /100 WBC
PLATELET # BLD: 157 K/MCL (ref 140–450)
POTASSIUM SERPL-SCNC: 4 MMOL/L (ref 3.4–5.1)
RBC # BLD: 4.51 MIL/MCL (ref 4.5–5.9)
SARS-COV-2 RNA RESP QL NAA+PROBE: NOT DETECTED
SERVICE CMNT-IMP: NORMAL
SODIUM SERPL-SCNC: 139 MMOL/L (ref 135–145)
SPECIMEN SOURCE: NORMAL
WBC # BLD: 8.9 K/MCL (ref 4.2–11)

## 2020-09-26 PROCEDURE — 10002803 HB RX 637: Performed by: INTERNAL MEDICINE

## 2020-09-26 PROCEDURE — 83735 ASSAY OF MAGNESIUM: CPT

## 2020-09-26 PROCEDURE — 93270 REMOTE 30 DAY ECG REV/REPORT: CPT

## 2020-09-26 PROCEDURE — 85025 COMPLETE CBC W/AUTO DIFF WBC: CPT

## 2020-09-26 PROCEDURE — 80048 BASIC METABOLIC PNL TOTAL CA: CPT

## 2020-09-26 PROCEDURE — 96372 THER/PROPH/DIAG INJ SC/IM: CPT

## 2020-09-26 PROCEDURE — 93306 TTE W/DOPPLER COMPLETE: CPT

## 2020-09-26 PROCEDURE — 10004651 HB RX, NO CHARGE ITEM: Performed by: INTERNAL MEDICINE

## 2020-09-26 PROCEDURE — 96376 TX/PRO/DX INJ SAME DRUG ADON: CPT

## 2020-09-26 PROCEDURE — 10002800 HB RX 250 W HCPCS: Performed by: INTERNAL MEDICINE

## 2020-09-26 PROCEDURE — 10002800 HB RX 250 W HCPCS: Performed by: PSYCHIATRY & NEUROLOGY

## 2020-09-26 PROCEDURE — 90792 PSYCH DIAG EVAL W/MED SRVCS: CPT | Performed by: PSYCHIATRY & NEUROLOGY

## 2020-09-26 PROCEDURE — 36415 COLL VENOUS BLD VENIPUNCTURE: CPT

## 2020-09-26 PROCEDURE — G0378 HOSPITAL OBSERVATION PER HR: HCPCS

## 2020-09-26 PROCEDURE — 10002807 HB RX 258: Performed by: PSYCHIATRY & NEUROLOGY

## 2020-09-26 RX ORDER — POTASSIUM CHLORIDE 20 MEQ/1
40 TABLET, EXTENDED RELEASE ORAL ONCE
Status: COMPLETED | OUTPATIENT
Start: 2020-09-26 | End: 2020-09-26

## 2020-09-26 RX ADMIN — POTASSIUM CHLORIDE 40 MEQ: 1500 TABLET, EXTENDED RELEASE ORAL at 10:14

## 2020-09-26 RX ADMIN — LAMOTRIGINE 300 MG: 150 TABLET ORAL at 10:14

## 2020-09-26 RX ADMIN — HALOPERIDOL 10 MG: 5 TABLET ORAL at 10:14

## 2020-09-26 RX ADMIN — THIAMINE HYDROCHLORIDE 400 MG: 100 INJECTION, SOLUTION INTRAMUSCULAR; INTRAVENOUS at 10:21

## 2020-09-26 RX ADMIN — SODIUM CHLORIDE, PRESERVATIVE FREE 2 ML: 5 INJECTION INTRAVENOUS at 10:25

## 2020-09-26 RX ADMIN — THIAMINE HYDROCHLORIDE 400 MG: 100 INJECTION, SOLUTION INTRAMUSCULAR; INTRAVENOUS at 14:25

## 2020-09-26 RX ADMIN — ENOXAPARIN SODIUM 40 MG: 40 INJECTION SUBCUTANEOUS at 10:14

## 2020-09-26 ASSESSMENT — PAIN SCALES - GENERAL
PAINLEVEL_OUTOF10: 3
PAINLEVEL_OUTOF10: 0

## 2020-09-27 ENCOUNTER — HOSPITAL ENCOUNTER (INPATIENT)
Age: 45
LOS: 9 days | Discharge: HOME | DRG: 885 | End: 2020-10-06
Attending: EMERGENCY MEDICINE | Admitting: PSYCHIATRY & NEUROLOGY

## 2020-09-27 DIAGNOSIS — F25.0 SCHIZOAFFECTIVE DISORDER, BIPOLAR TYPE (CMD): Primary | ICD-10-CM

## 2020-09-27 DIAGNOSIS — R45.851 SUICIDAL IDEATION: ICD-10-CM

## 2020-09-27 LAB
ALBUMIN SERPL-MCNC: 4 G/DL (ref 3.6–5.1)
ALBUMIN/GLOB SERPL: 1.1 {RATIO} (ref 1–2.4)
ALP SERPL-CCNC: 125 UNITS/L (ref 45–117)
ALT SERPL-CCNC: 30 UNITS/L
AMPHETAMINES UR QL SCN>500 NG/ML: NEGATIVE
ANION GAP SERPL CALC-SCNC: 8 MMOL/L (ref 10–20)
APAP SERPL-MCNC: <2 MCG/ML (ref 10–30)
AST SERPL-CCNC: 24 UNITS/L
BARBITURATES UR QL SCN>200 NG/ML: NEGATIVE
BASOPHILS # BLD: 0 K/MCL (ref 0–0.3)
BASOPHILS NFR BLD: 0 %
BENZODIAZ UR QL SCN>200 NG/ML: NEGATIVE
BILIRUB SERPL-MCNC: 0.4 MG/DL (ref 0.2–1)
BUN SERPL-MCNC: 10 MG/DL (ref 6–20)
BUN/CREAT SERPL: 11 (ref 7–25)
BZE UR QL SCN>150 NG/ML: NEGATIVE
CALCIUM SERPL-MCNC: 8.8 MG/DL (ref 8.4–10.2)
CANNABINOIDS UR QL SCN>50 NG/ML: NEGATIVE
CHLORIDE SERPL-SCNC: 107 MMOL/L (ref 98–107)
CO2 SERPL-SCNC: 26 MMOL/L (ref 21–32)
CREAT SERPL-MCNC: 0.9 MG/DL (ref 0.67–1.17)
DIFFERENTIAL METHOD BLD: ABNORMAL
EOSINOPHIL # BLD: 0.2 K/MCL (ref 0.1–0.5)
EOSINOPHIL NFR BLD: 3 %
ERYTHROCYTE [DISTWIDTH] IN BLOOD: 13 % (ref 11–15)
ETHANOL SERPL-MCNC: NORMAL MG/DL
GLOBULIN SER-MCNC: 3.6 G/DL (ref 2–4)
GLUCOSE SERPL-MCNC: 113 MG/DL (ref 65–99)
HCT VFR BLD CALC: 40.8 % (ref 39–51)
HGB BLD-MCNC: 14 G/DL (ref 13–17)
IMM GRANULOCYTES # BLD AUTO: 0 K/MCL (ref 0–0.2)
IMM GRANULOCYTES NFR BLD: 0 %
LYMPHOCYTES # BLD: 1.5 K/MCL (ref 1–4.8)
LYMPHOCYTES NFR BLD: 20 %
MCH RBC QN AUTO: 32.2 PG (ref 26–34)
MCHC RBC AUTO-ENTMCNC: 34.3 G/DL (ref 32–36.5)
MCV RBC AUTO: 93.8 FL (ref 78–100)
MONOCYTES # BLD: 0.8 K/MCL (ref 0.3–0.9)
MONOCYTES NFR BLD: 11 %
NEUTROPHILS # BLD: 5.1 K/MCL (ref 1.8–7.7)
NEUTROPHILS NFR BLD: 66 %
NRBC BLD MANUAL-RTO: 0 /100 WBC
OPIATES UR QL SCN>300 NG/ML: NEGATIVE
PCP UR QL SCN>25 NG/ML: NEGATIVE
PLATELET # BLD: 162 K/MCL (ref 140–450)
POTASSIUM SERPL-SCNC: 3.5 MMOL/L (ref 3.4–5.1)
PROT SERPL-MCNC: 7.6 G/DL (ref 6.4–8.2)
RBC # BLD: 4.35 MIL/MCL (ref 4.5–5.9)
SALICYLATES SERPL-MCNC: <2.8 MG/DL
SARS-COV-2 RNA RESP QL NAA+PROBE: NOT DETECTED
SERVICE CMNT-IMP: NORMAL
SODIUM SERPL-SCNC: 138 MMOL/L (ref 135–145)
SPECIMEN SOURCE: NORMAL
WBC # BLD: 7.6 K/MCL (ref 4.2–11)

## 2020-09-27 PROCEDURE — C9803 HOPD COVID-19 SPEC COLLECT: HCPCS

## 2020-09-27 PROCEDURE — 99285 EMERGENCY DEPT VISIT HI MDM: CPT

## 2020-09-27 PROCEDURE — 80329 ANALGESICS NON-OPIOID 1 OR 2: CPT

## 2020-09-27 PROCEDURE — 13003244 HB ROOM CHARGE INTENSIVE PSYCH

## 2020-09-27 PROCEDURE — 85025 COMPLETE CBC W/AUTO DIFF WBC: CPT

## 2020-09-27 PROCEDURE — 80320 DRUG SCREEN QUANTALCOHOLS: CPT

## 2020-09-27 PROCEDURE — 36415 COLL VENOUS BLD VENIPUNCTURE: CPT

## 2020-09-27 PROCEDURE — 80053 COMPREHEN METABOLIC PANEL: CPT

## 2020-09-27 PROCEDURE — 80307 DRUG TEST PRSMV CHEM ANLYZR: CPT

## 2020-09-27 PROCEDURE — 87635 SARS-COV-2 COVID-19 AMP PRB: CPT

## 2020-09-27 PROCEDURE — 90839 PSYTX CRISIS INITIAL 60 MIN: CPT

## 2020-09-27 ASSESSMENT — LIFESTYLE VARIABLES
HOW OFTEN DO YOU HAVE A DRINK CONTAINING ALCOHOL: MONTHLY OR LESS
HOW OFTEN DO YOU HAVE 6 OR MORE DRINKS ON ONE OCCASION: NEVER
ALCOHOL_USE_STATUS: NO OR LOW RISK WITH VALIDATED TOOL
HOW MANY STANDARD DRINKS CONTAINING ALCOHOL DO YOU HAVE ON A TYPICAL DAY: 0,1 OR 2
AUDIT-C TOTAL SCORE: 1
ALCOHOL_USE: DENIES

## 2020-09-27 ASSESSMENT — COGNITIVE AND FUNCTIONAL STATUS - GENERAL
BEHAVIOR: SUICIDAL/SUICIDAL IDEATION;POOR EYE CONTACT
MOTOR_BEHAVIOR-RETARDATION_CALCULATED: DECREASED MOVEMENT
PERCEPTUAL_MISINTERPRETATIONS_HALLUCINATIONS: AUDITORY
MOOD: ANXIOUS;FLAT
LEVEL_OF_CONSCIOUSNESS_CALCULATED: LETHARGIC
SPEECH: BREATHY VOICE;RAMBLING SPEECH
ORIENTATION: ORIENTED (PERSON/PLACE/TIME)
MEMORY: INTACT

## 2020-09-27 ASSESSMENT — ENCOUNTER SYMPTOMS
SLEEP DISTURBANCE: 1
CONFUSION: 0
WEAKNESS: 0
FEVER: 0
BRUISES/BLEEDS EASILY: 0
BACK PAIN: 0
ABDOMINAL PAIN: 0
NUMBNESS: 0
HEADACHES: 0
SHORTNESS OF BREATH: 0

## 2020-09-28 PROCEDURE — 99223 1ST HOSP IP/OBS HIGH 75: CPT | Performed by: PSYCHIATRY & NEUROLOGY

## 2020-09-28 PROCEDURE — 10002803 HB RX 637: Performed by: PSYCHIATRY & NEUROLOGY

## 2020-09-28 PROCEDURE — 10002803 HB RX 637: Performed by: HOSPITALIST

## 2020-09-28 PROCEDURE — 13003244 HB ROOM CHARGE INTENSIVE PSYCH

## 2020-09-28 PROCEDURE — 10002800 HB RX 250 W HCPCS: Performed by: PSYCHIATRY & NEUROLOGY

## 2020-09-28 RX ORDER — BENZTROPINE MESYLATE 1 MG/1
1 TABLET ORAL EVERY 8 HOURS PRN
Status: DISCONTINUED | OUTPATIENT
Start: 2020-09-28 | End: 2020-10-06 | Stop reason: HOSPADM

## 2020-09-28 RX ORDER — BENZTROPINE MESYLATE 1 MG/ML
1 INJECTION INTRAMUSCULAR; INTRAVENOUS EVERY 8 HOURS PRN
Status: DISCONTINUED | OUTPATIENT
Start: 2020-09-28 | End: 2020-10-06 | Stop reason: HOSPADM

## 2020-09-28 RX ORDER — ACETAMINOPHEN 325 MG/1
650 TABLET ORAL EVERY 6 HOURS PRN
Status: DISCONTINUED | OUTPATIENT
Start: 2020-09-28 | End: 2020-10-06 | Stop reason: HOSPADM

## 2020-09-28 RX ORDER — MAGNESIUM HYDROXIDE/ALUMINUM HYDROXICE/SIMETHICONE 120; 1200; 1200 MG/30ML; MG/30ML; MG/30ML
20 SUSPENSION ORAL 4 TIMES DAILY PRN
Status: DISCONTINUED | OUTPATIENT
Start: 2020-09-28 | End: 2020-10-06 | Stop reason: HOSPADM

## 2020-09-28 RX ORDER — HALOPERIDOL 5 MG/1
10 TABLET ORAL 2 TIMES DAILY
Status: DISCONTINUED | OUTPATIENT
Start: 2020-09-28 | End: 2020-09-28

## 2020-09-28 RX ORDER — HALOPERIDOL 5 MG/ML
5 INJECTION INTRAMUSCULAR EVERY 4 HOURS PRN
Status: DISCONTINUED | OUTPATIENT
Start: 2020-09-28 | End: 2020-10-06 | Stop reason: HOSPADM

## 2020-09-28 RX ORDER — LORAZEPAM 2 MG/ML
1 INJECTION INTRAMUSCULAR EVERY 4 HOURS PRN
Status: DISCONTINUED | OUTPATIENT
Start: 2020-09-28 | End: 2020-10-06 | Stop reason: HOSPADM

## 2020-09-28 RX ORDER — TRAZODONE HYDROCHLORIDE 50 MG/1
50 TABLET ORAL NIGHTLY PRN
Status: DISCONTINUED | OUTPATIENT
Start: 2020-09-28 | End: 2020-10-06 | Stop reason: HOSPADM

## 2020-09-28 RX ORDER — LAMOTRIGINE 100 MG/1
300 TABLET ORAL 2 TIMES DAILY
Status: DISCONTINUED | OUTPATIENT
Start: 2020-09-28 | End: 2020-10-01

## 2020-09-28 RX ORDER — LORAZEPAM 1 MG/1
1 TABLET ORAL EVERY 4 HOURS PRN
Status: DISCONTINUED | OUTPATIENT
Start: 2020-09-28 | End: 2020-10-06 | Stop reason: HOSPADM

## 2020-09-28 RX ORDER — HALOPERIDOL 5 MG/1
5 TABLET ORAL EVERY 4 HOURS PRN
Status: DISCONTINUED | OUTPATIENT
Start: 2020-09-28 | End: 2020-10-06 | Stop reason: HOSPADM

## 2020-09-28 RX ORDER — ARIPIPRAZOLE 10 MG/1
10 TABLET ORAL DAILY
Status: DISCONTINUED | OUTPATIENT
Start: 2020-09-28 | End: 2020-10-06

## 2020-09-28 RX ORDER — NICOTINE 21 MG/24HR
1 PATCH, TRANSDERMAL 24 HOURS TRANSDERMAL DAILY
Status: DISCONTINUED | OUTPATIENT
Start: 2020-09-28 | End: 2020-10-06 | Stop reason: HOSPADM

## 2020-09-28 RX ORDER — FLUTICASONE PROPIONATE 50 MCG
1 SPRAY, SUSPENSION (ML) NASAL DAILY
Status: DISCONTINUED | OUTPATIENT
Start: 2020-09-28 | End: 2020-10-06 | Stop reason: HOSPADM

## 2020-09-28 RX ORDER — PANTOPRAZOLE SODIUM 40 MG/1
40 TABLET, DELAYED RELEASE ORAL NIGHTLY
Status: DISCONTINUED | OUTPATIENT
Start: 2020-09-28 | End: 2020-10-06 | Stop reason: HOSPADM

## 2020-09-28 RX ADMIN — PANTOPRAZOLE SODIUM 40 MG: 20 TABLET, DELAYED RELEASE ORAL at 15:22

## 2020-09-28 RX ADMIN — ARIPIPRAZOLE 10 MG: 10 TABLET ORAL at 15:22

## 2020-09-28 RX ADMIN — LORAZEPAM 1 MG: 2 INJECTION INTRAMUSCULAR; INTRAVENOUS at 20:05

## 2020-09-28 RX ADMIN — HALOPERIDOL LACTATE 5 MG: 5 INJECTION, SOLUTION INTRAMUSCULAR at 20:05

## 2020-09-28 RX ADMIN — LAMOTRIGINE 300 MG: 100 TABLET ORAL at 08:57

## 2020-09-28 RX ADMIN — HALOPERIDOL 5 MG: 5 TABLET ORAL at 08:59

## 2020-09-28 RX ADMIN — NICOTINE 1 PATCH: 21 PATCH TRANSDERMAL at 09:00

## 2020-09-28 SDOH — HEALTH STABILITY: MENTAL HEALTH: HOW OFTEN DO YOU HAVE A DRINK CONTAINING ALCOHOL?: NEVER

## 2020-09-28 ASSESSMENT — ENCOUNTER SYMPTOMS
WEAKNESS: 0
NAUSEA: 0
ABDOMINAL PAIN: 0
CHEST TIGHTNESS: 0
CHILLS: 0
DIZZINESS: 0
FEVER: 0
SHORTNESS OF BREATH: 0
VOMITING: 0
SORE THROAT: 0

## 2020-09-28 ASSESSMENT — LIFESTYLE VARIABLES
RECENT DECLINE IN ADLS: NO
AUDIT-C TOTAL SCORE: 1
ARE YOU DEAF OR DO YOU HAVE SERIOUS DIFFICULTY  HEARING: NO
RECENTLY LOST WEIGHT WITHOUT TRYING: 0
CHRONIC/CANCER PAIN PRESENT: NO
SHORT OF BREATH OR FATIGUE WITH ADLS: NO
HOW OFTEN DO YOU HAVE 6 OR MORE DRINKS ON ONE OCCASION: NEVER
TOBACCO_USE_STATUS_IN_THE_LAST_30_DAYS: USED TOBACCO IN THE LAST 30 DAYS
ADL BEFORE ADMISSION: INDEPENDENT
HOW OFTEN DO YOU HAVE A DRINK CONTAINING ALCOHOL: MONTHLY OR LESS
HAVE YOU BEEN EATING POORLY BECAUSE OF A DECREASED APPETITE: 0
ADL NEEDS ASSIST: NO
TYPE_OF_TOBACCO_USED: CIGARETTES
HAVE YOU EVER BEEN EXPOSED TO OTHER VERY DISTURBING, TRAUMATIC OR ANXIETY PROVOKING EVENTS IN YOUR LIFETIME?: NO
HOW MANY STANDARD DRINKS CONTAINING ALCOHOL DO YOU HAVE ON A TYPICAL DAY: 0,1 OR 2
ARE YOU BLIND OR DO YOU HAVE SERIOUS DIFFICULTY SEEING, EVEN WHEN WEARING GLASSES: NO
IS PATIENT ABLE TO COMPLETE ASSESSMENT AT THIS TIME: YES
ALCOHOL_USE: YES
ALCOHOL_USE_STATUS: NO OR LOW RISK WITH VALIDATED TOOL
HAVE YOU EVER BEEN VERBALLY, EMOTIONALLY, PHYSICALLY OR SEXUALLY ABUSED, OR ABUSED VIA SOCIAL MEDIA?: NO

## 2020-09-28 ASSESSMENT — COLUMBIA-SUICIDE SEVERITY RATING SCALE - C-SSRS
TOTAL  NUMBER OF INTERRUPTED ATTEMPTS LIFETIME: YES
TOTAL  NUMBER OF INTERRUPTED ATTEMPTS PAST 3 MONTHS: NO
TOTAL  NUMBER OF ABORTED OR SELF INTERRUPTED ATTEMPTS PAST 3 MONTHS: YES
REASONS FOR IDEATION LIFETIME: EQUALLY TO GET ATTENTION, REVENGE OR A REACTION FROM OTHERS AND TO END/STOP THE PAIN
LETHALITY/MEDICAL DAMAGE CODE FIRST ACTUAL ATTEMPT: MODERATE PHYSICAL DAMAGE, MEDICAL ATTENTION NEEDED
LETHALITY/MEDICAL DAMAGE CODE MOST LETHAL ACTUAL ATTEMPT: MODERATE PHYSICAL DAMAGE, MEDICAL ATTENTION NEEDED
6. HAVE YOU EVER DONE ANYTHING, STARTED TO DO ANYTHING, OR PREPARED TO DO ANYTHING TO END YOUR LIFE?: NO
REASONS FOR IDEATION PAST MONTH: EQUALLY TO GET ATTENTION, REVENGE OR A REACTION FROM OTHERS AND TO END/STOP THE PAIN
LETHALITY/MEDICAL DAMAGE CODE MOST RECENT ACTUAL ATTEMPT: MODERATE PHYSICAL DAMAGE, MEDICAL ATTENTION NEEDED
6. HAVE YOU EVER DONE ANYTHING, STARTED TO DO ANYTHING, OR PREPARED TO DO ANYTHING TO END YOUR LIFE?: YES
ATTEMPT PAST THREE MONTHS: NO
ATTEMPT LIFETIME: YES

## 2020-09-28 ASSESSMENT — PAIN SCALES - GENERAL
PAINLEVEL_OUTOF10: 0
PAINLEVEL_OUTOF10: 0

## 2020-09-28 ASSESSMENT — ACTIVITIES OF DAILY LIVING (ADL): ADL_SCORE: 12

## 2020-09-29 LAB
CHOLEST SERPL-MCNC: 172 MG/DL
CHOLEST/HDLC SERPL: 5.1 {RATIO}
HDLC SERPL-MCNC: 34 MG/DL
LDLC SERPL CALC-MCNC: 122 MG/DL
NONHDLC SERPL-MCNC: 138 MG/DL
TRIGL SERPL-MCNC: 81 MG/DL
TSH SERPL-ACNC: 1.4 MCUNITS/ML (ref 0.35–5)

## 2020-09-29 PROCEDURE — 84443 ASSAY THYROID STIM HORMONE: CPT

## 2020-09-29 PROCEDURE — 80061 LIPID PANEL: CPT

## 2020-09-29 PROCEDURE — 10002803 HB RX 637: Performed by: HOSPITALIST

## 2020-09-29 PROCEDURE — 10002803 HB RX 637: Performed by: PSYCHIATRY & NEUROLOGY

## 2020-09-29 PROCEDURE — 99232 SBSQ HOSP IP/OBS MODERATE 35: CPT | Performed by: PSYCHIATRY & NEUROLOGY

## 2020-09-29 PROCEDURE — 13003244 HB ROOM CHARGE INTENSIVE PSYCH

## 2020-09-29 PROCEDURE — 36415 COLL VENOUS BLD VENIPUNCTURE: CPT

## 2020-09-29 RX ADMIN — ARIPIPRAZOLE 10 MG: 10 TABLET ORAL at 08:23

## 2020-09-29 RX ADMIN — PANTOPRAZOLE SODIUM 40 MG: 20 TABLET, DELAYED RELEASE ORAL at 21:46

## 2020-09-29 RX ADMIN — NICOTINE 1 PATCH: 21 PATCH TRANSDERMAL at 08:24

## 2020-09-29 ASSESSMENT — PAIN SCALES - GENERAL
PAINLEVEL_OUTOF10: 0
PAINLEVEL_OUTOF10: 0

## 2020-09-30 PROCEDURE — 10004651 HB RX, NO CHARGE ITEM: Performed by: PSYCHIATRY & NEUROLOGY

## 2020-09-30 PROCEDURE — 10002803 HB RX 637: Performed by: HOSPITALIST

## 2020-09-30 PROCEDURE — 99233 SBSQ HOSP IP/OBS HIGH 50: CPT | Performed by: PSYCHIATRY & NEUROLOGY

## 2020-09-30 PROCEDURE — 10002803 HB RX 637: Performed by: PSYCHIATRY & NEUROLOGY

## 2020-09-30 PROCEDURE — 13003244 HB ROOM CHARGE INTENSIVE PSYCH

## 2020-09-30 RX ORDER — LAMOTRIGINE 100 MG/1
300 TABLET ORAL 2 TIMES DAILY
Status: DISCONTINUED | OUTPATIENT
Start: 2020-09-30 | End: 2020-09-30 | Stop reason: SDUPTHER

## 2020-09-30 RX ADMIN — ACETAMINOPHEN 650 MG: 325 TABLET ORAL at 08:12

## 2020-09-30 RX ADMIN — PANTOPRAZOLE SODIUM 40 MG: 20 TABLET, DELAYED RELEASE ORAL at 21:57

## 2020-09-30 RX ADMIN — LORAZEPAM 1 MG: 1 TABLET ORAL at 14:56

## 2020-09-30 RX ADMIN — ARIPIPRAZOLE 10 MG: 10 TABLET ORAL at 10:20

## 2020-09-30 RX ADMIN — FLUTICASONE PROPIONATE 1 SPRAY: 50 SPRAY, METERED NASAL at 08:47

## 2020-09-30 RX ADMIN — LAMOTRIGINE 300 MG: 100 TABLET ORAL at 21:57

## 2020-09-30 ASSESSMENT — PAIN SCALES - GENERAL
PAINLEVEL_OUTOF10: 9
PAINLEVEL_OUTOF10: 0
PAINLEVEL_OUTOF10: 0
PAINLEVEL_OUTOF10: 2
PAINLEVEL_OUTOF10: 2
PAINLEVEL_OUTOF10: 0

## 2020-10-01 PROCEDURE — 10004651 HB RX, NO CHARGE ITEM: Performed by: PSYCHIATRY & NEUROLOGY

## 2020-10-01 PROCEDURE — 13003244 HB ROOM CHARGE INTENSIVE PSYCH

## 2020-10-01 PROCEDURE — 99232 SBSQ HOSP IP/OBS MODERATE 35: CPT | Performed by: PSYCHIATRY & NEUROLOGY

## 2020-10-01 PROCEDURE — 10002803 HB RX 637: Performed by: HOSPITALIST

## 2020-10-01 PROCEDURE — 10002803 HB RX 637: Performed by: PSYCHIATRY & NEUROLOGY

## 2020-10-01 RX ORDER — LAMOTRIGINE 100 MG/1
100 TABLET ORAL 2 TIMES DAILY
Status: DISCONTINUED | OUTPATIENT
Start: 2020-10-01 | End: 2020-10-02

## 2020-10-01 RX ADMIN — ACETAMINOPHEN 650 MG: 325 TABLET ORAL at 05:04

## 2020-10-01 RX ADMIN — FLUTICASONE PROPIONATE 1 SPRAY: 50 SPRAY, METERED NASAL at 08:32

## 2020-10-01 RX ADMIN — PANTOPRAZOLE SODIUM 40 MG: 20 TABLET, DELAYED RELEASE ORAL at 22:14

## 2020-10-01 RX ADMIN — ACETAMINOPHEN 650 MG: 325 TABLET ORAL at 13:24

## 2020-10-01 RX ADMIN — LAMOTRIGINE 100 MG: 100 TABLET ORAL at 22:14

## 2020-10-01 RX ADMIN — NICOTINE 1 PATCH: 21 PATCH TRANSDERMAL at 08:31

## 2020-10-01 ASSESSMENT — PAIN SCALES - GENERAL
PAINLEVEL_OUTOF10: 0
PAINLEVEL_OUTOF10: 0
PAINLEVEL_OUTOF10: 9
PAINLEVEL_OUTOF10: 0
PAINLEVEL_OUTOF10: 7
PAINLEVEL_OUTOF10: 0

## 2020-10-02 PROCEDURE — 10002803 HB RX 637: Performed by: HOSPITALIST

## 2020-10-02 PROCEDURE — 10004651 HB RX, NO CHARGE ITEM: Performed by: PSYCHIATRY & NEUROLOGY

## 2020-10-02 PROCEDURE — 13003244 HB ROOM CHARGE INTENSIVE PSYCH

## 2020-10-02 PROCEDURE — 10002803 HB RX 637: Performed by: PSYCHIATRY & NEUROLOGY

## 2020-10-02 PROCEDURE — 99233 SBSQ HOSP IP/OBS HIGH 50: CPT | Performed by: PSYCHIATRY & NEUROLOGY

## 2020-10-02 RX ADMIN — PANTOPRAZOLE SODIUM 40 MG: 20 TABLET, DELAYED RELEASE ORAL at 20:21

## 2020-10-02 RX ADMIN — FLUTICASONE PROPIONATE 1 SPRAY: 50 SPRAY, METERED NASAL at 08:05

## 2020-10-02 RX ADMIN — ARIPIPRAZOLE 10 MG: 10 TABLET ORAL at 08:05

## 2020-10-02 RX ADMIN — LAMOTRIGINE 100 MG: 100 TABLET ORAL at 08:05

## 2020-10-02 RX ADMIN — NICOTINE 1 PATCH: 21 PATCH TRANSDERMAL at 08:06

## 2020-10-02 RX ADMIN — ACETAMINOPHEN 650 MG: 325 TABLET ORAL at 13:50

## 2020-10-02 ASSESSMENT — PAIN SCALES - GENERAL
PAINLEVEL_OUTOF10: 0
PAINLEVEL_OUTOF10: 4
PAINLEVEL_OUTOF10: 0
PAINLEVEL_OUTOF10: 0

## 2020-10-03 PROCEDURE — 10002803 HB RX 637: Performed by: HOSPITALIST

## 2020-10-03 PROCEDURE — 99232 SBSQ HOSP IP/OBS MODERATE 35: CPT | Performed by: PSYCHIATRY & NEUROLOGY

## 2020-10-03 PROCEDURE — 10004651 HB RX, NO CHARGE ITEM: Performed by: PSYCHIATRY & NEUROLOGY

## 2020-10-03 PROCEDURE — 10002803 HB RX 637: Performed by: PSYCHIATRY & NEUROLOGY

## 2020-10-03 PROCEDURE — 13003244 HB ROOM CHARGE INTENSIVE PSYCH

## 2020-10-03 RX ADMIN — LORAZEPAM 1 MG: 1 TABLET ORAL at 18:09

## 2020-10-03 RX ADMIN — ACETAMINOPHEN 650 MG: 325 TABLET ORAL at 16:14

## 2020-10-03 RX ADMIN — FLUTICASONE PROPIONATE 1 SPRAY: 50 SPRAY, METERED NASAL at 09:08

## 2020-10-03 RX ADMIN — PANTOPRAZOLE SODIUM 40 MG: 20 TABLET, DELAYED RELEASE ORAL at 21:01

## 2020-10-03 RX ADMIN — ARIPIPRAZOLE 10 MG: 10 TABLET ORAL at 09:03

## 2020-10-03 RX ADMIN — NICOTINE 1 PATCH: 21 PATCH TRANSDERMAL at 09:09

## 2020-10-03 ASSESSMENT — PAIN SCALES - GENERAL
PAINLEVEL_OUTOF10: 0
PAINLEVEL_OUTOF10: 0
PAINLEVEL_OUTOF10: 4

## 2020-10-04 PROCEDURE — 10002803 HB RX 637: Performed by: PSYCHIATRY & NEUROLOGY

## 2020-10-04 PROCEDURE — 13003244 HB ROOM CHARGE INTENSIVE PSYCH

## 2020-10-04 PROCEDURE — 99232 SBSQ HOSP IP/OBS MODERATE 35: CPT | Performed by: PSYCHIATRY & NEUROLOGY

## 2020-10-04 PROCEDURE — 10002803 HB RX 637: Performed by: HOSPITALIST

## 2020-10-04 RX ADMIN — ARIPIPRAZOLE 10 MG: 10 TABLET ORAL at 08:15

## 2020-10-04 RX ADMIN — FLUTICASONE PROPIONATE 1 SPRAY: 50 SPRAY, METERED NASAL at 08:15

## 2020-10-04 RX ADMIN — PANTOPRAZOLE SODIUM 40 MG: 20 TABLET, DELAYED RELEASE ORAL at 20:42

## 2020-10-04 RX ADMIN — HALOPERIDOL 5 MG: 5 TABLET ORAL at 23:43

## 2020-10-04 RX ADMIN — NICOTINE 1 PATCH: 21 PATCH TRANSDERMAL at 08:15

## 2020-10-04 RX ADMIN — TRAZODONE HYDROCHLORIDE 50 MG: 50 TABLET ORAL at 23:43

## 2020-10-04 ASSESSMENT — PAIN SCALES - GENERAL
PAINLEVEL_OUTOF10: 0

## 2020-10-05 PROCEDURE — 10004577 HB ROOM CHARGE PSYCH

## 2020-10-05 PROCEDURE — 10002803 HB RX 637: Performed by: PSYCHIATRY & NEUROLOGY

## 2020-10-05 PROCEDURE — 10002803 HB RX 637: Performed by: HOSPITALIST

## 2020-10-05 PROCEDURE — 99233 SBSQ HOSP IP/OBS HIGH 50: CPT | Performed by: PSYCHIATRY & NEUROLOGY

## 2020-10-05 RX ORDER — PANTOPRAZOLE SODIUM 40 MG/1
40 TABLET, DELAYED RELEASE ORAL NIGHTLY
Qty: 30 TABLET | Refills: 0 | Status: ON HOLD | OUTPATIENT
Start: 2020-10-05 | End: 2021-06-12

## 2020-10-05 RX ADMIN — NICOTINE 1 PATCH: 21 PATCH TRANSDERMAL at 07:39

## 2020-10-05 RX ADMIN — HALOPERIDOL 5 MG: 5 TABLET ORAL at 22:07

## 2020-10-05 RX ADMIN — FLUTICASONE PROPIONATE 1 SPRAY: 50 SPRAY, METERED NASAL at 07:37

## 2020-10-05 RX ADMIN — ARIPIPRAZOLE 10 MG: 10 TABLET ORAL at 07:37

## 2020-10-05 RX ADMIN — PANTOPRAZOLE SODIUM 40 MG: 20 TABLET, DELAYED RELEASE ORAL at 22:07

## 2020-10-05 RX ADMIN — TRAZODONE HYDROCHLORIDE 50 MG: 50 TABLET ORAL at 22:07

## 2020-10-05 ASSESSMENT — PAIN SCALES - GENERAL
PAINLEVEL_OUTOF10: 0

## 2020-10-06 VITALS
DIASTOLIC BLOOD PRESSURE: 67 MMHG | WEIGHT: 235.23 LBS | HEIGHT: 73 IN | RESPIRATION RATE: 18 BRPM | OXYGEN SATURATION: 98 % | SYSTOLIC BLOOD PRESSURE: 108 MMHG | TEMPERATURE: 97 F | HEART RATE: 56 BPM | BODY MASS INDEX: 31.18 KG/M2

## 2020-10-06 PROCEDURE — 99238 HOSP IP/OBS DSCHRG MGMT 30/<: CPT | Performed by: PSYCHIATRY & NEUROLOGY

## 2020-10-06 PROCEDURE — 10002803 HB RX 637: Performed by: PSYCHIATRY & NEUROLOGY

## 2020-10-06 RX ORDER — ARIPIPRAZOLE 400 MG
400 KIT INTRAMUSCULAR ONCE
Qty: 400 MG | Refills: 5 | Status: SHIPPED | OUTPATIENT
Start: 2020-11-03 | End: 2020-11-02 | Stop reason: CLARIF

## 2020-10-06 RX ORDER — ARIPIPRAZOLE 400 MG
400 KIT INTRAMUSCULAR ONCE
Qty: 400 MG | Refills: 5 | Status: SHIPPED | OUTPATIENT
Start: 2020-11-03 | End: 2020-10-06 | Stop reason: SDUPTHER

## 2020-10-06 RX ORDER — TRAZODONE HYDROCHLORIDE 50 MG/1
50 TABLET ORAL NIGHTLY PRN
Qty: 30 TABLET | Refills: 2 | Status: ON HOLD | OUTPATIENT
Start: 2020-10-06 | End: 2021-01-06 | Stop reason: HOSPADM

## 2020-10-06 RX ADMIN — NICOTINE 1 PATCH: 21 PATCH TRANSDERMAL at 08:23

## 2020-10-06 RX ADMIN — ARIPIPRAZOLE 10 MG: 10 TABLET ORAL at 08:23

## 2020-10-06 ASSESSMENT — PAIN SCALES - GENERAL: PAINLEVEL_OUTOF10: 0

## 2020-10-07 ENCOUNTER — HOSPITAL ENCOUNTER (OUTPATIENT)
Dept: BEHAVIORAL HEALTH | Age: 45
Discharge: STILL ADMITTED OR ADMINISTRATIVE | End: 2020-10-07

## 2020-10-07 PROCEDURE — 90792 PSYCH DIAG EVAL W/MED SRVCS: CPT | Performed by: PSYCHIATRY & NEUROLOGY

## 2020-10-07 PROCEDURE — G0177 OPPS/PHP; TRAIN & EDUC SERV: HCPCS

## 2020-10-07 PROCEDURE — G0410 GRP PSYCH PARTIAL HOSP 45-50: HCPCS

## 2020-10-07 SDOH — HEALTH STABILITY: MENTAL HEALTH: HOW OFTEN DO YOU HAVE A DRINK CONTAINING ALCOHOL?: NEVER

## 2020-10-08 ENCOUNTER — HOSPITAL ENCOUNTER (OUTPATIENT)
Dept: BEHAVIORAL HEALTH | Age: 45
Discharge: STILL ADMITTED OR ADMINISTRATIVE | End: 2020-10-08

## 2020-10-08 PROCEDURE — G0410 GRP PSYCH PARTIAL HOSP 45-50: HCPCS

## 2020-10-08 PROCEDURE — G0177 OPPS/PHP; TRAIN & EDUC SERV: HCPCS

## 2020-10-09 ENCOUNTER — HOSPITAL ENCOUNTER (OUTPATIENT)
Dept: BEHAVIORAL HEALTH | Age: 45
Discharge: STILL ADMITTED OR ADMINISTRATIVE | End: 2020-10-09
Attending: PSYCHIATRY & NEUROLOGY

## 2020-10-09 PROCEDURE — G0410 GRP PSYCH PARTIAL HOSP 45-50: HCPCS

## 2020-10-09 PROCEDURE — 99232 SBSQ HOSP IP/OBS MODERATE 35: CPT | Performed by: PSYCHIATRY & NEUROLOGY

## 2020-10-09 PROCEDURE — G0177 OPPS/PHP; TRAIN & EDUC SERV: HCPCS

## 2020-10-09 RX ORDER — ARIPIPRAZOLE 5 MG/1
5 TABLET ORAL 2 TIMES DAILY
Refills: 0 | INPATIENT
Start: 2020-10-09 | End: 2020-11-02 | Stop reason: CLARIF

## 2020-10-13 ENCOUNTER — HOSPITAL ENCOUNTER (OUTPATIENT)
Dept: BEHAVIORAL HEALTH | Age: 45
Discharge: STILL ADMITTED OR ADMINISTRATIVE | End: 2020-10-13
Attending: PSYCHIATRY & NEUROLOGY

## 2020-10-13 PROCEDURE — G0177 OPPS/PHP; TRAIN & EDUC SERV: HCPCS

## 2020-10-13 PROCEDURE — G0410 GRP PSYCH PARTIAL HOSP 45-50: HCPCS

## 2020-10-14 ENCOUNTER — HOSPITAL ENCOUNTER (OUTPATIENT)
Dept: BEHAVIORAL HEALTH | Age: 45
Discharge: STILL ADMITTED OR ADMINISTRATIVE | End: 2020-10-14
Attending: PSYCHIATRY & NEUROLOGY

## 2020-10-14 ENCOUNTER — HOSPITAL ENCOUNTER (EMERGENCY)
Age: 45
Discharge: HOME | End: 2020-10-14
Attending: STUDENT IN AN ORGANIZED HEALTH CARE EDUCATION/TRAINING PROGRAM

## 2020-10-14 ENCOUNTER — APPOINTMENT (OUTPATIENT)
Dept: GENERAL RADIOLOGY | Age: 45
End: 2020-10-14
Attending: STUDENT IN AN ORGANIZED HEALTH CARE EDUCATION/TRAINING PROGRAM

## 2020-10-14 VITALS
HEART RATE: 77 BPM | SYSTOLIC BLOOD PRESSURE: 144 MMHG | BODY MASS INDEX: 31.38 KG/M2 | HEIGHT: 73 IN | RESPIRATION RATE: 18 BRPM | OXYGEN SATURATION: 99 % | DIASTOLIC BLOOD PRESSURE: 80 MMHG | WEIGHT: 236.77 LBS | TEMPERATURE: 98.1 F

## 2020-10-14 DIAGNOSIS — Z20.822 EXPOSURE TO COVID-19 VIRUS: Primary | ICD-10-CM

## 2020-10-14 PROCEDURE — G0410 GRP PSYCH PARTIAL HOSP 45-50: HCPCS

## 2020-10-14 PROCEDURE — 71046 X-RAY EXAM CHEST 2 VIEWS: CPT

## 2020-10-14 PROCEDURE — 93005 ELECTROCARDIOGRAM TRACING: CPT | Performed by: STUDENT IN AN ORGANIZED HEALTH CARE EDUCATION/TRAINING PROGRAM

## 2020-10-14 PROCEDURE — U0003 INFECTIOUS AGENT DETECTION BY NUCLEIC ACID (DNA OR RNA); SEVERE ACUTE RESPIRATORY SYNDROME CORONAVIRUS 2 (SARS-COV-2) (CORONAVIRUS DISEASE [COVID-19]), AMPLIFIED PROBE TECHNIQUE, MAKING USE OF HIGH THROUGHPUT TECHNOLOGIES AS DESCRIBED BY CMS-2020-01-R: HCPCS

## 2020-10-14 PROCEDURE — C9803 HOPD COVID-19 SPEC COLLECT: HCPCS

## 2020-10-14 PROCEDURE — G0177 OPPS/PHP; TRAIN & EDUC SERV: HCPCS

## 2020-10-14 PROCEDURE — 99283 EMERGENCY DEPT VISIT LOW MDM: CPT

## 2020-10-14 SDOH — HEALTH STABILITY: MENTAL HEALTH: HOW OFTEN DO YOU HAVE A DRINK CONTAINING ALCOHOL?: NEVER

## 2020-10-14 ASSESSMENT — ENCOUNTER SYMPTOMS
SLEEP DISTURBANCE: 1
ABDOMINAL PAIN: 0
VOMITING: 0
DIARRHEA: 0
NEUROLOGICAL NEGATIVE: 1
CHILLS: 0
EYES NEGATIVE: 1
FEVER: 0
SHORTNESS OF BREATH: 0
COUGH: 0
SORE THROAT: 0
HALLUCINATIONS: 0

## 2020-10-14 ASSESSMENT — PAIN SCALES - GENERAL: PAINLEVEL_OUTOF10: 0

## 2020-10-15 ENCOUNTER — HOSPITAL ENCOUNTER (OUTPATIENT)
Dept: BEHAVIORAL HEALTH | Age: 45
Discharge: STILL ADMITTED OR ADMINISTRATIVE | End: 2020-10-15
Attending: PSYCHIATRY & NEUROLOGY

## 2020-10-15 ENCOUNTER — TELEPHONE (OUTPATIENT)
Dept: BEHAVIORAL HEALTH | Age: 45
End: 2020-10-15

## 2020-10-15 LAB
SARS-COV-2 RNA RESP QL NAA+PROBE: NOT DETECTED
SERVICE CMNT-IMP: NORMAL
SPECIMEN SOURCE: NORMAL

## 2020-10-15 PROCEDURE — G0410 GRP PSYCH PARTIAL HOSP 45-50: HCPCS

## 2020-10-15 PROCEDURE — G0177 OPPS/PHP; TRAIN & EDUC SERV: HCPCS

## 2020-10-15 PROCEDURE — 99232 SBSQ HOSP IP/OBS MODERATE 35: CPT | Performed by: PSYCHIATRY & NEUROLOGY

## 2020-10-16 ENCOUNTER — HOSPITAL ENCOUNTER (OUTPATIENT)
Dept: BEHAVIORAL HEALTH | Age: 45
Discharge: STILL ADMITTED OR ADMINISTRATIVE | End: 2020-10-16
Attending: PSYCHIATRY & NEUROLOGY

## 2020-10-16 LAB
ATRIAL RATE (BPM): 71
P AXIS (DEGREES): 34
PR-INTERVAL (MSEC): 146
QRS-INTERVAL (MSEC): 104
QT-INTERVAL (MSEC): 420
QTC: 456
R AXIS (DEGREES): 5
REPORT TEXT: NORMAL
T AXIS (DEGREES): 38
VENTRICULAR RATE EKG/MIN (BPM): 71

## 2020-10-16 PROCEDURE — 99238 HOSP IP/OBS DSCHRG MGMT 30/<: CPT | Performed by: PSYCHIATRY & NEUROLOGY

## 2020-10-17 ENCOUNTER — HOSPITAL ENCOUNTER (EMERGENCY)
Age: 45
Discharge: PSYCHIATRIC OR SUBSTANCE USE FACILITY | End: 2020-10-18
Attending: EMERGENCY MEDICINE

## 2020-10-17 DIAGNOSIS — F22 PARANOID PSYCHOSIS (CMD): ICD-10-CM

## 2020-10-17 DIAGNOSIS — F32.A DEPRESSION WITH SUICIDAL IDEATION: Primary | ICD-10-CM

## 2020-10-17 DIAGNOSIS — R45.851 DEPRESSION WITH SUICIDAL IDEATION: Primary | ICD-10-CM

## 2020-10-17 LAB
ALBUMIN SERPL-MCNC: 4 G/DL (ref 3.6–5.1)
ALBUMIN/GLOB SERPL: 1.2 {RATIO} (ref 1–2.4)
ALP SERPL-CCNC: 94 UNITS/L (ref 45–117)
ALT SERPL-CCNC: 28 UNITS/L
AMPHETAMINES UR QL SCN>500 NG/ML: NEGATIVE
ANION GAP SERPL CALC-SCNC: 8 MMOL/L (ref 10–20)
AST SERPL-CCNC: 24 UNITS/L
BARBITURATES UR QL SCN>200 NG/ML: NEGATIVE
BASOPHILS # BLD: 0 K/MCL (ref 0–0.3)
BASOPHILS NFR BLD: 0 %
BENZODIAZ UR QL SCN>200 NG/ML: NEGATIVE
BILIRUB SERPL-MCNC: 0.6 MG/DL (ref 0.2–1)
BUN SERPL-MCNC: 6 MG/DL (ref 6–20)
BUN/CREAT SERPL: 8 (ref 7–25)
BZE UR QL SCN>150 NG/ML: NEGATIVE
CALCIUM SERPL-MCNC: 8.8 MG/DL (ref 8.4–10.2)
CANNABINOIDS UR QL SCN>50 NG/ML: NEGATIVE
CHLORIDE SERPL-SCNC: 108 MMOL/L (ref 98–107)
CO2 SERPL-SCNC: 29 MMOL/L (ref 21–32)
CREAT SERPL-MCNC: 0.74 MG/DL (ref 0.67–1.17)
DIFFERENTIAL METHOD BLD: ABNORMAL
EOSINOPHIL # BLD: 0.1 K/MCL (ref 0.1–0.5)
EOSINOPHIL NFR BLD: 1 %
ERYTHROCYTE [DISTWIDTH] IN BLOOD: 13.2 % (ref 11–15)
ETHANOL SERPL-MCNC: NORMAL MG/DL
GLOBULIN SER-MCNC: 3.3 G/DL (ref 2–4)
GLUCOSE SERPL-MCNC: 85 MG/DL (ref 65–99)
HCT VFR BLD CALC: 40.8 % (ref 39–51)
HGB BLD-MCNC: 13.7 G/DL (ref 13–17)
IMM GRANULOCYTES # BLD AUTO: 0 K/MCL (ref 0–0.2)
IMM GRANULOCYTES NFR BLD: 0 %
LYMPHOCYTES # BLD: 1.1 K/MCL (ref 1–4.8)
LYMPHOCYTES NFR BLD: 18 %
MCH RBC QN AUTO: 31.8 PG (ref 26–34)
MCHC RBC AUTO-ENTMCNC: 33.6 G/DL (ref 32–36.5)
MCV RBC AUTO: 94.7 FL (ref 78–100)
MONOCYTES # BLD: 0.4 K/MCL (ref 0.3–0.9)
MONOCYTES NFR BLD: 7 %
NEUTROPHILS # BLD: 4.6 K/MCL (ref 1.8–7.7)
NEUTROPHILS NFR BLD: 74 %
NRBC BLD MANUAL-RTO: 0 /100 WBC
OPIATES UR QL SCN>300 NG/ML: NEGATIVE
PCP UR QL SCN>25 NG/ML: NEGATIVE
PLATELET # BLD: 186 K/MCL (ref 140–450)
POTASSIUM SERPL-SCNC: 4.2 MMOL/L (ref 3.4–5.1)
PROT SERPL-MCNC: 7.3 G/DL (ref 6.4–8.2)
RBC # BLD: 4.31 MIL/MCL (ref 4.5–5.9)
SARS-COV-2 RNA RESP QL NAA+PROBE: NOT DETECTED
SERVICE CMNT-IMP: NORMAL
SODIUM SERPL-SCNC: 141 MMOL/L (ref 135–145)
SPECIMEN SOURCE: NORMAL
WBC # BLD: 6.2 K/MCL (ref 4.2–11)

## 2020-10-17 PROCEDURE — 96374 THER/PROPH/DIAG INJ IV PUSH: CPT

## 2020-10-17 PROCEDURE — U0003 INFECTIOUS AGENT DETECTION BY NUCLEIC ACID (DNA OR RNA); SEVERE ACUTE RESPIRATORY SYNDROME CORONAVIRUS 2 (SARS-COV-2) (CORONAVIRUS DISEASE [COVID-19]), AMPLIFIED PROBE TECHNIQUE, MAKING USE OF HIGH THROUGHPUT TECHNOLOGIES AS DESCRIBED BY CMS-2020-01-R: HCPCS

## 2020-10-17 PROCEDURE — 85025 COMPLETE CBC W/AUTO DIFF WBC: CPT

## 2020-10-17 PROCEDURE — 10002800 HB RX 250 W HCPCS

## 2020-10-17 PROCEDURE — 99285 EMERGENCY DEPT VISIT HI MDM: CPT

## 2020-10-17 PROCEDURE — 90839 PSYTX CRISIS INITIAL 60 MIN: CPT

## 2020-10-17 PROCEDURE — 87635 SARS-COV-2 COVID-19 AMP PRB: CPT

## 2020-10-17 PROCEDURE — 80320 DRUG SCREEN QUANTALCOHOLS: CPT

## 2020-10-17 PROCEDURE — 80307 DRUG TEST PRSMV CHEM ANLYZR: CPT

## 2020-10-17 PROCEDURE — C9803 HOPD COVID-19 SPEC COLLECT: HCPCS

## 2020-10-17 PROCEDURE — 80053 COMPREHEN METABOLIC PANEL: CPT

## 2020-10-17 RX ORDER — LORAZEPAM 2 MG/ML
INJECTION INTRAMUSCULAR
Status: COMPLETED
Start: 2020-10-17 | End: 2020-10-17

## 2020-10-17 RX ORDER — LORAZEPAM 2 MG/ML
2 INJECTION INTRAMUSCULAR ONCE
Status: COMPLETED | OUTPATIENT
Start: 2020-10-17 | End: 2020-10-17

## 2020-10-17 RX ADMIN — LORAZEPAM 2 MG: 2 INJECTION INTRAMUSCULAR; INTRAVENOUS at 20:40

## 2020-10-17 RX ADMIN — LORAZEPAM 2 MG: 2 INJECTION INTRAMUSCULAR at 20:40

## 2020-10-17 SDOH — HEALTH STABILITY: MENTAL HEALTH: HOW OFTEN DO YOU HAVE A DRINK CONTAINING ALCOHOL?: NEVER

## 2020-10-17 ASSESSMENT — ENCOUNTER SYMPTOMS
COUGH: 0
VOMITING: 0
SHORTNESS OF BREATH: 0
POLYPHAGIA: 0
FEVER: 0
FACIAL SWELLING: 0
CONFUSION: 0
EYE DISCHARGE: 0
CHILLS: 0
SORE THROAT: 0
HEADACHES: 0
NUMBNESS: 0
BRUISES/BLEEDS EASILY: 0
POLYDIPSIA: 0
EYE PAIN: 0
BACK PAIN: 0
EYE REDNESS: 0
COLOR CHANGE: 0
WOUND: 0
ABDOMINAL PAIN: 0
NAUSEA: 0
DIZZINESS: 0
WHEEZING: 0
ACTIVITY CHANGE: 0
DIARRHEA: 0

## 2020-10-17 ASSESSMENT — COGNITIVE AND FUNCTIONAL STATUS - GENERAL
SPEECH: PRESSURED
MOOD: DEPRESSED
MOTOR_BEHAVIOR-RETARDATION_CALCULATED: SLOWNESS OF MOTOR RESPONSES
AFFECT: ANXIOUS;DEPRESSED;FLAT;IRRITABLE
BEHAVIOR: SUICIDAL/SUICIDAL IDEATION;POOR EYE CONTACT

## 2020-10-17 ASSESSMENT — PAIN SCALES - GENERAL: PAINLEVEL_OUTOF10: 0

## 2020-10-18 VITALS
TEMPERATURE: 98.5 F | WEIGHT: 228.84 LBS | OXYGEN SATURATION: 99 % | SYSTOLIC BLOOD PRESSURE: 113 MMHG | DIASTOLIC BLOOD PRESSURE: 58 MMHG | BODY MASS INDEX: 30.19 KG/M2 | HEART RATE: 75 BPM | RESPIRATION RATE: 16 BRPM

## 2020-10-19 ENCOUNTER — APPOINTMENT (OUTPATIENT)
Dept: BEHAVIORAL HEALTH | Age: 45
End: 2020-10-19
Attending: PSYCHIATRY & NEUROLOGY

## 2020-10-20 ENCOUNTER — APPOINTMENT (OUTPATIENT)
Dept: BEHAVIORAL HEALTH | Age: 45
End: 2020-10-20
Attending: PSYCHIATRY & NEUROLOGY

## 2020-11-02 ENCOUNTER — OFFICE VISIT (OUTPATIENT)
Dept: BEHAVIORAL HEALTH | Age: 45
End: 2020-11-02

## 2020-11-02 DIAGNOSIS — F31.77 BIPOLAR DISORDER, IN PARTIAL REMISSION, MOST RECENT EPISODE MIXED (CMD): ICD-10-CM

## 2020-11-02 DIAGNOSIS — F25.0 SCHIZOAFFECTIVE DISORDER, BIPOLAR TYPE (CMD): Primary | ICD-10-CM

## 2020-11-02 PROCEDURE — 99213 OFFICE O/P EST LOW 20 MIN: CPT | Performed by: PSYCHIATRY & NEUROLOGY

## 2020-11-02 RX ORDER — BENZTROPINE MESYLATE 1 MG/1
1 TABLET ORAL 3 TIMES DAILY
Qty: 90 TABLET | Refills: 3 | Status: ON HOLD | OUTPATIENT
Start: 2020-11-02 | End: 2021-01-06 | Stop reason: HOSPADM

## 2020-11-02 RX ORDER — HALOPERIDOL DECANOATE 100 MG/ML
100 INJECTION INTRAMUSCULAR
Qty: 1 ML | Refills: 0 | Status: ON HOLD
Start: 2020-11-02 | End: 2021-01-15 | Stop reason: SDUPTHER

## 2020-11-03 RX ORDER — ARIPIPRAZOLE 5 MG/1
TABLET ORAL
Qty: 60 TABLET | Refills: 0 | OUTPATIENT
Start: 2020-11-03

## 2020-11-12 ENCOUNTER — OFFICE VISIT (OUTPATIENT)
Dept: BEHAVIORAL HEALTH | Age: 45
End: 2020-11-12

## 2020-11-12 DIAGNOSIS — F20.0 PARANOID SCHIZOPHRENIA (CMD): ICD-10-CM

## 2020-11-12 DIAGNOSIS — F31.77 BIPOLAR DISORDER, IN PARTIAL REMISSION, MOST RECENT EPISODE MIXED (CMD): Primary | ICD-10-CM

## 2020-11-12 DIAGNOSIS — F41.1 GENERALIZED ANXIETY DISORDER: ICD-10-CM

## 2020-11-12 PROCEDURE — 96372 THER/PROPH/DIAG INJ SC/IM: CPT | Performed by: PSYCHIATRY & NEUROLOGY

## 2020-11-12 PROCEDURE — 99213 OFFICE O/P EST LOW 20 MIN: CPT | Performed by: PSYCHIATRY & NEUROLOGY

## 2020-11-23 ENCOUNTER — APPOINTMENT (OUTPATIENT)
Dept: BEHAVIORAL HEALTH | Age: 45
End: 2020-11-23

## 2020-11-27 ENCOUNTER — TELEPHONE (OUTPATIENT)
Dept: SCHEDULING | Age: 45
End: 2020-11-27

## 2020-12-01 ENCOUNTER — OFFICE VISIT (OUTPATIENT)
Dept: BEHAVIORAL HEALTH | Age: 45
End: 2020-12-01

## 2020-12-01 DIAGNOSIS — F25.0 SCHIZOAFFECTIVE DISORDER, BIPOLAR TYPE (CMD): ICD-10-CM

## 2020-12-01 DIAGNOSIS — F20.0 PARANOID SCHIZOPHRENIA (CMD): ICD-10-CM

## 2020-12-01 DIAGNOSIS — F41.1 GENERALIZED ANXIETY DISORDER: ICD-10-CM

## 2020-12-01 DIAGNOSIS — F31.77 BIPOLAR DISORDER, IN PARTIAL REMISSION, MOST RECENT EPISODE MIXED (CMD): Primary | ICD-10-CM

## 2020-12-01 PROCEDURE — 96372 THER/PROPH/DIAG INJ SC/IM: CPT | Performed by: PSYCHIATRY & NEUROLOGY

## 2020-12-01 PROCEDURE — 99213 OFFICE O/P EST LOW 20 MIN: CPT | Performed by: PSYCHIATRY & NEUROLOGY

## 2020-12-07 ENCOUNTER — TELEPHONE (OUTPATIENT)
Dept: SCHEDULING | Age: 45
End: 2020-12-07

## 2020-12-07 ENCOUNTER — HOSPITAL ENCOUNTER (EMERGENCY)
Age: 45
Discharge: PSYCHIATRIC OR SUBSTANCE USE FACILITY | End: 2020-12-08
Attending: EMERGENCY MEDICINE

## 2020-12-07 DIAGNOSIS — R45.850 HOMICIDAL IDEATION: Primary | ICD-10-CM

## 2020-12-07 LAB
ANION GAP SERPL CALC-SCNC: 9 MMOL/L (ref 10–20)
APAP SERPL-MCNC: <2 MCG/ML (ref 10–30)
BASOPHILS # BLD: 0.1 K/MCL (ref 0–0.3)
BASOPHILS NFR BLD: 1 %
BUN SERPL-MCNC: 15 MG/DL (ref 6–20)
BUN/CREAT SERPL: 18 (ref 7–25)
CALCIUM SERPL-MCNC: 9.3 MG/DL (ref 8.4–10.2)
CHLORIDE SERPL-SCNC: 104 MMOL/L (ref 98–107)
CO2 SERPL-SCNC: 28 MMOL/L (ref 21–32)
CREAT SERPL-MCNC: 0.84 MG/DL (ref 0.67–1.17)
DEPRECATED RDW RBC: 44.8 FL (ref 39–50)
EOSINOPHIL # BLD: 0.3 K/MCL (ref 0–0.5)
EOSINOPHIL NFR BLD: 3 %
ERYTHROCYTE [DISTWIDTH] IN BLOOD: 12.5 % (ref 11–15)
ETHANOL SERPL-MCNC: NORMAL MG/DL
ETHANOL SERPL-MCNC: NORMAL MG/DL
FASTING DURATION TIME PATIENT: ABNORMAL H
GFR SERPLBLD BASED ON 1.73 SQ M-ARVRAT: >90 ML/MIN/1.73M2
GLUCOSE SERPL-MCNC: 111 MG/DL (ref 65–99)
HCT VFR BLD CALC: 43.7 % (ref 39–51)
HGB BLD-MCNC: 14.8 G/DL (ref 13–17)
IMM GRANULOCYTES # BLD AUTO: 0 K/MCL (ref 0–0.2)
IMM GRANULOCYTES # BLD: 0 %
LYMPHOCYTES # BLD: 2.2 K/MCL (ref 1–4.8)
LYMPHOCYTES NFR BLD: 24 %
MAGNESIUM SERPL-MCNC: 2.3 MG/DL (ref 1.7–2.4)
MCH RBC QN AUTO: 32.5 PG (ref 26–34)
MCHC RBC AUTO-ENTMCNC: 33.9 G/DL (ref 32–36.5)
MCV RBC AUTO: 96 FL (ref 78–100)
MONOCYTES # BLD: 1 K/MCL (ref 0.3–0.9)
MONOCYTES NFR BLD: 10 %
NEUTROPHILS # BLD: 5.8 K/MCL (ref 1.8–7.7)
NEUTROPHILS NFR BLD: 62 %
NRBC BLD MANUAL-RTO: 0 /100 WBC
PLATELET # BLD AUTO: 229 K/MCL (ref 140–450)
POTASSIUM SERPL-SCNC: 3.9 MMOL/L (ref 3.4–5.1)
RBC # BLD: 4.55 MIL/MCL (ref 4.5–5.9)
SALICYLATES SERPL-MCNC: 4 MG/DL
SARS-COV-2 RNA RESP QL NAA+PROBE: NOT DETECTED
SERVICE CMNT-IMP: NORMAL
SERVICE CMNT-IMP: NORMAL
SODIUM SERPL-SCNC: 137 MMOL/L (ref 135–145)
WBC # BLD: 9.3 K/MCL (ref 4.2–11)

## 2020-12-07 PROCEDURE — U0003 INFECTIOUS AGENT DETECTION BY NUCLEIC ACID (DNA OR RNA); SEVERE ACUTE RESPIRATORY SYNDROME CORONAVIRUS 2 (SARS-COV-2) (CORONAVIRUS DISEASE [COVID-19]), AMPLIFIED PROBE TECHNIQUE, MAKING USE OF HIGH THROUGHPUT TECHNOLOGIES AS DESCRIBED BY CMS-2020-01-R: HCPCS | Performed by: EMERGENCY MEDICINE

## 2020-12-07 PROCEDURE — 96375 TX/PRO/DX INJ NEW DRUG ADDON: CPT

## 2020-12-07 PROCEDURE — 87635 SARS-COV-2 COVID-19 AMP PRB: CPT | Performed by: EMERGENCY MEDICINE

## 2020-12-07 PROCEDURE — 80048 BASIC METABOLIC PNL TOTAL CA: CPT | Performed by: EMERGENCY MEDICINE

## 2020-12-07 PROCEDURE — 96374 THER/PROPH/DIAG INJ IV PUSH: CPT

## 2020-12-07 PROCEDURE — 85025 COMPLETE CBC W/AUTO DIFF WBC: CPT | Performed by: EMERGENCY MEDICINE

## 2020-12-07 PROCEDURE — 80320 DRUG SCREEN QUANTALCOHOLS: CPT | Performed by: EMERGENCY MEDICINE

## 2020-12-07 PROCEDURE — 99285 EMERGENCY DEPT VISIT HI MDM: CPT

## 2020-12-07 PROCEDURE — 83735 ASSAY OF MAGNESIUM: CPT | Performed by: EMERGENCY MEDICINE

## 2020-12-07 PROCEDURE — 80329 ANALGESICS NON-OPIOID 1 OR 2: CPT | Performed by: EMERGENCY MEDICINE

## 2020-12-07 PROCEDURE — 93005 ELECTROCARDIOGRAM TRACING: CPT | Performed by: EMERGENCY MEDICINE

## 2020-12-07 PROCEDURE — C9803 HOPD COVID-19 SPEC COLLECT: HCPCS

## 2020-12-07 PROCEDURE — 10002800 HB RX 250 W HCPCS: Performed by: EMERGENCY MEDICINE

## 2020-12-07 RX ORDER — HALOPERIDOL 5 MG/ML
5 INJECTION INTRAMUSCULAR ONCE
Status: COMPLETED | OUTPATIENT
Start: 2020-12-07 | End: 2020-12-07

## 2020-12-07 RX ORDER — LORAZEPAM 2 MG/ML
1 INJECTION INTRAMUSCULAR ONCE
Status: COMPLETED | OUTPATIENT
Start: 2020-12-07 | End: 2020-12-07

## 2020-12-07 RX ADMIN — HALOPERIDOL LACTATE 5 MG: 5 INJECTION, SOLUTION INTRAMUSCULAR at 18:43

## 2020-12-07 RX ADMIN — LORAZEPAM 1 MG: 2 INJECTION INTRAMUSCULAR; INTRAVENOUS at 18:43

## 2020-12-07 SDOH — HEALTH STABILITY: MENTAL HEALTH: HOW OFTEN DO YOU HAVE A DRINK CONTAINING ALCOHOL?: NEVER

## 2020-12-07 ASSESSMENT — PAIN SCALES - GENERAL: PAINLEVEL_OUTOF10: 0

## 2020-12-08 VITALS
TEMPERATURE: 98.4 F | BODY MASS INDEX: 29.74 KG/M2 | OXYGEN SATURATION: 99 % | DIASTOLIC BLOOD PRESSURE: 75 MMHG | WEIGHT: 224.43 LBS | HEART RATE: 76 BPM | HEIGHT: 73 IN | RESPIRATION RATE: 18 BRPM | SYSTOLIC BLOOD PRESSURE: 116 MMHG

## 2020-12-08 LAB
AMPHETAMINES UR QL SCN>500 NG/ML: NEGATIVE
ATRIAL RATE (BPM): 74
BARBITURATES UR QL SCN>200 NG/ML: NEGATIVE
BENZODIAZ UR QL SCN>200 NG/ML: NEGATIVE
BZE UR QL SCN>150 NG/ML: NEGATIVE
CANNABINOIDS UR QL SCN>50 NG/ML: NEGATIVE
OPIATES UR QL SCN>300 NG/ML: NEGATIVE
P AXIS (DEGREES): 38
PCP UR QL SCN>25 NG/ML: NEGATIVE
PR-INTERVAL (MSEC): 140
QRS-INTERVAL (MSEC): 102
QT-INTERVAL (MSEC): 408
QTC: 453
R AXIS (DEGREES): 6
RAINBOW EXTRA TUBES HOLD SPECIMEN: NORMAL
REPORT TEXT: NORMAL
T AXIS (DEGREES): 33
VENTRICULAR RATE EKG/MIN (BPM): 74

## 2020-12-08 PROCEDURE — 90840 PSYTX CRISIS EA ADDL 30 MIN: CPT

## 2020-12-08 PROCEDURE — 90839 PSYTX CRISIS INITIAL 60 MIN: CPT

## 2020-12-08 PROCEDURE — 80307 DRUG TEST PRSMV CHEM ANLYZR: CPT | Performed by: EMERGENCY MEDICINE

## 2020-12-08 ASSESSMENT — COGNITIVE AND FUNCTIONAL STATUS - GENERAL
SPEECH: GUARDED
MEMORY: INTACT
AFFECT: FLAT
MOOD: FLAT
BEHAVIOR: APPROPRIATE TO SITUATION
LEVEL_OF_CONSCIOUSNESS_CALCULATED: LETHARGIC
ORIENTATION: ORIENTED (PERSON/PLACE/TIME)
ATTENTION_CALCULATED: MAINTAINS ATTENTION

## 2020-12-08 ASSESSMENT — LIFESTYLE VARIABLES
HOW OFTEN DO YOU HAVE 6 OR MORE DRINKS ON ONE OCCASION: NEVER
AUDIT-C TOTAL SCORE: 2
HOW MANY STANDARD DRINKS CONTAINING ALCOHOL DO YOU HAVE ON A TYPICAL DAY: 0,1 OR 2
HOW OFTEN DO YOU HAVE A DRINK CONTAINING ALCOHOL: 2 TO 4 TIMES PER MONTH

## 2020-12-10 ENCOUNTER — APPOINTMENT (OUTPATIENT)
Dept: BEHAVIORAL HEALTH | Age: 45
End: 2020-12-10

## 2020-12-21 ENCOUNTER — HOSPITAL ENCOUNTER (INPATIENT)
Age: 45
LOS: 16 days | Discharge: HOME | DRG: 885 | End: 2021-01-06
Attending: EMERGENCY MEDICINE | Admitting: PSYCHIATRY & NEUROLOGY

## 2020-12-21 DIAGNOSIS — F25.9 SCHIZOAFFECTIVE DISORDER, UNSPECIFIED TYPE (CMD): ICD-10-CM

## 2020-12-21 DIAGNOSIS — F25.0 SCHIZOAFFECTIVE DISORDER, BIPOLAR TYPE (CMD): Primary | ICD-10-CM

## 2020-12-21 DIAGNOSIS — R45.850 HOMICIDAL IDEATION: ICD-10-CM

## 2020-12-21 LAB
ALBUMIN SERPL-MCNC: 3.8 G/DL (ref 3.6–5.1)
ALBUMIN/GLOB SERPL: 1.1 {RATIO} (ref 1–2.4)
ALP SERPL-CCNC: 115 UNITS/L (ref 45–117)
ALT SERPL-CCNC: 25 UNITS/L
AMPHETAMINES UR QL SCN>500 NG/ML: NEGATIVE
ANION GAP SERPL CALC-SCNC: 8 MMOL/L (ref 10–20)
AST SERPL-CCNC: 24 UNITS/L
BARBITURATES UR QL SCN>200 NG/ML: NEGATIVE
BASOPHILS # BLD: 0 K/MCL (ref 0–0.3)
BASOPHILS NFR BLD: 0 %
BENZODIAZ UR QL SCN>200 NG/ML: NEGATIVE
BILIRUB SERPL-MCNC: 0.3 MG/DL (ref 0.2–1)
BUN SERPL-MCNC: 9 MG/DL (ref 6–20)
BUN/CREAT SERPL: 12 (ref 7–25)
BZE UR QL SCN>150 NG/ML: NEGATIVE
CALCIUM SERPL-MCNC: 8.7 MG/DL (ref 8.4–10.2)
CANNABINOIDS UR QL SCN>50 NG/ML: NEGATIVE
CHLORIDE SERPL-SCNC: 109 MMOL/L (ref 98–107)
CO2 SERPL-SCNC: 26 MMOL/L (ref 21–32)
CREAT SERPL-MCNC: 0.73 MG/DL (ref 0.67–1.17)
DEPRECATED RDW RBC: 41.8 FL (ref 39–50)
EOSINOPHIL # BLD: 0.2 K/MCL (ref 0–0.5)
EOSINOPHIL NFR BLD: 3 %
ERYTHROCYTE [DISTWIDTH] IN BLOOD: 11.9 % (ref 11–15)
ETHANOL SERPL-MCNC: NORMAL MG/DL
FASTING DURATION TIME PATIENT: ABNORMAL H
GFR SERPLBLD BASED ON 1.73 SQ M-ARVRAT: >90 ML/MIN/1.73M2
GLOBULIN SER-MCNC: 3.5 G/DL (ref 2–4)
GLUCOSE SERPL-MCNC: 137 MG/DL (ref 65–99)
HCT VFR BLD CALC: 40.7 % (ref 39–51)
HGB BLD-MCNC: 14 G/DL (ref 13–17)
IMM GRANULOCYTES # BLD AUTO: 0 K/MCL (ref 0–0.2)
IMM GRANULOCYTES # BLD: 0 %
LYMPHOCYTES # BLD: 2.6 K/MCL (ref 1–4.8)
LYMPHOCYTES NFR BLD: 27 %
MCH RBC QN AUTO: 32.4 PG (ref 26–34)
MCHC RBC AUTO-ENTMCNC: 34.4 G/DL (ref 32–36.5)
MCV RBC AUTO: 94.2 FL (ref 78–100)
MONOCYTES # BLD: 0.7 K/MCL (ref 0.3–0.9)
MONOCYTES NFR BLD: 7 %
NEUTROPHILS # BLD: 6 K/MCL (ref 1.8–7.7)
NEUTROPHILS NFR BLD: 63 %
NRBC BLD MANUAL-RTO: 0 /100 WBC
OPIATES UR QL SCN>300 NG/ML: NEGATIVE
PCP UR QL SCN>25 NG/ML: NEGATIVE
PLATELET # BLD AUTO: 205 K/MCL (ref 140–450)
POTASSIUM SERPL-SCNC: 3.3 MMOL/L (ref 3.4–5.1)
PROT SERPL-MCNC: 7.3 G/DL (ref 6.4–8.2)
RBC # BLD: 4.32 MIL/MCL (ref 4.5–5.9)
SARS-COV-2 RNA RESP QL NAA+PROBE: NOT DETECTED
SERVICE CMNT-IMP: NORMAL
SERVICE CMNT-IMP: NORMAL
SODIUM SERPL-SCNC: 140 MMOL/L (ref 135–145)
WBC # BLD: 9.6 K/MCL (ref 4.2–11)

## 2020-12-21 PROCEDURE — U0003 INFECTIOUS AGENT DETECTION BY NUCLEIC ACID (DNA OR RNA); SEVERE ACUTE RESPIRATORY SYNDROME CORONAVIRUS 2 (SARS-COV-2) (CORONAVIRUS DISEASE [COVID-19]), AMPLIFIED PROBE TECHNIQUE, MAKING USE OF HIGH THROUGHPUT TECHNOLOGIES AS DESCRIBED BY CMS-2020-01-R: HCPCS | Performed by: EMERGENCY MEDICINE

## 2020-12-21 PROCEDURE — 80320 DRUG SCREEN QUANTALCOHOLS: CPT | Performed by: EMERGENCY MEDICINE

## 2020-12-21 PROCEDURE — 13003244 HB ROOM CHARGE INTENSIVE PSYCH

## 2020-12-21 PROCEDURE — 96374 THER/PROPH/DIAG INJ IV PUSH: CPT

## 2020-12-21 PROCEDURE — 80307 DRUG TEST PRSMV CHEM ANLYZR: CPT | Performed by: EMERGENCY MEDICINE

## 2020-12-21 PROCEDURE — 90839 PSYTX CRISIS INITIAL 60 MIN: CPT

## 2020-12-21 PROCEDURE — 96375 TX/PRO/DX INJ NEW DRUG ADDON: CPT

## 2020-12-21 PROCEDURE — 99284 EMERGENCY DEPT VISIT MOD MDM: CPT

## 2020-12-21 PROCEDURE — C9803 HOPD COVID-19 SPEC COLLECT: HCPCS

## 2020-12-21 PROCEDURE — 10002803 HB RX 637: Performed by: EMERGENCY MEDICINE

## 2020-12-21 PROCEDURE — 85025 COMPLETE CBC W/AUTO DIFF WBC: CPT | Performed by: EMERGENCY MEDICINE

## 2020-12-21 PROCEDURE — 10002800 HB RX 250 W HCPCS: Performed by: EMERGENCY MEDICINE

## 2020-12-21 PROCEDURE — 80053 COMPREHEN METABOLIC PANEL: CPT | Performed by: EMERGENCY MEDICINE

## 2020-12-21 RX ORDER — LORAZEPAM 2 MG/ML
1 INJECTION INTRAMUSCULAR ONCE
Status: COMPLETED | OUTPATIENT
Start: 2020-12-21 | End: 2020-12-21

## 2020-12-21 RX ORDER — HALOPERIDOL 5 MG/1
5 TABLET ORAL EVERY 4 HOURS PRN
Status: DISCONTINUED | OUTPATIENT
Start: 2020-12-21 | End: 2021-01-06 | Stop reason: HOSPADM

## 2020-12-21 RX ORDER — HALOPERIDOL 5 MG/ML
5 INJECTION INTRAMUSCULAR EVERY 4 HOURS PRN
Status: DISCONTINUED | OUTPATIENT
Start: 2020-12-21 | End: 2021-01-06 | Stop reason: HOSPADM

## 2020-12-21 RX ORDER — BISACODYL 10 MG
10 SUPPOSITORY, RECTAL RECTAL DAILY PRN
Status: DISCONTINUED | OUTPATIENT
Start: 2020-12-21 | End: 2021-01-06 | Stop reason: HOSPADM

## 2020-12-21 RX ORDER — BENZTROPINE MESYLATE 1 MG/ML
1 INJECTION INTRAMUSCULAR; INTRAVENOUS EVERY 8 HOURS PRN
Status: DISCONTINUED | OUTPATIENT
Start: 2020-12-21 | End: 2021-01-06 | Stop reason: HOSPADM

## 2020-12-21 RX ORDER — LORAZEPAM 1 MG/1
1 TABLET ORAL EVERY 4 HOURS PRN
Status: DISCONTINUED | OUTPATIENT
Start: 2020-12-21 | End: 2021-01-06 | Stop reason: HOSPADM

## 2020-12-21 RX ORDER — BENZTROPINE MESYLATE 1 MG/1
1 TABLET ORAL EVERY 8 HOURS PRN
Status: DISCONTINUED | OUTPATIENT
Start: 2020-12-21 | End: 2021-01-06 | Stop reason: HOSPADM

## 2020-12-21 RX ORDER — TRAZODONE HYDROCHLORIDE 50 MG/1
50 TABLET ORAL NIGHTLY PRN
Status: DISCONTINUED | OUTPATIENT
Start: 2020-12-21 | End: 2021-01-06 | Stop reason: HOSPADM

## 2020-12-21 RX ORDER — HALOPERIDOL 5 MG/ML
5 INJECTION INTRAMUSCULAR ONCE
Status: COMPLETED | OUTPATIENT
Start: 2020-12-21 | End: 2020-12-21

## 2020-12-21 RX ORDER — HYDROXYZINE HYDROCHLORIDE 25 MG/1
50 TABLET, FILM COATED ORAL EVERY 4 HOURS PRN
Status: DISCONTINUED | OUTPATIENT
Start: 2020-12-21 | End: 2021-01-06 | Stop reason: HOSPADM

## 2020-12-21 RX ORDER — MAGNESIUM HYDROXIDE/ALUMINUM HYDROXICE/SIMETHICONE 120; 1200; 1200 MG/30ML; MG/30ML; MG/30ML
20 SUSPENSION ORAL EVERY 4 HOURS PRN
Status: DISCONTINUED | OUTPATIENT
Start: 2020-12-21 | End: 2021-01-06 | Stop reason: HOSPADM

## 2020-12-21 RX ORDER — NICOTINE 21 MG/24HR
1 PATCH, TRANSDERMAL 24 HOURS TRANSDERMAL ONCE
Status: DISCONTINUED | OUTPATIENT
Start: 2020-12-21 | End: 2020-12-22

## 2020-12-21 RX ORDER — LORAZEPAM 2 MG/ML
1 INJECTION INTRAMUSCULAR EVERY 4 HOURS PRN
Status: DISCONTINUED | OUTPATIENT
Start: 2020-12-21 | End: 2021-01-06 | Stop reason: HOSPADM

## 2020-12-21 RX ORDER — POTASSIUM CHLORIDE 20 MEQ/1
40 TABLET, EXTENDED RELEASE ORAL ONCE
Status: COMPLETED | OUTPATIENT
Start: 2020-12-21 | End: 2020-12-21

## 2020-12-21 RX ORDER — ACETAMINOPHEN 325 MG/1
650 TABLET ORAL EVERY 4 HOURS PRN
Status: DISCONTINUED | OUTPATIENT
Start: 2020-12-21 | End: 2021-01-06 | Stop reason: HOSPADM

## 2020-12-21 RX ORDER — POLYETHYLENE GLYCOL 3350 17 G/17G
17 POWDER, FOR SOLUTION ORAL DAILY PRN
Status: DISCONTINUED | OUTPATIENT
Start: 2020-12-21 | End: 2021-01-06 | Stop reason: HOSPADM

## 2020-12-21 RX ORDER — AMOXICILLIN 250 MG
2 CAPSULE ORAL 2 TIMES DAILY
Status: DISCONTINUED | OUTPATIENT
Start: 2020-12-22 | End: 2021-01-06 | Stop reason: HOSPADM

## 2020-12-21 RX ADMIN — HALOPERIDOL LACTATE 5 MG: 5 INJECTION INTRAMUSCULAR at 18:59

## 2020-12-21 RX ADMIN — LORAZEPAM 1 MG: 2 INJECTION INTRAMUSCULAR; INTRAVENOUS at 19:00

## 2020-12-21 RX ADMIN — NICOTINE 1 PATCH: 21 PATCH TRANSDERMAL at 17:52

## 2020-12-21 RX ADMIN — POTASSIUM CHLORIDE 40 MEQ: 20 TABLET, EXTENDED RELEASE ORAL at 17:52

## 2020-12-21 SDOH — HEALTH STABILITY: MENTAL HEALTH: HOW OFTEN DO YOU HAVE A DRINK CONTAINING ALCOHOL?: NEVER

## 2020-12-21 ASSESSMENT — ENCOUNTER SYMPTOMS
EYE DISCHARGE: 0
EYE REDNESS: 0
ABDOMINAL PAIN: 0
HEADACHES: 0
NUMBNESS: 0
CHILLS: 0
WOUND: 0
SORE THROAT: 0
FACIAL SWELLING: 0
SHORTNESS OF BREATH: 0
ACTIVITY CHANGE: 0
DIZZINESS: 0
COUGH: 0
NERVOUS/ANXIOUS: 1
BRUISES/BLEEDS EASILY: 0
NAUSEA: 0
VOMITING: 0
DIARRHEA: 0
BACK PAIN: 0
POLYDIPSIA: 0
COLOR CHANGE: 0
FEVER: 0
AGITATION: 1
POLYPHAGIA: 0
WHEEZING: 0
EYE PAIN: 0

## 2020-12-21 ASSESSMENT — LIFESTYLE VARIABLES
ALCOHOL_USE: YES
ALCOHOL_USE_STATUS: NO OR LOW RISK WITH VALIDATED TOOL
AUDIT-C TOTAL SCORE: 0
OPIATES USE: DENIES
HOW OFTEN DO YOU HAVE A DRINK CONTAINING ALCOHOL: NEVER
CHRONIC/CANCER PAIN PRESENT: NO
ALCOHOL_ROUTE: PO
HOW OFTEN DO YOU HAVE 6 OR MORE DRINKS ON ONE OCCASION: NEVER
ALCOHOL_USE_STATUS: NO OR LOW RISK WITH VALIDATED TOOL
SHORT OF BREATH OR FATIGUE WITH ADLS: NO
CAFFEINE: DENIES
AMPHETAMINES/STIMULANTS USE: DENIES
IS PATIENT ABLE TO COMPLETE ASSESSMENT AT THIS TIME: YES
HOW MANY STANDARD DRINKS CONTAINING ALCOHOL DO YOU HAVE ON A TYPICAL DAY: 0,1 OR 2
HAVE YOU EVER BEEN EXPOSED TO OTHER VERY DISTURBING, TRAUMATIC OR ANXIETY PROVOKING EVENTS IN YOUR LIFETIME?: NO
ADL BEFORE ADMISSION: INDEPENDENT
HOW OFTEN DO YOU HAVE A DRINK CONTAINING ALCOHOL: MONTHLY OR LESS
RECENT DECLINE IN ADLS: NO
RECENTLY LOST WEIGHT WITHOUT TRYING: 0
HOW OFTEN DO YOU HAVE 6 OR MORE DRINKS ON ONE OCCASION: LESS THAN MONTHLY
E-CIGARETTE_USE: NO E-CIGARETTES USE IN THE LAST 30 DAYS
ARE YOU DEAF OR DO YOU HAVE SERIOUS DIFFICULTY  HEARING: NO
ADL NEEDS ASSIST: NO
ALCOHOL_USE: DENIES
HAVE YOU EVER BEEN VERBALLY, EMOTIONALLY, PHYSICALLY OR SEXUALLY ABUSED, OR ABUSED VIA SOCIAL MEDIA?: NO
AUDIT-C TOTAL SCORE: 2
HAVE YOU BEEN EATING POORLY BECAUSE OF A DECREASED APPETITE: 0
VOLATILE SOLVENTS/INHALANTS USE: DENIES
HOW MANY CIGARETTES HAVE YOU SMOKED PER DAY ON AVERAGE?: DENIES
TOBACCO_USE_STATUS_IN_THE_LAST_30_DAYS: USED TOBACCO IN THE LAST 30 DAYS
ALCOHOL_TYPE: HARD LIQUOR
COCAINE USE: DENIES
HOW MANY STANDARD DRINKS CONTAINING ALCOHOL DO YOU HAVE ON A TYPICAL DAY: 0,1 OR 2
ARE YOU BLIND OR DO YOU HAVE SERIOUS DIFFICULTY SEEING, EVEN WHEN WEARING GLASSES: NO

## 2020-12-21 ASSESSMENT — COGNITIVE AND FUNCTIONAL STATUS - GENERAL
MOTOR_BEHAVIOR-RETARDATION_CALCULATED: 1;3
BEHAVIOR: INAPPROPRIATE;POOR EYE CONTACT
PERCEPTUAL_MISINTERPRETATIONS_HALLUCINATIONS: AUDITORY
ORIENTATION: ORIENTED (PERSON/PLACE/TIME)
LEVEL_OF_CONSCIOUSNESS_CALCULATED: ALERT
ATTENTION_CALCULATED: MAINTAINS ATTENTION
MEMORY: UNABLE TO ASSESS
SPEECH: CLEAR/UNDERSTANDABLE

## 2020-12-21 ASSESSMENT — PAIN SCALES - GENERAL: PAINLEVEL_OUTOF10: 0

## 2020-12-21 ASSESSMENT — ACTIVITIES OF DAILY LIVING (ADL): ADL_SCORE: 12

## 2020-12-22 PROBLEM — R45.850 HOMICIDAL THOUGHTS: Status: ACTIVE | Noted: 2020-12-22

## 2020-12-22 LAB
ALBUMIN SERPL-MCNC: 3.4 G/DL (ref 3.6–5.1)
ALBUMIN/GLOB SERPL: 1 {RATIO} (ref 1–2.4)
ALP SERPL-CCNC: 106 UNITS/L (ref 45–117)
ALT SERPL-CCNC: 23 UNITS/L
ANION GAP SERPL CALC-SCNC: 6 MMOL/L (ref 10–20)
AST SERPL-CCNC: 17 UNITS/L
BASOPHILS # BLD: 0 K/MCL (ref 0–0.3)
BASOPHILS NFR BLD: 0 %
BILIRUB SERPL-MCNC: 0.5 MG/DL (ref 0.2–1)
BUN SERPL-MCNC: 9 MG/DL (ref 6–20)
BUN/CREAT SERPL: 11 (ref 7–25)
CALCIUM SERPL-MCNC: 8.7 MG/DL (ref 8.4–10.2)
CHLORIDE SERPL-SCNC: 108 MMOL/L (ref 98–107)
CHOLEST SERPL-MCNC: 154 MG/DL
CHOLEST/HDLC SERPL: 4.5 {RATIO}
CO2 SERPL-SCNC: 30 MMOL/L (ref 21–32)
CREAT SERPL-MCNC: 0.8 MG/DL (ref 0.67–1.17)
DEPRECATED RDW RBC: 42.5 FL (ref 39–50)
EOSINOPHIL # BLD: 0.2 K/MCL (ref 0–0.5)
EOSINOPHIL NFR BLD: 3 %
ERYTHROCYTE [DISTWIDTH] IN BLOOD: 12.2 % (ref 11–15)
FASTING DURATION TIME PATIENT: ABNORMAL H
FASTING DURATION TIME PATIENT: ABNORMAL H
GFR SERPLBLD BASED ON 1.73 SQ M-ARVRAT: >90 ML/MIN/1.73M2
GLOBULIN SER-MCNC: 3.3 G/DL (ref 2–4)
GLUCOSE SERPL-MCNC: 101 MG/DL (ref 65–99)
HCT VFR BLD CALC: 39.6 % (ref 39–51)
HDLC SERPL-MCNC: 34 MG/DL
HGB BLD-MCNC: 13.5 G/DL (ref 13–17)
IMM GRANULOCYTES # BLD AUTO: 0 K/MCL (ref 0–0.2)
IMM GRANULOCYTES # BLD: 0 %
LDLC SERPL CALC-MCNC: 108 MG/DL
LYMPHOCYTES # BLD: 1.2 K/MCL (ref 1–4.8)
LYMPHOCYTES NFR BLD: 19 %
MCH RBC QN AUTO: 32.5 PG (ref 26–34)
MCHC RBC AUTO-ENTMCNC: 34.1 G/DL (ref 32–36.5)
MCV RBC AUTO: 95.2 FL (ref 78–100)
MONOCYTES # BLD: 0.4 K/MCL (ref 0.3–0.9)
MONOCYTES NFR BLD: 7 %
NEUTROPHILS # BLD: 4.5 K/MCL (ref 1.8–7.7)
NEUTROPHILS NFR BLD: 71 %
NONHDLC SERPL-MCNC: 120 MG/DL
NRBC BLD MANUAL-RTO: 0 /100 WBC
PLATELET # BLD AUTO: 189 K/MCL (ref 140–450)
POTASSIUM SERPL-SCNC: 4.4 MMOL/L (ref 3.4–5.1)
PROT SERPL-MCNC: 6.7 G/DL (ref 6.4–8.2)
RAINBOW EXTRA TUBES HOLD SPECIMEN: NORMAL
RAINBOW EXTRA TUBES HOLD SPECIMEN: NORMAL
RBC # BLD: 4.16 MIL/MCL (ref 4.5–5.9)
SODIUM SERPL-SCNC: 140 MMOL/L (ref 135–145)
TRIGL SERPL-MCNC: 61 MG/DL
TSH SERPL-ACNC: 0.63 MCUNITS/ML (ref 0.35–5)
WBC # BLD: 6.3 K/MCL (ref 4.2–11)

## 2020-12-22 PROCEDURE — 80061 LIPID PANEL: CPT | Performed by: PSYCHIATRY & NEUROLOGY

## 2020-12-22 PROCEDURE — 80053 COMPREHEN METABOLIC PANEL: CPT | Performed by: PSYCHIATRY & NEUROLOGY

## 2020-12-22 PROCEDURE — 84443 ASSAY THYROID STIM HORMONE: CPT | Performed by: PSYCHIATRY & NEUROLOGY

## 2020-12-22 PROCEDURE — 10002803 HB RX 637: Performed by: PSYCHIATRY & NEUROLOGY

## 2020-12-22 PROCEDURE — 13003244 HB ROOM CHARGE INTENSIVE PSYCH

## 2020-12-22 PROCEDURE — 10002803 HB RX 637: Performed by: INTERNAL MEDICINE

## 2020-12-22 PROCEDURE — 90792 PSYCH DIAG EVAL W/MED SRVCS: CPT | Performed by: PSYCHIATRY & NEUROLOGY

## 2020-12-22 PROCEDURE — 99222 1ST HOSP IP/OBS MODERATE 55: CPT | Performed by: INTERNAL MEDICINE

## 2020-12-22 PROCEDURE — 85025 COMPLETE CBC W/AUTO DIFF WBC: CPT | Performed by: PSYCHIATRY & NEUROLOGY

## 2020-12-22 PROCEDURE — 36415 COLL VENOUS BLD VENIPUNCTURE: CPT | Performed by: PSYCHIATRY & NEUROLOGY

## 2020-12-22 RX ORDER — BENZTROPINE MESYLATE 1 MG/1
0.5 TABLET ORAL EVERY 12 HOURS SCHEDULED
Status: DISCONTINUED | OUTPATIENT
Start: 2020-12-22 | End: 2021-01-06 | Stop reason: HOSPADM

## 2020-12-22 RX ORDER — NICOTINE 21 MG/24HR
1 PATCH, TRANSDERMAL 24 HOURS TRANSDERMAL DAILY
Status: DISCONTINUED | OUTPATIENT
Start: 2020-12-22 | End: 2021-01-06 | Stop reason: HOSPADM

## 2020-12-22 RX ORDER — HALOPERIDOL 5 MG/1
5 TABLET ORAL EVERY MORNING
Status: DISCONTINUED | OUTPATIENT
Start: 2020-12-22 | End: 2021-01-06 | Stop reason: HOSPADM

## 2020-12-22 RX ORDER — NICOTINE 21 MG/24HR
1 PATCH, TRANSDERMAL 24 HOURS TRANSDERMAL DAILY
Status: DISCONTINUED | OUTPATIENT
Start: 2020-12-22 | End: 2020-12-22

## 2020-12-22 RX ORDER — PANTOPRAZOLE SODIUM 40 MG/1
40 TABLET, DELAYED RELEASE ORAL NIGHTLY
Status: DISCONTINUED | OUTPATIENT
Start: 2020-12-22 | End: 2021-01-06 | Stop reason: HOSPADM

## 2020-12-22 RX ORDER — HALOPERIDOL 5 MG/1
10 TABLET ORAL AT BEDTIME
Status: DISCONTINUED | OUTPATIENT
Start: 2020-12-22 | End: 2021-01-06 | Stop reason: HOSPADM

## 2020-12-22 RX ADMIN — HALOPERIDOL 5 MG: 5 TABLET ORAL at 08:19

## 2020-12-22 RX ADMIN — DOCUSATE SODIUM AND SENNOSIDES 2 TABLET: 8.6; 5 TABLET, FILM COATED ORAL at 23:02

## 2020-12-22 RX ADMIN — BENZTROPINE MESYLATE 0.5 MG: 1 TABLET ORAL at 08:18

## 2020-12-22 RX ADMIN — HALOPERIDOL 10 MG: 5 TABLET ORAL at 21:00

## 2020-12-22 RX ADMIN — LORAZEPAM 1 MG: 1 TABLET ORAL at 18:01

## 2020-12-22 RX ADMIN — BENZTROPINE MESYLATE 0.5 MG: 1 TABLET ORAL at 21:16

## 2020-12-22 RX ADMIN — DOCUSATE SODIUM AND SENNOSIDES 2 TABLET: 8.6; 5 TABLET, FILM COATED ORAL at 08:19

## 2020-12-22 RX ADMIN — Medication 1 PATCH: at 08:20

## 2020-12-22 RX ADMIN — PANTOPRAZOLE SODIUM 40 MG: 40 TABLET, DELAYED RELEASE ORAL at 23:03

## 2020-12-22 ASSESSMENT — ENCOUNTER SYMPTOMS
TREMORS: 0
HEMATOLOGIC/LYMPHATIC NEGATIVE: 1
EYES NEGATIVE: 1
NEUROLOGICAL NEGATIVE: 1
ABDOMINAL PAIN: 0
NAUSEA: 0
DIARRHEA: 0
CHILLS: 0
RECTAL PAIN: 0
COUGH: 0
GASTROINTESTINAL NEGATIVE: 1
ENDOCRINE NEGATIVE: 1
CONSTITUTIONAL NEGATIVE: 1
FATIGUE: 0
WEAKNESS: 0
FEVER: 0
SORE THROAT: 0
RESPIRATORY NEGATIVE: 1
SINUS PAIN: 0
SHORTNESS OF BREATH: 0
PSYCHIATRIC NEGATIVE: 1
ALLERGIC/IMMUNOLOGIC NEGATIVE: 1

## 2020-12-22 ASSESSMENT — PATIENT HEALTH QUESTIONNAIRE - PHQ9
IS PATIENT ABLE TO COMPLETE PHQ2 OR PHQ9: NO, PATIENT WILL NEVER BE ABLE TO COMPLETE
IS PATIENT ABLE TO COMPLETE PHQ2 OR PHQ9: NO, PATIENT WILL NEVER BE ABLE TO COMPLETE

## 2020-12-22 ASSESSMENT — PAIN SCALES - GENERAL
PAINLEVEL_OUTOF10: 0

## 2020-12-23 PROCEDURE — 13003244 HB ROOM CHARGE INTENSIVE PSYCH

## 2020-12-23 PROCEDURE — 10002803 HB RX 637: Performed by: PSYCHIATRY & NEUROLOGY

## 2020-12-23 PROCEDURE — 10002803 HB RX 637: Performed by: INTERNAL MEDICINE

## 2020-12-23 PROCEDURE — 99233 SBSQ HOSP IP/OBS HIGH 50: CPT | Performed by: PSYCHIATRY & NEUROLOGY

## 2020-12-23 RX ORDER — HALOPERIDOL DECANOATE 100 MG/ML
100 INJECTION INTRAMUSCULAR ONCE
Status: DISCONTINUED | OUTPATIENT
Start: 2021-01-02 | End: 2021-01-01

## 2020-12-23 RX ADMIN — HYDROXYZINE HYDROCHLORIDE 50 MG: 25 TABLET ORAL at 17:43

## 2020-12-23 RX ADMIN — DOCUSATE SODIUM AND SENNOSIDES 2 TABLET: 8.6; 5 TABLET, FILM COATED ORAL at 08:56

## 2020-12-23 RX ADMIN — PANTOPRAZOLE SODIUM 40 MG: 40 TABLET, DELAYED RELEASE ORAL at 23:00

## 2020-12-23 RX ADMIN — BENZTROPINE MESYLATE 0.5 MG: 1 TABLET ORAL at 08:56

## 2020-12-23 RX ADMIN — BENZTROPINE MESYLATE 0.5 MG: 1 TABLET ORAL at 23:00

## 2020-12-23 RX ADMIN — LORAZEPAM 1 MG: 1 TABLET ORAL at 23:00

## 2020-12-23 RX ADMIN — DOCUSATE SODIUM AND SENNOSIDES 2 TABLET: 8.6; 5 TABLET, FILM COATED ORAL at 23:00

## 2020-12-23 RX ADMIN — HALOPERIDOL 10 MG: 5 TABLET ORAL at 23:00

## 2020-12-23 RX ADMIN — HALOPERIDOL 5 MG: 5 TABLET ORAL at 08:56

## 2020-12-23 RX ADMIN — LORAZEPAM 1 MG: 1 TABLET ORAL at 13:30

## 2020-12-23 RX ADMIN — Medication 1 PATCH: at 09:00

## 2020-12-23 ASSESSMENT — PAIN SCALES - GENERAL
PAINLEVEL_OUTOF10: 0
PAINLEVEL_OUTOF10: 0

## 2020-12-24 PROCEDURE — 10002803 HB RX 637: Performed by: PSYCHIATRY & NEUROLOGY

## 2020-12-24 PROCEDURE — 99233 SBSQ HOSP IP/OBS HIGH 50: CPT | Performed by: PSYCHIATRY & NEUROLOGY

## 2020-12-24 PROCEDURE — 10002803 HB RX 637: Performed by: INTERNAL MEDICINE

## 2020-12-24 PROCEDURE — 10002800 HB RX 250 W HCPCS: Performed by: PSYCHIATRY & NEUROLOGY

## 2020-12-24 PROCEDURE — 13003244 HB ROOM CHARGE INTENSIVE PSYCH

## 2020-12-24 RX ADMIN — DOCUSATE SODIUM AND SENNOSIDES 2 TABLET: 8.6; 5 TABLET, FILM COATED ORAL at 08:29

## 2020-12-24 RX ADMIN — HALOPERIDOL 10 MG: 5 TABLET ORAL at 21:29

## 2020-12-24 RX ADMIN — HALOPERIDOL LACTATE 5 MG: 5 INJECTION, SOLUTION INTRAMUSCULAR at 15:00

## 2020-12-24 RX ADMIN — BENZTROPINE MESYLATE 0.5 MG: 1 TABLET ORAL at 21:29

## 2020-12-24 RX ADMIN — HALOPERIDOL 5 MG: 5 TABLET ORAL at 08:30

## 2020-12-24 RX ADMIN — Medication 1 PATCH: at 08:30

## 2020-12-24 RX ADMIN — PANTOPRAZOLE SODIUM 40 MG: 40 TABLET, DELAYED RELEASE ORAL at 21:29

## 2020-12-24 RX ADMIN — DOCUSATE SODIUM AND SENNOSIDES 2 TABLET: 8.6; 5 TABLET, FILM COATED ORAL at 21:29

## 2020-12-24 RX ADMIN — LORAZEPAM 1 MG: 1 TABLET ORAL at 21:29

## 2020-12-24 RX ADMIN — BENZTROPINE MESYLATE 0.5 MG: 1 TABLET ORAL at 08:30

## 2020-12-24 ASSESSMENT — PAIN SCALES - GENERAL
PAINLEVEL_OUTOF10: 0
PAINLEVEL_OUTOF10: 0

## 2020-12-25 PROCEDURE — 10002803 HB RX 637: Performed by: PSYCHIATRY & NEUROLOGY

## 2020-12-25 PROCEDURE — 10002800 HB RX 250 W HCPCS: Performed by: PSYCHIATRY & NEUROLOGY

## 2020-12-25 PROCEDURE — 99232 SBSQ HOSP IP/OBS MODERATE 35: CPT | Performed by: PSYCHIATRY & NEUROLOGY

## 2020-12-25 PROCEDURE — 13003244 HB ROOM CHARGE INTENSIVE PSYCH

## 2020-12-25 PROCEDURE — 10002803 HB RX 637: Performed by: INTERNAL MEDICINE

## 2020-12-25 RX ADMIN — HALOPERIDOL 5 MG: 5 TABLET ORAL at 08:43

## 2020-12-25 RX ADMIN — LORAZEPAM 1 MG: 2 INJECTION INTRAMUSCULAR; INTRAVENOUS at 16:44

## 2020-12-25 RX ADMIN — DOCUSATE SODIUM AND SENNOSIDES 2 TABLET: 8.6; 5 TABLET, FILM COATED ORAL at 21:34

## 2020-12-25 RX ADMIN — DOCUSATE SODIUM AND SENNOSIDES 2 TABLET: 8.6; 5 TABLET, FILM COATED ORAL at 08:43

## 2020-12-25 RX ADMIN — BENZTROPINE MESYLATE 0.5 MG: 1 TABLET ORAL at 21:33

## 2020-12-25 RX ADMIN — HALOPERIDOL LACTATE 5 MG: 5 INJECTION, SOLUTION INTRAMUSCULAR at 16:44

## 2020-12-25 RX ADMIN — BENZTROPINE MESYLATE 0.5 MG: 1 TABLET ORAL at 08:43

## 2020-12-25 RX ADMIN — PANTOPRAZOLE SODIUM 40 MG: 40 TABLET, DELAYED RELEASE ORAL at 21:34

## 2020-12-25 RX ADMIN — Medication 1 PATCH: at 08:43

## 2020-12-25 RX ADMIN — TRAZODONE HYDROCHLORIDE 50 MG: 50 TABLET ORAL at 21:34

## 2020-12-25 RX ADMIN — HALOPERIDOL 10 MG: 5 TABLET ORAL at 21:35

## 2020-12-25 ASSESSMENT — PAIN SCALES - GENERAL
PAINLEVEL_OUTOF10: 0
PAINLEVEL_OUTOF10: 0

## 2020-12-26 PROCEDURE — 10002803 HB RX 637: Performed by: PSYCHIATRY & NEUROLOGY

## 2020-12-26 PROCEDURE — 10002803 HB RX 637: Performed by: INTERNAL MEDICINE

## 2020-12-26 PROCEDURE — 13003244 HB ROOM CHARGE INTENSIVE PSYCH

## 2020-12-26 PROCEDURE — 99233 SBSQ HOSP IP/OBS HIGH 50: CPT | Performed by: PSYCHIATRY & NEUROLOGY

## 2020-12-26 RX ADMIN — HALOPERIDOL 5 MG: 5 TABLET ORAL at 08:38

## 2020-12-26 RX ADMIN — PANTOPRAZOLE SODIUM 40 MG: 40 TABLET, DELAYED RELEASE ORAL at 23:07

## 2020-12-26 RX ADMIN — DOCUSATE SODIUM AND SENNOSIDES 2 TABLET: 8.6; 5 TABLET, FILM COATED ORAL at 23:06

## 2020-12-26 RX ADMIN — HALOPERIDOL 10 MG: 5 TABLET ORAL at 23:07

## 2020-12-26 RX ADMIN — TRAZODONE HYDROCHLORIDE 50 MG: 50 TABLET ORAL at 23:08

## 2020-12-26 RX ADMIN — BENZTROPINE MESYLATE 0.5 MG: 1 TABLET ORAL at 08:38

## 2020-12-26 RX ADMIN — DOCUSATE SODIUM AND SENNOSIDES 2 TABLET: 8.6; 5 TABLET, FILM COATED ORAL at 08:38

## 2020-12-26 RX ADMIN — Medication 1 PATCH: at 09:11

## 2020-12-26 RX ADMIN — BENZTROPINE MESYLATE 0.5 MG: 1 TABLET ORAL at 23:07

## 2020-12-26 RX ADMIN — HYDROXYZINE HYDROCHLORIDE 50 MG: 25 TABLET ORAL at 23:06

## 2020-12-26 ASSESSMENT — PAIN SCALES - GENERAL
PAINLEVEL_OUTOF10: 0
PAINLEVEL_OUTOF10: 0

## 2020-12-27 PROCEDURE — 99233 SBSQ HOSP IP/OBS HIGH 50: CPT | Performed by: PSYCHIATRY & NEUROLOGY

## 2020-12-27 PROCEDURE — 10002800 HB RX 250 W HCPCS: Performed by: PSYCHIATRY & NEUROLOGY

## 2020-12-27 PROCEDURE — 13003244 HB ROOM CHARGE INTENSIVE PSYCH

## 2020-12-27 PROCEDURE — 10002803 HB RX 637: Performed by: PSYCHIATRY & NEUROLOGY

## 2020-12-27 PROCEDURE — 10002803 HB RX 637: Performed by: INTERNAL MEDICINE

## 2020-12-27 RX ADMIN — HALOPERIDOL 5 MG: 5 TABLET ORAL at 08:31

## 2020-12-27 RX ADMIN — DOCUSATE SODIUM AND SENNOSIDES 2 TABLET: 8.6; 5 TABLET, FILM COATED ORAL at 20:42

## 2020-12-27 RX ADMIN — LORAZEPAM 1 MG: 2 INJECTION INTRAMUSCULAR; INTRAVENOUS at 20:45

## 2020-12-27 RX ADMIN — Medication 1 PATCH: at 08:37

## 2020-12-27 RX ADMIN — DOCUSATE SODIUM AND SENNOSIDES 2 TABLET: 8.6; 5 TABLET, FILM COATED ORAL at 08:31

## 2020-12-27 RX ADMIN — BENZTROPINE MESYLATE 0.5 MG: 1 TABLET ORAL at 08:31

## 2020-12-27 RX ADMIN — HALOPERIDOL 5 MG: 5 TABLET ORAL at 20:40

## 2020-12-27 RX ADMIN — PANTOPRAZOLE SODIUM 40 MG: 40 TABLET, DELAYED RELEASE ORAL at 20:42

## 2020-12-27 RX ADMIN — HALOPERIDOL LACTATE 5 MG: 5 INJECTION, SOLUTION INTRAMUSCULAR at 20:38

## 2020-12-27 RX ADMIN — BENZTROPINE MESYLATE 0.5 MG: 1 TABLET ORAL at 20:38

## 2020-12-27 RX ADMIN — DOCUSATE SODIUM AND SENNOSIDES 2 TABLET: 8.6; 5 TABLET, FILM COATED ORAL at 20:39

## 2020-12-27 ASSESSMENT — PAIN SCALES - GENERAL
PAINLEVEL_OUTOF10: 3
PAINLEVEL_OUTOF10: 0
PAINLEVEL_OUTOF10: 0

## 2020-12-28 PROCEDURE — 10002803 HB RX 637: Performed by: INTERNAL MEDICINE

## 2020-12-28 PROCEDURE — 13003244 HB ROOM CHARGE INTENSIVE PSYCH

## 2020-12-28 PROCEDURE — 10002803 HB RX 637: Performed by: PSYCHIATRY & NEUROLOGY

## 2020-12-28 PROCEDURE — 99233 SBSQ HOSP IP/OBS HIGH 50: CPT | Performed by: PSYCHIATRY & NEUROLOGY

## 2020-12-28 RX ADMIN — PANTOPRAZOLE SODIUM 40 MG: 40 TABLET, DELAYED RELEASE ORAL at 20:08

## 2020-12-28 RX ADMIN — HYDROXYZINE HYDROCHLORIDE 50 MG: 25 TABLET ORAL at 20:05

## 2020-12-28 RX ADMIN — DOCUSATE SODIUM AND SENNOSIDES 2 TABLET: 8.6; 5 TABLET, FILM COATED ORAL at 20:07

## 2020-12-28 RX ADMIN — HALOPERIDOL 10 MG: 5 TABLET ORAL at 20:08

## 2020-12-28 RX ADMIN — LORAZEPAM 1 MG: 1 TABLET ORAL at 12:47

## 2020-12-28 RX ADMIN — HALOPERIDOL 5 MG: 5 TABLET ORAL at 08:45

## 2020-12-28 RX ADMIN — BENZTROPINE MESYLATE 0.5 MG: 1 TABLET ORAL at 20:05

## 2020-12-28 RX ADMIN — BENZTROPINE MESYLATE 0.5 MG: 1 TABLET ORAL at 08:44

## 2020-12-28 RX ADMIN — Medication 1 PATCH: at 08:43

## 2020-12-28 RX ADMIN — HALOPERIDOL 5 MG: 5 TABLET ORAL at 12:47

## 2020-12-28 ASSESSMENT — PAIN SCALES - GENERAL
PAINLEVEL_OUTOF10: 0
PAINLEVEL_OUTOF10: 0

## 2020-12-29 ENCOUNTER — APPOINTMENT (OUTPATIENT)
Dept: BEHAVIORAL HEALTH | Age: 45
End: 2020-12-29

## 2020-12-29 PROCEDURE — 10002803 HB RX 637: Performed by: PSYCHIATRY & NEUROLOGY

## 2020-12-29 PROCEDURE — 13003244 HB ROOM CHARGE INTENSIVE PSYCH

## 2020-12-29 PROCEDURE — 99233 SBSQ HOSP IP/OBS HIGH 50: CPT | Performed by: PSYCHIATRY & NEUROLOGY

## 2020-12-29 RX ADMIN — HALOPERIDOL 5 MG: 5 TABLET ORAL at 08:53

## 2020-12-29 RX ADMIN — ALUMINUM HYDROXIDE, MAGNESIUM HYDROXIDE, AND SIMETHICONE 20 ML: 200; 200; 20 SUSPENSION ORAL at 07:33

## 2020-12-29 RX ADMIN — LORAZEPAM 1 MG: 1 TABLET ORAL at 17:55

## 2020-12-29 RX ADMIN — Medication 1 PATCH: at 08:58

## 2020-12-29 RX ADMIN — BENZTROPINE MESYLATE 0.5 MG: 1 TABLET ORAL at 08:51

## 2020-12-29 ASSESSMENT — PAIN SCALES - GENERAL
PAINLEVEL_OUTOF10: 0
PAINLEVEL_OUTOF10: 0

## 2020-12-30 PROCEDURE — 99233 SBSQ HOSP IP/OBS HIGH 50: CPT | Performed by: PSYCHIATRY & NEUROLOGY

## 2020-12-30 PROCEDURE — 10002803 HB RX 637: Performed by: INTERNAL MEDICINE

## 2020-12-30 PROCEDURE — 10002803 HB RX 637: Performed by: PSYCHIATRY & NEUROLOGY

## 2020-12-30 PROCEDURE — 13003244 HB ROOM CHARGE INTENSIVE PSYCH

## 2020-12-30 RX ADMIN — PANTOPRAZOLE SODIUM 40 MG: 40 TABLET, DELAYED RELEASE ORAL at 20:49

## 2020-12-30 RX ADMIN — HALOPERIDOL 5 MG: 5 TABLET ORAL at 07:37

## 2020-12-30 RX ADMIN — HALOPERIDOL 10 MG: 5 TABLET ORAL at 20:49

## 2020-12-30 RX ADMIN — BENZTROPINE MESYLATE 0.5 MG: 1 TABLET ORAL at 07:37

## 2020-12-30 RX ADMIN — BENZTROPINE MESYLATE 0.5 MG: 1 TABLET ORAL at 20:49

## 2020-12-30 ASSESSMENT — PAIN SCALES - GENERAL
PAINLEVEL_OUTOF10: 0
PAINLEVEL_OUTOF10: 0
PAINLEVEL_OUTOF10: 3

## 2020-12-31 PROCEDURE — 10002803 HB RX 637: Performed by: PSYCHIATRY & NEUROLOGY

## 2020-12-31 PROCEDURE — 13003244 HB ROOM CHARGE INTENSIVE PSYCH

## 2020-12-31 PROCEDURE — 99233 SBSQ HOSP IP/OBS HIGH 50: CPT | Performed by: PSYCHIATRY & NEUROLOGY

## 2020-12-31 PROCEDURE — 10002803 HB RX 637: Performed by: INTERNAL MEDICINE

## 2020-12-31 RX ADMIN — PANTOPRAZOLE SODIUM 40 MG: 40 TABLET, DELAYED RELEASE ORAL at 22:49

## 2020-12-31 RX ADMIN — BENZTROPINE MESYLATE 0.5 MG: 1 TABLET ORAL at 22:49

## 2020-12-31 RX ADMIN — HALOPERIDOL 5 MG: 5 TABLET ORAL at 17:54

## 2020-12-31 RX ADMIN — LORAZEPAM 1 MG: 1 TABLET ORAL at 17:54

## 2020-12-31 RX ADMIN — BENZTROPINE MESYLATE 0.5 MG: 1 TABLET ORAL at 08:52

## 2020-12-31 RX ADMIN — HALOPERIDOL 10 MG: 5 TABLET ORAL at 22:49

## 2020-12-31 RX ADMIN — HALOPERIDOL 5 MG: 5 TABLET ORAL at 08:52

## 2020-12-31 RX ADMIN — Medication 1 PATCH: at 08:51

## 2020-12-31 ASSESSMENT — PAIN SCALES - GENERAL
PAINLEVEL_OUTOF10: 0

## 2021-01-01 PROCEDURE — 10002803 HB RX 637: Performed by: PSYCHIATRY & NEUROLOGY

## 2021-01-01 PROCEDURE — 10002803 HB RX 637: Performed by: INTERNAL MEDICINE

## 2021-01-01 PROCEDURE — 10004651 HB RX, NO CHARGE ITEM: Performed by: PSYCHIATRY & NEUROLOGY

## 2021-01-01 PROCEDURE — 10002800 HB RX 250 W HCPCS: Performed by: PSYCHIATRY & NEUROLOGY

## 2021-01-01 PROCEDURE — 13003244 HB ROOM CHARGE INTENSIVE PSYCH

## 2021-01-01 PROCEDURE — 99233 SBSQ HOSP IP/OBS HIGH 50: CPT | Performed by: PSYCHIATRY & NEUROLOGY

## 2021-01-01 RX ORDER — HALOPERIDOL DECANOATE 100 MG/ML
100 INJECTION INTRAMUSCULAR ONCE
Status: COMPLETED | OUTPATIENT
Start: 2021-01-01 | End: 2021-01-01

## 2021-01-01 RX ADMIN — HALOPERIDOL DECANOATE 100 MG: 100 INJECTION, SOLUTION INTRAMUSCULAR at 17:23

## 2021-01-01 RX ADMIN — HALOPERIDOL 5 MG: 5 TABLET ORAL at 08:59

## 2021-01-01 RX ADMIN — BENZTROPINE MESYLATE 0.5 MG: 1 TABLET ORAL at 21:54

## 2021-01-01 RX ADMIN — HALOPERIDOL 10 MG: 5 TABLET ORAL at 21:55

## 2021-01-01 RX ADMIN — BENZTROPINE MESYLATE 0.5 MG: 1 TABLET ORAL at 09:00

## 2021-01-01 RX ADMIN — PANTOPRAZOLE SODIUM 40 MG: 40 TABLET, DELAYED RELEASE ORAL at 21:54

## 2021-01-01 RX ADMIN — Medication 1 PATCH: at 09:00

## 2021-01-01 RX ADMIN — ACETAMINOPHEN 650 MG: 325 TABLET ORAL at 11:30

## 2021-01-01 ASSESSMENT — PAIN SCALES - GENERAL
PAINLEVEL_OUTOF10: 0
PAINLEVEL_OUTOF10: 0
PAINLEVEL_OUTOF10: 7
PAINLEVEL_OUTOF10: 0

## 2021-01-02 PROCEDURE — 10002803 HB RX 637: Performed by: PSYCHIATRY & NEUROLOGY

## 2021-01-02 PROCEDURE — 13003244 HB ROOM CHARGE INTENSIVE PSYCH

## 2021-01-02 PROCEDURE — 10002803 HB RX 637: Performed by: INTERNAL MEDICINE

## 2021-01-02 PROCEDURE — 99233 SBSQ HOSP IP/OBS HIGH 50: CPT | Performed by: PSYCHIATRY & NEUROLOGY

## 2021-01-02 PROCEDURE — 10004651 HB RX, NO CHARGE ITEM: Performed by: PSYCHIATRY & NEUROLOGY

## 2021-01-02 RX ORDER — GABAPENTIN 100 MG/1
100 CAPSULE ORAL 3 TIMES DAILY
Status: DISCONTINUED | OUTPATIENT
Start: 2021-01-02 | End: 2021-01-06 | Stop reason: HOSPADM

## 2021-01-02 RX ORDER — ZIPRASIDONE MESYLATE 20 MG/ML
10 INJECTION, POWDER, LYOPHILIZED, FOR SOLUTION INTRAMUSCULAR EVERY 12 HOURS PRN
Status: DISCONTINUED | OUTPATIENT
Start: 2021-01-02 | End: 2021-01-06 | Stop reason: HOSPADM

## 2021-01-02 RX ORDER — WATER 10 ML/10ML
INJECTION INTRAMUSCULAR; INTRAVENOUS; SUBCUTANEOUS
Status: DISPENSED
Start: 2021-01-02 | End: 2021-01-02

## 2021-01-02 RX ORDER — ZIPRASIDONE MESYLATE 20 MG/ML
INJECTION, POWDER, LYOPHILIZED, FOR SOLUTION INTRAMUSCULAR
Status: DISPENSED
Start: 2021-01-02 | End: 2021-01-02

## 2021-01-02 RX ADMIN — ACETAMINOPHEN 650 MG: 325 TABLET ORAL at 12:57

## 2021-01-02 RX ADMIN — PANTOPRAZOLE SODIUM 40 MG: 40 TABLET, DELAYED RELEASE ORAL at 19:56

## 2021-01-02 RX ADMIN — BENZTROPINE MESYLATE 0.5 MG: 1 TABLET ORAL at 19:57

## 2021-01-02 RX ADMIN — LORAZEPAM 1 MG: 1 TABLET ORAL at 09:32

## 2021-01-02 RX ADMIN — GABAPENTIN 100 MG: 100 CAPSULE ORAL at 19:57

## 2021-01-02 RX ADMIN — Medication 1 PATCH: at 08:44

## 2021-01-02 RX ADMIN — HALOPERIDOL 5 MG: 5 TABLET ORAL at 09:32

## 2021-01-02 RX ADMIN — HALOPERIDOL 10 MG: 5 TABLET ORAL at 19:57

## 2021-01-02 RX ADMIN — BENZTROPINE MESYLATE 0.5 MG: 1 TABLET ORAL at 08:44

## 2021-01-02 RX ADMIN — LORAZEPAM 1 MG: 1 TABLET ORAL at 18:37

## 2021-01-02 RX ADMIN — GABAPENTIN 100 MG: 100 CAPSULE ORAL at 11:18

## 2021-01-02 RX ADMIN — HALOPERIDOL 5 MG: 5 TABLET ORAL at 18:37

## 2021-01-02 RX ADMIN — HALOPERIDOL 5 MG: 5 TABLET ORAL at 08:44

## 2021-01-02 ASSESSMENT — PAIN SCALES - GENERAL
PAINLEVEL_OUTOF10: 0
PAINLEVEL_OUTOF10: 5
PAINLEVEL_OUTOF10: 0
PAINLEVEL_OUTOF10: 0

## 2021-01-03 PROCEDURE — 13003244 HB ROOM CHARGE INTENSIVE PSYCH

## 2021-01-03 PROCEDURE — 10002803 HB RX 637: Performed by: PSYCHIATRY & NEUROLOGY

## 2021-01-03 PROCEDURE — 99233 SBSQ HOSP IP/OBS HIGH 50: CPT | Performed by: PSYCHIATRY & NEUROLOGY

## 2021-01-03 PROCEDURE — 10002803 HB RX 637: Performed by: INTERNAL MEDICINE

## 2021-01-03 RX ADMIN — BENZTROPINE MESYLATE 0.5 MG: 1 TABLET ORAL at 20:00

## 2021-01-03 RX ADMIN — GABAPENTIN 100 MG: 100 CAPSULE ORAL at 20:00

## 2021-01-03 RX ADMIN — BENZTROPINE MESYLATE 0.5 MG: 1 TABLET ORAL at 08:41

## 2021-01-03 RX ADMIN — GABAPENTIN 100 MG: 100 CAPSULE ORAL at 08:41

## 2021-01-03 RX ADMIN — PANTOPRAZOLE SODIUM 40 MG: 40 TABLET, DELAYED RELEASE ORAL at 20:00

## 2021-01-03 RX ADMIN — HALOPERIDOL 10 MG: 5 TABLET ORAL at 20:00

## 2021-01-03 RX ADMIN — GABAPENTIN 100 MG: 100 CAPSULE ORAL at 14:00

## 2021-01-03 RX ADMIN — HALOPERIDOL 5 MG: 5 TABLET ORAL at 08:41

## 2021-01-03 RX ADMIN — Medication 1 PATCH: at 08:41

## 2021-01-03 ASSESSMENT — PAIN SCALES - GENERAL
PAINLEVEL_OUTOF10: 0
PAINLEVEL_OUTOF10: 0

## 2021-01-04 PROCEDURE — 10002803 HB RX 637: Performed by: PSYCHIATRY & NEUROLOGY

## 2021-01-04 PROCEDURE — 13003244 HB ROOM CHARGE INTENSIVE PSYCH

## 2021-01-04 PROCEDURE — 10002803 HB RX 637: Performed by: INTERNAL MEDICINE

## 2021-01-04 PROCEDURE — 99233 SBSQ HOSP IP/OBS HIGH 50: CPT | Performed by: PSYCHIATRY & NEUROLOGY

## 2021-01-04 PROCEDURE — 99232 SBSQ HOSP IP/OBS MODERATE 35: CPT | Performed by: INTERNAL MEDICINE

## 2021-01-04 RX ADMIN — BENZTROPINE MESYLATE 0.5 MG: 1 TABLET ORAL at 20:01

## 2021-01-04 RX ADMIN — HALOPERIDOL 10 MG: 5 TABLET ORAL at 20:02

## 2021-01-04 RX ADMIN — HALOPERIDOL 5 MG: 5 TABLET ORAL at 08:23

## 2021-01-04 RX ADMIN — Medication 1 PATCH: at 08:23

## 2021-01-04 RX ADMIN — HALOPERIDOL 5 MG: 5 TABLET ORAL at 12:07

## 2021-01-04 RX ADMIN — GABAPENTIN 100 MG: 100 CAPSULE ORAL at 09:32

## 2021-01-04 RX ADMIN — GABAPENTIN 100 MG: 100 CAPSULE ORAL at 14:07

## 2021-01-04 RX ADMIN — GABAPENTIN 100 MG: 100 CAPSULE ORAL at 20:01

## 2021-01-04 RX ADMIN — BENZTROPINE MESYLATE 0.5 MG: 1 TABLET ORAL at 09:31

## 2021-01-04 RX ADMIN — LORAZEPAM 1 MG: 1 TABLET ORAL at 12:07

## 2021-01-04 RX ADMIN — PANTOPRAZOLE SODIUM 40 MG: 40 TABLET, DELAYED RELEASE ORAL at 20:01

## 2021-01-04 ASSESSMENT — PAIN SCALES - GENERAL: PAINLEVEL_OUTOF10: 0

## 2021-01-05 PROCEDURE — 10002803 HB RX 637: Performed by: PSYCHIATRY & NEUROLOGY

## 2021-01-05 PROCEDURE — 99233 SBSQ HOSP IP/OBS HIGH 50: CPT | Performed by: PSYCHIATRY & NEUROLOGY

## 2021-01-05 PROCEDURE — 13003244 HB ROOM CHARGE INTENSIVE PSYCH

## 2021-01-05 PROCEDURE — 10002803 HB RX 637: Performed by: INTERNAL MEDICINE

## 2021-01-05 RX ADMIN — BENZTROPINE MESYLATE 0.5 MG: 1 TABLET ORAL at 20:33

## 2021-01-05 RX ADMIN — HALOPERIDOL 5 MG: 5 TABLET ORAL at 08:24

## 2021-01-05 RX ADMIN — GABAPENTIN 100 MG: 100 CAPSULE ORAL at 20:25

## 2021-01-05 RX ADMIN — BENZTROPINE MESYLATE 0.5 MG: 1 TABLET ORAL at 08:24

## 2021-01-05 RX ADMIN — PANTOPRAZOLE SODIUM 40 MG: 40 TABLET, DELAYED RELEASE ORAL at 20:38

## 2021-01-05 RX ADMIN — HALOPERIDOL 5 MG: 5 TABLET ORAL at 10:57

## 2021-01-05 RX ADMIN — LORAZEPAM 1 MG: 1 TABLET ORAL at 10:57

## 2021-01-05 RX ADMIN — GABAPENTIN 100 MG: 100 CAPSULE ORAL at 08:24

## 2021-01-05 RX ADMIN — GABAPENTIN 100 MG: 100 CAPSULE ORAL at 15:23

## 2021-01-05 RX ADMIN — HALOPERIDOL 5 MG: 5 TABLET ORAL at 20:24

## 2021-01-05 ASSESSMENT — PATIENT HEALTH QUESTIONNAIRE - PHQ9: IS PATIENT ABLE TO COMPLETE PHQ2 OR PHQ9: NO, PATIENT WILL NEVER BE ABLE TO COMPLETE

## 2021-01-05 ASSESSMENT — PAIN SCALES - GENERAL
PAINLEVEL_OUTOF10: 0
PAINLEVEL_OUTOF10: 0

## 2021-01-06 VITALS
RESPIRATION RATE: 16 BRPM | OXYGEN SATURATION: 99 % | TEMPERATURE: 96.6 F | SYSTOLIC BLOOD PRESSURE: 121 MMHG | HEIGHT: 73 IN | DIASTOLIC BLOOD PRESSURE: 71 MMHG | BODY MASS INDEX: 28.63 KG/M2 | WEIGHT: 216.05 LBS | HEART RATE: 53 BPM

## 2021-01-06 PROBLEM — F43.10 POST TRAUMATIC STRESS DISORDER (PTSD): Status: RESOLVED | Noted: 2020-08-05 | Resolved: 2021-01-06

## 2021-01-06 PROBLEM — R45.851 SUICIDAL IDEATION: Status: RESOLVED | Noted: 2020-08-17 | Resolved: 2021-01-06

## 2021-01-06 PROBLEM — F32.A DEPRESSION: Status: RESOLVED | Noted: 2020-08-05 | Resolved: 2021-01-06

## 2021-01-06 PROBLEM — R53.83 EXHAUSTION: Status: RESOLVED | Noted: 2020-08-05 | Resolved: 2021-01-06

## 2021-01-06 PROBLEM — R45.850 HOMICIDAL THOUGHTS: Status: RESOLVED | Noted: 2020-12-22 | Resolved: 2021-01-06

## 2021-01-06 PROCEDURE — 99239 HOSP IP/OBS DSCHRG MGMT >30: CPT | Performed by: PSYCHIATRY & NEUROLOGY

## 2021-01-06 PROCEDURE — 10002803 HB RX 637: Performed by: PSYCHIATRY & NEUROLOGY

## 2021-01-06 RX ORDER — BENZTROPINE MESYLATE 0.5 MG/1
0.5 TABLET ORAL EVERY 12 HOURS SCHEDULED
Qty: 28 TABLET | Refills: 0 | Status: ON HOLD | OUTPATIENT
Start: 2021-01-06 | End: 2021-01-15 | Stop reason: HOSPADM

## 2021-01-06 RX ORDER — GABAPENTIN 100 MG/1
100 CAPSULE ORAL 3 TIMES DAILY
Qty: 42 CAPSULE | Refills: 0 | Status: ON HOLD | OUTPATIENT
Start: 2021-01-06 | End: 2021-01-15 | Stop reason: HOSPADM

## 2021-01-06 RX ORDER — NICOTINE 21 MG/24HR
1 PATCH, TRANSDERMAL 24 HOURS TRANSDERMAL DAILY
Qty: 14 PATCH | Refills: 0 | Status: ON HOLD | OUTPATIENT
Start: 2021-01-07 | End: 2021-01-15 | Stop reason: HOSPADM

## 2021-01-06 RX ORDER — HALOPERIDOL 5 MG/1
5 TABLET ORAL EVERY MORNING
Qty: 14 TABLET | Refills: 0 | Status: ON HOLD | OUTPATIENT
Start: 2021-01-07 | End: 2021-01-15 | Stop reason: HOSPADM

## 2021-01-06 RX ORDER — HALOPERIDOL 10 MG/1
10 TABLET ORAL AT BEDTIME
Qty: 14 TABLET | Refills: 0 | Status: ON HOLD | OUTPATIENT
Start: 2021-01-06 | End: 2021-01-15 | Stop reason: HOSPADM

## 2021-01-06 RX ADMIN — GABAPENTIN 100 MG: 100 CAPSULE ORAL at 14:08

## 2021-01-06 RX ADMIN — BENZTROPINE MESYLATE 0.5 MG: 1 TABLET ORAL at 08:36

## 2021-01-06 RX ADMIN — HALOPERIDOL 5 MG: 5 TABLET ORAL at 08:36

## 2021-01-06 RX ADMIN — Medication 1 PATCH: at 08:36

## 2021-01-06 RX ADMIN — GABAPENTIN 100 MG: 100 CAPSULE ORAL at 08:36

## 2021-01-06 ASSESSMENT — PAIN SCALES - GENERAL: PAINLEVEL_OUTOF10: 0

## 2021-01-10 ENCOUNTER — HOSPITAL ENCOUNTER (EMERGENCY)
Age: 46
Discharge: PSYCHIATRIC OR SUBSTANCE USE FACILITY | End: 2021-01-11
Attending: EMERGENCY MEDICINE

## 2021-01-10 DIAGNOSIS — F25.9 SCHIZOAFFECTIVE DISORDER, UNSPECIFIED TYPE (CMD): Primary | ICD-10-CM

## 2021-01-10 LAB
ALBUMIN SERPL-MCNC: 4.2 G/DL (ref 3.6–5.1)
ALBUMIN/GLOB SERPL: 1.2 {RATIO} (ref 1–2.4)
ALP SERPL-CCNC: 130 UNITS/L (ref 45–117)
ALT SERPL-CCNC: 46 UNITS/L
AMPHETAMINES UR QL SCN>500 NG/ML: NEGATIVE
ANION GAP SERPL CALC-SCNC: 9 MMOL/L (ref 10–20)
APPEARANCE UR: CLEAR
AST SERPL-CCNC: 25 UNITS/L
BACTERIA #/AREA URNS HPF: ABNORMAL /HPF
BARBITURATES UR QL SCN>200 NG/ML: NEGATIVE
BASOPHILS # BLD: 0 K/MCL (ref 0–0.3)
BASOPHILS NFR BLD: 0 %
BENZODIAZ UR QL SCN>200 NG/ML: NEGATIVE
BILIRUB SERPL-MCNC: 0.3 MG/DL (ref 0.2–1)
BILIRUB UR QL STRIP: NEGATIVE
BUN SERPL-MCNC: 10 MG/DL (ref 6–20)
BUN/CREAT SERPL: 14 (ref 7–25)
BZE UR QL SCN>150 NG/ML: NEGATIVE
CALCIUM SERPL-MCNC: 9.4 MG/DL (ref 8.4–10.2)
CANNABINOIDS UR QL SCN>50 NG/ML: NEGATIVE
CHLORIDE SERPL-SCNC: 105 MMOL/L (ref 98–107)
CO2 SERPL-SCNC: 28 MMOL/L (ref 21–32)
COLOR UR: ABNORMAL
CREAT SERPL-MCNC: 0.7 MG/DL (ref 0.67–1.17)
DEPRECATED RDW RBC: 40.8 FL (ref 39–50)
EOSINOPHIL # BLD: 0.2 K/MCL (ref 0–0.5)
EOSINOPHIL NFR BLD: 2 %
ERYTHROCYTE [DISTWIDTH] IN BLOOD: 11.9 % (ref 11–15)
ETHANOL SERPL-MCNC: NORMAL MG/DL
FASTING DURATION TIME PATIENT: ABNORMAL H
GFR SERPLBLD BASED ON 1.73 SQ M-ARVRAT: >90 ML/MIN/1.73M2
GLOBULIN SER-MCNC: 3.6 G/DL (ref 2–4)
GLUCOSE SERPL-MCNC: 105 MG/DL (ref 65–99)
GLUCOSE UR STRIP-MCNC: NEGATIVE MG/DL
HCT VFR BLD CALC: 41.2 % (ref 39–51)
HGB BLD-MCNC: 14.3 G/DL (ref 13–17)
HGB UR QL STRIP: ABNORMAL
HYALINE CASTS #/AREA URNS LPF: ABNORMAL /LPF
IMM GRANULOCYTES # BLD AUTO: 0 K/MCL (ref 0–0.2)
IMM GRANULOCYTES # BLD: 0 %
KETONES UR STRIP-MCNC: NEGATIVE MG/DL
LEUKOCYTE ESTERASE UR QL STRIP: NEGATIVE
LYMPHOCYTES # BLD: 2.4 K/MCL (ref 1–4.8)
LYMPHOCYTES NFR BLD: 31 %
MCH RBC QN AUTO: 32.3 PG (ref 26–34)
MCHC RBC AUTO-ENTMCNC: 34.7 G/DL (ref 32–36.5)
MCV RBC AUTO: 93 FL (ref 78–100)
MONOCYTES # BLD: 0.6 K/MCL (ref 0.3–0.9)
MONOCYTES NFR BLD: 8 %
NEUTROPHILS # BLD: 4.4 K/MCL (ref 1.8–7.7)
NEUTROPHILS NFR BLD: 59 %
NITRITE UR QL STRIP: NEGATIVE
NRBC BLD MANUAL-RTO: 0 /100 WBC
OPIATES UR QL SCN>300 NG/ML: NEGATIVE
PCP UR QL SCN>25 NG/ML: NEGATIVE
PH UR STRIP: 7 [PH] (ref 5–7)
PLATELET # BLD AUTO: 193 K/MCL (ref 140–450)
POTASSIUM SERPL-SCNC: 3.7 MMOL/L (ref 3.4–5.1)
PROT SERPL-MCNC: 7.8 G/DL (ref 6.4–8.2)
PROT UR STRIP-MCNC: NEGATIVE MG/DL
RBC # BLD: 4.43 MIL/MCL (ref 4.5–5.9)
RBC #/AREA URNS HPF: ABNORMAL /HPF
SARS-COV-2 RNA RESP QL NAA+PROBE: NOT DETECTED
SERVICE CMNT-IMP: NORMAL
SERVICE CMNT-IMP: NORMAL
SODIUM SERPL-SCNC: 138 MMOL/L (ref 135–145)
SP GR UR STRIP: 1.01 (ref 1–1.03)
SQUAMOUS #/AREA URNS HPF: ABNORMAL /HPF
UROBILINOGEN UR STRIP-MCNC: 0.2 MG/DL
WBC # BLD: 7.7 K/MCL (ref 4.2–11)
WBC #/AREA URNS HPF: ABNORMAL /HPF

## 2021-01-10 PROCEDURE — 81001 URINALYSIS AUTO W/SCOPE: CPT | Performed by: EMERGENCY MEDICINE

## 2021-01-10 PROCEDURE — 85025 COMPLETE CBC W/AUTO DIFF WBC: CPT | Performed by: EMERGENCY MEDICINE

## 2021-01-10 PROCEDURE — 96374 THER/PROPH/DIAG INJ IV PUSH: CPT

## 2021-01-10 PROCEDURE — U0003 INFECTIOUS AGENT DETECTION BY NUCLEIC ACID (DNA OR RNA); SEVERE ACUTE RESPIRATORY SYNDROME CORONAVIRUS 2 (SARS-COV-2) (CORONAVIRUS DISEASE [COVID-19]), AMPLIFIED PROBE TECHNIQUE, MAKING USE OF HIGH THROUGHPUT TECHNOLOGIES AS DESCRIBED BY CMS-2020-01-R: HCPCS | Performed by: EMERGENCY MEDICINE

## 2021-01-10 PROCEDURE — 90839 PSYTX CRISIS INITIAL 60 MIN: CPT

## 2021-01-10 PROCEDURE — U0005 INFEC AGEN DETEC AMPLI PROBE: HCPCS

## 2021-01-10 PROCEDURE — U0003 INFECTIOUS AGENT DETECTION BY NUCLEIC ACID (DNA OR RNA); SEVERE ACUTE RESPIRATORY SYNDROME CORONAVIRUS 2 (SARS-COV-2) (CORONAVIRUS DISEASE [COVID-19]), AMPLIFIED PROBE TECHNIQUE, MAKING USE OF HIGH THROUGHPUT TECHNOLOGIES AS DESCRIBED BY CMS-2020-01-R: HCPCS

## 2021-01-10 PROCEDURE — 99285 EMERGENCY DEPT VISIT HI MDM: CPT

## 2021-01-10 PROCEDURE — C9803 HOPD COVID-19 SPEC COLLECT: HCPCS

## 2021-01-10 PROCEDURE — 10002803 HB RX 637: Performed by: EMERGENCY MEDICINE

## 2021-01-10 PROCEDURE — 80307 DRUG TEST PRSMV CHEM ANLYZR: CPT | Performed by: EMERGENCY MEDICINE

## 2021-01-10 PROCEDURE — 80053 COMPREHEN METABOLIC PANEL: CPT | Performed by: EMERGENCY MEDICINE

## 2021-01-10 PROCEDURE — 83036 HEMOGLOBIN GLYCOSYLATED A1C: CPT | Performed by: PSYCHIATRY & NEUROLOGY

## 2021-01-10 PROCEDURE — 82077 ASSAY SPEC XCP UR&BREATH IA: CPT | Performed by: EMERGENCY MEDICINE

## 2021-01-10 RX ORDER — BENZTROPINE MESYLATE 0.5 MG/1
0.5 TABLET ORAL ONCE
Status: COMPLETED | OUTPATIENT
Start: 2021-01-10 | End: 2021-01-10

## 2021-01-10 RX ORDER — GABAPENTIN 100 MG/1
100 CAPSULE ORAL ONCE
Status: COMPLETED | OUTPATIENT
Start: 2021-01-10 | End: 2021-01-10

## 2021-01-10 RX ADMIN — GABAPENTIN 100 MG: 100 CAPSULE ORAL at 20:36

## 2021-01-10 RX ADMIN — BENZTROPINE MESYLATE 0.5 MG: 0.5 TABLET ORAL at 20:36

## 2021-01-10 SDOH — HEALTH STABILITY: MENTAL HEALTH: HOW OFTEN DO YOU HAVE A DRINK CONTAINING ALCOHOL?: NEVER

## 2021-01-10 ASSESSMENT — ENCOUNTER SYMPTOMS
AGITATION: 1
ABDOMINAL PAIN: 0
LIGHT-HEADEDNESS: 0
EYE PAIN: 0
FEVER: 0
CHEST TIGHTNESS: 0
WOUND: 0
SHORTNESS OF BREATH: 0
RHINORRHEA: 0
CHILLS: 0
DIZZINESS: 0
VOMITING: 0
NAUSEA: 0
BACK PAIN: 0
HALLUCINATIONS: 1
TROUBLE SWALLOWING: 0
SINUS PAIN: 0
FATIGUE: 0

## 2021-01-10 ASSESSMENT — COGNITIVE AND FUNCTIONAL STATUS - GENERAL
MEMORY: INTACT
AFFECT: CONSTRICTED (REDUCED) EXPRESSION;WITHDRAWN
ORIENTATION: ORIENTED (PERSON/PLACE/TIME)
SPEECH: RAMBLING SPEECH
MOTOR_BEHAVIOR-RETARDATION_CALCULATED: 1;3
LEVEL_OF_CONSCIOUSNESS_CALCULATED: ALERT
MOOD: CONSTRICTED
MOTOR_BEHAVIOR-AGITATION_CALCULATED: CALM AND PURPOSEFUL
ATTENTION_CALCULATED: MAINTAINS ATTENTION
BEHAVIOR: POOR EYE CONTACT

## 2021-01-10 ASSESSMENT — PAIN SCALES - GENERAL: PAINLEVEL_OUTOF10: 0

## 2021-01-11 ENCOUNTER — HOSPITAL ENCOUNTER (INPATIENT)
Age: 46
LOS: 4 days | Discharge: HOME | DRG: 885 | End: 2021-01-15
Attending: PSYCHIATRY & NEUROLOGY | Admitting: PSYCHIATRY & NEUROLOGY

## 2021-01-11 VITALS
WEIGHT: 218.26 LBS | HEART RATE: 62 BPM | RESPIRATION RATE: 16 BRPM | HEIGHT: 73 IN | OXYGEN SATURATION: 99 % | DIASTOLIC BLOOD PRESSURE: 78 MMHG | SYSTOLIC BLOOD PRESSURE: 123 MMHG | BODY MASS INDEX: 28.93 KG/M2 | TEMPERATURE: 98.2 F

## 2021-01-11 DIAGNOSIS — F25.0 SCHIZOAFFECTIVE DISORDER, BIPOLAR TYPE (CMD): Chronic | ICD-10-CM

## 2021-01-11 LAB
HBA1C MFR BLD: 5.5 % (ref 4.5–5.6)
RAINBOW EXTRA TUBES HOLD SPECIMEN: NORMAL

## 2021-01-11 PROCEDURE — 10002803 HB RX 637: Performed by: PSYCHIATRY & NEUROLOGY

## 2021-01-11 PROCEDURE — 96374 THER/PROPH/DIAG INJ IV PUSH: CPT

## 2021-01-11 PROCEDURE — 99223 1ST HOSP IP/OBS HIGH 75: CPT | Performed by: INTERNAL MEDICINE

## 2021-01-11 PROCEDURE — 10002800 HB RX 250 W HCPCS: Performed by: EMERGENCY MEDICINE

## 2021-01-11 PROCEDURE — 10004577 HB ROOM CHARGE PSYCH

## 2021-01-11 PROCEDURE — 90792 PSYCH DIAG EVAL W/MED SRVCS: CPT | Performed by: PSYCHIATRY & NEUROLOGY

## 2021-01-11 RX ORDER — LORAZEPAM 2 MG/ML
2 INJECTION INTRAMUSCULAR EVERY 4 HOURS PRN
Status: DISCONTINUED | OUTPATIENT
Start: 2021-01-11 | End: 2021-01-11

## 2021-01-11 RX ORDER — BENZTROPINE MESYLATE 1 MG/ML
1 INJECTION INTRAMUSCULAR; INTRAVENOUS EVERY 4 HOURS PRN
Status: DISCONTINUED | OUTPATIENT
Start: 2021-01-11 | End: 2021-01-15 | Stop reason: HOSPADM

## 2021-01-11 RX ORDER — OLANZAPINE 5 MG/1
5 TABLET ORAL NIGHTLY
Status: DISCONTINUED | OUTPATIENT
Start: 2021-01-11 | End: 2021-01-12

## 2021-01-11 RX ORDER — HALOPERIDOL 5 MG/1
10 TABLET ORAL AT BEDTIME
Status: DISCONTINUED | OUTPATIENT
Start: 2021-01-11 | End: 2021-01-11

## 2021-01-11 RX ORDER — BENZTROPINE MESYLATE 0.5 MG/1
0.5 TABLET ORAL EVERY 12 HOURS SCHEDULED
Status: DISCONTINUED | OUTPATIENT
Start: 2021-01-11 | End: 2021-01-15 | Stop reason: HOSPADM

## 2021-01-11 RX ORDER — FLUTICASONE PROPIONATE 50 MCG
1 SPRAY, SUSPENSION (ML) NASAL DAILY PRN
Status: DISCONTINUED | OUTPATIENT
Start: 2021-01-11 | End: 2021-01-15 | Stop reason: HOSPADM

## 2021-01-11 RX ORDER — TRAZODONE HYDROCHLORIDE 50 MG/1
50 TABLET ORAL NIGHTLY PRN
Status: DISCONTINUED | OUTPATIENT
Start: 2021-01-11 | End: 2021-01-15 | Stop reason: HOSPADM

## 2021-01-11 RX ORDER — BENZTROPINE MESYLATE 1 MG/1
1 TABLET ORAL EVERY 4 HOURS PRN
Status: DISCONTINUED | OUTPATIENT
Start: 2021-01-11 | End: 2021-01-15 | Stop reason: HOSPADM

## 2021-01-11 RX ORDER — OLANZAPINE 10 MG/2ML
10 INJECTION, POWDER, FOR SOLUTION INTRAMUSCULAR EVERY 4 HOURS PRN
Status: DISCONTINUED | OUTPATIENT
Start: 2021-01-11 | End: 2021-01-15 | Stop reason: HOSPADM

## 2021-01-11 RX ORDER — HALOPERIDOL DECANOATE 100 MG/ML
100 INJECTION INTRAMUSCULAR
Status: ON HOLD | COMMUNITY
End: 2021-01-11 | Stop reason: SDUPTHER

## 2021-01-11 RX ORDER — LORAZEPAM 2 MG/ML
2 INJECTION INTRAMUSCULAR ONCE
Status: COMPLETED | OUTPATIENT
Start: 2021-01-11 | End: 2021-01-11

## 2021-01-11 RX ORDER — HALOPERIDOL 5 MG/1
10 TABLET ORAL
Status: DISCONTINUED | OUTPATIENT
Start: 2021-01-11 | End: 2021-01-11

## 2021-01-11 RX ORDER — PANTOPRAZOLE SODIUM 40 MG/1
40 TABLET, DELAYED RELEASE ORAL NIGHTLY
Status: DISCONTINUED | OUTPATIENT
Start: 2021-01-11 | End: 2021-01-15 | Stop reason: HOSPADM

## 2021-01-11 RX ORDER — GABAPENTIN 100 MG/1
100 CAPSULE ORAL 3 TIMES DAILY
Status: DISCONTINUED | OUTPATIENT
Start: 2021-01-11 | End: 2021-01-15 | Stop reason: HOSPADM

## 2021-01-11 RX ORDER — HYDROXYZINE HYDROCHLORIDE 25 MG/1
50 TABLET, FILM COATED ORAL EVERY 4 HOURS PRN
Status: DISCONTINUED | OUTPATIENT
Start: 2021-01-11 | End: 2021-01-15 | Stop reason: HOSPADM

## 2021-01-11 RX ORDER — OLANZAPINE 5 MG/1
5 TABLET, ORALLY DISINTEGRATING ORAL
Status: DISCONTINUED | OUTPATIENT
Start: 2021-01-11 | End: 2021-01-15 | Stop reason: HOSPADM

## 2021-01-11 RX ORDER — HALOPERIDOL 5 MG/ML
5 INJECTION INTRAMUSCULAR
Status: DISCONTINUED | OUTPATIENT
Start: 2021-01-11 | End: 2021-01-11

## 2021-01-11 RX ORDER — HALOPERIDOL 5 MG/1
5 TABLET ORAL AT BEDTIME
Status: DISCONTINUED | OUTPATIENT
Start: 2021-01-11 | End: 2021-01-12

## 2021-01-11 RX ORDER — HALOPERIDOL 5 MG/1
5 TABLET ORAL EVERY MORNING
Status: DISCONTINUED | OUTPATIENT
Start: 2021-01-11 | End: 2021-01-12

## 2021-01-11 RX ORDER — LORAZEPAM 2 MG/1
2 TABLET ORAL EVERY 4 HOURS PRN
Status: DISCONTINUED | OUTPATIENT
Start: 2021-01-11 | End: 2021-01-11

## 2021-01-11 RX ORDER — CALCIUM CARBONATE 500 MG/1
500 TABLET, CHEWABLE ORAL EVERY 4 HOURS PRN
Status: DISCONTINUED | OUTPATIENT
Start: 2021-01-11 | End: 2021-01-15 | Stop reason: HOSPADM

## 2021-01-11 RX ORDER — NICOTINE 21 MG/24HR
1 PATCH, TRANSDERMAL 24 HOURS TRANSDERMAL DAILY
Status: DISCONTINUED | OUTPATIENT
Start: 2021-01-11 | End: 2021-01-15 | Stop reason: HOSPADM

## 2021-01-11 RX ADMIN — LORAZEPAM 2 MG: 2 INJECTION INTRAMUSCULAR; INTRAVENOUS at 05:21

## 2021-01-11 RX ADMIN — BENZTROPINE MESYLATE 0.5 MG: 0.5 TABLET ORAL at 11:45

## 2021-01-11 RX ADMIN — HALOPERIDOL 5 MG: 5 TABLET ORAL at 11:45

## 2021-01-11 RX ADMIN — Medication 1 PATCH: at 11:45

## 2021-01-11 RX ADMIN — OLANZAPINE 5 MG: 5 TABLET, FILM COATED ORAL at 21:15

## 2021-01-11 RX ADMIN — HALOPERIDOL 5 MG: 5 TABLET ORAL at 21:15

## 2021-01-11 RX ADMIN — BENZTROPINE MESYLATE 0.5 MG: 0.5 TABLET ORAL at 21:15

## 2021-01-11 RX ADMIN — GABAPENTIN 100 MG: 100 CAPSULE ORAL at 11:45

## 2021-01-11 RX ADMIN — GABAPENTIN 100 MG: 100 CAPSULE ORAL at 14:06

## 2021-01-11 RX ADMIN — GABAPENTIN 100 MG: 100 CAPSULE ORAL at 21:15

## 2021-01-11 RX ADMIN — PANTOPRAZOLE SODIUM 40 MG: 40 TABLET, DELAYED RELEASE ORAL at 21:15

## 2021-01-11 ASSESSMENT — LIFESTYLE VARIABLES
RECENT DECLINE IN ADLS: NO
TYPE_OF_TOBACCO_USED: CIGARETTES
IS PATIENT ABLE TO COMPLETE ASSESSMENT AT THIS TIME: YES
AMOUNT_OF_TOBACCO_USED: FIVE OR MORE CIGARETTES PER DAY AND/OR CIGAR OR PIPE DAILY
MALNUTRITION SCORING TOOL (MST) SCORE: 1
HAVE YOU BEEN EATING POORLY BECAUSE OF A DECREASED APPETITE: 0
CHRONIC/CANCER PAIN PRESENT: NO
FEEDING: INDEPENDENT
ADL SCORE: 24
ADL BEFORE ADMISSION: INDEPENDENT
ALCOHOL_USE: DENIES
ARE YOU DEAF OR DO YOU HAVE SERIOUS DIFFICULTY  HEARING: NO
HOW MUCH WEIGHT HAVE YOU LOST: 1
CONTINENCE: INDEPENDENT
HAVE YOU EVER BEEN VERBALLY, EMOTIONALLY, PHYSICALLY OR SEXUALLY ABUSED, OR ABUSED VIA SOCIAL MEDIA?: NO
COCAINE USE: DENIES
ARE YOU BLIND OR DO YOU HAVE SERIOUS DIFFICULTY SEEING, EVEN WHEN WEARING GLASSES: NO
TOBACCO_USE_STATUS_IN_THE_LAST_30_DAYS: USED TOBACCO IN THE LAST 30 DAYS
TRANSFERRING: INDEPENDENT
AMPHETAMINES/STIMULANTS USE: DENIES
HOW OFTEN DO YOU HAVE A DRINK CONTAINING ALCOHOL: NEVER
ALCOHOL_USE_STATUS: NO OR LOW RISK WITH VALIDATED TOOL
OPIATES USE: DENIES
E-CIGARETTE_USE: NO E-CIGARETTES USE IN THE LAST 30 DAYS
RECENTLY LOST WEIGHT WITHOUT TRYING: 0
HOW OFTEN DO YOU HAVE 6 OR MORE DRINKS ON ONE OCCASION: NEVER
DRESSING: INDEPENDENT
HOW MANY CIGARETTES HAVE YOU SMOKED PER DAY ON AVERAGE?: DENIES
DAYS ALCOHOL CONSUMED IN THE LAST FOUR WEEKS: INDEPENDENT
AUDIT-C TOTAL SCORE: 0
ADL NEEDS ASSIST: NO
VOLATILE SOLVENTS/INHALANTS USE: DENIES
HOW MANY STANDARD DRINKS CONTAINING ALCOHOL DO YOU HAVE ON A TYPICAL DAY: 0,1 OR 2
SHORT OF BREATH OR FATIGUE WITH ADLS: NO
HAVE YOU EVER BEEN EXPOSED TO OTHER VERY DISTURBING, TRAUMATIC OR ANXIETY PROVOKING EVENTS IN YOUR LIFETIME?: NO
TOILETING: INDEPENDENT

## 2021-01-11 ASSESSMENT — COLUMBIA-SUICIDE SEVERITY RATING SCALE - C-SSRS
REASONS FOR IDEATION LIFETIME: EQUALLY TO GET ATTENTION, REVENGE OR A REACTION FROM OTHERS AND TO END/STOP THE PAIN
MOST LETHAL DATE: 65623
6. HAVE YOU EVER DONE ANYTHING, STARTED TO DO ANYTHING, OR PREPARED TO DO ANYTHING TO END YOUR LIFE?: NO
MOST RECENT DATE: 65623
REASONS FOR IDEATION PAST MONTH: DOES NOT APPLY
ATTEMPT LIFETIME: YES
TOTAL  NUMBER OF INTERRUPTED ATTEMPTS PAST 3 MONTHS: NO
LETHALITY/MEDICAL DAMAGE CODE MOST LETHAL ACTUAL ATTEMPT: MODERATE PHYSICAL DAMAGE, MEDICAL ATTENTION NEEDED
6. HAVE YOU EVER DONE ANYTHING, STARTED TO DO ANYTHING, OR PREPARED TO DO ANYTHING TO END YOUR LIFE?: YES
FIRST ATTEMPT DATE: 65623
LETHALITY/MEDICAL DAMAGE CODE MOST RECENT ACTUAL ATTEMPT: MODERATE PHYSICAL DAMAGE, MEDICAL ATTENTION NEEDED
ATTEMPT PAST THREE MONTHS: NO
TOTAL  NUMBER OF ACTUAL ATTEMPTS LIFETIME: 1
TOTAL  NUMBER OF INTERRUPTED ATTEMPTS LIFETIME: NO
LETHALITY/MEDICAL DAMAGE CODE FIRST ACTUAL ATTEMPT: MODERATE PHYSICAL DAMAGE, MEDICAL ATTENTION NEEDED
TOTAL  NUMBER OF ABORTED OR SELF INTERRUPTED ATTEMPTS PAST 3 MONTHS: NO

## 2021-01-11 ASSESSMENT — PAIN SCALES - GENERAL
PAINLEVEL_OUTOF10: 0
PAINLEVEL_OUTOF10: 0

## 2021-01-12 PROBLEM — F25.0 SCHIZOAFFECTIVE DISORDER, BIPOLAR TYPE  (CMD): Chronic | Status: ACTIVE | Noted: 2020-04-23

## 2021-01-12 PROCEDURE — 10002803 HB RX 637: Performed by: PSYCHIATRY & NEUROLOGY

## 2021-01-12 PROCEDURE — 99232 SBSQ HOSP IP/OBS MODERATE 35: CPT | Performed by: PSYCHIATRY & NEUROLOGY

## 2021-01-12 PROCEDURE — 10004577 HB ROOM CHARGE PSYCH

## 2021-01-12 RX ORDER — OLANZAPINE 10 MG/1
10 TABLET ORAL NIGHTLY
Status: DISCONTINUED | OUTPATIENT
Start: 2021-01-12 | End: 2021-01-14

## 2021-01-12 RX ORDER — HALOPERIDOL 5 MG/1
5 TABLET ORAL DAILY
Status: DISCONTINUED | OUTPATIENT
Start: 2021-01-13 | End: 2021-01-14

## 2021-01-12 RX ADMIN — BENZTROPINE MESYLATE 0.5 MG: 0.5 TABLET ORAL at 08:22

## 2021-01-12 RX ADMIN — HYDROXYZINE HYDROCHLORIDE 50 MG: 25 TABLET ORAL at 17:13

## 2021-01-12 RX ADMIN — GABAPENTIN 100 MG: 100 CAPSULE ORAL at 21:24

## 2021-01-12 RX ADMIN — PANTOPRAZOLE SODIUM 40 MG: 40 TABLET, DELAYED RELEASE ORAL at 21:24

## 2021-01-12 RX ADMIN — HALOPERIDOL 5 MG: 5 TABLET ORAL at 06:37

## 2021-01-12 RX ADMIN — Medication 1 PATCH: at 08:25

## 2021-01-12 RX ADMIN — GABAPENTIN 100 MG: 100 CAPSULE ORAL at 13:52

## 2021-01-12 RX ADMIN — GABAPENTIN 100 MG: 100 CAPSULE ORAL at 08:22

## 2021-01-12 RX ADMIN — BENZTROPINE MESYLATE 0.5 MG: 0.5 TABLET ORAL at 21:24

## 2021-01-12 RX ADMIN — OLANZAPINE 10 MG: 10 TABLET, FILM COATED ORAL at 21:26

## 2021-01-12 ASSESSMENT — PAIN SCALES - GENERAL
PAINLEVEL_OUTOF10: 0
PAINLEVEL_OUTOF10: 0

## 2021-01-13 ENCOUNTER — TELEPHONE (OUTPATIENT)
Dept: BEHAVIORAL HEALTH | Age: 46
End: 2021-01-13

## 2021-01-13 PROCEDURE — 99232 SBSQ HOSP IP/OBS MODERATE 35: CPT | Performed by: PSYCHIATRY & NEUROLOGY

## 2021-01-13 PROCEDURE — 10002803 HB RX 637: Performed by: PSYCHIATRY & NEUROLOGY

## 2021-01-13 PROCEDURE — 10004577 HB ROOM CHARGE PSYCH

## 2021-01-13 RX ADMIN — GABAPENTIN 100 MG: 100 CAPSULE ORAL at 19:54

## 2021-01-13 RX ADMIN — PANTOPRAZOLE SODIUM 40 MG: 40 TABLET, DELAYED RELEASE ORAL at 19:55

## 2021-01-13 RX ADMIN — BENZTROPINE MESYLATE 0.5 MG: 0.5 TABLET ORAL at 19:55

## 2021-01-13 RX ADMIN — HALOPERIDOL 5 MG: 5 TABLET ORAL at 08:35

## 2021-01-13 RX ADMIN — BENZTROPINE MESYLATE 0.5 MG: 0.5 TABLET ORAL at 08:35

## 2021-01-13 RX ADMIN — GABAPENTIN 100 MG: 100 CAPSULE ORAL at 08:35

## 2021-01-13 RX ADMIN — OLANZAPINE 10 MG: 10 TABLET, FILM COATED ORAL at 19:55

## 2021-01-13 RX ADMIN — GABAPENTIN 100 MG: 100 CAPSULE ORAL at 13:21

## 2021-01-13 RX ADMIN — Medication 1 PATCH: at 08:35

## 2021-01-13 ASSESSMENT — PAIN SCALES - GENERAL: PAINLEVEL_OUTOF10: 0

## 2021-01-14 PROCEDURE — 10004577 HB ROOM CHARGE PSYCH

## 2021-01-14 PROCEDURE — 10002803 HB RX 637: Performed by: PSYCHIATRY & NEUROLOGY

## 2021-01-14 PROCEDURE — 99232 SBSQ HOSP IP/OBS MODERATE 35: CPT | Performed by: PSYCHIATRY & NEUROLOGY

## 2021-01-14 RX ADMIN — PANTOPRAZOLE SODIUM 40 MG: 40 TABLET, DELAYED RELEASE ORAL at 21:24

## 2021-01-14 RX ADMIN — GABAPENTIN 100 MG: 100 CAPSULE ORAL at 14:01

## 2021-01-14 RX ADMIN — GABAPENTIN 100 MG: 100 CAPSULE ORAL at 08:25

## 2021-01-14 RX ADMIN — BENZTROPINE MESYLATE 0.5 MG: 0.5 TABLET ORAL at 08:25

## 2021-01-14 RX ADMIN — Medication 1 PATCH: at 08:25

## 2021-01-14 RX ADMIN — OLANZAPINE 15 MG: 10 TABLET, FILM COATED ORAL at 21:23

## 2021-01-14 RX ADMIN — BENZTROPINE MESYLATE 0.5 MG: 0.5 TABLET ORAL at 21:23

## 2021-01-14 RX ADMIN — HALOPERIDOL 5 MG: 5 TABLET ORAL at 08:25

## 2021-01-14 RX ADMIN — GABAPENTIN 100 MG: 100 CAPSULE ORAL at 21:22

## 2021-01-14 ASSESSMENT — PAIN SCALES - GENERAL
PAINLEVEL_OUTOF10: 0

## 2021-01-15 VITALS
DIASTOLIC BLOOD PRESSURE: 59 MMHG | TEMPERATURE: 97.9 F | OXYGEN SATURATION: 96 % | RESPIRATION RATE: 16 BRPM | HEART RATE: 55 BPM | WEIGHT: 218.03 LBS | SYSTOLIC BLOOD PRESSURE: 97 MMHG | HEIGHT: 73 IN | BODY MASS INDEX: 28.9 KG/M2

## 2021-01-15 PROCEDURE — 10002803 HB RX 637: Performed by: PSYCHIATRY & NEUROLOGY

## 2021-01-15 PROCEDURE — 99239 HOSP IP/OBS DSCHRG MGMT >30: CPT | Performed by: PSYCHIATRY & NEUROLOGY

## 2021-01-15 RX ORDER — HALOPERIDOL DECANOATE 100 MG/ML
100 INJECTION INTRAMUSCULAR
Qty: 1 ML | Refills: 0 | Status: SHIPPED
Start: 2021-02-01 | End: 2021-04-01 | Stop reason: CLARIF

## 2021-01-15 RX ORDER — GABAPENTIN 100 MG/1
100 CAPSULE ORAL 3 TIMES DAILY
Qty: 90 CAPSULE | Refills: 2 | Status: SHIPPED | OUTPATIENT
Start: 2021-01-15 | End: 2021-04-01 | Stop reason: CLARIF

## 2021-01-15 RX ORDER — NICOTINE 21 MG/24HR
1 PATCH, TRANSDERMAL 24 HOURS TRANSDERMAL DAILY
Qty: 30 PATCH | Refills: 2 | Status: SHIPPED | OUTPATIENT
Start: 2021-01-16 | End: 2021-04-01 | Stop reason: CLARIF

## 2021-01-15 RX ORDER — BENZTROPINE MESYLATE 0.5 MG/1
0.5 TABLET ORAL EVERY 12 HOURS SCHEDULED
Qty: 60 TABLET | Refills: 2 | Status: SHIPPED | OUTPATIENT
Start: 2021-01-15 | End: 2021-04-01 | Stop reason: CLARIF

## 2021-01-15 RX ORDER — OLANZAPINE 15 MG/1
15 TABLET ORAL NIGHTLY
Qty: 30 TABLET | Refills: 2 | Status: SHIPPED | OUTPATIENT
Start: 2021-01-15 | End: 2021-02-18 | Stop reason: SDUPTHER

## 2021-01-15 RX ADMIN — BENZTROPINE MESYLATE 0.5 MG: 0.5 TABLET ORAL at 08:33

## 2021-01-15 RX ADMIN — GABAPENTIN 100 MG: 100 CAPSULE ORAL at 08:33

## 2021-01-15 RX ADMIN — Medication 1 PATCH: at 08:33

## 2021-01-15 ASSESSMENT — PAIN SCALES - GENERAL: PAINLEVEL_OUTOF10: 0

## 2021-01-25 RX ORDER — BENZTROPINE MESYLATE 1 MG/1
TABLET ORAL
Qty: 270 TABLET | Refills: 1 | Status: SHIPPED | OUTPATIENT
Start: 2021-01-25 | End: 2021-02-18 | Stop reason: SDUPTHER

## 2021-01-31 ENCOUNTER — APPOINTMENT (OUTPATIENT)
Dept: CT IMAGING | Age: 46
End: 2021-01-31
Attending: EMERGENCY MEDICINE

## 2021-01-31 ENCOUNTER — HOSPITAL ENCOUNTER (EMERGENCY)
Age: 46
Discharge: PSYCHIATRIC OR SUBSTANCE USE FACILITY | End: 2021-01-31
Attending: EMERGENCY MEDICINE

## 2021-01-31 VITALS
HEIGHT: 78 IN | RESPIRATION RATE: 20 BRPM | OXYGEN SATURATION: 98 % | BODY MASS INDEX: 26.76 KG/M2 | SYSTOLIC BLOOD PRESSURE: 137 MMHG | HEART RATE: 69 BPM | TEMPERATURE: 98 F | DIASTOLIC BLOOD PRESSURE: 75 MMHG | WEIGHT: 231.26 LBS

## 2021-01-31 DIAGNOSIS — F25.9: Primary | ICD-10-CM

## 2021-01-31 LAB
ALBUMIN SERPL-MCNC: 3.7 G/DL (ref 3.6–5.1)
ALP SERPL-CCNC: 103 UNITS/L (ref 45–117)
ALT SERPL-CCNC: 92 UNITS/L
AMPHETAMINES UR QL SCN>500 NG/ML: NEGATIVE
ANION GAP SERPL CALC-SCNC: 4 MMOL/L (ref 10–20)
APAP SERPL-MCNC: <2 MCG/ML (ref 10–30)
APTT PPP: 26 SEC (ref 22–30)
AST SERPL-CCNC: 26 UNITS/L
ATRIAL RATE (BPM): 90
BARBITURATES UR QL SCN>200 NG/ML: NEGATIVE
BASOPHILS # BLD: 0 K/MCL (ref 0–0.3)
BASOPHILS NFR BLD: 0 %
BENZODIAZ UR QL SCN>200 NG/ML: NEGATIVE
BILIRUB CONJ SERPL-MCNC: <0.1 MG/DL (ref 0–0.2)
BILIRUB SERPL-MCNC: 0.3 MG/DL (ref 0.2–1)
BUN SERPL-MCNC: 7 MG/DL (ref 6–20)
BUN/CREAT SERPL: 10 (ref 7–25)
BZE UR QL SCN>150 NG/ML: NEGATIVE
CALCIUM SERPL-MCNC: 8.7 MG/DL (ref 8.4–10.2)
CANNABINOIDS UR QL SCN>50 NG/ML: NEGATIVE
CHLORIDE SERPL-SCNC: 109 MMOL/L (ref 98–107)
CO2 SERPL-SCNC: 30 MMOL/L (ref 21–32)
CREAT SERPL-MCNC: 0.68 MG/DL (ref 0.67–1.17)
DEPRECATED RDW RBC: 45.9 FL (ref 39–50)
EOSINOPHIL # BLD: 0.1 K/MCL (ref 0–0.5)
EOSINOPHIL NFR BLD: 2 %
ERYTHROCYTE [DISTWIDTH] IN BLOOD: 13.2 % (ref 11–15)
ETHANOL SERPL-MCNC: NORMAL MG/DL
FASTING DURATION TIME PATIENT: ABNORMAL H
GFR SERPLBLD BASED ON 1.73 SQ M-ARVRAT: >90 ML/MIN/1.73M2
GLUCOSE SERPL-MCNC: 101 MG/DL (ref 65–99)
HCT VFR BLD CALC: 38.8 % (ref 39–51)
HGB BLD-MCNC: 13 G/DL (ref 13–17)
IMM GRANULOCYTES # BLD AUTO: 0 K/MCL (ref 0–0.2)
IMM GRANULOCYTES # BLD: 1 %
INR PPP: 1
LIPASE SERPL-CCNC: 68 UNITS/L (ref 73–393)
LYMPHOCYTES # BLD: 1.2 K/MCL (ref 1–4.8)
LYMPHOCYTES NFR BLD: 21 %
MAGNESIUM SERPL-MCNC: 2 MG/DL (ref 1.7–2.4)
MCH RBC QN AUTO: 31.9 PG (ref 26–34)
MCHC RBC AUTO-ENTMCNC: 33.5 G/DL (ref 32–36.5)
MCV RBC AUTO: 95.1 FL (ref 78–100)
MONOCYTES # BLD: 0.5 K/MCL (ref 0.3–0.9)
MONOCYTES NFR BLD: 10 %
NEUTROPHILS # BLD: 3.7 K/MCL (ref 1.8–7.7)
NEUTROPHILS NFR BLD: 66 %
NRBC BLD MANUAL-RTO: 0 /100 WBC
OPIATES UR QL SCN>300 NG/ML: NEGATIVE
P AXIS (DEGREES): 66
PCP UR QL SCN>25 NG/ML: NEGATIVE
PLATELET # BLD AUTO: 178 K/MCL (ref 140–450)
POTASSIUM SERPL-SCNC: 4.1 MMOL/L (ref 3.4–5.1)
PR-INTERVAL (MSEC): 136
PROT SERPL-MCNC: 6.9 G/DL (ref 6.4–8.2)
PROTHROMBIN TIME: 10 SEC (ref 9.7–11.8)
QRS-INTERVAL (MSEC): 102
QT-INTERVAL (MSEC): 368
QTC: 450
R AXIS (DEGREES): 21
RBC # BLD: 4.08 MIL/MCL (ref 4.5–5.9)
REPORT TEXT: NORMAL
SALICYLATES SERPL-MCNC: 4.5 MG/DL
SARS-COV-2 RNA RESP QL NAA+PROBE: NOT DETECTED
SERVICE CMNT-IMP: NORMAL
SERVICE CMNT-IMP: NORMAL
SODIUM SERPL-SCNC: 139 MMOL/L (ref 135–145)
T AXIS (DEGREES): 47
VENTRICULAR RATE EKG/MIN (BPM): 90
WBC # BLD: 5.6 K/MCL (ref 4.2–11)

## 2021-01-31 PROCEDURE — 93005 ELECTROCARDIOGRAM TRACING: CPT | Performed by: EMERGENCY MEDICINE

## 2021-01-31 PROCEDURE — 85025 COMPLETE CBC W/AUTO DIFF WBC: CPT | Performed by: EMERGENCY MEDICINE

## 2021-01-31 PROCEDURE — 80143 DRUG ASSAY ACETAMINOPHEN: CPT | Performed by: EMERGENCY MEDICINE

## 2021-01-31 PROCEDURE — 96372 THER/PROPH/DIAG INJ SC/IM: CPT

## 2021-01-31 PROCEDURE — 80048 BASIC METABOLIC PNL TOTAL CA: CPT | Performed by: EMERGENCY MEDICINE

## 2021-01-31 PROCEDURE — 10003568 RESTRAINTS VIOLENT OR SELF-DESTRUCTIVE ADULT (AGE 18 AND OLDER): Performed by: EMERGENCY MEDICINE

## 2021-01-31 PROCEDURE — 80179 DRUG ASSAY SALICYLATE: CPT | Performed by: EMERGENCY MEDICINE

## 2021-01-31 PROCEDURE — 10002800 HB RX 250 W HCPCS: Performed by: EMERGENCY MEDICINE

## 2021-01-31 PROCEDURE — 72125 CT NECK SPINE W/O DYE: CPT

## 2021-01-31 PROCEDURE — 83735 ASSAY OF MAGNESIUM: CPT | Performed by: EMERGENCY MEDICINE

## 2021-01-31 PROCEDURE — 87635 SARS-COV-2 COVID-19 AMP PRB: CPT | Performed by: EMERGENCY MEDICINE

## 2021-01-31 PROCEDURE — 83690 ASSAY OF LIPASE: CPT | Performed by: EMERGENCY MEDICINE

## 2021-01-31 PROCEDURE — 80307 DRUG TEST PRSMV CHEM ANLYZR: CPT | Performed by: EMERGENCY MEDICINE

## 2021-01-31 PROCEDURE — 36415 COLL VENOUS BLD VENIPUNCTURE: CPT

## 2021-01-31 PROCEDURE — 82077 ASSAY SPEC XCP UR&BREATH IA: CPT | Performed by: EMERGENCY MEDICINE

## 2021-01-31 PROCEDURE — 80076 HEPATIC FUNCTION PANEL: CPT | Performed by: EMERGENCY MEDICINE

## 2021-01-31 PROCEDURE — 70450 CT HEAD/BRAIN W/O DYE: CPT

## 2021-01-31 PROCEDURE — 90839 PSYTX CRISIS INITIAL 60 MIN: CPT

## 2021-01-31 PROCEDURE — 10002801 HB RX 250 W/O HCPCS

## 2021-01-31 PROCEDURE — C9803 HOPD COVID-19 SPEC COLLECT: HCPCS

## 2021-01-31 PROCEDURE — 99285 EMERGENCY DEPT VISIT HI MDM: CPT

## 2021-01-31 PROCEDURE — 85610 PROTHROMBIN TIME: CPT | Performed by: EMERGENCY MEDICINE

## 2021-01-31 PROCEDURE — 85730 THROMBOPLASTIN TIME PARTIAL: CPT | Performed by: EMERGENCY MEDICINE

## 2021-01-31 PROCEDURE — U0003 INFECTIOUS AGENT DETECTION BY NUCLEIC ACID (DNA OR RNA); SEVERE ACUTE RESPIRATORY SYNDROME CORONAVIRUS 2 (SARS-COV-2) (CORONAVIRUS DISEASE [COVID-19]), AMPLIFIED PROBE TECHNIQUE, MAKING USE OF HIGH THROUGHPUT TECHNOLOGIES AS DESCRIBED BY CMS-2020-01-R: HCPCS | Performed by: EMERGENCY MEDICINE

## 2021-01-31 RX ORDER — ZIPRASIDONE MESYLATE 20 MG/ML
20 INJECTION, POWDER, LYOPHILIZED, FOR SOLUTION INTRAMUSCULAR ONCE
Status: COMPLETED | OUTPATIENT
Start: 2021-01-31 | End: 2021-01-31

## 2021-01-31 RX ORDER — WATER 10 ML/10ML
INJECTION INTRAMUSCULAR; INTRAVENOUS; SUBCUTANEOUS
Status: COMPLETED
Start: 2021-01-31 | End: 2021-01-31

## 2021-01-31 RX ADMIN — ZIPRASIDONE MESYLATE 20 MG: 20 INJECTION, POWDER, LYOPHILIZED, FOR SOLUTION INTRAMUSCULAR at 10:27

## 2021-01-31 RX ADMIN — WATER 1.2 ML: 1 INJECTION INTRAMUSCULAR; INTRAVENOUS; SUBCUTANEOUS at 11:25

## 2021-01-31 SDOH — HEALTH STABILITY: MENTAL HEALTH: HOW OFTEN DO YOU HAVE A DRINK CONTAINING ALCOHOL?: NEVER

## 2021-01-31 ASSESSMENT — COGNITIVE AND FUNCTIONAL STATUS - GENERAL
PERCEPTUAL_MISINTERPRETATIONS_HALLUCINATIONS: VISUAL
SPEECH: PRESSURED
ATTENTION_CALCULATED: MAINTAINS ATTENTION
ORIENTATION: ORIENTED (PERSON/PLACE/TIME)
MEMORY: INTACT
MOOD: UNREMARKABLE
AFFECT: APPROPRIATE TO SITUATION
MOTOR_BEHAVIOR-AGITATION_CALCULATED: AGITATION
MOTOR_BEHAVIOR-RETARDATION_CALCULATED: CALM AND PURPOSEFUL
BEHAVIOR: APPROPRIATE TO SITUATION
LEVEL_OF_CONSCIOUSNESS_CALCULATED: ALERT

## 2021-01-31 ASSESSMENT — PAIN SCALES - GENERAL: PAINLEVEL_OUTOF10: 8

## 2021-01-31 ASSESSMENT — PAIN DESCRIPTION - PAIN TYPE: TYPE: ACUTE PAIN

## 2021-02-01 ENCOUNTER — APPOINTMENT (OUTPATIENT)
Dept: BEHAVIORAL HEALTH | Age: 46
End: 2021-02-01

## 2021-02-05 ENCOUNTER — TELEPHONE (OUTPATIENT)
Dept: SCHEDULING | Age: 46
End: 2021-02-05

## 2021-02-16 ENCOUNTER — TELEPHONE (OUTPATIENT)
Dept: BEHAVIORAL HEALTH | Age: 46
End: 2021-02-16

## 2021-02-18 ENCOUNTER — TELEPHONIC VISIT (OUTPATIENT)
Dept: BEHAVIORAL HEALTH | Age: 46
End: 2021-02-18

## 2021-02-18 DIAGNOSIS — F25.0 SCHIZOAFFECTIVE DISORDER, BIPOLAR TYPE (CMD): Primary | ICD-10-CM

## 2021-02-18 DIAGNOSIS — F41.1 GENERALIZED ANXIETY DISORDER: ICD-10-CM

## 2021-02-18 PROCEDURE — 99441 TELEPHONE E&M BY PHYSICIAN EST PT NOT ORIG PREV 7 DAYS 5-10 MIN: CPT | Performed by: PSYCHIATRY & NEUROLOGY

## 2021-02-18 RX ORDER — CARBAMAZEPINE 400 MG/1
400 TABLET, EXTENDED RELEASE ORAL 2 TIMES DAILY
Qty: 60 TABLET | Refills: 1 | Status: SHIPPED | OUTPATIENT
Start: 2021-02-18 | End: 2021-04-01 | Stop reason: SDUPTHER

## 2021-02-18 RX ORDER — OLANZAPINE 20 MG/1
15 TABLET ORAL NIGHTLY
Qty: 30 TABLET | Refills: 2 | Status: SHIPPED
Start: 2021-02-18 | End: 2021-02-18 | Stop reason: SDUPTHER

## 2021-02-18 RX ORDER — OLANZAPINE 20 MG/1
15 TABLET ORAL NIGHTLY
Qty: 30 TABLET | Refills: 1 | Status: SHIPPED | OUTPATIENT
Start: 2021-02-18 | End: 2021-03-13

## 2021-02-25 ENCOUNTER — APPOINTMENT (OUTPATIENT)
Dept: BEHAVIORAL HEALTH | Age: 46
End: 2021-02-25

## 2021-03-01 ENCOUNTER — OFFICE VISIT (OUTPATIENT)
Dept: BEHAVIORAL HEALTH | Age: 46
End: 2021-03-01

## 2021-03-01 DIAGNOSIS — F41.1 GENERALIZED ANXIETY DISORDER: ICD-10-CM

## 2021-03-01 DIAGNOSIS — F25.0 SCHIZOAFFECTIVE DISORDER, BIPOLAR TYPE (CMD): Primary | ICD-10-CM

## 2021-03-01 PROCEDURE — 99212 OFFICE O/P EST SF 10 MIN: CPT | Performed by: PSYCHIATRY & NEUROLOGY

## 2021-03-13 RX ORDER — OLANZAPINE 20 MG/1
TABLET ORAL
Qty: 30 TABLET | Refills: 0 | Status: SHIPPED | OUTPATIENT
Start: 2021-03-13 | End: 2021-04-01 | Stop reason: SDUPTHER

## 2021-04-01 ENCOUNTER — BEHAVIORAL HEALTH (OUTPATIENT)
Dept: BEHAVIORAL HEALTH | Age: 46
End: 2021-04-01

## 2021-04-01 DIAGNOSIS — F31.9 BIPOLAR 1 DISORDER (CMD): ICD-10-CM

## 2021-04-01 DIAGNOSIS — F25.0 SCHIZOAFFECTIVE DISORDER, BIPOLAR TYPE (CMD): Primary | ICD-10-CM

## 2021-04-01 DIAGNOSIS — F31.77 BIPOLAR DISORDER, IN PARTIAL REMISSION, MOST RECENT EPISODE MIXED (CMD): ICD-10-CM

## 2021-04-01 DIAGNOSIS — F41.1 GENERALIZED ANXIETY DISORDER: ICD-10-CM

## 2021-04-01 PROCEDURE — 99441 TELEPHONE E&M BY PHYSICIAN EST PT NOT ORIG PREV 7 DAYS 5-10 MIN: CPT | Performed by: PSYCHIATRY & NEUROLOGY

## 2021-04-01 RX ORDER — OLANZAPINE 20 MG/1
20 TABLET ORAL NIGHTLY
Qty: 30 TABLET | Refills: 1 | Status: SHIPPED | OUTPATIENT
Start: 2021-04-01 | End: 2021-04-27 | Stop reason: SDUPTHER

## 2021-04-01 RX ORDER — CARBAMAZEPINE 400 MG/1
400 TABLET, EXTENDED RELEASE ORAL 2 TIMES DAILY
Qty: 60 TABLET | Refills: 1 | Status: SHIPPED | OUTPATIENT
Start: 2021-04-01 | End: 2021-05-19

## 2021-04-07 RX ORDER — OLANZAPINE 20 MG/1
TABLET ORAL
Qty: 30 TABLET | Refills: 1 | OUTPATIENT
Start: 2021-04-07

## 2021-04-26 RX ORDER — VARENICLINE TARTRATE 1 MG/1
TABLET, FILM COATED ORAL
Qty: 60 TABLET | Refills: 1 | OUTPATIENT
Start: 2021-04-26

## 2021-04-27 ENCOUNTER — TELEPHONE (OUTPATIENT)
Dept: BEHAVIORAL HEALTH | Age: 46
End: 2021-04-27

## 2021-04-27 RX ORDER — OLANZAPINE 20 MG/1
20 TABLET ORAL NIGHTLY
Qty: 90 TABLET | Refills: 0 | Status: SHIPPED | OUTPATIENT
Start: 2021-04-27 | End: 2021-07-19

## 2021-05-19 RX ORDER — CARBAMAZEPINE 400 MG/1
400 TABLET, EXTENDED RELEASE ORAL 2 TIMES DAILY
Qty: 60 TABLET | Refills: 1 | Status: SHIPPED | OUTPATIENT
Start: 2021-05-19 | End: 2021-06-20

## 2021-05-26 RX ORDER — VARENICLINE TARTRATE 1 MG/1
TABLET, FILM COATED ORAL
Qty: 60 TABLET | Refills: 1 | Status: ON HOLD | OUTPATIENT
Start: 2021-05-26 | End: 2021-06-14 | Stop reason: HOSPADM

## 2021-06-01 ENCOUNTER — TELEPHONIC VISIT (OUTPATIENT)
Dept: BEHAVIORAL HEALTH | Age: 46
End: 2021-06-01

## 2021-06-01 DIAGNOSIS — F25.0 SCHIZOAFFECTIVE DISORDER, BIPOLAR TYPE (CMD): Primary | ICD-10-CM

## 2021-06-01 DIAGNOSIS — R45.850 HOMICIDAL IDEATION: ICD-10-CM

## 2021-06-01 DIAGNOSIS — F31.77 BIPOLAR DISORDER, IN PARTIAL REMISSION, MOST RECENT EPISODE MIXED (CMD): ICD-10-CM

## 2021-06-01 DIAGNOSIS — F41.1 GENERALIZED ANXIETY DISORDER: ICD-10-CM

## 2021-06-01 PROCEDURE — 99441 TELEPHONE E&M BY PHYSICIAN EST PT NOT ORIG PREV 7 DAYS 5-10 MIN: CPT | Performed by: PSYCHIATRY & NEUROLOGY

## 2021-06-06 ENCOUNTER — HOSPITAL ENCOUNTER (INPATIENT)
Age: 46
LOS: 8 days | Discharge: HOME | DRG: 885 | End: 2021-06-14
Attending: EMERGENCY MEDICINE | Admitting: PSYCHIATRY & NEUROLOGY

## 2021-06-06 DIAGNOSIS — R45.851 SUICIDAL IDEATION: Primary | ICD-10-CM

## 2021-06-06 LAB
ALBUMIN SERPL-MCNC: 4.1 G/DL (ref 3.6–5.1)
ALBUMIN/GLOB SERPL: 1.3 {RATIO} (ref 1–2.4)
ALP SERPL-CCNC: 99 UNITS/L (ref 45–117)
ALT SERPL-CCNC: 28 UNITS/L
AMPHETAMINES UR QL SCN>500 NG/ML: NEGATIVE
ANION GAP SERPL CALC-SCNC: 9 MMOL/L (ref 10–20)
AST SERPL-CCNC: 16 UNITS/L
BARBITURATES UR QL SCN>200 NG/ML: NEGATIVE
BASOPHILS # BLD: 0 K/MCL (ref 0–0.3)
BASOPHILS NFR BLD: 0 %
BENZODIAZ UR QL SCN>200 NG/ML: NEGATIVE
BILIRUB SERPL-MCNC: 0.3 MG/DL (ref 0.2–1)
BUN SERPL-MCNC: 9 MG/DL (ref 6–20)
BUN/CREAT SERPL: 12 (ref 7–25)
BZE UR QL SCN>150 NG/ML: NEGATIVE
CALCIUM SERPL-MCNC: 9 MG/DL (ref 8.4–10.2)
CANNABINOIDS UR QL SCN>50 NG/ML: NEGATIVE
CHLORIDE SERPL-SCNC: 108 MMOL/L (ref 98–107)
CO2 SERPL-SCNC: 28 MMOL/L (ref 21–32)
CREAT SERPL-MCNC: 0.73 MG/DL (ref 0.67–1.17)
DEPRECATED RDW RBC: 41.3 FL (ref 39–50)
EOSINOPHIL # BLD: 0.2 K/MCL (ref 0–0.5)
EOSINOPHIL NFR BLD: 3 %
ERYTHROCYTE [DISTWIDTH] IN BLOOD: 11.9 % (ref 11–15)
ETHANOL SERPL-MCNC: NORMAL MG/DL
FASTING DURATION TIME PATIENT: ABNORMAL H
GFR SERPLBLD BASED ON 1.73 SQ M-ARVRAT: >90 ML/MIN/1.73M2
GLOBULIN SER-MCNC: 3.2 G/DL (ref 2–4)
GLUCOSE SERPL-MCNC: 117 MG/DL (ref 65–99)
HCT VFR BLD CALC: 40.6 % (ref 39–51)
HGB BLD-MCNC: 14.2 G/DL (ref 13–17)
IMM GRANULOCYTES # BLD AUTO: 0 K/MCL (ref 0–0.2)
IMM GRANULOCYTES # BLD: 0 %
LYMPHOCYTES # BLD: 1.7 K/MCL (ref 1–4.8)
LYMPHOCYTES NFR BLD: 29 %
MCH RBC QN AUTO: 33.2 PG (ref 26–34)
MCHC RBC AUTO-ENTMCNC: 35 G/DL (ref 32–36.5)
MCV RBC AUTO: 94.9 FL (ref 78–100)
MONOCYTES # BLD: 0.5 K/MCL (ref 0.3–0.9)
MONOCYTES NFR BLD: 8 %
NEUTROPHILS # BLD: 3.5 K/MCL (ref 1.8–7.7)
NEUTROPHILS NFR BLD: 60 %
NRBC BLD MANUAL-RTO: 0 /100 WBC
OPIATES UR QL SCN>300 NG/ML: NEGATIVE
PCP UR QL SCN>25 NG/ML: NEGATIVE
PLATELET # BLD AUTO: 176 K/MCL (ref 140–450)
POTASSIUM SERPL-SCNC: 3.9 MMOL/L (ref 3.4–5.1)
PROT SERPL-MCNC: 7.3 G/DL (ref 6.4–8.2)
RBC # BLD: 4.28 MIL/MCL (ref 4.5–5.9)
SARS-COV-2 RNA RESP QL NAA+PROBE: NOT DETECTED
SERVICE CMNT-IMP: NORMAL
SERVICE CMNT-IMP: NORMAL
SODIUM SERPL-SCNC: 141 MMOL/L (ref 135–145)
WBC # BLD: 5.9 K/MCL (ref 4.2–11)

## 2021-06-06 PROCEDURE — 87635 SARS-COV-2 COVID-19 AMP PRB: CPT | Performed by: EMERGENCY MEDICINE

## 2021-06-06 PROCEDURE — 10002803 HB RX 637: Performed by: PSYCHIATRY & NEUROLOGY

## 2021-06-06 PROCEDURE — 80307 DRUG TEST PRSMV CHEM ANLYZR: CPT | Performed by: EMERGENCY MEDICINE

## 2021-06-06 PROCEDURE — 99284 EMERGENCY DEPT VISIT MOD MDM: CPT

## 2021-06-06 PROCEDURE — 84443 ASSAY THYROID STIM HORMONE: CPT | Performed by: PSYCHIATRY & NEUROLOGY

## 2021-06-06 PROCEDURE — 80053 COMPREHEN METABOLIC PANEL: CPT | Performed by: EMERGENCY MEDICINE

## 2021-06-06 PROCEDURE — C9803 HOPD COVID-19 SPEC COLLECT: HCPCS

## 2021-06-06 PROCEDURE — 82077 ASSAY SPEC XCP UR&BREATH IA: CPT | Performed by: EMERGENCY MEDICINE

## 2021-06-06 PROCEDURE — 85025 COMPLETE CBC W/AUTO DIFF WBC: CPT | Performed by: EMERGENCY MEDICINE

## 2021-06-06 PROCEDURE — 10004577 HB ROOM CHARGE PSYCH

## 2021-06-06 PROCEDURE — 36415 COLL VENOUS BLD VENIPUNCTURE: CPT

## 2021-06-06 PROCEDURE — 90839 PSYTX CRISIS INITIAL 60 MIN: CPT

## 2021-06-06 RX ORDER — LORAZEPAM 2 MG/ML
2 INJECTION INTRAMUSCULAR EVERY 4 HOURS PRN
Status: DISCONTINUED | OUTPATIENT
Start: 2021-06-06 | End: 2021-06-14 | Stop reason: HOSPADM

## 2021-06-06 RX ORDER — LORAZEPAM 2 MG/1
2 TABLET ORAL EVERY 4 HOURS PRN
Status: DISCONTINUED | OUTPATIENT
Start: 2021-06-06 | End: 2021-06-14 | Stop reason: HOSPADM

## 2021-06-06 RX ORDER — BENZTROPINE MESYLATE 1 MG/ML
1 INJECTION INTRAMUSCULAR; INTRAVENOUS EVERY 6 HOURS PRN
Status: DISCONTINUED | OUTPATIENT
Start: 2021-06-06 | End: 2021-06-14 | Stop reason: HOSPADM

## 2021-06-06 RX ORDER — HYDROXYZINE HYDROCHLORIDE 25 MG/1
50 TABLET, FILM COATED ORAL EVERY 4 HOURS PRN
Status: DISCONTINUED | OUTPATIENT
Start: 2021-06-06 | End: 2021-06-14 | Stop reason: HOSPADM

## 2021-06-06 RX ORDER — POLYETHYLENE GLYCOL 3350 17 G/17G
17 POWDER, FOR SOLUTION ORAL DAILY PRN
Status: DISCONTINUED | OUTPATIENT
Start: 2021-06-06 | End: 2021-06-14 | Stop reason: HOSPADM

## 2021-06-06 RX ORDER — TRAZODONE HYDROCHLORIDE 100 MG/1
100 TABLET ORAL NIGHTLY PRN
Status: DISCONTINUED | OUTPATIENT
Start: 2021-06-06 | End: 2021-06-14 | Stop reason: HOSPADM

## 2021-06-06 RX ORDER — BENZTROPINE MESYLATE 1 MG/1
1 TABLET ORAL EVERY 6 HOURS PRN
Status: DISCONTINUED | OUTPATIENT
Start: 2021-06-06 | End: 2021-06-14 | Stop reason: HOSPADM

## 2021-06-06 RX ORDER — HALOPERIDOL 5 MG/ML
5 INJECTION INTRAMUSCULAR EVERY 4 HOURS PRN
Status: DISCONTINUED | OUTPATIENT
Start: 2021-06-06 | End: 2021-06-14 | Stop reason: HOSPADM

## 2021-06-06 RX ORDER — HALOPERIDOL 5 MG/1
10 TABLET ORAL EVERY 4 HOURS PRN
Status: DISCONTINUED | OUTPATIENT
Start: 2021-06-06 | End: 2021-06-14 | Stop reason: HOSPADM

## 2021-06-06 RX ORDER — ACETAMINOPHEN 325 MG/1
650 TABLET ORAL EVERY 4 HOURS PRN
Status: DISCONTINUED | OUTPATIENT
Start: 2021-06-06 | End: 2021-06-14 | Stop reason: HOSPADM

## 2021-06-06 RX ORDER — MAGNESIUM HYDROXIDE/ALUMINUM HYDROXICE/SIMETHICONE 120; 1200; 1200 MG/30ML; MG/30ML; MG/30ML
20 SUSPENSION ORAL EVERY 4 HOURS PRN
Status: DISCONTINUED | OUTPATIENT
Start: 2021-06-06 | End: 2021-06-14 | Stop reason: HOSPADM

## 2021-06-06 RX ADMIN — TRAZODONE HYDROCHLORIDE 100 MG: 100 TABLET ORAL at 23:15

## 2021-06-06 RX ADMIN — BENZTROPINE MESYLATE 1 MG: 1 TABLET ORAL at 23:15

## 2021-06-06 RX ADMIN — HALOPERIDOL 10 MG: 5 TABLET ORAL at 23:15

## 2021-06-06 RX ADMIN — LORAZEPAM 2 MG: 2 TABLET ORAL at 23:15

## 2021-06-06 ASSESSMENT — ENCOUNTER SYMPTOMS
DIZZINESS: 0
SHORTNESS OF BREATH: 0
LIGHT-HEADEDNESS: 0
TROUBLE SWALLOWING: 0
BACK PAIN: 0
ABDOMINAL PAIN: 0
FEVER: 0
EYE PAIN: 0
VOMITING: 0
NAUSEA: 0
FATIGUE: 0
CHEST TIGHTNESS: 0
AGITATION: 0
CHILLS: 0
RHINORRHEA: 0

## 2021-06-06 ASSESSMENT — LIFESTYLE VARIABLES
CAFFEINE: YES
OPIATES USE: DENIES
HAVE YOU BEEN EATING POORLY BECAUSE OF A DECREASED APPETITE: 0
HAVE YOU EVER BEEN EXPOSED TO OTHER VERY DISTURBING, TRAUMATIC OR ANXIETY PROVOKING EVENTS IN YOUR LIFETIME?: NO
HOW MANY CIGARETTES HAVE YOU SMOKED PER DAY ON AVERAGE?: DENIES
HOW OFTEN DO YOU HAVE 6 OR MORE DRINKS ON ONE OCCASION: NEVER
ALCOHOL_USE: DENIES
HOW OFTEN DO YOU HAVE A DRINK CONTAINING ALCOHOL: NEVER
HOW MANY STANDARD DRINKS CONTAINING ALCOHOL DO YOU HAVE ON A TYPICAL DAY: 0,1 OR 2
TYPE_OF_TOBACCO_USED: CIGARETTES
HOW OFTEN DO YOU HAVE 6 OR MORE DRINKS ON ONE OCCASION: NEVER
HOW OFTEN DO YOU HAVE A DRINK CONTAINING ALCOHOL: NEVER
AUDIT-C TOTAL SCORE: 0
RECENT DECLINE IN ADLS: NO
ARE YOU BLIND OR DO YOU HAVE SERIOUS DIFFICULTY SEEING, EVEN WHEN WEARING GLASSES: NO
CHRONIC/CANCER PAIN PRESENT: NO
HAVE YOU EVER BEEN VERBALLY, EMOTIONALLY, PHYSICALLY OR SEXUALLY ABUSED, OR ABUSED VIA SOCIAL MEDIA?: NO
IS PATIENT ABLE TO COMPLETE ASSESSMENT AT THIS TIME: YES
AUDIT-C TOTAL SCORE: 0
RECENTLY LOST WEIGHT WITHOUT TRYING: 0
ALCOHOL_USE_STATUS: NO OR LOW RISK WITH VALIDATED TOOL
TOBACCO_USE_STATUS_IN_THE_LAST_30_DAYS: USED TOBACCO IN THE LAST 30 DAYS
AMPHETAMINES/STIMULANTS USE: DENIES
SHORT OF BREATH OR FATIGUE WITH ADLS: NO
ADL BEFORE ADMISSION: INDEPENDENT
VOLATILE SOLVENTS/INHALANTS USE: DENIES
COCAINE USE: DENIES
ADL NEEDS ASSIST: NO
E-CIGARETTE_USE: NO E-CIGARETTES USE IN THE LAST 30 DAYS
ARE YOU DEAF OR DO YOU HAVE SERIOUS DIFFICULTY  HEARING: NO
ALCOHOL_USE_STATUS: NO OR LOW RISK WITH VALIDATED TOOL
AMOUNT_OF_TOBACCO_USED: FIVE OR MORE CIGARETTES PER DAY AND/OR CIGAR OR PIPE DAILY
HOW MANY STANDARD DRINKS CONTAINING ALCOHOL DO YOU HAVE ON A TYPICAL DAY: 0,1 OR 2

## 2021-06-06 ASSESSMENT — COGNITIVE AND FUNCTIONAL STATUS - GENERAL
MOOD: FLAT;DEPRESSED
LEVEL_OF_CONSCIOUSNESS_CALCULATED: ALERT
AFFECT: FLAT;WITHDRAWN;DEPRESSED
MEMORY: INTACT
MOTOR_BEHAVIOR-RETARDATION_CALCULATED: CALM AND PURPOSEFUL
BEHAVIOR: SUICIDAL/SUICIDAL IDEATION
SPEECH: CLEAR/UNDERSTANDABLE
PERCEPTUAL_MISINTERPRETATIONS_HALLUCINATIONS: CLEAR REALITY BASED PERCEPTIONS
ORIENTATION: ORIENTED (PERSON/PLACE/TIME)
ATTENTION_CALCULATED: MAINTAINS ATTENTION
MOTOR_BEHAVIOR-AGITATION_CALCULATED: CALM AND PURPOSEFUL

## 2021-06-06 ASSESSMENT — PAIN SCALES - GENERAL
PAINLEVEL_OUTOF10: 0
PAINLEVEL_OUTOF10: 0

## 2021-06-06 ASSESSMENT — ACTIVITIES OF DAILY LIVING (ADL): ADL_SCORE: 12

## 2021-06-07 LAB
BASOPHILS # BLD: 0 K/MCL (ref 0–0.3)
BASOPHILS NFR BLD: 1 %
DEPRECATED RDW RBC: 40.9 FL (ref 39–50)
EOSINOPHIL # BLD: 0.2 K/MCL (ref 0–0.5)
EOSINOPHIL NFR BLD: 3 %
ERYTHROCYTE [DISTWIDTH] IN BLOOD: 11.8 % (ref 11–15)
HCT VFR BLD CALC: 37.8 % (ref 39–51)
HGB BLD-MCNC: 13.1 G/DL (ref 13–17)
IMM GRANULOCYTES # BLD AUTO: 0 K/MCL (ref 0–0.2)
IMM GRANULOCYTES # BLD: 0 %
LYMPHOCYTES # BLD: 1.6 K/MCL (ref 1–4.8)
LYMPHOCYTES NFR BLD: 30 %
MCH RBC QN AUTO: 32.8 PG (ref 26–34)
MCHC RBC AUTO-ENTMCNC: 34.7 G/DL (ref 32–36.5)
MCV RBC AUTO: 94.5 FL (ref 78–100)
MONOCYTES # BLD: 0.6 K/MCL (ref 0.3–0.9)
MONOCYTES NFR BLD: 10 %
NEUTROPHILS # BLD: 3 K/MCL (ref 1.8–7.7)
NEUTROPHILS NFR BLD: 56 %
NRBC BLD MANUAL-RTO: 0 /100 WBC
PLATELET # BLD AUTO: 151 K/MCL (ref 140–450)
RAINBOW EXTRA TUBES HOLD SPECIMEN: NORMAL
RBC # BLD: 4 MIL/MCL (ref 4.5–5.9)
TSH SERPL-ACNC: 1.43 MCUNITS/ML (ref 0.35–5)
WBC # BLD: 5.5 K/MCL (ref 4.2–11)

## 2021-06-07 PROCEDURE — 36415 COLL VENOUS BLD VENIPUNCTURE: CPT | Performed by: PSYCHIATRY & NEUROLOGY

## 2021-06-07 PROCEDURE — 85025 COMPLETE CBC W/AUTO DIFF WBC: CPT | Performed by: PSYCHIATRY & NEUROLOGY

## 2021-06-07 PROCEDURE — 90792 PSYCH DIAG EVAL W/MED SRVCS: CPT | Performed by: PSYCHIATRY & NEUROLOGY

## 2021-06-07 PROCEDURE — 99221 1ST HOSP IP/OBS SF/LOW 40: CPT | Performed by: FAMILY MEDICINE

## 2021-06-07 PROCEDURE — 10004577 HB ROOM CHARGE PSYCH

## 2021-06-07 ASSESSMENT — PAIN SCALES - GENERAL
PAINLEVEL_OUTOF10: 0

## 2021-06-07 ASSESSMENT — PATIENT HEALTH QUESTIONNAIRE - PHQ9: IS PATIENT ABLE TO COMPLETE PHQ2 OR PHQ9: NO, DEFER TO LATER TIME

## 2021-06-08 LAB — CARBAMAZEPINE SERPL-MCNC: 1.5 MCG/ML (ref 4–12)

## 2021-06-08 PROCEDURE — 80156 ASSAY CARBAMAZEPINE TOTAL: CPT | Performed by: PSYCHIATRY & NEUROLOGY

## 2021-06-08 PROCEDURE — 36415 COLL VENOUS BLD VENIPUNCTURE: CPT | Performed by: PSYCHIATRY & NEUROLOGY

## 2021-06-08 PROCEDURE — 10004577 HB ROOM CHARGE PSYCH

## 2021-06-08 PROCEDURE — 10002803 HB RX 637: Performed by: PSYCHIATRY & NEUROLOGY

## 2021-06-08 PROCEDURE — 99233 SBSQ HOSP IP/OBS HIGH 50: CPT | Performed by: PSYCHIATRY & NEUROLOGY

## 2021-06-08 RX ORDER — OLANZAPINE 5 MG/1
20 TABLET, ORALLY DISINTEGRATING ORAL NIGHTLY
Status: DISCONTINUED | OUTPATIENT
Start: 2021-06-08 | End: 2021-06-13

## 2021-06-08 RX ORDER — CARBAMAZEPINE 200 MG/1
400 TABLET, EXTENDED RELEASE ORAL 2 TIMES DAILY
Status: DISCONTINUED | OUTPATIENT
Start: 2021-06-08 | End: 2021-06-14 | Stop reason: HOSPADM

## 2021-06-08 RX ADMIN — OLANZAPINE 20 MG: 5 TABLET, ORALLY DISINTEGRATING ORAL at 20:50

## 2021-06-08 RX ADMIN — CARBAMAZEPINE 400 MG: 200 TABLET, EXTENDED RELEASE ORAL at 20:50

## 2021-06-08 RX ADMIN — CARBAMAZEPINE 400 MG: 200 TABLET, EXTENDED RELEASE ORAL at 12:40

## 2021-06-08 ASSESSMENT — PAIN SCALES - GENERAL
PAINLEVEL_OUTOF10: 0

## 2021-06-09 PROCEDURE — U0005 INFEC AGEN DETEC AMPLI PROBE: HCPCS | Performed by: PSYCHIATRY & NEUROLOGY

## 2021-06-09 PROCEDURE — 99233 SBSQ HOSP IP/OBS HIGH 50: CPT | Performed by: PSYCHIATRY & NEUROLOGY

## 2021-06-09 PROCEDURE — 10002803 HB RX 637: Performed by: PSYCHIATRY & NEUROLOGY

## 2021-06-09 PROCEDURE — 10004577 HB ROOM CHARGE PSYCH

## 2021-06-09 RX ADMIN — CARBAMAZEPINE 400 MG: 200 TABLET, EXTENDED RELEASE ORAL at 21:20

## 2021-06-09 RX ADMIN — OLANZAPINE 20 MG: 5 TABLET, ORALLY DISINTEGRATING ORAL at 21:20

## 2021-06-09 RX ADMIN — CARBAMAZEPINE 400 MG: 200 TABLET, EXTENDED RELEASE ORAL at 08:34

## 2021-06-09 ASSESSMENT — PAIN SCALES - GENERAL
PAINLEVEL_OUTOF10: 0

## 2021-06-10 LAB
SARS-COV-2 RNA RESP QL NAA+PROBE: NOT DETECTED
SERVICE CMNT-IMP: NORMAL
SERVICE CMNT-IMP: NORMAL

## 2021-06-10 PROCEDURE — 10004577 HB ROOM CHARGE PSYCH

## 2021-06-10 PROCEDURE — 10002803 HB RX 637: Performed by: PSYCHIATRY & NEUROLOGY

## 2021-06-10 PROCEDURE — 99233 SBSQ HOSP IP/OBS HIGH 50: CPT | Performed by: PSYCHIATRY & NEUROLOGY

## 2021-06-10 RX ADMIN — CARBAMAZEPINE 400 MG: 200 TABLET, EXTENDED RELEASE ORAL at 08:20

## 2021-06-10 RX ADMIN — OLANZAPINE 20 MG: 5 TABLET, ORALLY DISINTEGRATING ORAL at 21:20

## 2021-06-10 RX ADMIN — CARBAMAZEPINE 400 MG: 200 TABLET, EXTENDED RELEASE ORAL at 21:20

## 2021-06-10 ASSESSMENT — PAIN SCALES - GENERAL: PAINLEVEL_OUTOF10: 0

## 2021-06-11 PROCEDURE — 10004577 HB ROOM CHARGE PSYCH

## 2021-06-11 PROCEDURE — 10002803 HB RX 637: Performed by: PSYCHIATRY & NEUROLOGY

## 2021-06-11 PROCEDURE — 99233 SBSQ HOSP IP/OBS HIGH 50: CPT | Performed by: PSYCHIATRY & NEUROLOGY

## 2021-06-11 RX ADMIN — CARBAMAZEPINE 400 MG: 200 TABLET, EXTENDED RELEASE ORAL at 08:25

## 2021-06-11 RX ADMIN — OLANZAPINE 20 MG: 5 TABLET, ORALLY DISINTEGRATING ORAL at 21:51

## 2021-06-11 RX ADMIN — CARBAMAZEPINE 400 MG: 200 TABLET, EXTENDED RELEASE ORAL at 21:51

## 2021-06-11 ASSESSMENT — PAIN SCALES - GENERAL
PAINLEVEL_OUTOF10: 0
PAINLEVEL_OUTOF10: 0

## 2021-06-12 PROCEDURE — 10004577 HB ROOM CHARGE PSYCH

## 2021-06-12 PROCEDURE — 10002803 HB RX 637: Performed by: PSYCHIATRY & NEUROLOGY

## 2021-06-12 PROCEDURE — U0005 INFEC AGEN DETEC AMPLI PROBE: HCPCS | Performed by: PSYCHIATRY & NEUROLOGY

## 2021-06-12 PROCEDURE — 99233 SBSQ HOSP IP/OBS HIGH 50: CPT | Performed by: PSYCHIATRY & NEUROLOGY

## 2021-06-12 RX ADMIN — OLANZAPINE 20 MG: 5 TABLET, ORALLY DISINTEGRATING ORAL at 21:37

## 2021-06-12 RX ADMIN — CARBAMAZEPINE 400 MG: 200 TABLET, EXTENDED RELEASE ORAL at 21:38

## 2021-06-12 RX ADMIN — CARBAMAZEPINE 400 MG: 200 TABLET, EXTENDED RELEASE ORAL at 09:10

## 2021-06-12 ASSESSMENT — PAIN SCALES - GENERAL
PAINLEVEL_OUTOF10: 0
PAINLEVEL_OUTOF10: 0

## 2021-06-13 LAB
CARBAMAZEPINE SERPL-MCNC: 9.2 MCG/ML (ref 4–12)
SARS-COV-2 RNA RESP QL NAA+PROBE: NOT DETECTED
SERVICE CMNT-IMP: NORMAL
SERVICE CMNT-IMP: NORMAL

## 2021-06-13 PROCEDURE — 10002803 HB RX 637: Performed by: PSYCHIATRY & NEUROLOGY

## 2021-06-13 PROCEDURE — 10004577 HB ROOM CHARGE PSYCH

## 2021-06-13 PROCEDURE — 99233 SBSQ HOSP IP/OBS HIGH 50: CPT | Performed by: PSYCHIATRY & NEUROLOGY

## 2021-06-13 PROCEDURE — 80156 ASSAY CARBAMAZEPINE TOTAL: CPT | Performed by: PSYCHIATRY & NEUROLOGY

## 2021-06-13 PROCEDURE — 36415 COLL VENOUS BLD VENIPUNCTURE: CPT | Performed by: PSYCHIATRY & NEUROLOGY

## 2021-06-13 RX ORDER — OLANZAPINE 10 MG/1
20 TABLET ORAL NIGHTLY
Status: DISCONTINUED | OUTPATIENT
Start: 2021-06-13 | End: 2021-06-14 | Stop reason: HOSPADM

## 2021-06-13 RX ADMIN — CARBAMAZEPINE 400 MG: 200 TABLET, EXTENDED RELEASE ORAL at 13:24

## 2021-06-13 RX ADMIN — OLANZAPINE 20 MG: 10 TABLET, FILM COATED ORAL at 21:36

## 2021-06-13 RX ADMIN — CARBAMAZEPINE 400 MG: 200 TABLET, EXTENDED RELEASE ORAL at 21:36

## 2021-06-13 ASSESSMENT — PAIN SCALES - GENERAL
PAINLEVEL_OUTOF10: 0
PAINLEVEL_OUTOF10: 0

## 2021-06-14 VITALS
SYSTOLIC BLOOD PRESSURE: 96 MMHG | TEMPERATURE: 98.1 F | HEIGHT: 73 IN | HEART RATE: 58 BPM | OXYGEN SATURATION: 97 % | WEIGHT: 229.28 LBS | DIASTOLIC BLOOD PRESSURE: 55 MMHG | BODY MASS INDEX: 30.39 KG/M2 | RESPIRATION RATE: 16 BRPM

## 2021-06-14 PROCEDURE — 99238 HOSP IP/OBS DSCHRG MGMT 30/<: CPT | Performed by: PSYCHIATRY & NEUROLOGY

## 2021-06-14 PROCEDURE — 10002803 HB RX 637: Performed by: PSYCHIATRY & NEUROLOGY

## 2021-06-14 RX ADMIN — CARBAMAZEPINE 400 MG: 200 TABLET, EXTENDED RELEASE ORAL at 08:42

## 2021-06-14 ASSESSMENT — PAIN SCALES - GENERAL: PAINLEVEL_OUTOF10: 0

## 2021-06-15 ENCOUNTER — BEHAVIORAL HEALTH (OUTPATIENT)
Dept: BEHAVIORAL HEALTH | Age: 46
End: 2021-06-15

## 2021-06-15 DIAGNOSIS — F25.0 SCHIZOAFFECTIVE DISORDER, BIPOLAR TYPE (CMD): Primary | ICD-10-CM

## 2021-06-15 DIAGNOSIS — F41.1 GENERALIZED ANXIETY DISORDER: ICD-10-CM

## 2021-06-15 DIAGNOSIS — F31.77 BIPOLAR DISORDER, IN PARTIAL REMISSION, MOST RECENT EPISODE MIXED (CMD): ICD-10-CM

## 2021-06-15 DIAGNOSIS — F33.1 MODERATE EPISODE OF RECURRENT MAJOR DEPRESSIVE DISORDER (CMD): ICD-10-CM

## 2021-06-15 PROCEDURE — 99212 OFFICE O/P EST SF 10 MIN: CPT | Performed by: PSYCHIATRY & NEUROLOGY

## 2021-06-15 RX ORDER — BUPROPION HYDROCHLORIDE 150 MG/1
150 TABLET ORAL DAILY
Qty: 90 TABLET | Refills: 0 | Status: SHIPPED | OUTPATIENT
Start: 2021-06-15 | End: 2021-07-20 | Stop reason: SDUPTHER

## 2021-06-20 RX ORDER — CARBAMAZEPINE 400 MG/1
400 TABLET, EXTENDED RELEASE ORAL 2 TIMES DAILY
Qty: 60 TABLET | Refills: 1 | Status: SHIPPED | OUTPATIENT
Start: 2021-06-20 | End: 2021-07-20 | Stop reason: SDUPTHER

## 2021-06-20 RX ORDER — VARENICLINE TARTRATE 1 MG/1
TABLET, FILM COATED ORAL
Qty: 60 TABLET | Refills: 1 | Status: SHIPPED | OUTPATIENT
Start: 2021-06-20 | End: 2021-10-18 | Stop reason: CLARIF

## 2021-07-19 RX ORDER — OLANZAPINE 20 MG/1
TABLET ORAL
Qty: 90 TABLET | Refills: 0 | Status: SHIPPED | OUTPATIENT
Start: 2021-07-19 | End: 2021-07-20 | Stop reason: CLARIF

## 2021-07-20 ENCOUNTER — BEHAVIORAL HEALTH (OUTPATIENT)
Dept: BEHAVIORAL HEALTH | Age: 46
End: 2021-07-20

## 2021-07-20 DIAGNOSIS — F25.0 SCHIZOAFFECTIVE DISORDER, BIPOLAR TYPE (CMD): Primary | ICD-10-CM

## 2021-07-20 DIAGNOSIS — F31.77 BIPOLAR DISORDER, IN PARTIAL REMISSION, MOST RECENT EPISODE MIXED (CMD): ICD-10-CM

## 2021-07-20 DIAGNOSIS — F41.1 GENERALIZED ANXIETY DISORDER: ICD-10-CM

## 2021-07-20 PROCEDURE — 99213 OFFICE O/P EST LOW 20 MIN: CPT | Performed by: PSYCHIATRY & NEUROLOGY

## 2021-07-20 RX ORDER — BUPROPION HYDROCHLORIDE 150 MG/1
150 TABLET ORAL DAILY
Qty: 90 TABLET | Refills: 0 | Status: SHIPPED | OUTPATIENT
Start: 2021-07-20 | End: 2021-10-18 | Stop reason: SDUPTHER

## 2021-07-20 RX ORDER — CARBAMAZEPINE 400 MG/1
400 TABLET, EXTENDED RELEASE ORAL 2 TIMES DAILY
Qty: 180 TABLET | Refills: 0 | Status: SHIPPED | OUTPATIENT
Start: 2021-07-20 | End: 2021-10-12

## 2021-07-20 RX ORDER — OLANZAPINE 15 MG/1
15 TABLET ORAL NIGHTLY
Qty: 90 TABLET | Refills: 0 | Status: SHIPPED | OUTPATIENT
Start: 2021-07-20 | End: 2021-10-12

## 2021-09-02 ENCOUNTER — APPOINTMENT (OUTPATIENT)
Dept: BEHAVIORAL HEALTH | Age: 46
End: 2021-09-02

## 2021-10-12 RX ORDER — CARBAMAZEPINE 400 MG/1
TABLET, EXTENDED RELEASE ORAL
Qty: 180 TABLET | Refills: 0 | Status: SHIPPED | OUTPATIENT
Start: 2021-10-12 | End: 2022-01-06

## 2021-10-12 RX ORDER — OLANZAPINE 15 MG/1
TABLET ORAL
Qty: 90 TABLET | Refills: 0 | Status: SHIPPED | OUTPATIENT
Start: 2021-10-12 | End: 2022-01-05

## 2021-10-18 ENCOUNTER — BEHAVIORAL HEALTH (OUTPATIENT)
Dept: BEHAVIORAL HEALTH | Age: 46
End: 2021-10-18

## 2021-10-18 DIAGNOSIS — F25.0 SCHIZOAFFECTIVE DISORDER, BIPOLAR TYPE (CMD): Primary | ICD-10-CM

## 2021-10-18 DIAGNOSIS — F31.77 BIPOLAR DISORDER, IN PARTIAL REMISSION, MOST RECENT EPISODE MIXED (CMD): ICD-10-CM

## 2021-10-18 DIAGNOSIS — F41.1 GENERALIZED ANXIETY DISORDER: ICD-10-CM

## 2021-10-18 PROCEDURE — 99213 OFFICE O/P EST LOW 20 MIN: CPT | Performed by: PSYCHIATRY & NEUROLOGY

## 2021-10-18 RX ORDER — BUPROPION HYDROCHLORIDE 150 MG/1
150 TABLET ORAL DAILY
Qty: 90 TABLET | Refills: 0 | Status: SHIPPED | OUTPATIENT
Start: 2021-10-18 | End: 2022-01-17 | Stop reason: SDUPTHER

## 2022-01-05 RX ORDER — OLANZAPINE 15 MG/1
TABLET ORAL
Qty: 90 TABLET | Refills: 0 | Status: SHIPPED | OUTPATIENT
Start: 2022-01-05 | End: 2022-03-28

## 2022-01-06 RX ORDER — CARBAMAZEPINE 400 MG/1
TABLET, EXTENDED RELEASE ORAL
Qty: 180 TABLET | Refills: 0 | Status: SHIPPED | OUTPATIENT
Start: 2022-01-06 | End: 2022-03-31

## 2022-01-17 ENCOUNTER — BEHAVIORAL HEALTH (OUTPATIENT)
Dept: BEHAVIORAL HEALTH | Age: 47
End: 2022-01-17

## 2022-01-17 DIAGNOSIS — F25.0 SCHIZOAFFECTIVE DISORDER, BIPOLAR TYPE (CMD): Primary | ICD-10-CM

## 2022-01-17 DIAGNOSIS — F31.9 BIPOLAR 1 DISORDER (CMD): ICD-10-CM

## 2022-01-17 DIAGNOSIS — F41.1 GENERALIZED ANXIETY DISORDER: ICD-10-CM

## 2022-01-17 DIAGNOSIS — F31.77 BIPOLAR DISORDER, IN PARTIAL REMISSION, MOST RECENT EPISODE MIXED (CMD): ICD-10-CM

## 2022-01-17 PROCEDURE — 99213 OFFICE O/P EST LOW 20 MIN: CPT | Performed by: PSYCHIATRY & NEUROLOGY

## 2022-01-17 RX ORDER — BUPROPION HYDROCHLORIDE 150 MG/1
150 TABLET ORAL DAILY
Qty: 90 TABLET | Refills: 0 | Status: SHIPPED | OUTPATIENT
Start: 2022-01-17 | End: 2022-04-25

## 2022-01-17 RX ORDER — VARENICLINE TARTRATE 0.5 MG/1
0.5 TABLET, FILM COATED ORAL 2 TIMES DAILY
Qty: 30 TABLET | Refills: 2 | Status: SHIPPED | OUTPATIENT
Start: 2022-01-17 | End: 2022-05-09 | Stop reason: SDUPTHER

## 2022-03-28 RX ORDER — OLANZAPINE 15 MG/1
TABLET ORAL
Qty: 90 TABLET | Refills: 0 | Status: SHIPPED | OUTPATIENT
Start: 2022-03-28 | End: 2022-05-09 | Stop reason: SDUPTHER

## 2022-03-31 RX ORDER — CARBAMAZEPINE 400 MG/1
TABLET, EXTENDED RELEASE ORAL
Qty: 180 TABLET | Refills: 0 | Status: SHIPPED | OUTPATIENT
Start: 2022-03-31 | End: 2022-06-27

## 2022-04-25 RX ORDER — BUPROPION HYDROCHLORIDE 150 MG/1
TABLET ORAL
Qty: 90 TABLET | Refills: 0 | Status: SHIPPED | OUTPATIENT
Start: 2022-04-25 | End: 2022-07-18

## 2022-05-09 ENCOUNTER — BEHAVIORAL HEALTH (OUTPATIENT)
Dept: BEHAVIORAL HEALTH | Age: 47
End: 2022-05-09

## 2022-05-09 DIAGNOSIS — F25.0 SCHIZOAFFECTIVE DISORDER, BIPOLAR TYPE (CMD): Primary | ICD-10-CM

## 2022-05-09 DIAGNOSIS — F31.77 BIPOLAR DISORDER, IN PARTIAL REMISSION, MOST RECENT EPISODE MIXED (CMD): ICD-10-CM

## 2022-05-09 DIAGNOSIS — F31.9 BIPOLAR 1 DISORDER (CMD): ICD-10-CM

## 2022-05-09 DIAGNOSIS — F41.1 GENERALIZED ANXIETY DISORDER: ICD-10-CM

## 2022-05-09 PROCEDURE — 99213 OFFICE O/P EST LOW 20 MIN: CPT | Performed by: PSYCHIATRY & NEUROLOGY

## 2022-05-09 RX ORDER — VARENICLINE TARTRATE 1 MG/1
1 TABLET, FILM COATED ORAL 2 TIMES DAILY
Qty: 60 TABLET | Refills: 3 | Status: SHIPPED | OUTPATIENT
Start: 2022-05-09 | End: 2022-08-02

## 2022-05-09 RX ORDER — OLANZAPINE 10 MG/1
10 TABLET ORAL NIGHTLY
Qty: 90 TABLET | Refills: 0 | Status: SHIPPED | OUTPATIENT
Start: 2022-05-09 | End: 2022-07-31

## 2022-06-20 RX ORDER — OLANZAPINE 15 MG/1
TABLET ORAL
Qty: 90 TABLET | Refills: 0 | OUTPATIENT
Start: 2022-06-20

## 2022-06-27 RX ORDER — CARBAMAZEPINE 400 MG/1
TABLET, EXTENDED RELEASE ORAL
Qty: 180 TABLET | Refills: 0 | Status: SHIPPED | OUTPATIENT
Start: 2022-06-27 | End: 2022-09-23

## 2022-07-18 RX ORDER — BUPROPION HYDROCHLORIDE 150 MG/1
TABLET ORAL
Qty: 90 TABLET | Refills: 0 | Status: SHIPPED | OUTPATIENT
Start: 2022-07-18 | End: 2022-10-13

## 2022-07-31 RX ORDER — OLANZAPINE 10 MG/1
10 TABLET ORAL NIGHTLY
Qty: 90 TABLET | Refills: 0 | Status: SHIPPED | OUTPATIENT
Start: 2022-07-31 | End: 2022-10-31

## 2022-08-02 RX ORDER — VARENICLINE TARTRATE 1 MG/1
TABLET, FILM COATED ORAL
Qty: 180 TABLET | Refills: 1 | Status: SHIPPED | OUTPATIENT
Start: 2022-08-02 | End: 2023-01-10 | Stop reason: CLARIF

## 2022-09-06 ENCOUNTER — BEHAVIORAL HEALTH (OUTPATIENT)
Dept: BEHAVIORAL HEALTH | Age: 47
End: 2022-09-06

## 2022-09-06 DIAGNOSIS — F31.77 BIPOLAR DISORDER, IN PARTIAL REMISSION, MOST RECENT EPISODE MIXED (CMD): ICD-10-CM

## 2022-09-06 DIAGNOSIS — F25.0 SCHIZOAFFECTIVE DISORDER, BIPOLAR TYPE (CMD): Primary | ICD-10-CM

## 2022-09-06 DIAGNOSIS — F31.9 BIPOLAR 1 DISORDER (CMD): ICD-10-CM

## 2022-09-06 DIAGNOSIS — F33.1 MODERATE EPISODE OF RECURRENT MAJOR DEPRESSIVE DISORDER (CMD): ICD-10-CM

## 2022-09-06 DIAGNOSIS — F41.1 GENERALIZED ANXIETY DISORDER: ICD-10-CM

## 2022-09-06 PROCEDURE — 99213 OFFICE O/P EST LOW 20 MIN: CPT | Performed by: PSYCHIATRY & NEUROLOGY

## 2022-09-23 RX ORDER — CARBAMAZEPINE 400 MG/1
TABLET, EXTENDED RELEASE ORAL
Qty: 180 TABLET | Refills: 0 | Status: SHIPPED | OUTPATIENT
Start: 2022-09-23 | End: 2022-12-18

## 2022-10-13 RX ORDER — BUPROPION HYDROCHLORIDE 150 MG/1
TABLET ORAL
Qty: 90 TABLET | Refills: 0 | Status: SHIPPED | OUTPATIENT
Start: 2022-10-13 | End: 2023-01-09

## 2022-10-17 RX ORDER — OLANZAPINE 10 MG/1
10 TABLET ORAL NIGHTLY
Qty: 90 TABLET | Refills: 0 | OUTPATIENT
Start: 2022-10-17

## 2022-10-31 RX ORDER — OLANZAPINE 10 MG/1
10 TABLET ORAL NIGHTLY
Qty: 90 TABLET | Refills: 0 | Status: SHIPPED | OUTPATIENT
Start: 2022-10-31 | End: 2023-01-10 | Stop reason: SDUPTHER

## 2022-12-18 RX ORDER — CARBAMAZEPINE 400 MG/1
TABLET, EXTENDED RELEASE ORAL
Qty: 180 TABLET | Refills: 0 | Status: SHIPPED | OUTPATIENT
Start: 2022-12-18 | End: 2023-03-14

## 2023-01-09 RX ORDER — BUPROPION HYDROCHLORIDE 150 MG/1
TABLET ORAL
Qty: 90 TABLET | Refills: 0 | Status: SHIPPED | OUTPATIENT
Start: 2023-01-09 | End: 2023-04-06

## 2023-01-10 ENCOUNTER — BEHAVIORAL HEALTH (OUTPATIENT)
Dept: BEHAVIORAL HEALTH | Age: 48
End: 2023-01-10

## 2023-01-10 DIAGNOSIS — F41.1 GENERALIZED ANXIETY DISORDER: ICD-10-CM

## 2023-01-10 DIAGNOSIS — F25.0 SCHIZOAFFECTIVE DISORDER, BIPOLAR TYPE (CMD): Primary | ICD-10-CM

## 2023-01-10 DIAGNOSIS — F31.9 BIPOLAR 1 DISORDER (CMD): ICD-10-CM

## 2023-01-10 DIAGNOSIS — F31.77 BIPOLAR DISORDER, IN PARTIAL REMISSION, MOST RECENT EPISODE MIXED (CMD): ICD-10-CM

## 2023-01-10 PROCEDURE — 99213 OFFICE O/P EST LOW 20 MIN: CPT | Performed by: PSYCHIATRY & NEUROLOGY

## 2023-01-10 RX ORDER — OLANZAPINE 5 MG/1
5 TABLET ORAL NIGHTLY
Qty: 90 TABLET | Refills: 0 | Status: SHIPPED
Start: 2023-01-10 | End: 2023-02-01 | Stop reason: SDUPTHER

## 2023-01-27 RX ORDER — OLANZAPINE 10 MG/1
10 TABLET ORAL NIGHTLY
Qty: 90 TABLET | Refills: 0 | OUTPATIENT
Start: 2023-01-27

## 2023-02-01 RX ORDER — OLANZAPINE 5 MG/1
5 TABLET ORAL NIGHTLY
Qty: 90 TABLET | Refills: 0 | Status: SHIPPED | OUTPATIENT
Start: 2023-02-01 | End: 2023-04-18 | Stop reason: SDUPTHER

## 2023-03-14 RX ORDER — CARBAMAZEPINE 400 MG/1
TABLET, EXTENDED RELEASE ORAL
Qty: 180 TABLET | Refills: 0 | Status: SHIPPED | OUTPATIENT
Start: 2023-03-14 | End: 2023-05-18 | Stop reason: CLARIF

## 2023-04-06 RX ORDER — BUPROPION HYDROCHLORIDE 150 MG/1
TABLET ORAL
Qty: 90 TABLET | Refills: 0 | Status: SHIPPED | OUTPATIENT
Start: 2023-04-06 | End: 2023-06-28

## 2023-04-18 ENCOUNTER — TELEPHONE (OUTPATIENT)
Dept: BEHAVIORAL HEALTH | Age: 48
End: 2023-04-18

## 2023-04-18 RX ORDER — OLANZAPINE 5 MG/1
5 TABLET ORAL NIGHTLY
Qty: 90 TABLET | Refills: 0 | Status: SHIPPED | OUTPATIENT
Start: 2023-04-18 | End: 2023-06-28

## 2023-05-18 ENCOUNTER — BEHAVIORAL HEALTH (OUTPATIENT)
Dept: BEHAVIORAL HEALTH | Age: 48
End: 2023-05-18

## 2023-05-18 DIAGNOSIS — F25.0 SCHIZOAFFECTIVE DISORDER, BIPOLAR TYPE (CMD): Primary | ICD-10-CM

## 2023-05-18 DIAGNOSIS — F33.1 MODERATE EPISODE OF RECURRENT MAJOR DEPRESSIVE DISORDER (CMD): ICD-10-CM

## 2023-05-18 DIAGNOSIS — F31.77 BIPOLAR DISORDER, IN PARTIAL REMISSION, MOST RECENT EPISODE MIXED (CMD): ICD-10-CM

## 2023-05-18 DIAGNOSIS — F41.1 GENERALIZED ANXIETY DISORDER: ICD-10-CM

## 2023-05-18 PROCEDURE — 99213 OFFICE O/P EST LOW 20 MIN: CPT | Performed by: PSYCHIATRY & NEUROLOGY

## 2023-06-28 RX ORDER — BUPROPION HYDROCHLORIDE 150 MG/1
TABLET ORAL
Qty: 90 TABLET | Refills: 0 | Status: SHIPPED | OUTPATIENT
Start: 2023-06-28 | End: 2023-09-12 | Stop reason: SDUPTHER

## 2023-06-28 RX ORDER — OLANZAPINE 5 MG/1
5 TABLET ORAL NIGHTLY
Qty: 90 TABLET | Refills: 0 | Status: SHIPPED | OUTPATIENT
Start: 2023-06-28 | End: 2023-09-10

## 2023-09-10 RX ORDER — OLANZAPINE 5 MG/1
5 TABLET ORAL NIGHTLY
Qty: 90 TABLET | Refills: 0 | Status: SHIPPED | OUTPATIENT
Start: 2023-09-10

## 2023-09-13 RX ORDER — BUPROPION HYDROCHLORIDE 150 MG/1
150 TABLET ORAL DAILY
Qty: 90 TABLET | Refills: 0 | Status: SHIPPED | OUTPATIENT
Start: 2023-09-13

## 2023-09-26 ENCOUNTER — BEHAVIORAL HEALTH (OUTPATIENT)
Dept: BEHAVIORAL HEALTH | Age: 48
End: 2023-09-26

## 2023-09-26 DIAGNOSIS — F41.1 GENERALIZED ANXIETY DISORDER: ICD-10-CM

## 2023-09-26 DIAGNOSIS — F33.1 MODERATE EPISODE OF RECURRENT MAJOR DEPRESSIVE DISORDER (CMD): ICD-10-CM

## 2023-09-26 DIAGNOSIS — F31.77 BIPOLAR DISORDER, IN PARTIAL REMISSION, MOST RECENT EPISODE MIXED (CMD): ICD-10-CM

## 2023-09-26 DIAGNOSIS — F25.0 SCHIZOAFFECTIVE DISORDER, BIPOLAR TYPE (CMD): Primary | ICD-10-CM

## 2023-09-26 PROCEDURE — 99213 OFFICE O/P EST LOW 20 MIN: CPT | Performed by: PSYCHIATRY & NEUROLOGY

## 2023-12-11 RX ORDER — OLANZAPINE 5 MG/1
5 TABLET ORAL NIGHTLY
Qty: 90 TABLET | Refills: 0 | Status: SHIPPED | OUTPATIENT
Start: 2023-12-11

## 2024-01-25 ENCOUNTER — HOSPITAL ENCOUNTER (EMERGENCY)
Age: 49
Discharge: LEFT BEFORE DISCHARGE | End: 2024-01-25

## 2024-01-25 VITALS
DIASTOLIC BLOOD PRESSURE: 73 MMHG | RESPIRATION RATE: 14 BRPM | TEMPERATURE: 98.1 F | OXYGEN SATURATION: 97 % | SYSTOLIC BLOOD PRESSURE: 146 MMHG | HEART RATE: 76 BPM

## 2024-01-26 ENCOUNTER — NURSE TRIAGE (OUTPATIENT)
Dept: TELEHEALTH | Age: 49
End: 2024-01-26

## 2024-01-29 ENCOUNTER — LAB REQUISITION (OUTPATIENT)
Dept: LAB | Age: 49
End: 2024-01-29

## 2024-01-29 DIAGNOSIS — R53.83 OTHER FATIGUE: ICD-10-CM

## 2024-01-29 DIAGNOSIS — E78.5 HYPERLIPIDEMIA, UNSPECIFIED: ICD-10-CM

## 2024-01-29 DIAGNOSIS — F33.3 MAJOR DEPRESSIVE DISORDER, RECURRENT, SEVERE WITH PSYCHOTIC SYMPTOMS (CMD): ICD-10-CM

## 2024-01-29 DIAGNOSIS — F25.0 SCHIZOAFFECTIVE DISORDER, BIPOLAR TYPE (CMD): ICD-10-CM

## 2024-01-29 PROCEDURE — PSEU8299 THYROID STIMULATING HORMONE: Performed by: CLINICAL MEDICAL LABORATORY

## 2024-01-29 PROCEDURE — 80061 LIPID PANEL: CPT | Performed by: CLINICAL MEDICAL LABORATORY

## 2024-01-29 PROCEDURE — PSEU8270 LIPID PANEL WITHOUT REFLEX: Performed by: CLINICAL MEDICAL LABORATORY

## 2024-01-29 PROCEDURE — 84443 ASSAY THYROID STIM HORMONE: CPT | Performed by: CLINICAL MEDICAL LABORATORY

## 2024-01-30 ENCOUNTER — APPOINTMENT (OUTPATIENT)
Dept: BEHAVIORAL HEALTH | Age: 49
End: 2024-01-30

## 2024-01-30 LAB
CHOLEST SERPL-MCNC: 196 MG/DL
CHOLEST/HDLC SERPL: 4.9 {RATIO}
HDLC SERPL-MCNC: 40 MG/DL
LDLC SERPL CALC-MCNC: 138 MG/DL
NONHDLC SERPL-MCNC: 156 MG/DL
TRIGL SERPL-MCNC: 92 MG/DL
TSH SERPL-ACNC: 2.35 MCUNITS/ML (ref 0.35–5)

## 2024-01-31 ENCOUNTER — LAB REQUISITION (OUTPATIENT)
Dept: LAB | Age: 49
End: 2024-01-31

## 2024-01-31 DIAGNOSIS — F33.3 MAJOR DEPRESSIVE DISORDER, RECURRENT, SEVERE WITH PSYCHOTIC SYMPTOMS (CMD): ICD-10-CM

## 2024-01-31 DIAGNOSIS — F25.0 SCHIZOAFFECTIVE DISORDER, BIPOLAR TYPE (CMD): ICD-10-CM

## 2024-01-31 DIAGNOSIS — R53.83 OTHER FATIGUE: ICD-10-CM

## 2024-01-31 DIAGNOSIS — E78.5 HYPERLIPIDEMIA, UNSPECIFIED: ICD-10-CM

## 2024-01-31 LAB — HBA1C MFR BLD: 5.3 % (ref 4.5–5.6)

## 2024-01-31 PROCEDURE — PSEU8172 VITAMIN D 1 25 DIHYDROXY: Performed by: CLINICAL MEDICAL LABORATORY

## 2024-01-31 PROCEDURE — 83036 HEMOGLOBIN GLYCOSYLATED A1C: CPT | Performed by: CLINICAL MEDICAL LABORATORY

## 2024-01-31 PROCEDURE — PSEU8266 GLYCOHEMOGLOBIN: Performed by: CLINICAL MEDICAL LABORATORY

## 2024-01-31 PROCEDURE — 82652 VIT D 1 25-DIHYDROXY: CPT | Performed by: CLINICAL MEDICAL LABORATORY

## 2024-02-02 LAB — VITAMIN D2 SERPL-MCNC: 59.7 PG/ML (ref 19.9–79.3)

## 2024-02-05 ENCOUNTER — E-ADVICE (OUTPATIENT)
Dept: BEHAVIORAL HEALTH | Age: 49
End: 2024-02-05

## 2024-02-06 ENCOUNTER — BEHAVIORAL HEALTH (OUTPATIENT)
Dept: BEHAVIORAL HEALTH | Age: 49
End: 2024-02-06

## 2024-02-06 DIAGNOSIS — F41.1 GENERALIZED ANXIETY DISORDER: ICD-10-CM

## 2024-02-06 DIAGNOSIS — F25.0 SCHIZOAFFECTIVE DISORDER, BIPOLAR TYPE (CMD): Primary | ICD-10-CM

## 2024-02-06 DIAGNOSIS — F31.77 BIPOLAR DISORDER, IN PARTIAL REMISSION, MOST RECENT EPISODE MIXED (CMD): ICD-10-CM

## 2024-02-06 DIAGNOSIS — F31.9 BIPOLAR 1 DISORDER (CMD): ICD-10-CM

## 2024-02-06 PROCEDURE — 99214 OFFICE O/P EST MOD 30 MIN: CPT | Performed by: PSYCHIATRY & NEUROLOGY

## 2024-02-06 RX ORDER — OXCARBAZEPINE 300 MG/1
300 TABLET, FILM COATED ORAL 2 TIMES DAILY
Qty: 60 TABLET | Refills: 1 | Status: SHIPPED | OUTPATIENT
Start: 2024-02-06

## 2024-02-06 RX ORDER — TRAZODONE HYDROCHLORIDE 100 MG/1
100 TABLET ORAL NIGHTLY
Qty: 30 TABLET | Refills: 1 | Status: SHIPPED | OUTPATIENT
Start: 2024-02-06

## 2024-02-06 RX ORDER — ARIPIPRAZOLE 30 MG/1
30 TABLET ORAL DAILY
Qty: 30 TABLET | Refills: 1 | Status: SHIPPED | OUTPATIENT
Start: 2024-02-06

## 2024-02-08 ENCOUNTER — E-ADVICE (OUTPATIENT)
Dept: BEHAVIORAL HEALTH | Age: 49
End: 2024-02-08

## 2024-02-09 ENCOUNTER — E-ADVICE (OUTPATIENT)
Dept: BEHAVIORAL HEALTH | Age: 49
End: 2024-02-09

## 2024-02-11 ENCOUNTER — HOSPITAL ENCOUNTER (EMERGENCY)
Age: 49
Discharge: HOME | End: 2024-02-11
Attending: EMERGENCY MEDICINE

## 2024-02-11 ENCOUNTER — APPOINTMENT (OUTPATIENT)
Dept: GENERAL RADIOLOGY | Age: 49
End: 2024-02-11
Attending: EMERGENCY MEDICINE

## 2024-02-11 ENCOUNTER — NURSE TRIAGE (OUTPATIENT)
Dept: TELEHEALTH | Age: 49
End: 2024-02-11

## 2024-02-11 VITALS
WEIGHT: 270.06 LBS | OXYGEN SATURATION: 97 % | HEIGHT: 76 IN | RESPIRATION RATE: 17 BRPM | TEMPERATURE: 98.6 F | SYSTOLIC BLOOD PRESSURE: 126 MMHG | HEART RATE: 91 BPM | BODY MASS INDEX: 32.89 KG/M2 | DIASTOLIC BLOOD PRESSURE: 74 MMHG

## 2024-02-11 DIAGNOSIS — M79.661 PAIN IN RIGHT SHIN: Primary | ICD-10-CM

## 2024-02-11 PROCEDURE — 99283 EMERGENCY DEPT VISIT LOW MDM: CPT

## 2024-02-11 PROCEDURE — 10004651 HB RX, NO CHARGE ITEM: Performed by: EMERGENCY MEDICINE

## 2024-02-11 PROCEDURE — 73590 X-RAY EXAM OF LOWER LEG: CPT

## 2024-02-11 RX ORDER — ACETAMINOPHEN 500 MG
1000 TABLET ORAL ONCE
Status: COMPLETED | OUTPATIENT
Start: 2024-02-11 | End: 2024-02-11

## 2024-02-11 RX ADMIN — ACETAMINOPHEN 1000 MG: 500 TABLET ORAL at 07:32

## 2024-02-11 SDOH — SOCIAL STABILITY: SOCIAL INSECURITY: HOW OFTEN DOES ANYONE, INCLUDING FAMILY AND FRIENDS, THREATEN YOU WITH HARM?: NEVER

## 2024-02-11 SDOH — SOCIAL STABILITY: SOCIAL INSECURITY: HOW OFTEN DOES ANYONE, INCLUDING FAMILY AND FRIENDS, SCREAM OR CURSE AT YOU?: NEVER

## 2024-02-11 SDOH — SOCIAL STABILITY: SOCIAL INSECURITY: HOW OFTEN DOES ANYONE, INCLUDING FAMILY AND FRIENDS, INSULT OR TALK DOWN TO YOU?: NEVER

## 2024-02-11 SDOH — SOCIAL STABILITY: SOCIAL INSECURITY: HOW OFTEN DOES ANYONE, INCLUDING FAMILY AND FRIENDS, PHYSICALLY HURT YOU?: NEVER

## 2024-02-11 ASSESSMENT — PAIN SCALES - GENERAL
PAINLEVEL_OUTOF10: 8
PAINLEVEL_OUTOF10: 4
PAINLEVEL_OUTOF10: 2

## 2024-02-12 ENCOUNTER — TELEPHONE (OUTPATIENT)
Dept: BEHAVIORAL HEALTH | Age: 49
End: 2024-02-12

## 2024-02-12 ENCOUNTER — BEHAVIORAL HEALTH (OUTPATIENT)
Dept: BEHAVIORAL HEALTH | Age: 49
End: 2024-02-12

## 2024-02-12 DIAGNOSIS — F25.0 SCHIZOAFFECTIVE DISORDER, BIPOLAR TYPE (CMD): Primary | ICD-10-CM

## 2024-02-13 ENCOUNTER — HOSPITAL ENCOUNTER (INPATIENT)
Age: 49
DRG: 885 | End: 2024-02-13
Attending: EMERGENCY MEDICINE | Admitting: PATHOLOGY

## 2024-02-13 DIAGNOSIS — F99 PSYCHIATRIC PROBLEM: Primary | ICD-10-CM

## 2024-02-13 LAB
ALBUMIN SERPL-MCNC: 3.5 G/DL (ref 3.6–5.1)
ALBUMIN/GLOB SERPL: 1.1 {RATIO} (ref 1–2.4)
ALP SERPL-CCNC: 103 UNITS/L (ref 45–117)
ALT SERPL-CCNC: 34 UNITS/L
AMPHETAMINES UR QL SCN>500 NG/ML: NEGATIVE
ANION GAP SERPL CALC-SCNC: 8 MMOL/L (ref 7–19)
APAP SERPL-MCNC: <2 MCG/ML (ref 10–30)
AST SERPL-CCNC: 27 UNITS/L
BARBITURATES UR QL SCN>200 NG/ML: NEGATIVE
BASOPHILS # BLD: 0 K/MCL (ref 0–0.3)
BASOPHILS NFR BLD: 0 %
BENZODIAZ UR QL SCN>200 NG/ML: NEGATIVE
BILIRUB SERPL-MCNC: 0.2 MG/DL (ref 0.2–1)
BUN SERPL-MCNC: 18 MG/DL (ref 6–20)
BUN/CREAT SERPL: 21 (ref 7–25)
BZE UR QL SCN>150 NG/ML: NEGATIVE
CALCIUM SERPL-MCNC: 8.7 MG/DL (ref 8.4–10.2)
CANNABINOIDS UR QL SCN>50 NG/ML: NEGATIVE
CHLORIDE SERPL-SCNC: 110 MMOL/L (ref 97–110)
CO2 SERPL-SCNC: 26 MMOL/L (ref 21–32)
CREAT SERPL-MCNC: 0.86 MG/DL (ref 0.67–1.17)
DEPRECATED RDW RBC: 45.1 FL (ref 39–50)
EGFRCR SERPLBLD CKD-EPI 2021: >90 ML/MIN/{1.73_M2}
EOSINOPHIL # BLD: 0.2 K/MCL (ref 0–0.5)
EOSINOPHIL NFR BLD: 2 %
ERYTHROCYTE [DISTWIDTH] IN BLOOD: 13.2 % (ref 11–15)
ETHANOL SERPL-MCNC: NORMAL MG/DL
FASTING DURATION TIME PATIENT: ABNORMAL H
FENTANYL UR QL SCN: NEGATIVE
GLOBULIN SER-MCNC: 3.2 G/DL (ref 2–4)
GLUCOSE SERPL-MCNC: 129 MG/DL (ref 70–99)
HCT VFR BLD CALC: 37.1 % (ref 39–51)
HGB BLD-MCNC: 12.4 G/DL (ref 13–17)
IMM GRANULOCYTES # BLD AUTO: 0 K/MCL (ref 0–0.2)
IMM GRANULOCYTES # BLD: 0 %
LYMPHOCYTES # BLD: 1.4 K/MCL (ref 1–4.8)
LYMPHOCYTES NFR BLD: 19 %
MCH RBC QN AUTO: 31.6 PG (ref 26–34)
MCHC RBC AUTO-ENTMCNC: 33.4 G/DL (ref 32–36.5)
MCV RBC AUTO: 94.6 FL (ref 78–100)
MONOCYTES # BLD: 0.8 K/MCL (ref 0.3–0.9)
MONOCYTES NFR BLD: 11 %
NEUTROPHILS # BLD: 4.9 K/MCL (ref 1.8–7.7)
NEUTROPHILS NFR BLD: 68 %
NRBC BLD MANUAL-RTO: 0 /100 WBC
OPIATES UR QL SCN>300 NG/ML: NEGATIVE
PCP UR QL SCN>25 NG/ML: NEGATIVE
PLATELET # BLD AUTO: 266 K/MCL (ref 140–450)
POTASSIUM SERPL-SCNC: 3.7 MMOL/L (ref 3.4–5.1)
PROT SERPL-MCNC: 6.7 G/DL (ref 6.4–8.2)
RBC # BLD: 3.92 MIL/MCL (ref 4.5–5.9)
SALICYLATES SERPL-MCNC: <2.8 MG/DL
SARS-COV-2 RNA RESP QL NAA+PROBE: NOT DETECTED
SERVICE CMNT-IMP: NORMAL
SERVICE CMNT-IMP: NORMAL
SODIUM SERPL-SCNC: 140 MMOL/L (ref 135–145)
WBC # BLD: 7.2 K/MCL (ref 4.2–11)

## 2024-02-13 PROCEDURE — 87635 SARS-COV-2 COVID-19 AMP PRB: CPT | Performed by: EMERGENCY MEDICINE

## 2024-02-13 PROCEDURE — 10004577 HB ROOM CHARGE PSYCH

## 2024-02-13 PROCEDURE — 80143 DRUG ASSAY ACETAMINOPHEN: CPT | Performed by: EMERGENCY MEDICINE

## 2024-02-13 PROCEDURE — 36415 COLL VENOUS BLD VENIPUNCTURE: CPT

## 2024-02-13 PROCEDURE — 10004651 HB RX, NO CHARGE ITEM: Performed by: EMERGENCY MEDICINE

## 2024-02-13 PROCEDURE — 80179 DRUG ASSAY SALICYLATE: CPT | Performed by: EMERGENCY MEDICINE

## 2024-02-13 PROCEDURE — 93010 ELECTROCARDIOGRAM REPORT: CPT | Performed by: INTERNAL MEDICINE

## 2024-02-13 PROCEDURE — 93005 ELECTROCARDIOGRAM TRACING: CPT | Performed by: EMERGENCY MEDICINE

## 2024-02-13 PROCEDURE — 84443 ASSAY THYROID STIM HORMONE: CPT | Performed by: PATHOLOGY

## 2024-02-13 PROCEDURE — 85025 COMPLETE CBC W/AUTO DIFF WBC: CPT | Performed by: EMERGENCY MEDICINE

## 2024-02-13 PROCEDURE — 80307 DRUG TEST PRSMV CHEM ANLYZR: CPT | Performed by: EMERGENCY MEDICINE

## 2024-02-13 PROCEDURE — 99283 EMERGENCY DEPT VISIT LOW MDM: CPT

## 2024-02-13 PROCEDURE — 80053 COMPREHEN METABOLIC PANEL: CPT | Performed by: EMERGENCY MEDICINE

## 2024-02-13 PROCEDURE — 82077 ASSAY SPEC XCP UR&BREATH IA: CPT | Performed by: EMERGENCY MEDICINE

## 2024-02-13 RX ORDER — MAGNESIUM HYDROXIDE/ALUMINUM HYDROXICE/SIMETHICONE 120; 1200; 1200 MG/30ML; MG/30ML; MG/30ML
30 SUSPENSION ORAL EVERY 4 HOURS PRN
Status: DISCONTINUED | OUTPATIENT
Start: 2024-02-13 | End: 2024-02-20 | Stop reason: HOSPADM

## 2024-02-13 RX ORDER — BENZTROPINE MESYLATE 1 MG/1
1 TABLET ORAL EVERY 8 HOURS PRN
Status: DISCONTINUED | OUTPATIENT
Start: 2024-02-13 | End: 2024-02-20 | Stop reason: HOSPADM

## 2024-02-13 RX ORDER — ACETAMINOPHEN 325 MG/1
650 TABLET ORAL EVERY 4 HOURS PRN
Status: DISCONTINUED | OUTPATIENT
Start: 2024-02-13 | End: 2024-02-20 | Stop reason: HOSPADM

## 2024-02-13 RX ORDER — LORAZEPAM 1 MG/1
2 TABLET ORAL EVERY 4 HOURS PRN
Status: DISCONTINUED | OUTPATIENT
Start: 2024-02-13 | End: 2024-02-20 | Stop reason: HOSPADM

## 2024-02-13 RX ORDER — ACETAMINOPHEN 325 MG/1
650 TABLET ORAL ONCE
Status: COMPLETED | OUTPATIENT
Start: 2024-02-13 | End: 2024-02-13

## 2024-02-13 RX ORDER — AMOXICILLIN 250 MG
2 CAPSULE ORAL 2 TIMES DAILY PRN
Status: DISCONTINUED | OUTPATIENT
Start: 2024-02-13 | End: 2024-02-20 | Stop reason: HOSPADM

## 2024-02-13 RX ORDER — HALOPERIDOL 5 MG/1
10 TABLET ORAL EVERY 6 HOURS PRN
Status: DISCONTINUED | OUTPATIENT
Start: 2024-02-13 | End: 2024-02-20 | Stop reason: HOSPADM

## 2024-02-13 RX ORDER — ONDANSETRON 4 MG/1
4 TABLET, ORALLY DISINTEGRATING ORAL 2 TIMES DAILY PRN
Status: DISCONTINUED | OUTPATIENT
Start: 2024-02-13 | End: 2024-02-20 | Stop reason: HOSPADM

## 2024-02-13 RX ORDER — LORAZEPAM 2 MG/ML
2 INJECTION INTRAMUSCULAR EVERY 4 HOURS PRN
Status: DISCONTINUED | OUTPATIENT
Start: 2024-02-13 | End: 2024-02-20 | Stop reason: HOSPADM

## 2024-02-13 RX ORDER — TRAZODONE HYDROCHLORIDE 50 MG/1
50 TABLET ORAL NIGHTLY PRN
Status: DISCONTINUED | OUTPATIENT
Start: 2024-02-13 | End: 2024-02-14

## 2024-02-13 RX ORDER — ATORVASTATIN CALCIUM 80 MG/1
80 TABLET, FILM COATED ORAL DAILY
Status: ON HOLD | COMMUNITY
Start: 2023-08-19 | End: 2024-02-14

## 2024-02-13 RX ORDER — HYDROXYZINE HYDROCHLORIDE 25 MG/1
50 TABLET, FILM COATED ORAL EVERY 4 HOURS PRN
Status: DISCONTINUED | OUTPATIENT
Start: 2024-02-13 | End: 2024-02-20 | Stop reason: HOSPADM

## 2024-02-13 RX ORDER — BENZTROPINE MESYLATE 1 MG/ML
1 INJECTION INTRAMUSCULAR; INTRAVENOUS EVERY 8 HOURS PRN
Status: DISCONTINUED | OUTPATIENT
Start: 2024-02-13 | End: 2024-02-20 | Stop reason: HOSPADM

## 2024-02-13 RX ORDER — HALOPERIDOL 5 MG/ML
10 INJECTION INTRAMUSCULAR EVERY 6 HOURS PRN
Status: DISCONTINUED | OUTPATIENT
Start: 2024-02-13 | End: 2024-02-20 | Stop reason: HOSPADM

## 2024-02-13 RX ADMIN — ACETAMINOPHEN 650 MG: 325 TABLET ORAL at 22:43

## 2024-02-13 ASSESSMENT — PAIN SCALES - GENERAL
PAINLEVEL_OUTOF10: 0
PAINLEVEL_OUTOF10: 0

## 2024-02-14 LAB
ATRIAL RATE (BPM): 75
CHOLEST SERPL-MCNC: 163 MG/DL
CHOLEST/HDLC SERPL: 4.1 {RATIO}
HDLC SERPL-MCNC: 40 MG/DL
LDLC SERPL CALC-MCNC: 101 MG/DL
NONHDLC SERPL-MCNC: 123 MG/DL
P AXIS (DEGREES): 49
PR-INTERVAL (MSEC): 142
QRS-INTERVAL (MSEC): 100
QT-INTERVAL (MSEC): 392
QTC: 438
R AXIS (DEGREES): 26
REPORT TEXT: NORMAL
T AXIS (DEGREES): 52
TRIGL SERPL-MCNC: 109 MG/DL
TSH SERPL-ACNC: 1.41 MCUNITS/ML (ref 0.35–5)
VENTRICULAR RATE EKG/MIN (BPM): 75

## 2024-02-14 PROCEDURE — 13003244 HB ROOM CHARGE INTENSIVE PSYCH

## 2024-02-14 PROCEDURE — 10002800 HB RX 250 W HCPCS: Performed by: PATHOLOGY

## 2024-02-14 PROCEDURE — 96372 THER/PROPH/DIAG INJ SC/IM: CPT | Performed by: PATHOLOGY

## 2024-02-14 PROCEDURE — 90792 PSYCH DIAG EVAL W/MED SRVCS: CPT | Performed by: PATHOLOGY

## 2024-02-14 PROCEDURE — 36415 COLL VENOUS BLD VENIPUNCTURE: CPT | Performed by: PATHOLOGY

## 2024-02-14 PROCEDURE — 10002803 HB RX 637: Performed by: PATHOLOGY

## 2024-02-14 PROCEDURE — 10004651 HB RX, NO CHARGE ITEM: Performed by: PATHOLOGY

## 2024-02-14 PROCEDURE — 10002803 HB RX 637

## 2024-02-14 PROCEDURE — 80061 LIPID PANEL: CPT | Performed by: PATHOLOGY

## 2024-02-14 RX ORDER — OLANZAPINE 5 MG/1
5 TABLET ORAL NIGHTLY
Status: ON HOLD | COMMUNITY
End: 2024-02-20 | Stop reason: HOSPADM

## 2024-02-14 RX ORDER — CLONAZEPAM 1 MG/1
1 TABLET ORAL PRN
Status: ON HOLD | COMMUNITY
End: 2024-02-20

## 2024-02-14 RX ORDER — BUPROPION HYDROCHLORIDE 150 MG/1
150 TABLET ORAL DAILY
Status: ON HOLD | COMMUNITY
End: 2024-02-20 | Stop reason: HOSPADM

## 2024-02-14 RX ORDER — AMOXICILLIN 250 MG
1 CAPSULE ORAL PRN
Status: ON HOLD | COMMUNITY

## 2024-02-14 RX ORDER — IBUPROFEN 400 MG/1
600 TABLET ORAL EVERY 6 HOURS PRN
Status: DISCONTINUED | OUTPATIENT
Start: 2024-02-14 | End: 2024-02-20 | Stop reason: HOSPADM

## 2024-02-14 RX ORDER — DIVALPROEX SODIUM 500 MG/1
500 TABLET, DELAYED RELEASE ORAL 2 TIMES DAILY
Status: ON HOLD | COMMUNITY
End: 2024-02-20 | Stop reason: HOSPADM

## 2024-02-14 RX ORDER — TRAZODONE HYDROCHLORIDE 100 MG/1
100 TABLET ORAL NIGHTLY
Status: DISCONTINUED | OUTPATIENT
Start: 2024-02-14 | End: 2024-02-20 | Stop reason: HOSPADM

## 2024-02-14 RX ORDER — OXCARBAZEPINE 150 MG/1
300 TABLET, FILM COATED ORAL 2 TIMES DAILY
Status: DISCONTINUED | OUTPATIENT
Start: 2024-02-14 | End: 2024-02-20 | Stop reason: HOSPADM

## 2024-02-14 RX ORDER — ATORVASTATIN CALCIUM 20 MG/1
80 TABLET, FILM COATED ORAL DAILY
Status: DISCONTINUED | OUTPATIENT
Start: 2024-02-14 | End: 2024-02-14

## 2024-02-14 RX ORDER — HALOPERIDOL 5 MG/1
TABLET ORAL
Status: COMPLETED
Start: 2024-02-14 | End: 2024-02-14

## 2024-02-14 RX ORDER — QUETIAPINE FUMARATE 100 MG/1
100 TABLET, FILM COATED ORAL NIGHTLY
Status: DISCONTINUED | OUTPATIENT
Start: 2024-02-14 | End: 2024-02-20 | Stop reason: HOSPADM

## 2024-02-14 RX ORDER — CLONAZEPAM 0.5 MG/1
0.5 TABLET ORAL 2 TIMES DAILY
Status: DISCONTINUED | OUTPATIENT
Start: 2024-02-14 | End: 2024-02-15

## 2024-02-14 RX ORDER — NICOTINE 21 MG/24HR
1 PATCH, TRANSDERMAL 24 HOURS TRANSDERMAL DAILY
Status: DISCONTINUED | OUTPATIENT
Start: 2024-02-14 | End: 2024-02-20 | Stop reason: HOSPADM

## 2024-02-14 RX ADMIN — LORAZEPAM 2 MG: 2 INJECTION INTRAMUSCULAR; INTRAVENOUS at 18:26

## 2024-02-14 RX ADMIN — CLONAZEPAM 0.5 MG: 0.5 TABLET ORAL at 21:04

## 2024-02-14 RX ADMIN — HALOPERIDOL 10 MG: 5 TABLET ORAL at 00:27

## 2024-02-14 RX ADMIN — TRAZODONE HYDROCHLORIDE 100 MG: 100 TABLET ORAL at 21:04

## 2024-02-14 RX ADMIN — HALOPERIDOL 10 MG: 5 TABLET ORAL at 07:35

## 2024-02-14 RX ADMIN — NICOTINE POLACRILEX 2 MG: 2 GUM, CHEWING BUCCAL at 08:41

## 2024-02-14 RX ADMIN — NICOTINE POLACRILEX 2 MG: 2 GUM, CHEWING BUCCAL at 16:04

## 2024-02-14 RX ADMIN — OXCARBAZEPINE 300 MG: 150 TABLET, FILM COATED ORAL at 21:04

## 2024-02-14 RX ADMIN — ACETAMINOPHEN 650 MG: 325 TABLET ORAL at 02:19

## 2024-02-14 RX ADMIN — ACETAMINOPHEN 650 MG: 325 TABLET ORAL at 21:30

## 2024-02-14 RX ADMIN — NICOTINE POLACRILEX 2 MG: 2 GUM, CHEWING BUCCAL at 17:58

## 2024-02-14 RX ADMIN — BENZTROPINE MESYLATE 1 MG: 1 TABLET ORAL at 00:29

## 2024-02-14 RX ADMIN — LORAZEPAM 2 MG: 1 TABLET ORAL at 01:50

## 2024-02-14 RX ADMIN — HALOPERIDOL LACTATE 10 MG: 5 INJECTION, SOLUTION INTRAMUSCULAR at 18:26

## 2024-02-14 RX ADMIN — NICOTINE POLACRILEX 2 MG: 2 GUM, CHEWING BUCCAL at 14:13

## 2024-02-14 RX ADMIN — ACETAMINOPHEN 650 MG: 325 TABLET ORAL at 11:52

## 2024-02-14 RX ADMIN — Medication 1 PATCH: at 16:03

## 2024-02-14 RX ADMIN — QUETIAPINE FUMARATE 100 MG: 100 TABLET ORAL at 21:04

## 2024-02-14 RX ADMIN — ACETAMINOPHEN 650 MG: 325 TABLET ORAL at 17:34

## 2024-02-14 RX ADMIN — LORAZEPAM 2 MG: 1 TABLET ORAL at 07:35

## 2024-02-14 SDOH — ECONOMIC STABILITY: HOUSING INSECURITY: DO YOU HAVE PROBLEMS WITH ANY OF THE FOLLOWING?: NONE OF THE ABOVE

## 2024-02-14 SDOH — SOCIAL STABILITY: SOCIAL INSECURITY: HOW OFTEN DOES ANYONE, INCLUDING FAMILY AND FRIENDS, THREATEN YOU WITH HARM?: NEVER

## 2024-02-14 SDOH — SOCIAL STABILITY: SOCIAL INSECURITY: HOW OFTEN DOES ANYONE, INCLUDING FAMILY AND FRIENDS, PHYSICALLY HURT YOU?: NEVER

## 2024-02-14 SDOH — ECONOMIC STABILITY: HOUSING INSECURITY: WHAT IS YOUR LIVING SITUATION TODAY?: I HAVE A STEADY PLACE TO LIVE

## 2024-02-14 SDOH — SOCIAL STABILITY: SOCIAL NETWORK
HOW OFTEN DO YOU SEE OR TALK TO PEOPLE THAT YOU CARE ABOUT AND FEEL CLOSE TO? (FOR EXAMPLE: TALKING TO FRIENDS ON THE PHONE, VISITING FRIENDS OR FAMILY, GOING TO CHURCH OR CLUB MEETINGS): 1 OR 2 TIMES A WEEK

## 2024-02-14 SDOH — SOCIAL STABILITY: SOCIAL INSECURITY: HOW OFTEN DOES ANYONE, INCLUDING FAMILY AND FRIENDS, SCREAM OR CURSE AT YOU?: NEVER

## 2024-02-14 SDOH — ECONOMIC STABILITY: FOOD INSECURITY: WITHIN THE PAST 12 MONTHS, THE FOOD YOU BOUGHT JUST DIDN'T LAST AND YOU DIDN'T HAVE MONEY TO GET MORE.: NEVER TRUE

## 2024-02-14 SDOH — SOCIAL STABILITY: SOCIAL INSECURITY: HOW OFTEN DOES ANYONE, INCLUDING FAMILY AND FRIENDS, INSULT OR TALK DOWN TO YOU?: NEVER

## 2024-02-14 SDOH — ECONOMIC STABILITY: GENERAL

## 2024-02-14 SDOH — ECONOMIC STABILITY: TRANSPORTATION INSECURITY
IN THE PAST 12 MONTHS, HAS LACK OF RELIABLE TRANSPORTATION KEPT YOU FROM MEDICAL APPOINTMENTS, MEETINGS, WORK OR FROM GETTING THINGS NEEDED FOR DAILY LIVING?: NO

## 2024-02-14 SDOH — ECONOMIC STABILITY: GENERAL: WOULD YOU LIKE HELP WITH ANY OF THE FOLLOWING NEEDS?: I DON'T WANT HELP WITH ANY OF THESE

## 2024-02-14 ASSESSMENT — ENCOUNTER SYMPTOMS
SHORTNESS OF BREATH: 0
VOMITING: 0
ADENOPATHY: 0
PHOTOPHOBIA: 0
HEADACHES: 0
WEAKNESS: 0
HALLUCINATIONS: 0
SORE THROAT: 0
FEVER: 0
ABDOMINAL PAIN: 0
SINUS PRESSURE: 0
DIARRHEA: 0
WHEEZING: 0
FATIGUE: 0
RHINORRHEA: 0
CHILLS: 0
NAUSEA: 0
NUMBNESS: 0

## 2024-02-14 ASSESSMENT — LIFESTYLE VARIABLES
TOBACCO_USE_STATUS_IN_THE_LAST_30_DAYS: USED TOBACCO IN THE LAST 30 DAYS
AMOUNT_OF_TOBACCO_USED: FOUR OR FEWER CIGARETTES AND/OR SMOKELESS TOBACCO AND/OR SMOKED TOBACCO BUT NOT DAILY
ARE YOU DEAF OR DO YOU HAVE SERIOUS DIFFICULTY  HEARING: NO
RECENT DECLINE IN ADLS: NO
OPIATES USE: DENIES
ADL BEFORE ADMISSION: INDEPENDENT
COCAINE USE: DENIES
AUDIT-C TOTAL SCORE: 0
HOW MANY STANDARD DRINKS CONTAINING ALCOHOL DO YOU HAVE ON A TYPICAL DAY: 0,1 OR 2
ALCOHOL_USE_STATUS: NO OR LOW RISK WITH VALIDATED TOOL
HOW MANY CIGARETTES HAVE YOU SMOKED PER DAY ON AVERAGE?: DENIES
ADL NEEDS ASSIST: NO
ALCOHOL_USE: DENIES
ALCOHOL_USE_STATUS: NO OR LOW RISK WITH VALIDATED TOOL
HOW OFTEN DO YOU HAVE 6 OR MORE DRINKS ON ONE OCCASION: NEVER
HAVE YOU EVER BEEN EXPOSED TO OTHER VERY DISTURBING, TRAUMATIC OR ANXIETY PROVOKING EVENTS IN YOUR LIFETIME?: NO
TYPE_OF_TOBACCO_USED: CIGARS OR PIPE
HAVE YOU EVER BEEN VERBALLY, EMOTIONALLY, PHYSICALLY OR SEXUALLY ABUSED, OR ABUSED VIA SOCIAL MEDIA?: NO
ARE YOU BLIND OR DO YOU HAVE SERIOUS DIFFICULTY SEEING, EVEN WHEN WEARING GLASSES: NO
AMPHETAMINES/STIMULANTS USE: DENIES
HOW OFTEN DO YOU HAVE A DRINK CONTAINING ALCOHOL: NEVER

## 2024-02-14 ASSESSMENT — PAIN SCALES - GENERAL
PAINLEVEL_OUTOF10: 0
PAINLEVEL_OUTOF10: 7
PAINLEVEL_OUTOF10: 2
PAINLEVEL_OUTOF10: 0
PAINLEVEL_OUTOF10: 7
PAINLEVEL_OUTOF10: 6
PAINLEVEL_OUTOF10: 7
PAINLEVEL_OUTOF10: 4
PAINLEVEL_OUTOF10: 3
PAINLEVEL_OUTOF10: 5

## 2024-02-14 ASSESSMENT — ACTIVITIES OF DAILY LIVING (ADL): ADL_SCORE: 12

## 2024-02-14 ASSESSMENT — PATIENT HEALTH QUESTIONNAIRE - PHQ9
SUM OF ALL RESPONSES TO PHQ9 QUESTIONS 1 AND 2: 0
2. FEELING DOWN, DEPRESSED OR HOPELESS: NOT AT ALL
CLINICAL INTERPRETATION OF PHQ2 SCORE: NO FURTHER SCREENING NEEDED
1. LITTLE INTEREST OR PLEASURE IN DOING THINGS: NOT AT ALL
SUM OF ALL RESPONSES TO PHQ9 QUESTIONS 1 AND 2: 0
IS PATIENT ABLE TO COMPLETE PHQ2 OR PHQ9: YES

## 2024-02-15 PROCEDURE — 80183 DRUG SCRN QUANT OXCARBAZEPIN: CPT | Performed by: PATHOLOGY

## 2024-02-15 PROCEDURE — 36415 COLL VENOUS BLD VENIPUNCTURE: CPT | Performed by: PATHOLOGY

## 2024-02-15 PROCEDURE — 99233 SBSQ HOSP IP/OBS HIGH 50: CPT | Performed by: PATHOLOGY

## 2024-02-15 PROCEDURE — 10004651 HB RX, NO CHARGE ITEM: Performed by: PATHOLOGY

## 2024-02-15 PROCEDURE — 10002803 HB RX 637: Performed by: PATHOLOGY

## 2024-02-15 PROCEDURE — 13003244 HB ROOM CHARGE INTENSIVE PSYCH

## 2024-02-15 RX ORDER — CLONAZEPAM 0.5 MG/1
0.5 TABLET ORAL DAILY PRN
Status: DISCONTINUED | OUTPATIENT
Start: 2024-02-15 | End: 2024-02-20 | Stop reason: HOSPADM

## 2024-02-15 RX ADMIN — IBUPROFEN 600 MG: 400 TABLET ORAL at 08:38

## 2024-02-15 RX ADMIN — NICOTINE POLACRILEX 2 MG: 2 GUM, CHEWING BUCCAL at 10:26

## 2024-02-15 RX ADMIN — Medication 1 PATCH: at 08:02

## 2024-02-15 RX ADMIN — HYDROXYZINE HYDROCHLORIDE 50 MG: 25 TABLET ORAL at 10:07

## 2024-02-15 RX ADMIN — HYDROXYZINE HYDROCHLORIDE 50 MG: 25 TABLET ORAL at 19:40

## 2024-02-15 RX ADMIN — QUETIAPINE FUMARATE 100 MG: 100 TABLET ORAL at 21:12

## 2024-02-15 RX ADMIN — ACETAMINOPHEN 650 MG: 325 TABLET ORAL at 16:03

## 2024-02-15 RX ADMIN — NICOTINE POLACRILEX 2 MG: 2 GUM, CHEWING BUCCAL at 17:53

## 2024-02-15 RX ADMIN — CLONAZEPAM 0.5 MG: 0.5 TABLET ORAL at 08:00

## 2024-02-15 RX ADMIN — HYDROXYZINE HYDROCHLORIDE 50 MG: 25 TABLET ORAL at 14:08

## 2024-02-15 RX ADMIN — TRAZODONE HYDROCHLORIDE 100 MG: 100 TABLET ORAL at 21:12

## 2024-02-15 RX ADMIN — ALUMINUM HYDROXIDE, MAGNESIUM HYDROXIDE, DIMETHICONE 30 ML: 200; 200; 20 LIQUID ORAL at 16:04

## 2024-02-15 RX ADMIN — OXCARBAZEPINE 300 MG: 150 TABLET, FILM COATED ORAL at 08:00

## 2024-02-15 RX ADMIN — BENZTROPINE MESYLATE 1 MG: 1 TABLET ORAL at 01:37

## 2024-02-15 RX ADMIN — IBUPROFEN 600 MG: 400 TABLET ORAL at 01:37

## 2024-02-15 RX ADMIN — HALOPERIDOL 10 MG: 5 TABLET ORAL at 01:37

## 2024-02-15 RX ADMIN — ACETAMINOPHEN 650 MG: 325 TABLET ORAL at 06:46

## 2024-02-15 RX ADMIN — OXCARBAZEPINE 300 MG: 150 TABLET, FILM COATED ORAL at 21:12

## 2024-02-15 RX ADMIN — NICOTINE POLACRILEX 2 MG: 2 GUM, CHEWING BUCCAL at 16:04

## 2024-02-15 RX ADMIN — IBUPROFEN 600 MG: 400 TABLET ORAL at 19:40

## 2024-02-15 RX ADMIN — NICOTINE POLACRILEX 2 MG: 2 GUM, CHEWING BUCCAL at 06:17

## 2024-02-15 ASSESSMENT — PAIN SCALES - GENERAL
PAINLEVEL_OUTOF10: 7
PAINLEVEL_OUTOF10: 0
PAINLEVEL_OUTOF10: 2
PAINLEVEL_OUTOF10: 0
PAINLEVEL_OUTOF10: 7
PAINLEVEL_OUTOF10: 0
PAINLEVEL_OUTOF10: 6
PAINLEVEL_OUTOF10: 6

## 2024-02-16 PROCEDURE — 13003244 HB ROOM CHARGE INTENSIVE PSYCH

## 2024-02-16 PROCEDURE — 99232 SBSQ HOSP IP/OBS MODERATE 35: CPT | Performed by: PATHOLOGY

## 2024-02-16 PROCEDURE — 10002803 HB RX 637: Performed by: PATHOLOGY

## 2024-02-16 PROCEDURE — 10004651 HB RX, NO CHARGE ITEM: Performed by: PATHOLOGY

## 2024-02-16 RX ADMIN — QUETIAPINE FUMARATE 100 MG: 100 TABLET ORAL at 21:09

## 2024-02-16 RX ADMIN — ACETAMINOPHEN 650 MG: 325 TABLET ORAL at 19:47

## 2024-02-16 RX ADMIN — TRAZODONE HYDROCHLORIDE 100 MG: 100 TABLET ORAL at 21:09

## 2024-02-16 RX ADMIN — ACETAMINOPHEN 650 MG: 325 TABLET ORAL at 09:55

## 2024-02-16 RX ADMIN — MAGNESIUM HYDROXIDE 30 ML: 400 SUSPENSION ORAL at 18:40

## 2024-02-16 RX ADMIN — HALOPERIDOL 10 MG: 5 TABLET ORAL at 05:58

## 2024-02-16 RX ADMIN — OXCARBAZEPINE 300 MG: 150 TABLET, FILM COATED ORAL at 08:37

## 2024-02-16 RX ADMIN — HYDROXYZINE HYDROCHLORIDE 50 MG: 25 TABLET ORAL at 04:14

## 2024-02-16 RX ADMIN — CLONAZEPAM 0.5 MG: 0.5 TABLET ORAL at 00:38

## 2024-02-16 RX ADMIN — HYDROXYZINE HYDROCHLORIDE 50 MG: 25 TABLET ORAL at 13:18

## 2024-02-16 RX ADMIN — ACETAMINOPHEN 650 MG: 325 TABLET ORAL at 04:14

## 2024-02-16 RX ADMIN — ACETAMINOPHEN 650 MG: 325 TABLET ORAL at 23:36

## 2024-02-16 RX ADMIN — OXCARBAZEPINE 300 MG: 150 TABLET, FILM COATED ORAL at 21:09

## 2024-02-16 RX ADMIN — NICOTINE POLACRILEX 2 MG: 2 GUM, CHEWING BUCCAL at 16:43

## 2024-02-16 RX ADMIN — HYDROXYZINE HYDROCHLORIDE 50 MG: 25 TABLET ORAL at 17:32

## 2024-02-16 RX ADMIN — NICOTINE POLACRILEX 2 MG: 2 GUM, CHEWING BUCCAL at 12:56

## 2024-02-16 RX ADMIN — NICOTINE POLACRILEX 2 MG: 2 GUM, CHEWING BUCCAL at 09:56

## 2024-02-16 RX ADMIN — Medication 1 PATCH: at 08:37

## 2024-02-16 ASSESSMENT — PAIN SCALES - GENERAL
PAINLEVEL_OUTOF10: 6
PAINLEVEL_OUTOF10: 2
PAINLEVEL_OUTOF10: 0
PAINLEVEL_OUTOF10: 6
PAINLEVEL_OUTOF10: 8
PAINLEVEL_OUTOF10: 5

## 2024-02-17 VITALS
HEART RATE: 66 BPM | RESPIRATION RATE: 16 BRPM | WEIGHT: 267.2 LBS | HEIGHT: 74 IN | DIASTOLIC BLOOD PRESSURE: 69 MMHG | TEMPERATURE: 97.7 F | OXYGEN SATURATION: 98 % | SYSTOLIC BLOOD PRESSURE: 118 MMHG | BODY MASS INDEX: 34.29 KG/M2

## 2024-02-17 PROCEDURE — 10002803 HB RX 637: Performed by: PATHOLOGY

## 2024-02-17 PROCEDURE — 99232 SBSQ HOSP IP/OBS MODERATE 35: CPT | Performed by: PATHOLOGY

## 2024-02-17 PROCEDURE — 10004577 HB ROOM CHARGE PSYCH

## 2024-02-17 PROCEDURE — 10004651 HB RX, NO CHARGE ITEM: Performed by: PATHOLOGY

## 2024-02-17 RX ADMIN — HALOPERIDOL 10 MG: 5 TABLET ORAL at 04:29

## 2024-02-17 RX ADMIN — IBUPROFEN 600 MG: 400 TABLET ORAL at 01:25

## 2024-02-17 RX ADMIN — Medication 1 PATCH: at 08:52

## 2024-02-17 RX ADMIN — NICOTINE POLACRILEX 2 MG: 2 GUM, CHEWING BUCCAL at 14:11

## 2024-02-17 RX ADMIN — HYDROXYZINE HYDROCHLORIDE 50 MG: 25 TABLET ORAL at 01:25

## 2024-02-17 RX ADMIN — NICOTINE POLACRILEX 2 MG: 2 GUM, CHEWING BUCCAL at 18:05

## 2024-02-17 RX ADMIN — HALOPERIDOL 5 MG: 5 TABLET ORAL at 12:30

## 2024-02-17 RX ADMIN — OXCARBAZEPINE 300 MG: 150 TABLET, FILM COATED ORAL at 21:17

## 2024-02-17 RX ADMIN — IBUPROFEN 600 MG: 400 TABLET ORAL at 18:05

## 2024-02-17 RX ADMIN — NICOTINE POLACRILEX 2 MG: 2 GUM, CHEWING BUCCAL at 09:57

## 2024-02-17 RX ADMIN — OXCARBAZEPINE 300 MG: 150 TABLET, FILM COATED ORAL at 08:52

## 2024-02-17 RX ADMIN — QUETIAPINE FUMARATE 100 MG: 100 TABLET ORAL at 21:17

## 2024-02-17 RX ADMIN — ACETAMINOPHEN 650 MG: 325 TABLET ORAL at 12:30

## 2024-02-17 RX ADMIN — TRAZODONE HYDROCHLORIDE 100 MG: 100 TABLET ORAL at 21:17

## 2024-02-17 RX ADMIN — ACETAMINOPHEN 650 MG: 325 TABLET ORAL at 23:52

## 2024-02-17 ASSESSMENT — PAIN SCALES - GENERAL
PAINLEVEL_OUTOF10: 0
PAINLEVEL_OUTOF10: 0
PAINLEVEL_OUTOF10: 2
PAINLEVEL_OUTOF10: 1
PAINLEVEL_OUTOF10: 0
PAINLEVEL_OUTOF10: 0
PAINLEVEL_OUTOF10: 7
PAINLEVEL_OUTOF10: 6
PAINLEVEL_OUTOF10: 6

## 2024-02-17 ASSESSMENT — PAIN SCALES - WONG BAKER: WONGBAKER_NUMERICALRESPONSE: 7

## 2024-02-18 PROCEDURE — 10004577 HB ROOM CHARGE PSYCH

## 2024-02-18 PROCEDURE — 99232 SBSQ HOSP IP/OBS MODERATE 35: CPT | Performed by: PATHOLOGY

## 2024-02-18 PROCEDURE — 10004651 HB RX, NO CHARGE ITEM: Performed by: PATHOLOGY

## 2024-02-18 PROCEDURE — 10002803 HB RX 637: Performed by: HOSPITALIST

## 2024-02-18 PROCEDURE — 10002803 HB RX 637: Performed by: PATHOLOGY

## 2024-02-18 RX ORDER — CARBOXYMETHYLCELLULOSE SODIUM 5 MG/ML
1 SOLUTION/ DROPS OPHTHALMIC 3 TIMES DAILY PRN
Status: DISCONTINUED | OUTPATIENT
Start: 2024-02-18 | End: 2024-02-20 | Stop reason: HOSPADM

## 2024-02-18 RX ADMIN — IBUPROFEN 600 MG: 400 TABLET ORAL at 11:18

## 2024-02-18 RX ADMIN — HALOPERIDOL 10 MG: 5 TABLET ORAL at 22:21

## 2024-02-18 RX ADMIN — HYDROXYZINE HYDROCHLORIDE 50 MG: 25 TABLET ORAL at 04:23

## 2024-02-18 RX ADMIN — Medication 1 PATCH: at 07:37

## 2024-02-18 RX ADMIN — MAGNESIUM HYDROXIDE 30 ML: 400 SUSPENSION ORAL at 05:48

## 2024-02-18 RX ADMIN — OXCARBAZEPINE 300 MG: 150 TABLET, FILM COATED ORAL at 21:16

## 2024-02-18 RX ADMIN — NICOTINE POLACRILEX 2 MG: 2 GUM, CHEWING BUCCAL at 18:01

## 2024-02-18 RX ADMIN — QUETIAPINE FUMARATE 100 MG: 100 TABLET ORAL at 21:16

## 2024-02-18 RX ADMIN — ACETAMINOPHEN 650 MG: 325 TABLET ORAL at 04:10

## 2024-02-18 RX ADMIN — TRAZODONE HYDROCHLORIDE 100 MG: 100 TABLET ORAL at 21:16

## 2024-02-18 RX ADMIN — NICOTINE POLACRILEX 2 MG: 2 GUM, CHEWING BUCCAL at 14:25

## 2024-02-18 RX ADMIN — NICOTINE POLACRILEX 2 MG: 2 GUM, CHEWING BUCCAL at 19:51

## 2024-02-18 RX ADMIN — NICOTINE POLACRILEX 2 MG: 2 GUM, CHEWING BUCCAL at 10:05

## 2024-02-18 RX ADMIN — HYDROXYZINE HYDROCHLORIDE 50 MG: 25 TABLET ORAL at 11:53

## 2024-02-18 RX ADMIN — CARBOXYMETHYLCELLULOSE SODIUM 1 DROP: 5 SOLUTION/ DROPS OPHTHALMIC at 17:11

## 2024-02-18 RX ADMIN — OXCARBAZEPINE 300 MG: 150 TABLET, FILM COATED ORAL at 07:38

## 2024-02-18 RX ADMIN — NICOTINE POLACRILEX 2 MG: 2 GUM, CHEWING BUCCAL at 07:38

## 2024-02-18 ASSESSMENT — PAIN SCALES - WONG BAKER
WONGBAKER_NUMERICALRESPONSE: 2
WONGBAKER_NUMERICALRESPONSE: 4

## 2024-02-18 ASSESSMENT — PAIN SCALES - GENERAL
PAINLEVEL_OUTOF10: 6
PAINLEVEL_OUTOF10: 5
PAINLEVEL_OUTOF10: 2
PAINLEVEL_OUTOF10: 4
PAINLEVEL_OUTOF10: 0
PAINLEVEL_OUTOF10: 0

## 2024-02-19 LAB — 10OH-CARBAZEPINE SERPL-MCNC: 4 UG/ML (ref 3–35)

## 2024-02-19 PROCEDURE — 99232 SBSQ HOSP IP/OBS MODERATE 35: CPT | Performed by: PATHOLOGY

## 2024-02-19 PROCEDURE — 10004577 HB ROOM CHARGE PSYCH

## 2024-02-19 PROCEDURE — 10002803 HB RX 637: Performed by: PATHOLOGY

## 2024-02-19 PROCEDURE — 10004651 HB RX, NO CHARGE ITEM: Performed by: PATHOLOGY

## 2024-02-19 RX ADMIN — Medication 1 PATCH: at 14:33

## 2024-02-19 RX ADMIN — TRAZODONE HYDROCHLORIDE 100 MG: 100 TABLET ORAL at 21:39

## 2024-02-19 RX ADMIN — IBUPROFEN 600 MG: 400 TABLET ORAL at 09:06

## 2024-02-19 RX ADMIN — CLONAZEPAM 0.5 MG: 0.5 TABLET ORAL at 06:05

## 2024-02-19 RX ADMIN — NICOTINE POLACRILEX 2 MG: 2 GUM, CHEWING BUCCAL at 09:06

## 2024-02-19 RX ADMIN — ACETAMINOPHEN 650 MG: 325 TABLET ORAL at 05:02

## 2024-02-19 RX ADMIN — QUETIAPINE FUMARATE 100 MG: 100 TABLET ORAL at 21:39

## 2024-02-19 RX ADMIN — IBUPROFEN 600 MG: 400 TABLET ORAL at 18:18

## 2024-02-19 RX ADMIN — HYDROXYZINE HYDROCHLORIDE 50 MG: 25 TABLET ORAL at 16:03

## 2024-02-19 RX ADMIN — QUETIAPINE FUMARATE 100 MG: 100 TABLET ORAL at 21:10

## 2024-02-19 RX ADMIN — OXCARBAZEPINE 300 MG: 150 TABLET, FILM COATED ORAL at 08:39

## 2024-02-19 RX ADMIN — NICOTINE POLACRILEX 2 MG: 2 GUM, CHEWING BUCCAL at 18:15

## 2024-02-19 RX ADMIN — NICOTINE POLACRILEX 2 MG: 2 GUM, CHEWING BUCCAL at 12:46

## 2024-02-19 RX ADMIN — OXCARBAZEPINE 300 MG: 150 TABLET, FILM COATED ORAL at 21:10

## 2024-02-19 ASSESSMENT — PAIN SCALES - PAIN ASSESSMENT IN ADVANCED DEMENTIA (PAINAD)
BREATHING: NORMAL

## 2024-02-19 ASSESSMENT — PAIN SCALES - GENERAL
PAINLEVEL_OUTOF10: 5
PAINLEVEL_OUTOF10: 4
PAINLEVEL_OUTOF10: 0
PAINLEVEL_OUTOF10: 6
PAINLEVEL_OUTOF10: 6

## 2024-02-20 VITALS
WEIGHT: 267.2 LBS | TEMPERATURE: 97.9 F | SYSTOLIC BLOOD PRESSURE: 136 MMHG | DIASTOLIC BLOOD PRESSURE: 72 MMHG | HEART RATE: 70 BPM | BODY MASS INDEX: 34.29 KG/M2 | OXYGEN SATURATION: 99 % | HEIGHT: 74 IN | RESPIRATION RATE: 16 BRPM

## 2024-02-20 PROCEDURE — 10004651 HB RX, NO CHARGE ITEM: Performed by: PATHOLOGY

## 2024-02-20 PROCEDURE — 99238 HOSP IP/OBS DSCHRG MGMT 30/<: CPT | Performed by: PATHOLOGY

## 2024-02-20 PROCEDURE — 10002803 HB RX 637: Performed by: PATHOLOGY

## 2024-02-20 RX ORDER — NICOTINE 21 MG/24HR
1 PATCH, TRANSDERMAL 24 HOURS TRANSDERMAL DAILY
Qty: 30 PATCH | Refills: 0 | Status: ON HOLD | OUTPATIENT
Start: 2024-02-21

## 2024-02-20 RX ORDER — TRAZODONE HYDROCHLORIDE 100 MG/1
100 TABLET ORAL NIGHTLY
Qty: 30 TABLET | Refills: 0 | Status: ON HOLD | OUTPATIENT
Start: 2024-02-20

## 2024-02-20 RX ORDER — QUETIAPINE FUMARATE 100 MG/1
100 TABLET, FILM COATED ORAL NIGHTLY
Qty: 30 TABLET | Refills: 0 | Status: ON HOLD | OUTPATIENT
Start: 2024-02-20

## 2024-02-20 RX ORDER — CLONAZEPAM 0.5 MG/1
0.5 TABLET ORAL DAILY PRN
Qty: 30 TABLET | Refills: 0 | Status: ON HOLD
Start: 2024-02-20

## 2024-02-20 RX ORDER — OXCARBAZEPINE 300 MG/1
300 TABLET, FILM COATED ORAL 2 TIMES DAILY
Qty: 60 TABLET | Refills: 0 | Status: ON HOLD | OUTPATIENT
Start: 2024-02-20

## 2024-02-20 RX ORDER — HYDROXYZINE 50 MG/1
50 TABLET, FILM COATED ORAL 2 TIMES DAILY PRN
Qty: 60 TABLET | Refills: 0 | Status: ON HOLD | OUTPATIENT
Start: 2024-02-20

## 2024-02-20 RX ORDER — CLONAZEPAM 0.5 MG/1
0.5 TABLET ORAL PRN
Qty: 30 TABLET | Refills: 0 | Status: SHIPPED | OUTPATIENT
Start: 2024-02-20 | End: 2024-02-20

## 2024-02-20 RX ADMIN — ACETAMINOPHEN 650 MG: 325 TABLET ORAL at 03:44

## 2024-02-20 RX ADMIN — NICOTINE POLACRILEX 2 MG: 2 GUM, CHEWING BUCCAL at 12:31

## 2024-02-20 RX ADMIN — NICOTINE POLACRILEX 2 MG: 2 GUM, CHEWING BUCCAL at 09:55

## 2024-02-20 RX ADMIN — Medication 1 PATCH: at 08:34

## 2024-02-20 RX ADMIN — IBUPROFEN 600 MG: 400 TABLET ORAL at 09:55

## 2024-02-20 RX ADMIN — CLONAZEPAM 0.5 MG: 0.5 TABLET ORAL at 01:51

## 2024-02-20 RX ADMIN — OXCARBAZEPINE 300 MG: 150 TABLET, FILM COATED ORAL at 08:34

## 2024-02-20 RX ADMIN — CARBOXYMETHYLCELLULOSE SODIUM 1 DROP: 5 SOLUTION/ DROPS OPHTHALMIC at 02:27

## 2024-02-20 RX ADMIN — IBUPROFEN 600 MG: 400 TABLET ORAL at 01:29

## 2024-02-20 ASSESSMENT — PAIN SCALES - GENERAL
PAINLEVEL_OUTOF10: 2
PAINLEVEL_OUTOF10: 4
PAINLEVEL_OUTOF10: 0
PAINLEVEL_OUTOF10: 0
PAINLEVEL_OUTOF10: 4
PAINLEVEL_OUTOF10: 3
PAINLEVEL_OUTOF10: 0

## 2024-02-20 ASSESSMENT — PAIN SCALES - PAIN ASSESSMENT IN ADVANCED DEMENTIA (PAINAD)
BREATHING: NORMAL
BREATHING: NORMAL

## 2024-02-24 ENCOUNTER — HOSPITAL ENCOUNTER (INPATIENT)
Age: 49
DRG: 885 | End: 2024-02-24
Attending: EMERGENCY MEDICINE | Admitting: PSYCHIATRY & NEUROLOGY

## 2024-02-24 VITALS
RESPIRATION RATE: 16 BRPM | BODY MASS INDEX: 32.4 KG/M2 | WEIGHT: 252.43 LBS | HEART RATE: 89 BPM | DIASTOLIC BLOOD PRESSURE: 77 MMHG | HEIGHT: 74 IN | OXYGEN SATURATION: 100 % | SYSTOLIC BLOOD PRESSURE: 150 MMHG | TEMPERATURE: 98.2 F

## 2024-02-24 DIAGNOSIS — F25.9 SCHIZOAFFECTIVE DISORDER, UNSPECIFIED TYPE (CMD): ICD-10-CM

## 2024-02-24 DIAGNOSIS — F20.0 PARANOID SCHIZOPHRENIA (CMD): Primary | ICD-10-CM

## 2024-02-24 DIAGNOSIS — F25.0 SCHIZOAFFECTIVE DISORDER, BIPOLAR TYPE (CMD): Chronic | ICD-10-CM

## 2024-02-24 DIAGNOSIS — F60.89 CLUSTER B PERSONALITY DISORDER (CMD): ICD-10-CM

## 2024-02-24 LAB
AMPHETAMINES UR QL SCN>500 NG/ML: NEGATIVE
ANION GAP SERPL CALC-SCNC: 9 MMOL/L (ref 7–19)
BARBITURATES UR QL SCN>200 NG/ML: NEGATIVE
BASOPHILS # BLD: 0 K/MCL (ref 0–0.3)
BASOPHILS NFR BLD: 0 %
BENZODIAZ UR QL SCN>200 NG/ML: NEGATIVE
BUN SERPL-MCNC: 20 MG/DL (ref 6–20)
BUN/CREAT SERPL: 24 (ref 7–25)
BZE UR QL SCN>150 NG/ML: NEGATIVE
CALCIUM SERPL-MCNC: 9.4 MG/DL (ref 8.4–10.2)
CANNABINOIDS UR QL SCN>50 NG/ML: NEGATIVE
CHLORIDE SERPL-SCNC: 105 MMOL/L (ref 97–110)
CO2 SERPL-SCNC: 24 MMOL/L (ref 21–32)
CREAT SERPL-MCNC: 0.82 MG/DL (ref 0.67–1.17)
DEPRECATED RDW RBC: 45.2 FL (ref 39–50)
EGFRCR SERPLBLD CKD-EPI 2021: >90 ML/MIN/{1.73_M2}
EOSINOPHIL # BLD: 0 K/MCL (ref 0–0.5)
EOSINOPHIL NFR BLD: 0 %
ERYTHROCYTE [DISTWIDTH] IN BLOOD: 13.1 % (ref 11–15)
ETHANOL SERPL-MCNC: NORMAL MG/DL
FASTING DURATION TIME PATIENT: ABNORMAL H
FENTANYL UR QL SCN: NEGATIVE
FLUAV RNA RESP QL NAA+PROBE: NOT DETECTED
FLUBV RNA RESP QL NAA+PROBE: NOT DETECTED
GLUCOSE SERPL-MCNC: 112 MG/DL (ref 70–99)
HCT VFR BLD CALC: 42 % (ref 39–51)
HGB BLD-MCNC: 14.2 G/DL (ref 13–17)
IMM GRANULOCYTES # BLD AUTO: 0 K/MCL (ref 0–0.2)
IMM GRANULOCYTES # BLD: 0 %
LYMPHOCYTES # BLD: 1.3 K/MCL (ref 1–4.8)
LYMPHOCYTES NFR BLD: 15 %
MCH RBC QN AUTO: 31.9 PG (ref 26–34)
MCHC RBC AUTO-ENTMCNC: 33.8 G/DL (ref 32–36.5)
MCV RBC AUTO: 94.4 FL (ref 78–100)
MONOCYTES # BLD: 0.9 K/MCL (ref 0.3–0.9)
MONOCYTES NFR BLD: 10 %
NEUTROPHILS # BLD: 6.6 K/MCL (ref 1.8–7.7)
NEUTROPHILS NFR BLD: 75 %
NRBC BLD MANUAL-RTO: 0 /100 WBC
OPIATES UR QL SCN>300 NG/ML: NEGATIVE
PCP UR QL SCN>25 NG/ML: NEGATIVE
PLATELET # BLD AUTO: 240 K/MCL (ref 140–450)
POTASSIUM SERPL-SCNC: 4 MMOL/L (ref 3.4–5.1)
RAINBOW EXTRA TUBES HOLD SPECIMEN: NORMAL
RAINBOW EXTRA TUBES HOLD SPECIMEN: NORMAL
RBC # BLD: 4.45 MIL/MCL (ref 4.5–5.9)
RSV AG NPH QL IA.RAPID: NOT DETECTED
SARS-COV-2 RNA RESP QL NAA+PROBE: NOT DETECTED
SERVICE CMNT-IMP: NORMAL
SERVICE CMNT-IMP: NORMAL
SODIUM SERPL-SCNC: 134 MMOL/L (ref 135–145)
WBC # BLD: 8.9 K/MCL (ref 4.2–11)

## 2024-02-24 PROCEDURE — 85025 COMPLETE CBC W/AUTO DIFF WBC: CPT | Performed by: EMERGENCY MEDICINE

## 2024-02-24 PROCEDURE — 99223 1ST HOSP IP/OBS HIGH 75: CPT | Performed by: PSYCHIATRY & NEUROLOGY

## 2024-02-24 PROCEDURE — 10002800 HB RX 250 W HCPCS: Performed by: EMERGENCY MEDICINE

## 2024-02-24 PROCEDURE — 80307 DRUG TEST PRSMV CHEM ANLYZR: CPT | Performed by: EMERGENCY MEDICINE

## 2024-02-24 PROCEDURE — 82077 ASSAY SPEC XCP UR&BREATH IA: CPT | Performed by: EMERGENCY MEDICINE

## 2024-02-24 PROCEDURE — 0241U COVID/FLU/RSV PANEL: CPT | Performed by: EMERGENCY MEDICINE

## 2024-02-24 PROCEDURE — 10002800 HB RX 250 W HCPCS: Performed by: PSYCHIATRY & NEUROLOGY

## 2024-02-24 PROCEDURE — 80048 BASIC METABOLIC PNL TOTAL CA: CPT | Performed by: EMERGENCY MEDICINE

## 2024-02-24 PROCEDURE — 96372 THER/PROPH/DIAG INJ SC/IM: CPT | Performed by: PSYCHIATRY & NEUROLOGY

## 2024-02-24 PROCEDURE — 96374 THER/PROPH/DIAG INJ IV PUSH: CPT

## 2024-02-24 PROCEDURE — 90832 PSYTX W PT 30 MINUTES: CPT

## 2024-02-24 PROCEDURE — 13003244 HB ROOM CHARGE INTENSIVE PSYCH

## 2024-02-24 PROCEDURE — 10002803 HB RX 637: Performed by: PSYCHIATRY & NEUROLOGY

## 2024-02-24 PROCEDURE — 99284 EMERGENCY DEPT VISIT MOD MDM: CPT

## 2024-02-24 PROCEDURE — 10002800 HB RX 250 W HCPCS

## 2024-02-24 PROCEDURE — 96372 THER/PROPH/DIAG INJ SC/IM: CPT

## 2024-02-24 RX ORDER — ARIPIPRAZOLE 10 MG/1
10 TABLET ORAL DAILY
Status: DISCONTINUED | OUTPATIENT
Start: 2024-02-24 | End: 2024-02-24

## 2024-02-24 RX ORDER — HALOPERIDOL 5 MG/1
10 TABLET ORAL EVERY 8 HOURS PRN
Status: DISCONTINUED | OUTPATIENT
Start: 2024-02-24 | End: 2024-03-04

## 2024-02-24 RX ORDER — NICOTINE 21 MG/24HR
1 PATCH, TRANSDERMAL 24 HOURS TRANSDERMAL DAILY
Status: DISCONTINUED | OUTPATIENT
Start: 2024-02-24 | End: 2024-02-26

## 2024-02-24 RX ORDER — ZIPRASIDONE MESYLATE 20 MG/ML
20 INJECTION, POWDER, LYOPHILIZED, FOR SOLUTION INTRAMUSCULAR ONCE
Status: COMPLETED | OUTPATIENT
Start: 2024-02-24 | End: 2024-02-24

## 2024-02-24 RX ORDER — BENZTROPINE MESYLATE 1 MG/ML
1 INJECTION INTRAMUSCULAR; INTRAVENOUS EVERY 4 HOURS PRN
Status: DISCONTINUED | OUTPATIENT
Start: 2024-02-24 | End: 2024-03-05 | Stop reason: HOSPADM

## 2024-02-24 RX ORDER — BENZTROPINE MESYLATE 1 MG/1
1 TABLET ORAL EVERY 4 HOURS PRN
Status: DISCONTINUED | OUTPATIENT
Start: 2024-02-24 | End: 2024-03-05 | Stop reason: HOSPADM

## 2024-02-24 RX ORDER — HYDROXYZINE HYDROCHLORIDE 25 MG/1
50 TABLET, FILM COATED ORAL EVERY 4 HOURS PRN
Status: DISCONTINUED | OUTPATIENT
Start: 2024-02-24 | End: 2024-02-26

## 2024-02-24 RX ORDER — ZIPRASIDONE MESYLATE 20 MG/ML
INJECTION, POWDER, LYOPHILIZED, FOR SOLUTION INTRAMUSCULAR
Status: DISPENSED
Start: 2024-02-24 | End: 2024-02-25

## 2024-02-24 RX ORDER — HALOPERIDOL 5 MG/ML
INJECTION INTRAMUSCULAR
Status: COMPLETED
Start: 2024-02-24 | End: 2024-02-24

## 2024-02-24 RX ORDER — ACETAMINOPHEN 325 MG/1
650 TABLET ORAL EVERY 4 HOURS PRN
Status: DISCONTINUED | OUTPATIENT
Start: 2024-02-24 | End: 2024-02-25

## 2024-02-24 RX ORDER — TRAZODONE HYDROCHLORIDE 100 MG/1
100 TABLET ORAL NIGHTLY PRN
Status: DISCONTINUED | OUTPATIENT
Start: 2024-02-24 | End: 2024-02-27

## 2024-02-24 RX ORDER — ARIPIPRAZOLE 10 MG/1
15 TABLET ORAL DAILY
Status: DISCONTINUED | OUTPATIENT
Start: 2024-02-24 | End: 2024-02-25

## 2024-02-24 RX ORDER — DIVALPROEX SODIUM 500 MG/1
500 TABLET, DELAYED RELEASE ORAL 2 TIMES DAILY
Status: DISCONTINUED | OUTPATIENT
Start: 2024-02-24 | End: 2024-02-29

## 2024-02-24 RX ORDER — WATER 10 ML/10ML
INJECTION INTRAMUSCULAR; INTRAVENOUS; SUBCUTANEOUS
Status: DISPENSED
Start: 2024-02-24 | End: 2024-02-25

## 2024-02-24 RX ORDER — DIAZEPAM 5 MG/ML
10 INJECTION, SOLUTION INTRAMUSCULAR; INTRAVENOUS ONCE
Status: COMPLETED | OUTPATIENT
Start: 2024-02-24 | End: 2024-02-24

## 2024-02-24 RX ORDER — HALOPERIDOL 5 MG/ML
10 INJECTION INTRAMUSCULAR EVERY 8 HOURS PRN
Status: DISCONTINUED | OUTPATIENT
Start: 2024-02-24 | End: 2024-03-04

## 2024-02-24 RX ORDER — POLYETHYLENE GLYCOL 3350 17 G/17G
17 POWDER, FOR SOLUTION ORAL DAILY PRN
Status: DISCONTINUED | OUTPATIENT
Start: 2024-02-24 | End: 2024-03-05 | Stop reason: HOSPADM

## 2024-02-24 RX ADMIN — DIVALPROEX SODIUM 500 MG: 500 TABLET, DELAYED RELEASE ORAL at 20:00

## 2024-02-24 RX ADMIN — ARIPIPRAZOLE 15 MG: 10 TABLET ORAL at 13:41

## 2024-02-24 RX ADMIN — HALOPERIDOL LACTATE 10 MG: 5 INJECTION, SOLUTION INTRAMUSCULAR at 09:45

## 2024-02-24 RX ADMIN — HALOPERIDOL LACTATE 10 MG: 5 INJECTION, SOLUTION INTRAMUSCULAR at 18:40

## 2024-02-24 RX ADMIN — Medication 1 PATCH: at 13:41

## 2024-02-24 RX ADMIN — BENZTROPINE MESYLATE 1 MG: 1 TABLET ORAL at 20:00

## 2024-02-24 RX ADMIN — HYDROXYZINE HYDROCHLORIDE 50 MG: 25 TABLET ORAL at 19:28

## 2024-02-24 RX ADMIN — HYDROXYZINE HYDROCHLORIDE 50 MG: 25 TABLET ORAL at 23:20

## 2024-02-24 RX ADMIN — ZIPRASIDONE MESYLATE 20 MG: 20 INJECTION, POWDER, LYOPHILIZED, FOR SOLUTION INTRAMUSCULAR at 19:28

## 2024-02-24 RX ADMIN — TRAZODONE HYDROCHLORIDE 100 MG: 100 TABLET ORAL at 20:00

## 2024-02-24 RX ADMIN — DIVALPROEX SODIUM 500 MG: 500 TABLET, DELAYED RELEASE ORAL at 13:41

## 2024-02-24 RX ADMIN — DIAZEPAM 10 MG: 5 INJECTION INTRAMUSCULAR; INTRAVENOUS at 04:39

## 2024-02-24 SDOH — SOCIAL STABILITY: SOCIAL INSECURITY: HOW OFTEN DOES ANYONE, INCLUDING FAMILY AND FRIENDS, INSULT OR TALK DOWN TO YOU?: NEVER

## 2024-02-24 SDOH — ECONOMIC STABILITY: HOUSING INSECURITY: WHAT IS YOUR LIVING SITUATION TODAY?: I HAVE A STEADY PLACE TO LIVE

## 2024-02-24 SDOH — ECONOMIC STABILITY: HOUSING INSECURITY: DO YOU HAVE PROBLEMS WITH ANY OF THE FOLLOWING?: NONE OF THE ABOVE

## 2024-02-24 SDOH — ECONOMIC STABILITY: FOOD INSECURITY: WITHIN THE PAST 12 MONTHS, THE FOOD YOU BOUGHT JUST DIDN'T LAST AND YOU DIDN'T HAVE MONEY TO GET MORE.: SOMETIMES TRUE

## 2024-02-24 SDOH — SOCIAL STABILITY: SOCIAL INSECURITY: HOW OFTEN DOES ANYONE, INCLUDING FAMILY AND FRIENDS, SCREAM OR CURSE AT YOU?: NEVER

## 2024-02-24 SDOH — SOCIAL STABILITY: SOCIAL INSECURITY: HOW OFTEN DOES ANYONE, INCLUDING FAMILY AND FRIENDS, PHYSICALLY HURT YOU?: NEVER

## 2024-02-24 SDOH — SOCIAL STABILITY: SOCIAL INSECURITY: HOW OFTEN DOES ANYONE, INCLUDING FAMILY AND FRIENDS, THREATEN YOU WITH HARM?: NEVER

## 2024-02-24 SDOH — ECONOMIC STABILITY: FOOD INSECURITY: WITHIN THE PAST 12 MONTHS, THE FOOD YOU BOUGHT JUST DIDN'T LAST AND YOU DIDN'T HAVE MONEY TO GET MORE.: NEVER TRUE

## 2024-02-24 SDOH — ECONOMIC STABILITY: GENERAL

## 2024-02-24 SDOH — ECONOMIC STABILITY: GENERAL: WOULD YOU LIKE HELP WITH ANY OF THE FOLLOWING NEEDS?: I DON'T WANT HELP WITH ANY OF THESE

## 2024-02-24 ASSESSMENT — ACTIVITIES OF DAILY LIVING (ADL): ADL_SCORE: 12

## 2024-02-24 ASSESSMENT — ENCOUNTER SYMPTOMS
CHILLS: 0
AGITATION: 0
SHORTNESS OF BREATH: 0
DIZZINESS: 0
WEAKNESS: 0
ABDOMINAL PAIN: 0
VOMITING: 0
COUGH: 0
FEVER: 0
SORE THROAT: 0
BACK PAIN: 0
NAUSEA: 0

## 2024-02-24 ASSESSMENT — LIFESTYLE VARIABLES
TOBACCO_USE_STATUS_IN_THE_LAST_30_DAYS: USED TOBACCO IN THE LAST 30 DAYS
HAVE YOU EVER BEEN VERBALLY, EMOTIONALLY, PHYSICALLY OR SEXUALLY ABUSED, OR ABUSED VIA SOCIAL MEDIA?: NO
ARE YOU DEAF OR DO YOU HAVE SERIOUS DIFFICULTY  HEARING: NO
ARE YOU BLIND OR DO YOU HAVE SERIOUS DIFFICULTY SEEING, EVEN WHEN WEARING GLASSES: NO
HOW OFTEN DO YOU HAVE A DRINK CONTAINING ALCOHOL: NEVER
TYPE_OF_TOBACCO_USED: CIGARETTES
ALCOHOL_USE_STATUS: NO OR LOW RISK WITH VALIDATED TOOL
RECENT DECLINE IN ADLS: NO
ADL NEEDS ASSIST: NO
HOW MANY STANDARD DRINKS CONTAINING ALCOHOL DO YOU HAVE ON A TYPICAL DAY: 0,1 OR 2
ADL BEFORE ADMISSION: INDEPENDENT
HOW OFTEN DO YOU HAVE 6 OR MORE DRINKS ON ONE OCCASION: NEVER
AUDIT-C TOTAL SCORE: 0
HAVE YOU EVER BEEN EXPOSED TO OTHER VERY DISTURBING, TRAUMATIC OR ANXIETY PROVOKING EVENTS IN YOUR LIFETIME?: NO
SHORT OF BREATH OR FATIGUE WITH ADLS: NO

## 2024-02-24 ASSESSMENT — COGNITIVE AND FUNCTIONAL STATUS - GENERAL: LEVEL_OF_CONSCIOUSNESS_CALCULATED: ALERT

## 2024-02-24 ASSESSMENT — PATIENT HEALTH QUESTIONNAIRE - PHQ9
IS PATIENT ABLE TO COMPLETE PHQ2 OR PHQ9: YES
SUM OF ALL RESPONSES TO PHQ9 QUESTIONS 1 AND 2: 2
1. LITTLE INTEREST OR PLEASURE IN DOING THINGS: SEVERAL DAYS
2. FEELING DOWN, DEPRESSED OR HOPELESS: SEVERAL DAYS
CLINICAL INTERPRETATION OF PHQ2 SCORE: NO FURTHER SCREENING NEEDED
SUM OF ALL RESPONSES TO PHQ9 QUESTIONS 1 AND 2: 2

## 2024-02-24 ASSESSMENT — PAIN SCALES - GENERAL
PAINLEVEL_OUTOF10: 0
PAINLEVEL_OUTOF10: 0

## 2024-02-25 PROCEDURE — 96372 THER/PROPH/DIAG INJ SC/IM: CPT | Performed by: PSYCHIATRY & NEUROLOGY

## 2024-02-25 PROCEDURE — 99233 SBSQ HOSP IP/OBS HIGH 50: CPT | Performed by: PSYCHIATRY & NEUROLOGY

## 2024-02-25 PROCEDURE — 13003244 HB ROOM CHARGE INTENSIVE PSYCH

## 2024-02-25 PROCEDURE — 10002800 HB RX 250 W HCPCS: Performed by: PSYCHIATRY & NEUROLOGY

## 2024-02-25 PROCEDURE — 10002803 HB RX 637: Performed by: PSYCHIATRY & NEUROLOGY

## 2024-02-25 PROCEDURE — 10004651 HB RX, NO CHARGE ITEM: Performed by: PSYCHIATRY & NEUROLOGY

## 2024-02-25 PROCEDURE — 10002803 HB RX 637: Performed by: HOSPITALIST

## 2024-02-25 RX ORDER — IBUPROFEN 400 MG/1
600 TABLET ORAL EVERY 6 HOURS PRN
Status: DISCONTINUED | OUTPATIENT
Start: 2024-02-25 | End: 2024-03-05 | Stop reason: HOSPADM

## 2024-02-25 RX ORDER — ARIPIPRAZOLE 10 MG/1
20 TABLET ORAL DAILY
Status: DISCONTINUED | OUTPATIENT
Start: 2024-02-26 | End: 2024-02-26

## 2024-02-25 RX ADMIN — ARIPIPRAZOLE 15 MG: 10 TABLET ORAL at 08:17

## 2024-02-25 RX ADMIN — ACETAMINOPHEN 650 MG: 325 TABLET ORAL at 09:24

## 2024-02-25 RX ADMIN — TRAZODONE HYDROCHLORIDE 100 MG: 100 TABLET ORAL at 20:00

## 2024-02-25 RX ADMIN — HYDROXYZINE HYDROCHLORIDE 50 MG: 25 TABLET ORAL at 19:48

## 2024-02-25 RX ADMIN — IBUPROFEN 600 MG: 400 TABLET ORAL at 12:32

## 2024-02-25 RX ADMIN — ACETAMINOPHEN 650 MG: 325 TABLET ORAL at 02:16

## 2024-02-25 RX ADMIN — DIVALPROEX SODIUM 500 MG: 500 TABLET, DELAYED RELEASE ORAL at 08:17

## 2024-02-25 RX ADMIN — POLYETHYLENE GLYCOL (3350) 17 G: 17 POWDER, FOR SOLUTION ORAL at 08:44

## 2024-02-25 RX ADMIN — DIVALPROEX SODIUM 500 MG: 500 TABLET, DELAYED RELEASE ORAL at 20:00

## 2024-02-25 RX ADMIN — HALOPERIDOL LACTATE 10 MG: 5 INJECTION, SOLUTION INTRAMUSCULAR at 20:41

## 2024-02-25 RX ADMIN — IBUPROFEN 600 MG: 400 TABLET ORAL at 20:00

## 2024-02-25 RX ADMIN — Medication 1 PATCH: at 08:28

## 2024-02-25 RX ADMIN — HALOPERIDOL LACTATE 10 MG: 5 INJECTION, SOLUTION INTRAMUSCULAR at 10:35

## 2024-02-25 RX ADMIN — BENZTROPINE MESYLATE 1 MG: 1 INJECTION INTRAMUSCULAR; INTRAVENOUS at 20:42

## 2024-02-25 RX ADMIN — HYDROXYZINE HYDROCHLORIDE 50 MG: 25 TABLET ORAL at 07:54

## 2024-02-25 ASSESSMENT — PAIN SCALES - GENERAL
PAINLEVEL_OUTOF10: 6
PAINLEVEL_OUTOF10: 0
PAINLEVEL_OUTOF10: 6
PAINLEVEL_OUTOF10: 0
PAINLEVEL_OUTOF10: 8
PAINLEVEL_OUTOF10: 8
PAINLEVEL_OUTOF10: 4
PAINLEVEL_OUTOF10: 0

## 2024-02-26 PROBLEM — F60.89 CLUSTER B PERSONALITY DISORDER  (CMD): Status: ACTIVE | Noted: 2024-02-26

## 2024-02-26 LAB — VALPROATE SERPL-MCNC: 45 MCG/ML (ref 50–125)

## 2024-02-26 PROCEDURE — 99233 SBSQ HOSP IP/OBS HIGH 50: CPT | Performed by: PSYCHIATRY & NEUROLOGY

## 2024-02-26 PROCEDURE — 80164 ASSAY DIPROPYLACETIC ACD TOT: CPT | Performed by: PSYCHIATRY & NEUROLOGY

## 2024-02-26 PROCEDURE — 10002803 HB RX 637: Performed by: PSYCHIATRY & NEUROLOGY

## 2024-02-26 PROCEDURE — 36415 COLL VENOUS BLD VENIPUNCTURE: CPT | Performed by: PSYCHIATRY & NEUROLOGY

## 2024-02-26 PROCEDURE — 13003244 HB ROOM CHARGE INTENSIVE PSYCH

## 2024-02-26 PROCEDURE — 10002803 HB RX 637: Performed by: HOSPITALIST

## 2024-02-26 RX ORDER — HYDROXYZINE HYDROCHLORIDE 25 MG/1
50 TABLET, FILM COATED ORAL 3 TIMES DAILY PRN
Status: DISCONTINUED | OUTPATIENT
Start: 2024-02-26 | End: 2024-03-05 | Stop reason: HOSPADM

## 2024-02-26 RX ORDER — HALOPERIDOL 5 MG/1
10 TABLET ORAL 2 TIMES DAILY
Status: DISCONTINUED | OUTPATIENT
Start: 2024-02-26 | End: 2024-02-29

## 2024-02-26 RX ORDER — NICOTINE 21 MG/24HR
1 PATCH, TRANSDERMAL 24 HOURS TRANSDERMAL DAILY PRN
Status: DISCONTINUED | OUTPATIENT
Start: 2024-02-26 | End: 2024-03-05 | Stop reason: HOSPADM

## 2024-02-26 RX ORDER — QUETIAPINE FUMARATE 200 MG/1
200 TABLET, FILM COATED ORAL NIGHTLY
Status: DISCONTINUED | OUTPATIENT
Start: 2024-02-26 | End: 2024-03-01

## 2024-02-26 RX ADMIN — QUETIAPINE FUMARATE 200 MG: 200 TABLET ORAL at 20:18

## 2024-02-26 RX ADMIN — HYDROXYZINE HYDROCHLORIDE 50 MG: 25 TABLET ORAL at 14:56

## 2024-02-26 RX ADMIN — DIVALPROEX SODIUM 500 MG: 500 TABLET, DELAYED RELEASE ORAL at 20:18

## 2024-02-26 RX ADMIN — POLYETHYLENE GLYCOL (3350) 17 G: 17 POWDER, FOR SOLUTION ORAL at 08:48

## 2024-02-26 RX ADMIN — Medication 1 PATCH: at 06:56

## 2024-02-26 RX ADMIN — TRAZODONE HYDROCHLORIDE 100 MG: 100 TABLET ORAL at 21:10

## 2024-02-26 RX ADMIN — DIVALPROEX SODIUM 500 MG: 500 TABLET, DELAYED RELEASE ORAL at 07:58

## 2024-02-26 RX ADMIN — ARIPIPRAZOLE 20 MG: 10 TABLET ORAL at 07:58

## 2024-02-26 RX ADMIN — NICOTINE POLACRILEX 2 MG: 2 GUM, CHEWING BUCCAL at 16:32

## 2024-02-26 RX ADMIN — HYDROXYZINE HYDROCHLORIDE 50 MG: 25 TABLET ORAL at 18:56

## 2024-02-26 RX ADMIN — HYDROXYZINE HYDROCHLORIDE 50 MG: 25 TABLET ORAL at 11:08

## 2024-02-26 RX ADMIN — IBUPROFEN 600 MG: 400 TABLET ORAL at 13:49

## 2024-02-26 RX ADMIN — HYDROXYZINE HYDROCHLORIDE 50 MG: 25 TABLET ORAL at 06:33

## 2024-02-26 RX ADMIN — NICOTINE POLACRILEX 2 MG: 2 GUM, CHEWING BUCCAL at 13:01

## 2024-02-26 RX ADMIN — HALOPERIDOL 10 MG: 5 TABLET ORAL at 20:18

## 2024-02-26 RX ADMIN — IBUPROFEN 600 MG: 400 TABLET ORAL at 02:36

## 2024-02-26 RX ADMIN — HYDROXYZINE HYDROCHLORIDE 50 MG: 25 TABLET ORAL at 02:33

## 2024-02-26 ASSESSMENT — PAIN SCALES - GENERAL
PAINLEVEL_OUTOF10: 6
PAINLEVEL_OUTOF10: 2
PAINLEVEL_OUTOF10: 7
PAINLEVEL_OUTOF10: 0

## 2024-02-27 PROCEDURE — 10002803 HB RX 637: Performed by: HOSPITALIST

## 2024-02-27 PROCEDURE — 10002803 HB RX 637: Performed by: PSYCHIATRY & NEUROLOGY

## 2024-02-27 PROCEDURE — 99233 SBSQ HOSP IP/OBS HIGH 50: CPT | Performed by: PSYCHIATRY & NEUROLOGY

## 2024-02-27 PROCEDURE — 13003244 HB ROOM CHARGE INTENSIVE PSYCH

## 2024-02-27 RX ORDER — MIRTAZAPINE 15 MG/1
15 TABLET, FILM COATED ORAL NIGHTLY
Status: DISCONTINUED | OUTPATIENT
Start: 2024-02-27 | End: 2024-03-01

## 2024-02-27 RX ADMIN — HYDROXYZINE HYDROCHLORIDE 50 MG: 25 TABLET ORAL at 23:29

## 2024-02-27 RX ADMIN — HALOPERIDOL 10 MG: 5 TABLET ORAL at 20:26

## 2024-02-27 RX ADMIN — IBUPROFEN 600 MG: 400 TABLET ORAL at 13:37

## 2024-02-27 RX ADMIN — MIRTAZAPINE 15 MG: 15 TABLET, FILM COATED ORAL at 20:26

## 2024-02-27 RX ADMIN — HYDROXYZINE HYDROCHLORIDE 50 MG: 25 TABLET ORAL at 10:11

## 2024-02-27 RX ADMIN — HALOPERIDOL 10 MG: 5 TABLET ORAL at 08:07

## 2024-02-27 RX ADMIN — DIVALPROEX SODIUM 500 MG: 500 TABLET, DELAYED RELEASE ORAL at 20:26

## 2024-02-27 RX ADMIN — Medication 1 PATCH: at 08:45

## 2024-02-27 RX ADMIN — DIVALPROEX SODIUM 500 MG: 500 TABLET, DELAYED RELEASE ORAL at 08:07

## 2024-02-27 RX ADMIN — QUETIAPINE FUMARATE 200 MG: 200 TABLET ORAL at 20:26

## 2024-02-27 RX ADMIN — IBUPROFEN 600 MG: 400 TABLET ORAL at 04:47

## 2024-02-27 RX ADMIN — NICOTINE POLACRILEX 2 MG: 2 GUM, CHEWING BUCCAL at 19:32

## 2024-02-27 RX ADMIN — IBUPROFEN 600 MG: 400 TABLET ORAL at 21:01

## 2024-02-27 RX ADMIN — HYDROXYZINE HYDROCHLORIDE 50 MG: 25 TABLET ORAL at 02:38

## 2024-02-27 ASSESSMENT — PAIN SCALES - GENERAL
PAINLEVEL_OUTOF10: 2
PAINLEVEL_OUTOF10: 0
PAINLEVEL_OUTOF10: 5
PAINLEVEL_OUTOF10: 0
PAINLEVEL_OUTOF10: 3
PAINLEVEL_OUTOF10: 6
PAINLEVEL_OUTOF10: 0
PAINLEVEL_OUTOF10: 8

## 2024-02-28 PROCEDURE — 10002803 HB RX 637: Performed by: HOSPITALIST

## 2024-02-28 PROCEDURE — 13003244 HB ROOM CHARGE INTENSIVE PSYCH

## 2024-02-28 PROCEDURE — 10002803 HB RX 637: Performed by: PSYCHIATRY & NEUROLOGY

## 2024-02-28 PROCEDURE — 99233 SBSQ HOSP IP/OBS HIGH 50: CPT | Performed by: PSYCHIATRY & NEUROLOGY

## 2024-02-28 RX ADMIN — NICOTINE POLACRILEX 2 MG: 2 GUM, CHEWING BUCCAL at 08:38

## 2024-02-28 RX ADMIN — HALOPERIDOL 10 MG: 5 TABLET ORAL at 20:12

## 2024-02-28 RX ADMIN — HYDROXYZINE HYDROCHLORIDE 50 MG: 25 TABLET ORAL at 07:52

## 2024-02-28 RX ADMIN — DIVALPROEX SODIUM 500 MG: 500 TABLET, DELAYED RELEASE ORAL at 08:36

## 2024-02-28 RX ADMIN — NICOTINE POLACRILEX 2 MG: 2 GUM, CHEWING BUCCAL at 18:02

## 2024-02-28 RX ADMIN — MIRTAZAPINE 15 MG: 15 TABLET, FILM COATED ORAL at 20:12

## 2024-02-28 RX ADMIN — IBUPROFEN 600 MG: 400 TABLET ORAL at 12:56

## 2024-02-28 RX ADMIN — DIVALPROEX SODIUM 500 MG: 500 TABLET, DELAYED RELEASE ORAL at 20:12

## 2024-02-28 RX ADMIN — QUETIAPINE FUMARATE 200 MG: 200 TABLET ORAL at 20:12

## 2024-02-28 RX ADMIN — HALOPERIDOL 10 MG: 5 TABLET ORAL at 08:36

## 2024-02-28 ASSESSMENT — PAIN SCALES - GENERAL
PAINLEVEL_OUTOF10: 0
PAINLEVEL_OUTOF10: 7
PAINLEVEL_OUTOF10: 0

## 2024-02-29 PROCEDURE — 10002803 HB RX 637: Performed by: HOSPITALIST

## 2024-02-29 PROCEDURE — 99233 SBSQ HOSP IP/OBS HIGH 50: CPT | Performed by: PSYCHIATRY & NEUROLOGY

## 2024-02-29 PROCEDURE — 13003244 HB ROOM CHARGE INTENSIVE PSYCH

## 2024-02-29 PROCEDURE — 10002803 HB RX 637: Performed by: PSYCHIATRY & NEUROLOGY

## 2024-02-29 RX ORDER — DIVALPROEX SODIUM 500 MG/1
1000 TABLET, DELAYED RELEASE ORAL AT BEDTIME
Status: DISCONTINUED | OUTPATIENT
Start: 2024-02-29 | End: 2024-03-05 | Stop reason: HOSPADM

## 2024-02-29 RX ORDER — DIVALPROEX SODIUM 500 MG/1
500 TABLET, DELAYED RELEASE ORAL DAILY
Status: DISCONTINUED | OUTPATIENT
Start: 2024-03-01 | End: 2024-03-04

## 2024-02-29 RX ORDER — BENZTROPINE MESYLATE 1 MG/1
1 TABLET ORAL 2 TIMES DAILY
Status: DISCONTINUED | OUTPATIENT
Start: 2024-02-29 | End: 2024-03-01

## 2024-02-29 RX ORDER — LANOLIN ALCOHOL/MO/W.PET/CERES
9 CREAM (GRAM) TOPICAL NIGHTLY
Status: DISCONTINUED | OUTPATIENT
Start: 2024-02-29 | End: 2024-03-01

## 2024-02-29 RX ORDER — QUETIAPINE FUMARATE 200 MG/1
400 TABLET, FILM COATED ORAL NIGHTLY
Status: DISCONTINUED | OUTPATIENT
Start: 2024-02-29 | End: 2024-02-29

## 2024-02-29 RX ADMIN — IBUPROFEN 600 MG: 400 TABLET ORAL at 03:32

## 2024-02-29 RX ADMIN — HALOPERIDOL 10 MG: 5 TABLET ORAL at 08:02

## 2024-02-29 RX ADMIN — BENZTROPINE MESYLATE 1 MG: 1 TABLET ORAL at 20:11

## 2024-02-29 RX ADMIN — HYDROXYZINE HYDROCHLORIDE 50 MG: 25 TABLET ORAL at 00:09

## 2024-02-29 RX ADMIN — MIRTAZAPINE 15 MG: 15 TABLET, FILM COATED ORAL at 20:11

## 2024-02-29 RX ADMIN — QUETIAPINE FUMARATE 200 MG: 200 TABLET ORAL at 20:11

## 2024-02-29 RX ADMIN — HYDROXYZINE HYDROCHLORIDE 50 MG: 25 TABLET ORAL at 15:59

## 2024-02-29 RX ADMIN — BENZTROPINE MESYLATE 1 MG: 1 TABLET ORAL at 03:32

## 2024-02-29 RX ADMIN — DIVALPROEX SODIUM 1000 MG: 500 TABLET, DELAYED RELEASE ORAL at 20:27

## 2024-02-29 RX ADMIN — HALOPERIDOL 10 MG: 5 TABLET ORAL at 02:30

## 2024-02-29 RX ADMIN — NICOTINE POLACRILEX 2 MG: 2 GUM, CHEWING BUCCAL at 09:05

## 2024-02-29 RX ADMIN — IBUPROFEN 600 MG: 400 TABLET ORAL at 10:41

## 2024-02-29 RX ADMIN — DIVALPROEX SODIUM 500 MG: 500 TABLET, DELAYED RELEASE ORAL at 08:02

## 2024-02-29 RX ADMIN — NICOTINE POLACRILEX 2 MG: 2 GUM, CHEWING BUCCAL at 11:57

## 2024-02-29 RX ADMIN — Medication 9 MG: at 20:11

## 2024-02-29 RX ADMIN — NICOTINE POLACRILEX 2 MG: 2 GUM, CHEWING BUCCAL at 18:49

## 2024-02-29 RX ADMIN — NICOTINE POLACRILEX 2 MG: 2 GUM, CHEWING BUCCAL at 15:37

## 2024-02-29 RX ADMIN — HALOPERIDOL 10 MG: 5 TABLET ORAL at 23:18

## 2024-02-29 ASSESSMENT — PAIN SCALES - GENERAL
PAINLEVEL_OUTOF10: 5
PAINLEVEL_OUTOF10: 0

## 2024-03-01 PROCEDURE — 13003244 HB ROOM CHARGE INTENSIVE PSYCH

## 2024-03-01 PROCEDURE — 10002803 HB RX 637: Performed by: PSYCHIATRY & NEUROLOGY

## 2024-03-01 PROCEDURE — 10002803 HB RX 637: Performed by: HOSPITALIST

## 2024-03-01 PROCEDURE — 99233 SBSQ HOSP IP/OBS HIGH 50: CPT | Performed by: PSYCHIATRY & NEUROLOGY

## 2024-03-01 RX ORDER — OLANZAPINE 10 MG/1
20 TABLET ORAL NIGHTLY
Status: DISCONTINUED | OUTPATIENT
Start: 2024-03-01 | End: 2024-03-05 | Stop reason: HOSPADM

## 2024-03-01 RX ORDER — TRAZODONE HYDROCHLORIDE 100 MG/1
100 TABLET ORAL NIGHTLY
Status: DISCONTINUED | OUTPATIENT
Start: 2024-03-01 | End: 2024-03-01

## 2024-03-01 RX ADMIN — DIVALPROEX SODIUM 1000 MG: 500 TABLET, DELAYED RELEASE ORAL at 21:04

## 2024-03-01 RX ADMIN — HYDROXYZINE HYDROCHLORIDE 50 MG: 25 TABLET ORAL at 01:35

## 2024-03-01 RX ADMIN — HYDROXYZINE HYDROCHLORIDE 50 MG: 25 TABLET ORAL at 18:20

## 2024-03-01 RX ADMIN — HYDROXYZINE HYDROCHLORIDE 50 MG: 25 TABLET ORAL at 10:18

## 2024-03-01 RX ADMIN — NICOTINE POLACRILEX 2 MG: 2 GUM, CHEWING BUCCAL at 08:42

## 2024-03-01 RX ADMIN — NICOTINE POLACRILEX 2 MG: 2 GUM, CHEWING BUCCAL at 14:06

## 2024-03-01 RX ADMIN — OLANZAPINE 20 MG: 10 TABLET, FILM COATED ORAL at 21:04

## 2024-03-01 RX ADMIN — BENZTROPINE MESYLATE 1 MG: 1 TABLET ORAL at 07:55

## 2024-03-01 RX ADMIN — DIVALPROEX SODIUM 500 MG: 500 TABLET, DELAYED RELEASE ORAL at 07:54

## 2024-03-01 RX ADMIN — IBUPROFEN 600 MG: 400 TABLET ORAL at 03:49

## 2024-03-01 ASSESSMENT — PAIN SCALES - GENERAL
PAINLEVEL_OUTOF10: 0
PAINLEVEL_OUTOF10: 5

## 2024-03-02 PROCEDURE — 10002803 HB RX 637: Performed by: PSYCHIATRY & NEUROLOGY

## 2024-03-02 PROCEDURE — 10002803 HB RX 637: Performed by: HOSPITALIST

## 2024-03-02 PROCEDURE — 10004577 HB ROOM CHARGE PSYCH

## 2024-03-02 RX ADMIN — HYDROXYZINE HYDROCHLORIDE 50 MG: 25 TABLET ORAL at 21:35

## 2024-03-02 RX ADMIN — DIVALPROEX SODIUM 1000 MG: 500 TABLET, DELAYED RELEASE ORAL at 20:59

## 2024-03-02 RX ADMIN — OLANZAPINE 20 MG: 10 TABLET, FILM COATED ORAL at 20:59

## 2024-03-02 RX ADMIN — HALOPERIDOL 10 MG: 5 TABLET ORAL at 02:30

## 2024-03-02 RX ADMIN — IBUPROFEN 600 MG: 400 TABLET ORAL at 09:46

## 2024-03-02 RX ADMIN — NICOTINE POLACRILEX 2 MG: 2 GUM, CHEWING BUCCAL at 10:44

## 2024-03-02 RX ADMIN — NICOTINE POLACRILEX 2 MG: 2 GUM, CHEWING BUCCAL at 07:44

## 2024-03-02 RX ADMIN — NICOTINE POLACRILEX 2 MG: 2 GUM, CHEWING BUCCAL at 17:25

## 2024-03-02 RX ADMIN — HYDROXYZINE HYDROCHLORIDE 50 MG: 25 TABLET ORAL at 07:44

## 2024-03-02 RX ADMIN — IBUPROFEN 600 MG: 400 TABLET ORAL at 17:55

## 2024-03-02 RX ADMIN — DIVALPROEX SODIUM 500 MG: 500 TABLET, DELAYED RELEASE ORAL at 07:43

## 2024-03-02 RX ADMIN — NICOTINE POLACRILEX 2 MG: 2 GUM, CHEWING BUCCAL at 14:30

## 2024-03-02 RX ADMIN — HALOPERIDOL 10 MG: 5 TABLET ORAL at 23:52

## 2024-03-02 RX ADMIN — HALOPERIDOL 10 MG: 5 TABLET ORAL at 14:39

## 2024-03-02 RX ADMIN — IBUPROFEN 600 MG: 400 TABLET ORAL at 02:29

## 2024-03-02 ASSESSMENT — PAIN SCALES - GENERAL
PAINLEVEL_OUTOF10: 0
PAINLEVEL_OUTOF10: 0
PAINLEVEL_OUTOF10: 5
PAINLEVEL_OUTOF10: 6
PAINLEVEL_OUTOF10: 0
PAINLEVEL_OUTOF10: 4
PAINLEVEL_OUTOF10: 0
PAINLEVEL_OUTOF10: 0
PAINLEVEL_OUTOF10: 2

## 2024-03-03 PROCEDURE — 10002803 HB RX 637: Performed by: HOSPITALIST

## 2024-03-03 PROCEDURE — 10002803 HB RX 637: Performed by: PSYCHIATRY & NEUROLOGY

## 2024-03-03 PROCEDURE — 10004577 HB ROOM CHARGE PSYCH

## 2024-03-03 PROCEDURE — 99232 SBSQ HOSP IP/OBS MODERATE 35: CPT | Performed by: PSYCHIATRY & NEUROLOGY

## 2024-03-03 RX ADMIN — HYDROXYZINE HYDROCHLORIDE 50 MG: 25 TABLET ORAL at 08:08

## 2024-03-03 RX ADMIN — NICOTINE POLACRILEX 2 MG: 2 GUM, CHEWING BUCCAL at 10:58

## 2024-03-03 RX ADMIN — IBUPROFEN 600 MG: 400 TABLET ORAL at 08:08

## 2024-03-03 RX ADMIN — OLANZAPINE 20 MG: 10 TABLET, FILM COATED ORAL at 21:24

## 2024-03-03 RX ADMIN — HYDROXYZINE HYDROCHLORIDE 50 MG: 25 TABLET ORAL at 18:12

## 2024-03-03 RX ADMIN — DIVALPROEX SODIUM 1000 MG: 500 TABLET, DELAYED RELEASE ORAL at 21:24

## 2024-03-03 RX ADMIN — NICOTINE POLACRILEX 2 MG: 2 GUM, CHEWING BUCCAL at 14:05

## 2024-03-03 RX ADMIN — NICOTINE POLACRILEX 2 MG: 2 GUM, CHEWING BUCCAL at 19:41

## 2024-03-03 RX ADMIN — IBUPROFEN 600 MG: 400 TABLET ORAL at 14:04

## 2024-03-03 RX ADMIN — NICOTINE POLACRILEX 2 MG: 2 GUM, CHEWING BUCCAL at 08:23

## 2024-03-03 RX ADMIN — DIVALPROEX SODIUM 500 MG: 500 TABLET, DELAYED RELEASE ORAL at 08:08

## 2024-03-03 ASSESSMENT — PAIN SCALES - GENERAL
PAINLEVEL_OUTOF10: 7
PAINLEVEL_OUTOF10: 0
PAINLEVEL_OUTOF10: 2
PAINLEVEL_OUTOF10: 6
PAINLEVEL_OUTOF10: 3

## 2024-03-04 LAB
ALBUMIN SERPL-MCNC: 4 G/DL (ref 3.6–5.1)
ALBUMIN/GLOB SERPL: 1.3 {RATIO} (ref 1–2.4)
ALP SERPL-CCNC: 87 UNITS/L (ref 45–117)
ALT SERPL-CCNC: 28 UNITS/L
ANION GAP SERPL CALC-SCNC: 8 MMOL/L (ref 7–19)
AST SERPL-CCNC: 15 UNITS/L
BILIRUB SERPL-MCNC: 0.5 MG/DL (ref 0.2–1)
BUN SERPL-MCNC: 16 MG/DL (ref 6–20)
BUN/CREAT SERPL: 18 (ref 7–25)
CALCIUM SERPL-MCNC: 9.1 MG/DL (ref 8.4–10.2)
CHLORIDE SERPL-SCNC: 104 MMOL/L (ref 97–110)
CO2 SERPL-SCNC: 28 MMOL/L (ref 21–32)
CREAT SERPL-MCNC: 0.89 MG/DL (ref 0.67–1.17)
EGFRCR SERPLBLD CKD-EPI 2021: >90 ML/MIN/{1.73_M2}
FASTING DURATION TIME PATIENT: ABNORMAL H
GLOBULIN SER-MCNC: 3.1 G/DL (ref 2–4)
GLUCOSE SERPL-MCNC: 120 MG/DL (ref 70–99)
POTASSIUM SERPL-SCNC: 4.2 MMOL/L (ref 3.4–5.1)
PROT SERPL-MCNC: 7.1 G/DL (ref 6.4–8.2)
SODIUM SERPL-SCNC: 136 MMOL/L (ref 135–145)
VALPROATE SERPL-MCNC: 118 MCG/ML (ref 50–125)

## 2024-03-04 PROCEDURE — 10002803 HB RX 637: Performed by: HOSPITALIST

## 2024-03-04 PROCEDURE — 99233 SBSQ HOSP IP/OBS HIGH 50: CPT | Performed by: PSYCHIATRY & NEUROLOGY

## 2024-03-04 PROCEDURE — 80164 ASSAY DIPROPYLACETIC ACD TOT: CPT | Performed by: PSYCHIATRY & NEUROLOGY

## 2024-03-04 PROCEDURE — 80053 COMPREHEN METABOLIC PANEL: CPT | Performed by: PSYCHIATRY & NEUROLOGY

## 2024-03-04 PROCEDURE — 10004577 HB ROOM CHARGE PSYCH

## 2024-03-04 PROCEDURE — 36415 COLL VENOUS BLD VENIPUNCTURE: CPT | Performed by: PSYCHIATRY & NEUROLOGY

## 2024-03-04 PROCEDURE — 10002803 HB RX 637: Performed by: PSYCHIATRY & NEUROLOGY

## 2024-03-04 RX ADMIN — OLANZAPINE 20 MG: 10 TABLET, FILM COATED ORAL at 21:15

## 2024-03-04 RX ADMIN — DIVALPROEX SODIUM 500 MG: 500 TABLET, DELAYED RELEASE ORAL at 08:20

## 2024-03-04 RX ADMIN — IBUPROFEN 600 MG: 400 TABLET ORAL at 17:39

## 2024-03-04 RX ADMIN — NICOTINE POLACRILEX 2 MG: 2 GUM, CHEWING BUCCAL at 10:48

## 2024-03-04 RX ADMIN — IBUPROFEN 600 MG: 400 TABLET ORAL at 07:38

## 2024-03-04 RX ADMIN — DIVALPROEX SODIUM 1000 MG: 500 TABLET, DELAYED RELEASE ORAL at 21:15

## 2024-03-04 RX ADMIN — NICOTINE POLACRILEX 2 MG: 2 GUM, CHEWING BUCCAL at 14:17

## 2024-03-04 RX ADMIN — NICOTINE POLACRILEX 2 MG: 2 GUM, CHEWING BUCCAL at 08:20

## 2024-03-04 RX ADMIN — HYDROXYZINE HYDROCHLORIDE 50 MG: 25 TABLET ORAL at 10:46

## 2024-03-04 RX ADMIN — NICOTINE POLACRILEX 2 MG: 2 GUM, CHEWING BUCCAL at 17:39

## 2024-03-04 ASSESSMENT — PAIN SCALES - GENERAL
PAINLEVEL_OUTOF10: 4
PAINLEVEL_OUTOF10: 4
PAINLEVEL_OUTOF10: 0
PAINLEVEL_OUTOF10: 7

## 2024-03-05 ENCOUNTER — APPOINTMENT (OUTPATIENT)
Dept: BEHAVIORAL HEALTH | Age: 49
End: 2024-03-05

## 2024-03-05 VITALS
HEART RATE: 63 BPM | OXYGEN SATURATION: 99 % | WEIGHT: 252.43 LBS | HEIGHT: 74 IN | TEMPERATURE: 97.9 F | DIASTOLIC BLOOD PRESSURE: 74 MMHG | SYSTOLIC BLOOD PRESSURE: 143 MMHG | RESPIRATION RATE: 16 BRPM | BODY MASS INDEX: 32.4 KG/M2

## 2024-03-05 PROCEDURE — 10002803 HB RX 637: Performed by: HOSPITALIST

## 2024-03-05 PROCEDURE — 10002803 HB RX 637: Performed by: PSYCHIATRY & NEUROLOGY

## 2024-03-05 RX ORDER — DIVALPROEX SODIUM 500 MG/1
1000 TABLET, DELAYED RELEASE ORAL AT BEDTIME
Qty: 60 TABLET | Refills: 0 | Status: SHIPPED | OUTPATIENT
Start: 2024-03-05

## 2024-03-05 RX ORDER — OLANZAPINE 20 MG/1
20 TABLET ORAL NIGHTLY
Qty: 15 TABLET | Refills: 0 | Status: SHIPPED | OUTPATIENT
Start: 2024-03-05

## 2024-03-05 RX ADMIN — NICOTINE POLACRILEX 2 MG: 2 GUM, CHEWING BUCCAL at 08:17

## 2024-03-05 RX ADMIN — HYDROXYZINE HYDROCHLORIDE 50 MG: 25 TABLET ORAL at 09:58

## 2024-03-05 RX ADMIN — NICOTINE POLACRILEX 2 MG: 2 GUM, CHEWING BUCCAL at 10:44

## 2024-03-05 RX ADMIN — IBUPROFEN 600 MG: 400 TABLET ORAL at 16:38

## 2024-03-05 RX ADMIN — HYDROXYZINE HYDROCHLORIDE 50 MG: 25 TABLET ORAL at 00:13

## 2024-03-05 RX ADMIN — IBUPROFEN 600 MG: 400 TABLET ORAL at 08:24

## 2024-03-05 RX ADMIN — NICOTINE POLACRILEX 2 MG: 2 GUM, CHEWING BUCCAL at 14:36

## 2024-03-05 ASSESSMENT — PAIN SCALES - GENERAL
PAINLEVEL_OUTOF10: 0
PAINLEVEL_OUTOF10: 2
PAINLEVEL_OUTOF10: 5
PAINLEVEL_OUTOF10: 0
PAINLEVEL_OUTOF10: 7

## 2024-03-06 ENCOUNTER — HOSPITAL ENCOUNTER (OUTPATIENT)
Dept: BEHAVIORAL HEALTH | Age: 49
Discharge: STILL ADMITTED OR ADMINISTRATIVE | End: 2024-03-06

## 2024-03-06 DIAGNOSIS — G47.00 INSOMNIA, UNSPECIFIED TYPE: ICD-10-CM

## 2024-03-06 DIAGNOSIS — F25.0 SCHIZOAFFECTIVE DISORDER, BIPOLAR TYPE (CMD): Primary | Chronic | ICD-10-CM

## 2024-03-06 PROCEDURE — G0410 GRP PSYCH PARTIAL HOSP 45-50: HCPCS

## 2024-03-06 SDOH — ECONOMIC STABILITY: FOOD INSECURITY: WITHIN THE PAST 12 MONTHS, THE FOOD YOU BOUGHT JUST DIDN'T LAST AND YOU DIDN'T HAVE MONEY TO GET MORE.: NEVER TRUE

## 2024-03-06 SDOH — SOCIAL STABILITY: SOCIAL INSECURITY: HOW OFTEN DOES ANYONE, INCLUDING FAMILY AND FRIENDS, PHYSICALLY HURT YOU?: NEVER

## 2024-03-06 SDOH — SOCIAL STABILITY: SOCIAL INSECURITY: HOW OFTEN DOES ANYONE, INCLUDING FAMILY AND FRIENDS, THREATEN YOU WITH HARM?: NEVER

## 2024-03-06 SDOH — SOCIAL STABILITY: SOCIAL INSECURITY: HOW OFTEN DOES ANYONE, INCLUDING FAMILY AND FRIENDS, INSULT OR TALK DOWN TO YOU?: NEVER

## 2024-03-06 SDOH — SOCIAL STABILITY: SOCIAL INSECURITY: HOW OFTEN DOES ANYONE, INCLUDING FAMILY AND FRIENDS, SCREAM OR CURSE AT YOU?: NEVER

## 2024-03-06 SDOH — ECONOMIC STABILITY: FOOD INSECURITY: WITHIN THE PAST 12 MONTHS, THE FOOD YOU BOUGHT JUST DIDN'T LAST AND YOU DIDN'T HAVE MONEY TO GET MORE.: SOMETIMES TRUE

## 2024-03-06 ASSESSMENT — PATIENT HEALTH QUESTIONNAIRE - PHQ9
CLINICAL INTERPRETATION OF PHQ2 SCORE: NO FURTHER SCREENING NEEDED
1. LITTLE INTEREST OR PLEASURE IN DOING THINGS: SEVERAL DAYS
2. FEELING DOWN, DEPRESSED OR HOPELESS: SEVERAL DAYS
SUM OF ALL RESPONSES TO PHQ9 QUESTIONS 1 AND 2: 2
SUM OF ALL RESPONSES TO PHQ9 QUESTIONS 1 AND 2: 2

## 2024-03-06 ASSESSMENT — ANXIETY QUESTIONNAIRES
3. WORRYING TOO MUCH ABOUT DIFFERENT THINGS: 3 - NEARLY EVERY DAY
6. BECOMING EASILY ANNOYED OR IRRITABLE: 1 - SEVERAL DAYS
2. NOT BEING ABLE TO STOP OR CONTROL WORRYING: 3 - NEARLY EVERY DAY
7. FEELING AFRAID AS IF SOMETHING AWFUL MIGHT HAPPEN: 2 - MORE THAN HALF THE DAYS
5. BEING SO RESTLESS THAT IT IS HARD TO SIT STILL: 3 - NEARLY EVERY DAY
GAD7 TOTAL SCORE: 18
4. TROUBLE RELAXING: 3 - NEARLY EVERY DAY
1. FEELING NERVOUS, ANXIOUS, OR ON EDGE: 3 - NEARLY EVERY DAY

## 2024-03-06 ASSESSMENT — COGNITIVE AND FUNCTIONAL STATUS - GENERAL
MOTOR_BEHAVIOR-AGITATION_CALCULATED: CALM AND PURPOSEFUL
COGNITIVE_PROCESS: ORGANIZED/COHERENT
RELIABILITY: APPEARS TO BE TRUTHFUL/RELIABLE
MEMORY: INTACT
BEHAVIOR: APPROPRIATE TO SITUATION
ATTENTION_CALCULATED: MAINTAINS ATTENTION
APPEARANCE_AND_DRESS: APPROPRIATE TO SITUATION
SPEECH: CLEAR/UNDERSTANDABLE
LEVEL_OF_CONSCIOUSNESS_CALCULATED: ALERT
MOOD: UNREMARKABLE
ORIENTATION: ORIENTED TO PERSON;ORIENTED TO PLACE;ORIENTED TO TIME
PERCEPTUAL_MISINTERPRETATIONS_HALLUCINATIONS: CLEAR REALITY BASED PERCEPTIONS
INSIGHT: FAIR
AFFECT: APPROPRIATE TO SITUATION
JUDGEMENT: FAIR

## 2024-03-07 ENCOUNTER — HOSPITAL ENCOUNTER (OUTPATIENT)
Dept: BEHAVIORAL HEALTH | Age: 49
Discharge: STILL ADMITTED OR ADMINISTRATIVE | End: 2024-03-07

## 2024-03-07 PROCEDURE — G0410 GRP PSYCH PARTIAL HOSP 45-50: HCPCS

## 2024-03-08 ENCOUNTER — HOSPITAL ENCOUNTER (OUTPATIENT)
Dept: BEHAVIORAL HEALTH | Age: 49
End: 2024-03-08

## 2024-03-08 DIAGNOSIS — F25.0 SCHIZOAFFECTIVE DISORDER, BIPOLAR TYPE (CMD): Primary | Chronic | ICD-10-CM

## 2024-03-08 DIAGNOSIS — G47.00 INSOMNIA, UNSPECIFIED TYPE: ICD-10-CM

## 2024-03-08 PROCEDURE — G0410 GRP PSYCH PARTIAL HOSP 45-50: HCPCS

## 2024-03-11 ENCOUNTER — APPOINTMENT (OUTPATIENT)
Dept: BEHAVIORAL HEALTH | Age: 49
End: 2024-03-11

## 2024-03-11 DIAGNOSIS — F20.0 PARANOID SCHIZOPHRENIA (CMD): ICD-10-CM

## 2024-03-11 DIAGNOSIS — F31.77 BIPOLAR DISORDER, IN PARTIAL REMISSION, MOST RECENT EPISODE MIXED (CMD): ICD-10-CM

## 2024-03-11 DIAGNOSIS — G47.00 INSOMNIA, UNSPECIFIED TYPE: ICD-10-CM

## 2024-03-11 DIAGNOSIS — F25.0 SCHIZOAFFECTIVE DISORDER, BIPOLAR TYPE (CMD): Primary | ICD-10-CM

## 2024-03-11 DIAGNOSIS — F41.1 GENERALIZED ANXIETY DISORDER: ICD-10-CM

## 2024-03-11 RX ORDER — OLANZAPINE 10 MG/1
10 TABLET ORAL NIGHTLY
Qty: 30 TABLET | Refills: 0 | Status: SHIPPED
Start: 2024-03-11 | End: 2024-03-13 | Stop reason: DRUGHIGH

## 2024-03-12 ENCOUNTER — HOSPITAL ENCOUNTER (OUTPATIENT)
Dept: BEHAVIORAL HEALTH | Age: 49
Discharge: STILL ADMITTED OR ADMINISTRATIVE | End: 2024-03-12

## 2024-03-12 PROCEDURE — G0410 GRP PSYCH PARTIAL HOSP 45-50: HCPCS

## 2024-03-13 ENCOUNTER — HOSPITAL ENCOUNTER (OUTPATIENT)
Dept: BEHAVIORAL HEALTH | Age: 49
Discharge: STILL ADMITTED OR ADMINISTRATIVE | End: 2024-03-13

## 2024-03-13 VITALS — RESPIRATION RATE: 18 BRPM | HEART RATE: 86 BPM | SYSTOLIC BLOOD PRESSURE: 131 MMHG | DIASTOLIC BLOOD PRESSURE: 74 MMHG

## 2024-03-13 DIAGNOSIS — F60.89 CLUSTER B PERSONALITY DISORDER (CMD): Primary | ICD-10-CM

## 2024-03-13 DIAGNOSIS — F25.0 SCHIZOAFFECTIVE DISORDER, BIPOLAR TYPE (CMD): ICD-10-CM

## 2024-03-13 PROCEDURE — G0410 GRP PSYCH PARTIAL HOSP 45-50: HCPCS

## 2024-03-13 RX ORDER — OLANZAPINE 10 MG/1
20 TABLET ORAL NIGHTLY
Status: SHIPPED | INPATIENT
Start: 2024-03-13

## 2024-03-13 RX ORDER — CHOLECALCIFEROL (VITAMIN D3) 125 MCG
10 CAPSULE ORAL
Status: SHIPPED | INPATIENT
Start: 2024-03-13

## 2024-03-13 RX ORDER — BENZTROPINE MESYLATE 1 MG/1
1 TABLET ORAL 2 TIMES DAILY
Qty: 14 TABLET | Refills: 0 | Status: SHIPPED | OUTPATIENT
Start: 2024-03-13

## 2024-03-14 ENCOUNTER — LAB SERVICES (OUTPATIENT)
Dept: LAB | Age: 49
End: 2024-03-14

## 2024-03-14 ENCOUNTER — HOSPITAL ENCOUNTER (OUTPATIENT)
Dept: BEHAVIORAL HEALTH | Age: 49
Discharge: STILL ADMITTED OR ADMINISTRATIVE | End: 2024-03-14

## 2024-03-14 DIAGNOSIS — F25.0 SCHIZOAFFECTIVE DISORDER, BIPOLAR TYPE  (CMD): ICD-10-CM

## 2024-03-14 PROCEDURE — G0410 GRP PSYCH PARTIAL HOSP 45-50: HCPCS

## 2024-03-14 PROCEDURE — 80165 DIPROPYLACETIC ACID FREE: CPT | Performed by: CLINICAL MEDICAL LABORATORY

## 2024-03-14 PROCEDURE — 36415 COLL VENOUS BLD VENIPUNCTURE: CPT | Performed by: PSYCHIATRY & NEUROLOGY

## 2024-03-14 PROCEDURE — 80164 ASSAY DIPROPYLACETIC ACD TOT: CPT | Performed by: CLINICAL MEDICAL LABORATORY

## 2024-03-15 ENCOUNTER — HOSPITAL ENCOUNTER (OUTPATIENT)
Dept: BEHAVIORAL HEALTH | Age: 49
End: 2024-03-15

## 2024-03-15 DIAGNOSIS — F25.0 SCHIZOAFFECTIVE DISORDER, BIPOLAR TYPE (CMD): Chronic | ICD-10-CM

## 2024-03-15 DIAGNOSIS — F60.89 CLUSTER B PERSONALITY DISORDER (CMD): Primary | ICD-10-CM

## 2024-03-15 LAB
VALPROATE FREE MFR SERPL: 12 % (ref 5–18)
VALPROATE FREE SERPL-MCNC: 9 UG/ML (ref 7–23)
VALPROATE SERPL-MCNC: 77 UG/ML (ref 50–125)

## 2024-03-16 ENCOUNTER — E-ADVICE (OUTPATIENT)
Dept: BEHAVIORAL HEALTH | Age: 49
End: 2024-03-16

## 2024-03-16 DIAGNOSIS — F25.0 SCHIZOAFFECTIVE DISORDER, BIPOLAR TYPE (CMD): ICD-10-CM

## 2024-03-18 ENCOUNTER — APPOINTMENT (OUTPATIENT)
Dept: BEHAVIORAL HEALTH | Age: 49
End: 2024-03-18

## 2024-03-19 ENCOUNTER — APPOINTMENT (OUTPATIENT)
Dept: BEHAVIORAL HEALTH | Age: 49
End: 2024-03-19

## 2024-03-19 RX ORDER — OLANZAPINE 10 MG/1
10 TABLET ORAL NIGHTLY
Qty: 30 TABLET | Refills: 1 | Status: SHIPPED | OUTPATIENT
Start: 2024-03-19

## 2024-03-20 ENCOUNTER — APPOINTMENT (OUTPATIENT)
Dept: BEHAVIORAL HEALTH | Age: 49
End: 2024-03-20

## 2024-03-21 ENCOUNTER — APPOINTMENT (OUTPATIENT)
Dept: BEHAVIORAL HEALTH | Age: 49
End: 2024-03-21

## 2024-03-22 ENCOUNTER — APPOINTMENT (OUTPATIENT)
Dept: BEHAVIORAL HEALTH | Age: 49
End: 2024-03-22

## 2024-03-25 ENCOUNTER — APPOINTMENT (OUTPATIENT)
Dept: BEHAVIORAL HEALTH | Age: 49
End: 2024-03-25

## 2024-03-26 ENCOUNTER — APPOINTMENT (OUTPATIENT)
Dept: BEHAVIORAL HEALTH | Age: 49
End: 2024-03-26

## 2024-03-27 ENCOUNTER — APPOINTMENT (OUTPATIENT)
Dept: BEHAVIORAL HEALTH | Age: 49
End: 2024-03-27

## 2024-03-28 ENCOUNTER — APPOINTMENT (OUTPATIENT)
Dept: BEHAVIORAL HEALTH | Age: 49
End: 2024-03-28

## 2024-03-28 DIAGNOSIS — F25.0 SCHIZOAFFECTIVE DISORDER, BIPOLAR TYPE (CMD): Chronic | ICD-10-CM

## 2024-03-28 RX ORDER — DIVALPROEX SODIUM 500 MG/1
1000 TABLET, DELAYED RELEASE ORAL AT BEDTIME
Qty: 60 TABLET | Refills: 0 | Status: SHIPPED | OUTPATIENT
Start: 2024-03-28

## 2024-03-29 ENCOUNTER — APPOINTMENT (OUTPATIENT)
Dept: BEHAVIORAL HEALTH | Age: 49
End: 2024-03-29

## 2024-04-01 ENCOUNTER — APPOINTMENT (OUTPATIENT)
Dept: BEHAVIORAL HEALTH | Age: 49
End: 2024-04-01

## 2024-04-02 ENCOUNTER — APPOINTMENT (OUTPATIENT)
Dept: BEHAVIORAL HEALTH | Age: 49
End: 2024-04-02

## 2024-04-04 ENCOUNTER — BEHAVIORAL HEALTH (OUTPATIENT)
Dept: BEHAVIORAL HEALTH | Age: 49
End: 2024-04-04

## 2024-04-04 DIAGNOSIS — F31.77 BIPOLAR DISORDER, IN PARTIAL REMISSION, MOST RECENT EPISODE MIXED (CMD): ICD-10-CM

## 2024-04-04 DIAGNOSIS — G47.00 INSOMNIA, UNSPECIFIED TYPE: ICD-10-CM

## 2024-04-04 DIAGNOSIS — F25.0 SCHIZOAFFECTIVE DISORDER, BIPOLAR TYPE (CMD): Primary | ICD-10-CM

## 2024-04-04 RX ORDER — ARIPIPRAZOLE 400 MG
KIT INTRAMUSCULAR
Qty: 1 EACH | Refills: 5 | Status: SHIPPED | OUTPATIENT
Start: 2024-04-04

## 2024-04-04 RX ORDER — ARIPIPRAZOLE 10 MG/1
10 TABLET ORAL DAILY
Qty: 30 TABLET | Refills: 1 | Status: SHIPPED | OUTPATIENT
Start: 2024-04-04

## 2024-04-08 ENCOUNTER — APPOINTMENT (OUTPATIENT)
Dept: BEHAVIORAL HEALTH | Age: 49
End: 2024-04-08

## 2024-04-10 ENCOUNTER — NURSE TRIAGE (OUTPATIENT)
Dept: TELEHEALTH | Age: 49
End: 2024-04-10

## 2024-04-10 ENCOUNTER — TELEPHONE (OUTPATIENT)
Dept: BEHAVIORAL HEALTH | Age: 49
End: 2024-04-10

## 2024-04-10 RX ORDER — HYDROXYZINE 50 MG/1
50 TABLET, FILM COATED ORAL 2 TIMES DAILY PRN
Qty: 60 TABLET | Refills: 0 | Status: SHIPPED | OUTPATIENT
Start: 2024-04-10

## 2024-04-11 ENCOUNTER — APPOINTMENT (OUTPATIENT)
Dept: BEHAVIORAL HEALTH | Age: 49
End: 2024-04-11

## 2024-04-11 RX ORDER — QUETIAPINE FUMARATE 100 MG/1
100 TABLET, FILM COATED ORAL NIGHTLY
Qty: 30 TABLET | Refills: 0 | Status: SHIPPED | OUTPATIENT
Start: 2024-04-11

## 2024-04-11 RX ORDER — QUETIAPINE FUMARATE 100 MG/1
100 TABLET, FILM COATED ORAL NIGHTLY
COMMUNITY
End: 2024-04-11 | Stop reason: SDUPTHER

## 2024-04-18 ENCOUNTER — E-ADVICE (OUTPATIENT)
Dept: BEHAVIORAL HEALTH | Age: 49
End: 2024-04-18

## 2024-04-18 DIAGNOSIS — F31.77 BIPOLAR DISORDER, IN PARTIAL REMISSION, MOST RECENT EPISODE MIXED (CMD): Primary | ICD-10-CM

## 2024-04-19 DIAGNOSIS — F25.0 SCHIZOAFFECTIVE DISORDER, BIPOLAR TYPE (CMD): Chronic | ICD-10-CM

## 2024-04-19 RX ORDER — DIVALPROEX SODIUM 500 MG/1
1000 TABLET, DELAYED RELEASE ORAL AT BEDTIME
Qty: 180 TABLET | Refills: 0 | Status: SHIPPED | OUTPATIENT
Start: 2024-04-19

## 2024-04-23 ENCOUNTER — HOSPITAL ENCOUNTER (EMERGENCY)
Facility: HOSPITAL | Age: 49
Discharge: HOME | End: 2024-04-23
Attending: EMERGENCY MEDICINE
Payer: MEDICARE

## 2024-04-23 VITALS
DIASTOLIC BLOOD PRESSURE: 72 MMHG | HEART RATE: 72 BPM | BODY MASS INDEX: 31.7 KG/M2 | RESPIRATION RATE: 20 BRPM | SYSTOLIC BLOOD PRESSURE: 131 MMHG | TEMPERATURE: 98 F | OXYGEN SATURATION: 98 % | WEIGHT: 247 LBS | HEIGHT: 74 IN

## 2024-04-23 DIAGNOSIS — Z00.00 WELL ADULT HEALTH CHECK: Primary | ICD-10-CM

## 2024-04-23 PROCEDURE — 99283 EMERGENCY DEPT VISIT LOW MDM: CPT

## 2024-04-23 RX ORDER — CELECOXIB 200 MG/1
200 CAPSULE ORAL DAILY
COMMUNITY
Start: 2024-04-04

## 2024-04-23 RX ORDER — FINASTERIDE 1 MG/1
1 TABLET, FILM COATED ORAL DAILY
COMMUNITY
Start: 2024-03-11

## 2024-04-23 RX ORDER — DIVALPROEX SODIUM 500 MG/1
1000 TABLET, DELAYED RELEASE ORAL NIGHTLY
COMMUNITY
Start: 2024-03-05

## 2024-04-23 RX ORDER — BENZTROPINE MESYLATE 1 MG/1
1 TABLET ORAL 2 TIMES DAILY
COMMUNITY
Start: 2024-03-13

## 2024-04-23 RX ORDER — QUETIAPINE FUMARATE 100 MG/1
100 TABLET, FILM COATED ORAL NIGHTLY
COMMUNITY
Start: 2024-04-11

## 2024-04-23 NOTE — ED INITIAL ASSESSMENT (HPI)
Patient wants to get his Depakote levels. Patient states he thinks he sweat out all his Depakote and wants his level check. Patient has tangential thinking talking about new job, not sleeping, and \"other medical problems\" he will not further clearify. Keeps asking when he will see doctor. Very anxious in triage.

## 2024-04-27 ENCOUNTER — HOSPITAL ENCOUNTER (EMERGENCY)
Facility: HOSPITAL | Age: 49
Discharge: LEFT BEFORE DISCHARGE | End: 2024-04-27
Payer: MEDICARE

## 2024-04-27 VITALS
OXYGEN SATURATION: 97 % | TEMPERATURE: 98 F | SYSTOLIC BLOOD PRESSURE: 116 MMHG | HEART RATE: 71 BPM | RESPIRATION RATE: 20 BRPM | DIASTOLIC BLOOD PRESSURE: 71 MMHG

## 2024-04-28 ENCOUNTER — HOSPITAL ENCOUNTER (EMERGENCY)
Age: 49
Discharge: LEFT BEFORE DISCHARGE | End: 2024-04-28

## 2024-04-28 VITALS
SYSTOLIC BLOOD PRESSURE: 123 MMHG | TEMPERATURE: 98.4 F | RESPIRATION RATE: 16 BRPM | OXYGEN SATURATION: 98 % | DIASTOLIC BLOOD PRESSURE: 65 MMHG | HEART RATE: 68 BPM

## 2024-04-29 ENCOUNTER — LAB SERVICES (OUTPATIENT)
Dept: LAB | Age: 49
End: 2024-04-29

## 2024-04-29 DIAGNOSIS — F31.77 BIPOLAR DISORDER, IN PARTIAL REMISSION, MOST RECENT EPISODE MIXED  (CMD): ICD-10-CM

## 2024-04-29 PROCEDURE — 80164 ASSAY DIPROPYLACETIC ACD TOT: CPT | Performed by: CLINICAL MEDICAL LABORATORY

## 2024-04-29 PROCEDURE — 36415 COLL VENOUS BLD VENIPUNCTURE: CPT | Performed by: PSYCHIATRY & NEUROLOGY

## 2024-04-29 PROCEDURE — 80165 DIPROPYLACETIC ACID FREE: CPT | Performed by: CLINICAL MEDICAL LABORATORY

## 2024-04-30 RX ORDER — ARIPIPRAZOLE 10 MG/1
10 TABLET ORAL DAILY
Qty: 30 TABLET | Refills: 1 | Status: SHIPPED | OUTPATIENT
Start: 2024-04-30

## 2024-04-30 NOTE — ED PROVIDER NOTES
Patient Seen in: [facility] Emergency Department      History     Chief Complaint   Patient presents with    Other     Stated Complaint: anxiety    Subjective:   HPI    48 year old male with h/o bipolar d/o, gerd, hld, tyree who presents with c/o anxiety, specifically concerned about his depakote level. He thinks he has been sweating his depakote out and levels may be off. He has a psychiatrist and has otherwise been taking his medications as prescribed. He denies any SI or HI.     Objective:   Past Medical History:    Bipolar disorder (HCC)    Esophageal reflux    HYPERLIPIDEMIA    TYREE (obstructive sleep apnea)    AHI 6 RDI 6 REM AHI 7 Supine AHI 18 non-supine AHI 0 Sao2 Iraj 89%              History reviewed. No pertinent surgical history.             Social History     Socioeconomic History    Marital status: Single   Tobacco Use    Smoking status: Every Day     Current packs/day: 1.00     Types: Cigarettes    Smokeless tobacco: Never   Vaping Use    Vaping status: Never Used   Substance and Sexual Activity    Alcohol use: Yes     Comment: occ    Drug use: Yes     Types: Cannabis     Social Determinants of Health     Financial Resource Strain: Low Risk  (2/24/2024)    Received from [facility]    Financial Resource Strain     In the past year, have you or any family members you live with been unable to get any of the following when it was really needed? Check all that apply.: None   Food Insecurity: Medium Risk (3/6/2024)    Received from [facility]    Food Insecurity     Within the past 12 months, you worried that your food would run out before you got money to buy more.  : Never true     Within the past 12 months, the food you bought just didn't last and you didn't have money to get more. : Sometimes true   Transportation Needs: Not At Risk (2/24/2024)    Received from [facility]    Transportation Needs     In the past 12 months, has lack of reliable transportation  kept you from medical appointments, meetings, work or from getting things needed for daily living? : No   Social Connections: Medium Risk (2/24/2024)    Received from [facility]    Social Connections     How often do you see or talk to people that you care about and feel close to? (For example: talking to friends on the phone, visiting friends or family, going to [employer] or club meetings): 1 or 2 times a week              Review of Systems    Positive for stated complaint: anxiety  Other systems are as noted in HPI.  Constitutional and vital signs reviewed.      All other systems reviewed and negative except as noted above.    Physical Exam     ED Triage Vitals [04/23/24 0249]   /72   Pulse 72   Resp 20   Temp 97.8 °F (36.6 °C)   Temp src Temporal   SpO2 98 %   O2 Device None (Room air)       Current:/72   Pulse 72   Temp 97.8 °F (36.6 °C) (Temporal)   Resp 20   Ht 188 cm (6' 2\")   Wt 112 kg   SpO2 98%   BMI 31.71 kg/m²         Physical Exam  Vitals and nursing note reviewed.   Constitutional:       General: He is not in acute distress.     Appearance: He is well-developed. He is not ill-appearing or diaphoretic.   HENT:      Head: Normocephalic and atraumatic.   Eyes:      Conjunctiva/sclera: Conjunctivae normal.      Pupils: Pupils are equal, round, and reactive to light.   Cardiovascular:      Rate and Rhythm: Normal rate.   Pulmonary:      Effort: Pulmonary effort is normal. No respiratory distress.   Musculoskeletal:         General: No deformity. Normal range of motion.      Cervical back: Normal range of motion and neck supple.   Skin:     General: Skin is warm and dry.      Findings: No rash.   Neurological:      Mental Status: He is alert and oriented to person, place, and time.      Coordination: Coordination normal.   Psychiatric:         Attention and Perception: Attention normal.         Mood and Affect: Mood is anxious.         Behavior: Behavior normal. Behavior is  cooperative.      Comments: pacing             ED Course   Labs Reviewed - No data to display         MDM      Pulse Ox: 98%, Normal, RA    Medications - No data to display    Pt anxious but otherwise appropriate. I offered to send depakote levels but pt states he is feeling better and prefers to just go home.             Disposition and Plan     Clinical Impression:  1. Well adult health check         Disposition:  Discharge  4/23/2024  3:30 am    Follow-up:  Your psychiatrist at [facility]    Call            Medications Prescribed:  Discharge Medication List as of 4/23/2024  3:48 AM

## 2024-05-02 LAB
VALPROATE FREE MFR SERPL: NORMAL % (ref 5–18)
VALPROATE FREE SERPL-MCNC: <7 UG/ML (ref 7–23)
VALPROATE SERPL-MCNC: 52 UG/ML (ref 50–125)

## 2024-05-03 RX ORDER — HYDROXYZINE 50 MG/1
50 TABLET, FILM COATED ORAL 2 TIMES DAILY PRN
Qty: 60 TABLET | Refills: 0 | Status: SHIPPED | OUTPATIENT
Start: 2024-05-03

## 2024-05-03 RX ORDER — QUETIAPINE FUMARATE 100 MG/1
100 TABLET, FILM COATED ORAL NIGHTLY
Qty: 30 TABLET | Refills: 0 | Status: SHIPPED | OUTPATIENT
Start: 2024-05-03

## 2024-05-06 ENCOUNTER — APPOINTMENT (OUTPATIENT)
Dept: BEHAVIORAL HEALTH | Age: 49
End: 2024-05-06

## 2024-05-06 ENCOUNTER — TELEPHONE (OUTPATIENT)
Dept: BEHAVIORAL HEALTH | Age: 49
End: 2024-05-06

## 2024-05-06 RX ORDER — HYDROXYZINE 50 MG/1
50 TABLET, FILM COATED ORAL 2 TIMES DAILY PRN
Qty: 180 TABLET | Refills: 0 | Status: SHIPPED | OUTPATIENT
Start: 2024-05-06

## 2024-05-07 RX ORDER — QUETIAPINE FUMARATE 100 MG/1
100 TABLET, FILM COATED ORAL NIGHTLY
Qty: 30 TABLET | Refills: 0 | Status: SHIPPED | OUTPATIENT
Start: 2024-05-07

## 2024-05-13 ENCOUNTER — BEHAVIORAL HEALTH (OUTPATIENT)
Dept: BEHAVIORAL HEALTH | Age: 49
End: 2024-05-13

## 2024-05-13 ENCOUNTER — E-ADVICE (OUTPATIENT)
Dept: BEHAVIORAL HEALTH | Age: 49
End: 2024-05-13

## 2024-05-13 DIAGNOSIS — F25.0 SCHIZOAFFECTIVE DISORDER, BIPOLAR TYPE  (CMD): Primary | ICD-10-CM

## 2024-05-13 DIAGNOSIS — F31.77 BIPOLAR DISORDER, IN PARTIAL REMISSION, MOST RECENT EPISODE MIXED  (CMD): ICD-10-CM

## 2024-06-05 ENCOUNTER — TELEPHONE (OUTPATIENT)
Dept: BEHAVIORAL HEALTH | Age: 49
End: 2024-06-05

## 2024-06-05 DIAGNOSIS — F25.0 SCHIZOAFFECTIVE DISORDER, BIPOLAR TYPE  (CMD): Primary | ICD-10-CM

## 2024-06-05 RX ORDER — OLANZAPINE 5 MG/1
5 TABLET ORAL DAILY
Qty: 90 TABLET | Refills: 0 | Status: SHIPPED | OUTPATIENT
Start: 2024-06-05 | End: 2024-06-06 | Stop reason: CLARIF

## 2024-06-05 RX ORDER — DIVALPROEX SODIUM 250 MG/1
250 TABLET, DELAYED RELEASE ORAL 2 TIMES DAILY
COMMUNITY
End: 2024-06-05 | Stop reason: SDUPTHER

## 2024-06-05 RX ORDER — OLANZAPINE 10 MG/1
10 TABLET ORAL NIGHTLY
COMMUNITY
End: 2024-06-05 | Stop reason: SDUPTHER

## 2024-06-05 RX ORDER — OLANZAPINE 10 MG/1
10 TABLET ORAL NIGHTLY
Qty: 90 TABLET | Refills: 0 | Status: SHIPPED | OUTPATIENT
Start: 2024-06-05 | End: 2024-06-06 | Stop reason: CLARIF

## 2024-06-05 RX ORDER — DIVALPROEX SODIUM 250 MG/1
250 TABLET, DELAYED RELEASE ORAL 2 TIMES DAILY
Qty: 180 TABLET | Refills: 0 | Status: SHIPPED | OUTPATIENT
Start: 2024-06-05 | End: 2024-06-06 | Stop reason: SDUPTHER

## 2024-06-05 RX ORDER — OLANZAPINE 5 MG/1
5 TABLET, ORALLY DISINTEGRATING ORAL DAILY
COMMUNITY
End: 2024-06-05 | Stop reason: ALTCHOICE

## 2024-06-05 RX ORDER — DIVALPROEX SODIUM 500 MG/1
500 TABLET, DELAYED RELEASE ORAL 2 TIMES DAILY
Qty: 180 TABLET | Refills: 0 | Status: SHIPPED | OUTPATIENT
Start: 2024-06-05 | End: 2024-06-06 | Stop reason: SDUPTHER

## 2024-06-05 RX ORDER — OLANZAPINE 5 MG/1
5 TABLET ORAL DAILY
COMMUNITY
End: 2024-06-05 | Stop reason: SDUPTHER

## 2024-06-06 ENCOUNTER — APPOINTMENT (OUTPATIENT)
Dept: BEHAVIORAL HEALTH | Age: 49
End: 2024-06-06

## 2024-06-06 ENCOUNTER — LAB SERVICES (OUTPATIENT)
Dept: LAB | Age: 49
End: 2024-06-06

## 2024-06-06 DIAGNOSIS — F25.0 SCHIZOAFFECTIVE DISORDER, BIPOLAR TYPE  (CMD): Primary | ICD-10-CM

## 2024-06-06 DIAGNOSIS — F41.1 GENERALIZED ANXIETY DISORDER: ICD-10-CM

## 2024-06-06 DIAGNOSIS — F20.0 PARANOID SCHIZOPHRENIA  (CMD): ICD-10-CM

## 2024-06-06 DIAGNOSIS — F31.77 BIPOLAR DISORDER, IN PARTIAL REMISSION, MOST RECENT EPISODE MIXED  (CMD): ICD-10-CM

## 2024-06-06 DIAGNOSIS — G47.00 INSOMNIA, UNSPECIFIED TYPE: ICD-10-CM

## 2024-06-06 DIAGNOSIS — F25.0 SCHIZOAFFECTIVE DISORDER, BIPOLAR TYPE  (CMD): ICD-10-CM

## 2024-06-06 PROCEDURE — 80165 DIPROPYLACETIC ACID FREE: CPT | Performed by: CLINICAL MEDICAL LABORATORY

## 2024-06-06 PROCEDURE — 36415 COLL VENOUS BLD VENIPUNCTURE: CPT | Performed by: PSYCHIATRY & NEUROLOGY

## 2024-06-06 PROCEDURE — 80164 ASSAY DIPROPYLACETIC ACD TOT: CPT | Performed by: CLINICAL MEDICAL LABORATORY

## 2024-06-06 RX ORDER — DIVALPROEX SODIUM 250 MG/1
250 TABLET, DELAYED RELEASE ORAL 2 TIMES DAILY
Qty: 180 TABLET | Refills: 0 | Status: SHIPPED | OUTPATIENT
Start: 2024-06-06

## 2024-06-06 RX ORDER — DIVALPROEX SODIUM 500 MG/1
500 TABLET, DELAYED RELEASE ORAL 2 TIMES DAILY
Qty: 180 TABLET | Refills: 0 | Status: SHIPPED | OUTPATIENT
Start: 2024-06-06

## 2024-06-07 LAB
VALPROATE FREE MFR SERPL: 13 % (ref 5–18)
VALPROATE FREE SERPL-MCNC: 7 UG/ML (ref 7–23)
VALPROATE SERPL-MCNC: 54 UG/ML (ref 50–125)

## 2024-06-27 ENCOUNTER — BEHAVIORAL HEALTH (OUTPATIENT)
Dept: BEHAVIORAL HEALTH | Age: 49
End: 2024-06-27

## 2024-06-27 DIAGNOSIS — F25.0 SCHIZOAFFECTIVE DISORDER, BIPOLAR TYPE  (CMD): Primary | ICD-10-CM

## 2024-06-29 ENCOUNTER — HOSPITAL ENCOUNTER (EMERGENCY)
Facility: HOSPITAL | Age: 49
Discharge: HOME | End: 2024-06-29
Attending: EMERGENCY MEDICINE
Payer: MEDICARE

## 2024-06-29 VITALS
DIASTOLIC BLOOD PRESSURE: 67 MMHG | OXYGEN SATURATION: 98 % | SYSTOLIC BLOOD PRESSURE: 145 MMHG | TEMPERATURE: 98 F | WEIGHT: 235 LBS | BODY MASS INDEX: 30.16 KG/M2 | HEART RATE: 79 BPM | HEIGHT: 74 IN | RESPIRATION RATE: 16 BRPM

## 2024-06-29 DIAGNOSIS — R10.9 ABDOMINAL PAIN OF UNKNOWN ETIOLOGY: Primary | ICD-10-CM

## 2024-06-29 PROCEDURE — 99282 EMERGENCY DEPT VISIT SF MDM: CPT

## 2024-06-29 NOTE — ED PROVIDER NOTES
Patient Seen in: [facility] Emergency Department      History     Chief Complaint   Patient presents with    Abdominal Pain     Stated Complaint: sick/ eval p    Subjective:   HPI        Objective:   Past Medical History:    Bipolar disorder (HCC)    Esophageal reflux    HYPERLIPIDEMIA    BRENTON (obstructive sleep apnea)    AHI 6 RDI 6 REM AHI 7 Supine AHI 18 non-supine AHI 0 Sao2 Iraj 89%              History reviewed. No pertinent surgical history.             Social History     Socioeconomic History    Marital status: Single   Tobacco Use    Smoking status: Every Day     Current packs/day: 1.00     Types: Cigarettes    Smokeless tobacco: Never   Vaping Use    Vaping status: Never Used   Substance and Sexual Activity    Alcohol use: Not Currently     Comment: occ    Drug use: Not Currently     Types: Cannabis     Social Determinants of Health     Financial Resource Strain: Low Risk  (2/24/2024)    Received from [facility]    Financial Resource Strain     In the past year, have you or any family members you live with been unable to get any of the following when it was really needed? Check all that apply.: None   Food Insecurity: Medium Risk (3/6/2024)    Received from [facility]    Food Insecurity     Within the past 12 months, you worried that your food would run out before you got money to buy more.  : Never true     Within the past 12 months, the food you bought just didn't last and you didn't have money to get more. : Sometimes true   Transportation Needs: Not At Risk (2/24/2024)    Received from [facility]    Transportation Needs     In the past 12 months, has lack of reliable transportation kept you from medical appointments, meetings, work or from getting things needed for daily living? : No   Social Connections: Medium Risk (2/24/2024)    Received from [facility]    Social Connections     How often do you see or talk to people that you care about and  feel close to? (For example: talking to friends on the phone, visiting friends or family, going to [employer] or club meetings): 1 or 2 times a week              Review of Systems    Positive for stated Chief Complaint: Abdominal Pain    Other systems are as noted in HPI.  Constitutional and vital signs reviewed.      All other systems reviewed and negative except as noted above.    Physical Exam     ED Triage Vitals   BP 06/29/24 0754 145/67   Pulse 06/29/24 0754 81   Resp 06/29/24 0754 18   Temp 06/29/24 0756 98.4 °F (36.9 °C)   Temp src 06/29/24 0756 Temporal   SpO2 06/29/24 0754 98 %   O2 Device --        Current Vitals:   Vital Signs  BP: 145/67  Pulse: 79  Resp: 16  Temp: 98.4 °F (36.9 °C)  Temp src: Temporal  MAP (mmHg): 88    Oxygen Therapy  SpO2: 98 %            Physical Exam          ED Course   Labs Reviewed - No data to display                   MDM      48-year-old male with a history of bipolar disorder, hyperlipidemia, GERD, tobacco use presents today with abdominal pain.  Patient states that 2 days ago, he felt a \"explosion\" in his abdomen.  Afterwards, had some soreness which has since resolved.  He had a small amount of rectal bleeding at that time which also resolved.  He denies current pain, testicular pain, nausea/vomiting, diarrhea, dysuria, fevers, chest pain, shortness of breath, or other associated symptoms.  Of note, the patient was seen on June 25 in the ED for increased anxiety.  He was prescribed Atarax and states that those symptoms have improved.    On exam, vitals normal, well-appearing, abdomen soft, nontender, nondistended.  No CVA tenderness.    Differential: Gas pain, muscle spasm, considered but low likelihood perforated viscus, bleeding ulcer, diverticulitis, strangulated hernia, renal colic, or other emergent pathology    Given how well-appearing and asymptomatic he is, no emergent indication for advanced workup.  Will DC for PMD follow-up with return precautions.                                MDM    Disposition and Plan     Clinical Impression:  1. Abdominal pain of unknown etiology         Disposition:  Discharge  6/29/2024  8:23 am    Follow-up:  Humza Swanson MD  [address] [facility] [id number]  [phone]    Follow up  As needed          Medications Prescribed:  Discharge Medication List as of 6/29/2024  8:29 AM

## 2024-06-29 NOTE — ED INITIAL ASSESSMENT (HPI)
Patient reports abdominal pain, overall \"feeling sick\". Unknown fever.    Strep was negative.  saw

## 2024-07-01 ENCOUNTER — APPOINTMENT (OUTPATIENT)
Dept: BEHAVIORAL HEALTH | Age: 49
End: 2024-07-01

## 2024-07-02 ENCOUNTER — HOSPITAL ENCOUNTER (EMERGENCY)
Age: 49
Discharge: PSYCHIATRIC OR SUBSTANCE USE FACILITY | End: 2024-07-03
Attending: EMERGENCY MEDICINE

## 2024-07-02 DIAGNOSIS — F99 PSYCHIATRIC PROBLEM: Primary | ICD-10-CM

## 2024-07-02 LAB
AMPHETAMINES UR QL SCN>500 NG/ML: NEGATIVE
ANION GAP SERPL CALC-SCNC: 9 MMOL/L (ref 7–19)
BARBITURATES UR QL SCN>200 NG/ML: NEGATIVE
BASOPHILS # BLD: 0 K/MCL (ref 0–0.3)
BASOPHILS NFR BLD: 0 %
BENZODIAZ UR QL SCN>200 NG/ML: NEGATIVE
BUN SERPL-MCNC: 22 MG/DL (ref 6–20)
BUN/CREAT SERPL: 29 (ref 7–25)
BZE UR QL SCN>150 NG/ML: NEGATIVE
CALCIUM SERPL-MCNC: 8.8 MG/DL (ref 8.4–10.2)
CANNABINOIDS UR QL SCN>50 NG/ML: NEGATIVE
CHLORIDE SERPL-SCNC: 109 MMOL/L (ref 97–110)
CO2 SERPL-SCNC: 24 MMOL/L (ref 21–32)
CREAT SERPL-MCNC: 0.75 MG/DL (ref 0.67–1.17)
DEPRECATED RDW RBC: 47.4 FL (ref 39–50)
EGFRCR SERPLBLD CKD-EPI 2021: >90 ML/MIN/{1.73_M2}
EOSINOPHIL # BLD: 0.4 K/MCL (ref 0–0.5)
EOSINOPHIL NFR BLD: 4 %
ERYTHROCYTE [DISTWIDTH] IN BLOOD: 13.5 % (ref 11–15)
ETHANOL SERPL-MCNC: NORMAL MG/DL
FASTING DURATION TIME PATIENT: ABNORMAL H
FENTANYL UR QL SCN: NEGATIVE
FLUAV RNA RESP QL NAA+PROBE: NOT DETECTED
FLUBV RNA RESP QL NAA+PROBE: NOT DETECTED
GLUCOSE SERPL-MCNC: 111 MG/DL (ref 70–99)
HCT VFR BLD CALC: 41.7 % (ref 39–51)
HGB BLD-MCNC: 14.6 G/DL (ref 13–17)
IMM GRANULOCYTES # BLD AUTO: 0 K/MCL (ref 0–0.2)
IMM GRANULOCYTES # BLD: 0 %
LYMPHOCYTES # BLD: 2.2 K/MCL (ref 1–4.8)
LYMPHOCYTES NFR BLD: 25 %
MCH RBC QN AUTO: 33.1 PG (ref 26–34)
MCHC RBC AUTO-ENTMCNC: 35 G/DL (ref 32–36.5)
MCV RBC AUTO: 94.6 FL (ref 78–100)
MONOCYTES # BLD: 0.8 K/MCL (ref 0.3–0.9)
MONOCYTES NFR BLD: 9 %
NEUTROPHILS # BLD: 5.6 K/MCL (ref 1.8–7.7)
NEUTROPHILS NFR BLD: 62 %
NRBC BLD MANUAL-RTO: 0 /100 WBC
OPIATES UR QL SCN>300 NG/ML: NEGATIVE
PCP UR QL SCN>25 NG/ML: NEGATIVE
PLATELET # BLD AUTO: 198 K/MCL (ref 140–450)
POTASSIUM SERPL-SCNC: 3.7 MMOL/L (ref 3.4–5.1)
RBC # BLD: 4.41 MIL/MCL (ref 4.5–5.9)
RSV AG NPH QL IA.RAPID: NOT DETECTED
SARS-COV-2 RNA RESP QL NAA+PROBE: NOT DETECTED
SERVICE CMNT-IMP: NORMAL
SERVICE CMNT-IMP: NORMAL
SODIUM SERPL-SCNC: 138 MMOL/L (ref 135–145)
WBC # BLD: 9.1 K/MCL (ref 4.2–11)

## 2024-07-02 PROCEDURE — 99284 EMERGENCY DEPT VISIT MOD MDM: CPT

## 2024-07-02 PROCEDURE — 80307 DRUG TEST PRSMV CHEM ANLYZR: CPT | Performed by: EMERGENCY MEDICINE

## 2024-07-02 PROCEDURE — 80048 BASIC METABOLIC PNL TOTAL CA: CPT | Performed by: EMERGENCY MEDICINE

## 2024-07-02 PROCEDURE — 36415 COLL VENOUS BLD VENIPUNCTURE: CPT

## 2024-07-02 PROCEDURE — 82077 ASSAY SPEC XCP UR&BREATH IA: CPT | Performed by: EMERGENCY MEDICINE

## 2024-07-02 PROCEDURE — 80164 ASSAY DIPROPYLACETIC ACD TOT: CPT | Performed by: EMERGENCY MEDICINE

## 2024-07-02 PROCEDURE — 85025 COMPLETE CBC W/AUTO DIFF WBC: CPT | Performed by: EMERGENCY MEDICINE

## 2024-07-02 PROCEDURE — 90839 PSYTX CRISIS INITIAL 60 MIN: CPT

## 2024-07-02 PROCEDURE — 10002803 HB RX 637: Performed by: EMERGENCY MEDICINE

## 2024-07-02 PROCEDURE — 0241U COVID/FLU/RSV PANEL: CPT | Performed by: EMERGENCY MEDICINE

## 2024-07-02 RX ORDER — ALPRAZOLAM 0.25 MG/1
0.5 TABLET ORAL ONCE
Status: COMPLETED | OUTPATIENT
Start: 2024-07-02 | End: 2024-07-02

## 2024-07-02 RX ORDER — ALPRAZOLAM 0.25 MG/1
0.25 TABLET ORAL ONCE
Status: COMPLETED | OUTPATIENT
Start: 2024-07-02 | End: 2024-07-02

## 2024-07-02 RX ORDER — CLONAZEPAM 0.5 MG/1
0.5 TABLET ORAL ONCE
Status: DISCONTINUED | OUTPATIENT
Start: 2024-07-02 | End: 2024-07-02

## 2024-07-02 RX ADMIN — ALPRAZOLAM 0.5 MG: 0.25 TABLET ORAL at 23:18

## 2024-07-02 RX ADMIN — ALPRAZOLAM 0.25 MG: 0.25 TABLET ORAL at 23:47

## 2024-07-02 SDOH — ECONOMIC STABILITY: HOUSING INSECURITY: WHAT IS YOUR LIVING SITUATION TODAY?: I HAVE A STEADY PLACE TO LIVE

## 2024-07-02 SDOH — ECONOMIC STABILITY: FOOD INSECURITY: WITHIN THE PAST 12 MONTHS, THE FOOD YOU BOUGHT JUST DIDN'T LAST AND YOU DIDN'T HAVE MONEY TO GET MORE.: NEVER TRUE

## 2024-07-02 ASSESSMENT — LIFESTYLE VARIABLES: HOW OFTEN DO YOU HAVE A DRINK CONTAINING ALCOHOL: NEVER

## 2024-07-02 ASSESSMENT — PAIN SCALES - GENERAL: PAINLEVEL_OUTOF10: 0

## 2024-07-02 ASSESSMENT — COGNITIVE AND FUNCTIONAL STATUS - GENERAL: LEVEL_OF_CONSCIOUSNESS_CALCULATED: ALERT

## 2024-07-03 VITALS
TEMPERATURE: 98 F | RESPIRATION RATE: 18 BRPM | SYSTOLIC BLOOD PRESSURE: 109 MMHG | BODY MASS INDEX: 30.85 KG/M2 | DIASTOLIC BLOOD PRESSURE: 65 MMHG | WEIGHT: 240.3 LBS | OXYGEN SATURATION: 97 % | HEART RATE: 70 BPM

## 2024-07-03 LAB — VALPROATE SERPL-MCNC: 46 MCG/ML (ref 50–125)

## 2024-07-16 PROBLEM — F25.9 SCHIZOAFFECTIVE DISORDER, CHRONIC CONDITION WITH ACUTE EXACERBATION (HCC): Status: ACTIVE | Noted: 2020-06-30

## 2024-07-16 PROBLEM — F33.3 MDD (MAJOR DEPRESSIVE DISORDER), RECURRENT, SEVERE, WITH PSYCHOSIS (HCC): Status: ACTIVE | Noted: 2024-07-16

## 2024-07-17 ENCOUNTER — TELEPHONE (OUTPATIENT)
Dept: BEHAVIORAL HEALTH | Age: 49
End: 2024-07-17

## 2024-07-17 PROBLEM — F33.3 MDD (MAJOR DEPRESSIVE DISORDER), RECURRENT, SEVERE, WITH PSYCHOSIS (HCC): Status: RESOLVED | Noted: 2024-07-16 | Resolved: 2024-07-17

## 2024-07-17 PROBLEM — F25.0 SCHIZOAFFECTIVE DISORDER, BIPOLAR TYPE (HCC): Chronic | Status: ACTIVE | Noted: 2020-06-30

## 2024-07-22 PROBLEM — F51.05 INSOMNIA RELATED TO ANOTHER MENTAL DISORDER: Status: ACTIVE | Noted: 2024-07-22

## 2024-07-25 ENCOUNTER — TELEPHONE (OUTPATIENT)
Dept: BEHAVIORAL HEALTH | Age: 49
End: 2024-07-25

## 2024-07-25 ENCOUNTER — APPOINTMENT (OUTPATIENT)
Dept: BEHAVIORAL HEALTH | Age: 49
End: 2024-07-25

## 2024-07-29 ENCOUNTER — BEHAVIORAL HEALTH (OUTPATIENT)
Dept: BEHAVIORAL HEALTH | Age: 49
End: 2024-07-29

## 2024-07-29 DIAGNOSIS — F25.0 SCHIZOAFFECTIVE DISORDER, BIPOLAR TYPE  (CMD): Primary | ICD-10-CM

## 2024-08-06 ENCOUNTER — APPOINTMENT (OUTPATIENT)
Dept: BEHAVIORAL HEALTH | Age: 49
End: 2024-08-06

## 2024-08-06 DIAGNOSIS — F31.9 BIPOLAR 1 DISORDER  (CMD): ICD-10-CM

## 2024-08-06 DIAGNOSIS — F25.0 SCHIZOAFFECTIVE DISORDER, BIPOLAR TYPE  (CMD): ICD-10-CM

## 2024-08-06 DIAGNOSIS — F41.1 GENERALIZED ANXIETY DISORDER: ICD-10-CM

## 2024-08-06 DIAGNOSIS — G47.00 INSOMNIA, UNSPECIFIED TYPE: ICD-10-CM

## 2024-08-06 DIAGNOSIS — F31.77 BIPOLAR DISORDER, IN PARTIAL REMISSION, MOST RECENT EPISODE MIXED  (CMD): Primary | ICD-10-CM

## 2024-08-06 PROCEDURE — 99214 OFFICE O/P EST MOD 30 MIN: CPT | Performed by: PSYCHIATRY & NEUROLOGY

## 2024-08-06 RX ORDER — DIVALPROEX SODIUM 500 MG/1
1000 TABLET, DELAYED RELEASE ORAL 2 TIMES DAILY
Qty: 120 TABLET | Refills: 2 | Status: SHIPPED | OUTPATIENT
Start: 2024-08-06

## 2024-08-06 RX ORDER — ARIPIPRAZOLE 400 MG
KIT INTRAMUSCULAR
Qty: 1 EACH | Refills: 5 | Status: SHIPPED
Start: 2024-08-06

## 2024-08-06 RX ORDER — CLONAZEPAM 2 MG/1
2 TABLET ORAL NIGHTLY PRN
Qty: 30 TABLET | Refills: 2 | Status: SHIPPED | OUTPATIENT
Start: 2024-08-06

## 2024-08-13 RX ORDER — ARIPIPRAZOLE 400 MG
KIT INTRAMUSCULAR
Qty: 1 EACH | Refills: 5 | Status: SHIPPED | OUTPATIENT
Start: 2024-08-13

## 2024-08-20 ENCOUNTER — E-ADVICE (OUTPATIENT)
Dept: BEHAVIORAL HEALTH | Age: 49
End: 2024-08-20

## 2024-08-21 ENCOUNTER — TELEPHONE (OUTPATIENT)
Dept: BEHAVIORAL HEALTH | Age: 49
End: 2024-08-21

## 2024-08-26 ENCOUNTER — NURSE ONLY (OUTPATIENT)
Dept: BEHAVIORAL HEALTH | Age: 49
End: 2024-08-26

## 2024-08-26 ENCOUNTER — APPOINTMENT (OUTPATIENT)
Dept: BEHAVIORAL HEALTH | Age: 49
End: 2024-08-26

## 2024-08-26 DIAGNOSIS — F25.0 SCHIZOAFFECTIVE DISORDER, BIPOLAR TYPE  (CMD): Primary | ICD-10-CM

## 2024-09-03 ENCOUNTER — APPOINTMENT (OUTPATIENT)
Dept: BEHAVIORAL HEALTH | Age: 49
End: 2024-09-03

## 2024-09-03 DIAGNOSIS — F31.77 BIPOLAR DISORDER, IN PARTIAL REMISSION, MOST RECENT EPISODE MIXED  (CMD): ICD-10-CM

## 2024-09-03 DIAGNOSIS — F41.1 GENERALIZED ANXIETY DISORDER: ICD-10-CM

## 2024-09-03 DIAGNOSIS — F25.0 SCHIZOAFFECTIVE DISORDER, BIPOLAR TYPE  (CMD): Primary | ICD-10-CM

## 2024-09-03 DIAGNOSIS — G47.00 INSOMNIA, UNSPECIFIED TYPE: ICD-10-CM

## 2024-09-03 DIAGNOSIS — F33.1 MODERATE EPISODE OF RECURRENT MAJOR DEPRESSIVE DISORDER  (CMD): ICD-10-CM

## 2024-09-08 ENCOUNTER — HOSPITAL ENCOUNTER (EMERGENCY)
Age: 49
Discharge: PSYCHIATRIC OR SUBSTANCE USE FACILITY | End: 2024-09-08
Attending: STUDENT IN AN ORGANIZED HEALTH CARE EDUCATION/TRAINING PROGRAM

## 2024-09-08 VITALS
HEART RATE: 63 BPM | DIASTOLIC BLOOD PRESSURE: 63 MMHG | WEIGHT: 244.71 LBS | OXYGEN SATURATION: 98 % | SYSTOLIC BLOOD PRESSURE: 105 MMHG | TEMPERATURE: 97.6 F | BODY MASS INDEX: 31.42 KG/M2 | RESPIRATION RATE: 16 BRPM

## 2024-09-08 DIAGNOSIS — F29 PSYCHOSIS, UNSPECIFIED PSYCHOSIS TYPE  (CMD): Primary | ICD-10-CM

## 2024-09-08 DIAGNOSIS — R45.851 SUICIDAL IDEATION: ICD-10-CM

## 2024-09-08 LAB
ALBUMIN SERPL-MCNC: 4 G/DL (ref 3.6–5.1)
ALBUMIN/GLOB SERPL: 1.2 {RATIO} (ref 1–2.4)
ALP SERPL-CCNC: 80 UNITS/L (ref 45–117)
ALT SERPL-CCNC: 43 UNITS/L
AMPHETAMINES UR QL SCN>500 NG/ML: NEGATIVE
ANION GAP SERPL CALC-SCNC: 9 MMOL/L (ref 7–19)
APAP SERPL-MCNC: <2 MCG/ML (ref 10–30)
AST SERPL-CCNC: 25 UNITS/L
BARBITURATES UR QL SCN>200 NG/ML: NEGATIVE
BASOPHILS # BLD: 0 K/MCL (ref 0–0.3)
BASOPHILS NFR BLD: 0 %
BENZODIAZ UR QL SCN>200 NG/ML: NEGATIVE
BILIRUB SERPL-MCNC: 0.4 MG/DL (ref 0.2–1)
BUN SERPL-MCNC: 17 MG/DL (ref 6–20)
BUN/CREAT SERPL: 18 (ref 7–25)
BZE UR QL SCN>150 NG/ML: NEGATIVE
CALCIUM SERPL-MCNC: 8.8 MG/DL (ref 8.4–10.2)
CANNABINOIDS UR QL SCN>50 NG/ML: NEGATIVE
CHLORIDE SERPL-SCNC: 108 MMOL/L (ref 97–110)
CO2 SERPL-SCNC: 24 MMOL/L (ref 21–32)
CREAT SERPL-MCNC: 0.95 MG/DL (ref 0.67–1.17)
DEPRECATED RDW RBC: 46.3 FL (ref 39–50)
EGFRCR SERPLBLD CKD-EPI 2021: >90 ML/MIN/{1.73_M2}
EOSINOPHIL # BLD: 0.1 K/MCL (ref 0–0.5)
EOSINOPHIL NFR BLD: 0 %
ERYTHROCYTE [DISTWIDTH] IN BLOOD: 13 % (ref 11–15)
ETHANOL SERPL-MCNC: NORMAL MG/DL
FASTING DURATION TIME PATIENT: NORMAL H
FENTANYL UR QL SCN: NEGATIVE
GLOBULIN SER-MCNC: 3.3 G/DL (ref 2–4)
GLUCOSE SERPL-MCNC: 95 MG/DL (ref 70–99)
HCT VFR BLD CALC: 43.8 % (ref 39–51)
HGB BLD-MCNC: 15.3 G/DL (ref 13–17)
IMM GRANULOCYTES # BLD AUTO: 0 K/MCL (ref 0–0.2)
IMM GRANULOCYTES # BLD: 0 %
LYMPHOCYTES # BLD: 1.9 K/MCL (ref 1–4.8)
LYMPHOCYTES NFR BLD: 13 %
MCH RBC QN AUTO: 33.4 PG (ref 26–34)
MCHC RBC AUTO-ENTMCNC: 34.9 G/DL (ref 32–36.5)
MCV RBC AUTO: 95.6 FL (ref 78–100)
MONOCYTES # BLD: 1.2 K/MCL (ref 0.3–0.9)
MONOCYTES NFR BLD: 8 %
NEUTROPHILS # BLD: 11.3 K/MCL (ref 1.8–7.7)
NEUTROPHILS NFR BLD: 79 %
NRBC BLD MANUAL-RTO: 0 /100 WBC
OPIATES UR QL SCN>300 NG/ML: NEGATIVE
PCP UR QL SCN>25 NG/ML: NEGATIVE
PLATELET # BLD AUTO: 216 K/MCL (ref 140–450)
POTASSIUM SERPL-SCNC: 3.8 MMOL/L (ref 3.4–5.1)
PROT SERPL-MCNC: 7.3 G/DL (ref 6.4–8.2)
RBC # BLD: 4.58 MIL/MCL (ref 4.5–5.9)
SALICYLATES SERPL-MCNC: 3.8 MG/DL
SARS-COV-2 RNA RESP QL NAA+PROBE: NOT DETECTED
SERVICE CMNT-IMP: NORMAL
SERVICE CMNT-IMP: NORMAL
SODIUM SERPL-SCNC: 137 MMOL/L (ref 135–145)
WBC # BLD: 14.5 K/MCL (ref 4.2–11)

## 2024-09-08 PROCEDURE — 85025 COMPLETE CBC W/AUTO DIFF WBC: CPT | Performed by: STUDENT IN AN ORGANIZED HEALTH CARE EDUCATION/TRAINING PROGRAM

## 2024-09-08 PROCEDURE — 93010 ELECTROCARDIOGRAM REPORT: CPT | Performed by: INTERNAL MEDICINE

## 2024-09-08 PROCEDURE — 82077 ASSAY SPEC XCP UR&BREATH IA: CPT | Performed by: STUDENT IN AN ORGANIZED HEALTH CARE EDUCATION/TRAINING PROGRAM

## 2024-09-08 PROCEDURE — 93005 ELECTROCARDIOGRAM TRACING: CPT | Performed by: STUDENT IN AN ORGANIZED HEALTH CARE EDUCATION/TRAINING PROGRAM

## 2024-09-08 PROCEDURE — 99284 EMERGENCY DEPT VISIT MOD MDM: CPT

## 2024-09-08 PROCEDURE — 10002803 HB RX 637: Performed by: EMERGENCY MEDICINE

## 2024-09-08 PROCEDURE — 80143 DRUG ASSAY ACETAMINOPHEN: CPT | Performed by: STUDENT IN AN ORGANIZED HEALTH CARE EDUCATION/TRAINING PROGRAM

## 2024-09-08 PROCEDURE — 87635 SARS-COV-2 COVID-19 AMP PRB: CPT | Performed by: STUDENT IN AN ORGANIZED HEALTH CARE EDUCATION/TRAINING PROGRAM

## 2024-09-08 PROCEDURE — 90839 PSYTX CRISIS INITIAL 60 MIN: CPT

## 2024-09-08 PROCEDURE — 80307 DRUG TEST PRSMV CHEM ANLYZR: CPT | Performed by: STUDENT IN AN ORGANIZED HEALTH CARE EDUCATION/TRAINING PROGRAM

## 2024-09-08 PROCEDURE — 80053 COMPREHEN METABOLIC PANEL: CPT | Performed by: STUDENT IN AN ORGANIZED HEALTH CARE EDUCATION/TRAINING PROGRAM

## 2024-09-08 PROCEDURE — 80179 DRUG ASSAY SALICYLATE: CPT | Performed by: STUDENT IN AN ORGANIZED HEALTH CARE EDUCATION/TRAINING PROGRAM

## 2024-09-08 PROCEDURE — 36415 COLL VENOUS BLD VENIPUNCTURE: CPT

## 2024-09-08 RX ORDER — 0.9 % SODIUM CHLORIDE 0.9 %
2 VIAL (ML) INJECTION EVERY 12 HOURS SCHEDULED
Status: DISCONTINUED | OUTPATIENT
Start: 2024-09-08 | End: 2024-09-09 | Stop reason: HOSPADM

## 2024-09-08 RX ORDER — ALPRAZOLAM 0.25 MG
0.5 TABLET ORAL ONCE
Status: COMPLETED | OUTPATIENT
Start: 2024-09-08 | End: 2024-09-08

## 2024-09-08 RX ADMIN — ALPRAZOLAM 0.5 MG: 0.25 TABLET ORAL at 23:03

## 2024-09-08 SDOH — ECONOMIC STABILITY: HOUSING INSECURITY: WHAT IS YOUR LIVING SITUATION TODAY?: I HAVE A STEADY PLACE TO LIVE

## 2024-09-08 SDOH — SOCIAL STABILITY: SOCIAL INSECURITY: HOW OFTEN DOES ANYONE, INCLUDING FAMILY AND FRIENDS, THREATEN YOU WITH HARM?: NEVER

## 2024-09-08 SDOH — SOCIAL STABILITY: SOCIAL INSECURITY: HOW OFTEN DOES ANYONE, INCLUDING FAMILY AND FRIENDS, INSULT OR TALK DOWN TO YOU?: NEVER

## 2024-09-08 SDOH — SOCIAL STABILITY: SOCIAL INSECURITY: HOW OFTEN DOES ANYONE, INCLUDING FAMILY AND FRIENDS, SCREAM OR CURSE AT YOU?: NEVER

## 2024-09-08 SDOH — ECONOMIC STABILITY: FOOD INSECURITY: WITHIN THE PAST 12 MONTHS, THE FOOD YOU BOUGHT JUST DIDN'T LAST AND YOU DIDN'T HAVE MONEY TO GET MORE.: SOMETIMES TRUE

## 2024-09-08 SDOH — SOCIAL STABILITY: SOCIAL INSECURITY: HOW OFTEN DOES ANYONE, INCLUDING FAMILY AND FRIENDS, PHYSICALLY HURT YOU?: NEVER

## 2024-09-08 ASSESSMENT — PAIN SCALES - GENERAL: PAINLEVEL_OUTOF10: 0

## 2024-09-08 ASSESSMENT — LIFESTYLE VARIABLES: HOW OFTEN DO YOU HAVE A DRINK CONTAINING ALCOHOL: NEVER

## 2024-09-09 PROBLEM — F33.2 MDD (MAJOR DEPRESSIVE DISORDER), RECURRENT EPISODE, SEVERE (HCC): Status: RESOLVED | Noted: 2024-09-09 | Resolved: 2024-09-09

## 2024-09-09 PROBLEM — F33.2 MDD (MAJOR DEPRESSIVE DISORDER), RECURRENT EPISODE, SEVERE (HCC): Status: ACTIVE | Noted: 2024-09-09

## 2024-09-09 LAB
ATRIAL RATE (BPM): 69
P AXIS (DEGREES): 45
PR-INTERVAL (MSEC): 146
QRS-INTERVAL (MSEC): 98
QT-INTERVAL (MSEC): 394
QTC: 422
R AXIS (DEGREES): 8
RAINBOW EXTRA TUBES HOLD SPECIMEN: NORMAL
REPORT TEXT: NORMAL
T AXIS (DEGREES): 49
VENTRICULAR RATE EKG/MIN (BPM): 69

## 2024-09-23 ENCOUNTER — TELEPHONE (OUTPATIENT)
Dept: BEHAVIORAL HEALTH | Age: 49
End: 2024-09-23

## 2024-09-23 DIAGNOSIS — F25.0 SCHIZOAFFECTIVE DISORDER, BIPOLAR TYPE  (CMD): Primary | ICD-10-CM

## 2024-09-24 ENCOUNTER — APPOINTMENT (OUTPATIENT)
Dept: BEHAVIORAL HEALTH | Age: 49
End: 2024-09-24

## 2024-09-24 RX ORDER — DIVALPROEX SODIUM 500 MG/1
1000 TABLET, DELAYED RELEASE ORAL 2 TIMES DAILY
COMMUNITY
End: 2024-09-24 | Stop reason: SDUPTHER

## 2024-09-24 RX ORDER — DIVALPROEX SODIUM 500 MG/1
1000 TABLET, DELAYED RELEASE ORAL 2 TIMES DAILY
Qty: 120 TABLET | Refills: 0 | Status: SHIPPED | OUTPATIENT
Start: 2024-09-24

## 2024-09-24 RX ORDER — OLANZAPINE 20 MG/1
30 TABLET ORAL NIGHTLY
COMMUNITY
End: 2024-09-24 | Stop reason: SDUPTHER

## 2024-09-24 RX ORDER — DIVALPROEX SODIUM 500 MG/1
TABLET, DELAYED RELEASE ORAL
Qty: 360 TABLET | OUTPATIENT
Start: 2024-09-24

## 2024-09-24 RX ORDER — OLANZAPINE 20 MG/1
30 TABLET ORAL NIGHTLY
Qty: 45 TABLET | Refills: 0 | Status: SHIPPED | OUTPATIENT
Start: 2024-09-24

## 2024-09-28 ENCOUNTER — HOSPITAL ENCOUNTER (EMERGENCY)
Facility: HOSPITAL | Age: 49
Discharge: HOME | End: 2024-09-28
Attending: EMERGENCY MEDICINE
Payer: MEDICARE

## 2024-09-28 VITALS
WEIGHT: 250 LBS | OXYGEN SATURATION: 97 % | RESPIRATION RATE: 20 BRPM | BODY MASS INDEX: 32.08 KG/M2 | TEMPERATURE: 98 F | HEIGHT: 74 IN | SYSTOLIC BLOOD PRESSURE: 119 MMHG | HEART RATE: 69 BPM | DIASTOLIC BLOOD PRESSURE: 78 MMHG

## 2024-09-28 DIAGNOSIS — Z20.822 EXPOSURE TO COVID-19 VIRUS: Primary | ICD-10-CM

## 2024-09-28 LAB
HIV 1+2 AB+HIV1 P24 AG SERPL QL IA: NONREACTIVE
SARS-COV-2 RNA RESP QL NAA+PROBE: NOT DETECTED

## 2024-09-28 PROCEDURE — 99283 EMERGENCY DEPT VISIT LOW MDM: CPT

## 2024-09-28 PROCEDURE — 36415 COLL VENOUS BLD VENIPUNCTURE: CPT

## 2024-09-28 PROCEDURE — 86701 HIV-1ANTIBODY: CPT | Performed by: EMERGENCY MEDICINE

## 2024-09-28 NOTE — ED INITIAL ASSESSMENT (HPI)
Patient here for multiple complains: requesting Covid test, cough for 4 days, refill for his Clonazepam 2mg, states his doctor's office is giving him the \"run around\". Also requesting HIV testing, he had blood work done however cannot find it on My Chart.

## 2024-09-28 NOTE — ED PROVIDER NOTES
Patient Seen in: [facility] Emergency Department      History     Chief Complaint   Patient presents with    Cough/URI     Stated Complaint: COVID and HIV test, Psych med refill    Subjective:   HPI        Objective:   No pertinent past medical history.            No pertinent past surgical history.              No pertinent social history.            Review of Systems    Positive for stated Chief Complaint: Cough/URI    Other systems are as noted in HPI.  Constitutional and vital signs reviewed.      All other systems reviewed and negative except as noted above.    Physical Exam     ED Triage Vitals [09/28/24 0349]   /78   Pulse 69   Resp 20   Temp 98.1 °F (36.7 °C)   Temp src Oral   SpO2 97 %   O2 Device None (Room air)       Current Vitals:   Vital Signs  BP: 119/78  Pulse: 69  Resp: 20  Temp: 98.1 °F (36.7 °C)  Temp src: Oral    Oxygen Therapy  SpO2: 97 %  O2 Device: None (Room air)            Physical Exam          ED Course     Labs Reviewed   RAPID HIV    Narrative:     The following orders were created for panel order Rapid HIV.  Procedure                               Abnormality         Status                     ---------                               -----------         ------                     RAPID HIV[[id number]]                                        In process                   Please view results for these tests on the individual orders.   RAPID HIV   RAPID SARS-COV-2 BY PCR                      MDM      39-year-old male with history of schizoaffective disorder, major depression, and frequent visits for exacerbations of psychiatric illness presents today with several concerns.  First, patient states he has had a recent COVID exposure and is seeking a COVID test.  Second, he reports he is unable to see his recent HIV test results and is seeking test.  Lastly, the patient is seeking an expedited prescription of clonazepam as his prescription is almost out.    On exam, vitals normal,  well-appearing, clear lungs, linear speech, nonpressured, and's of benzodiazepine withdrawal.    Differential: COVID exposure, encounter for STI testing,    Patient states he would prefer to follow-up with his results on MyChart.  COVID and HIV's tests sent.  Patient informed that he needs to have his primary care doctor or psychiatrist prescribe his clonazepam.                                 MDM    Disposition and Plan     Clinical Impression:  1. Exposure to COVID-19 virus         Disposition:  Discharge  9/28/2024  4:20 am    Follow-up:  Primary care doctor    Follow up            Medications Prescribed:  Discharge Medication List as of 9/28/2024  4:35 AM

## 2024-10-03 RX ORDER — CLONAZEPAM 2 MG/1
2 TABLET ORAL NIGHTLY PRN
Qty: 30 TABLET | Refills: 2 | OUTPATIENT
Start: 2024-10-03

## 2024-10-16 DIAGNOSIS — F25.0 SCHIZOAFFECTIVE DISORDER, BIPOLAR TYPE  (CMD): ICD-10-CM

## 2024-10-16 RX ORDER — OLANZAPINE 20 MG/1
30 TABLET ORAL NIGHTLY
Qty: 45 TABLET | Refills: 0 | Status: SHIPPED | OUTPATIENT
Start: 2024-10-16

## 2024-10-29 ENCOUNTER — APPOINTMENT (OUTPATIENT)
Dept: BEHAVIORAL HEALTH | Age: 49
End: 2024-10-29

## 2024-11-05 ENCOUNTER — TELEPHONE (OUTPATIENT)
Dept: BEHAVIORAL HEALTH | Age: 49
End: 2024-11-05

## 2024-11-08 ENCOUNTER — TELEPHONE (OUTPATIENT)
Dept: BEHAVIORAL HEALTH | Age: 49
End: 2024-11-08

## 2024-11-08 DIAGNOSIS — F25.0 SCHIZOAFFECTIVE DISORDER, BIPOLAR TYPE  (CMD): ICD-10-CM

## 2024-11-08 RX ORDER — OLANZAPINE 20 MG/1
TABLET ORAL
Qty: 45 TABLET | Refills: 0 | Status: SHIPPED | OUTPATIENT
Start: 2024-11-08

## 2024-11-13 ENCOUNTER — HOSPITAL ENCOUNTER (EMERGENCY)
Age: 49
Discharge: HOME | End: 2024-11-13
Attending: STUDENT IN AN ORGANIZED HEALTH CARE EDUCATION/TRAINING PROGRAM

## 2024-11-13 VITALS
BODY MASS INDEX: 30.56 KG/M2 | HEART RATE: 61 BPM | WEIGHT: 238.1 LBS | SYSTOLIC BLOOD PRESSURE: 109 MMHG | OXYGEN SATURATION: 100 % | DIASTOLIC BLOOD PRESSURE: 64 MMHG | HEIGHT: 74 IN | TEMPERATURE: 97.9 F | RESPIRATION RATE: 18 BRPM

## 2024-11-13 DIAGNOSIS — R44.3 HALLUCINATIONS: Primary | ICD-10-CM

## 2024-11-13 LAB
ALBUMIN SERPL-MCNC: 3.8 G/DL (ref 3.4–5)
ALBUMIN/GLOB SERPL: 1.2 {RATIO} (ref 1–2.4)
ALP SERPL-CCNC: 72 UNITS/L (ref 45–117)
ALT SERPL-CCNC: 29 UNITS/L
AMPHETAMINES UR QL SCN>500 NG/ML: NEGATIVE
ANION GAP SERPL CALC-SCNC: 9 MMOL/L (ref 7–19)
APAP SERPL-MCNC: <2 MCG/ML (ref 10–30)
AST SERPL-CCNC: 18 UNITS/L
BARBITURATES UR QL SCN>200 NG/ML: NEGATIVE
BASOPHILS # BLD: 0 K/MCL (ref 0–0.3)
BASOPHILS NFR BLD: 0 %
BENZODIAZ UR QL SCN>200 NG/ML: NEGATIVE
BILIRUB SERPL-MCNC: 0.4 MG/DL (ref 0.2–1)
BUN SERPL-MCNC: 11 MG/DL (ref 6–20)
BUN/CREAT SERPL: 13 (ref 7–25)
BZE UR QL SCN>150 NG/ML: NEGATIVE
CALCIUM SERPL-MCNC: 8.9 MG/DL (ref 8.4–10.2)
CANNABINOIDS UR QL SCN>50 NG/ML: NEGATIVE
CHLORIDE SERPL-SCNC: 104 MMOL/L (ref 97–110)
CO2 SERPL-SCNC: 27 MMOL/L (ref 21–32)
CREAT SERPL-MCNC: 0.87 MG/DL (ref 0.67–1.17)
DEPRECATED RDW RBC: 42.7 FL (ref 39–50)
EGFRCR SERPLBLD CKD-EPI 2021: >90 ML/MIN/{1.73_M2}
EOSINOPHIL # BLD: 0.1 K/MCL (ref 0–0.5)
EOSINOPHIL NFR BLD: 1 %
ERYTHROCYTE [DISTWIDTH] IN BLOOD: 12.1 % (ref 11–15)
ETHANOL SERPL-MCNC: NORMAL MG/DL
FASTING DURATION TIME PATIENT: NORMAL H
FENTANYL UR QL SCN: NEGATIVE
FLUAV RNA RESP QL NAA+PROBE: NOT DETECTED
FLUBV RNA RESP QL NAA+PROBE: NOT DETECTED
GLOBULIN SER-MCNC: 3.2 G/DL (ref 2–4)
GLUCOSE SERPL-MCNC: 88 MG/DL (ref 70–99)
HCT VFR BLD CALC: 42 % (ref 39–51)
HGB BLD-MCNC: 14.9 G/DL (ref 13–17)
IMM GRANULOCYTES # BLD AUTO: 0 K/MCL (ref 0–0.2)
IMM GRANULOCYTES # BLD: 0 %
LYMPHOCYTES # BLD: 2 K/MCL (ref 1–4.8)
LYMPHOCYTES NFR BLD: 23 %
MCH RBC QN AUTO: 34 PG (ref 26–34)
MCHC RBC AUTO-ENTMCNC: 35.5 G/DL (ref 32–36.5)
MCV RBC AUTO: 95.9 FL (ref 78–100)
MONOCYTES # BLD: 0.8 K/MCL (ref 0.3–0.9)
MONOCYTES NFR BLD: 9 %
NEUTROPHILS # BLD: 5.7 K/MCL (ref 1.8–7.7)
NEUTROPHILS NFR BLD: 67 %
NRBC BLD MANUAL-RTO: 0 /100 WBC
OPIATES UR QL SCN>300 NG/ML: NEGATIVE
PCP UR QL SCN>25 NG/ML: NEGATIVE
PLATELET # BLD AUTO: 192 K/MCL (ref 140–450)
POTASSIUM SERPL-SCNC: 3.8 MMOL/L (ref 3.4–5.1)
PROT SERPL-MCNC: 7 G/DL (ref 6.4–8.2)
RAINBOW EXTRA TUBES HOLD SPECIMEN: NORMAL
RBC # BLD: 4.38 MIL/MCL (ref 4.5–5.9)
RSV AG NPH QL IA.RAPID: NOT DETECTED
SALICYLATES SERPL-MCNC: 3.5 MG/DL
SARS-COV-2 RNA RESP QL NAA+PROBE: NOT DETECTED
SERVICE CMNT-IMP: NORMAL
SERVICE CMNT-IMP: NORMAL
SODIUM SERPL-SCNC: 136 MMOL/L (ref 135–145)
WBC # BLD: 8.6 K/MCL (ref 4.2–11)

## 2024-11-13 PROCEDURE — 96374 THER/PROPH/DIAG INJ IV PUSH: CPT

## 2024-11-13 PROCEDURE — 85025 COMPLETE CBC W/AUTO DIFF WBC: CPT | Performed by: STUDENT IN AN ORGANIZED HEALTH CARE EDUCATION/TRAINING PROGRAM

## 2024-11-13 PROCEDURE — 90839 PSYTX CRISIS INITIAL 60 MIN: CPT

## 2024-11-13 PROCEDURE — 82077 ASSAY SPEC XCP UR&BREATH IA: CPT | Performed by: STUDENT IN AN ORGANIZED HEALTH CARE EDUCATION/TRAINING PROGRAM

## 2024-11-13 PROCEDURE — 80143 DRUG ASSAY ACETAMINOPHEN: CPT | Performed by: STUDENT IN AN ORGANIZED HEALTH CARE EDUCATION/TRAINING PROGRAM

## 2024-11-13 PROCEDURE — 80307 DRUG TEST PRSMV CHEM ANLYZR: CPT | Performed by: STUDENT IN AN ORGANIZED HEALTH CARE EDUCATION/TRAINING PROGRAM

## 2024-11-13 PROCEDURE — 99284 EMERGENCY DEPT VISIT MOD MDM: CPT

## 2024-11-13 PROCEDURE — 80053 COMPREHEN METABOLIC PANEL: CPT | Performed by: STUDENT IN AN ORGANIZED HEALTH CARE EDUCATION/TRAINING PROGRAM

## 2024-11-13 PROCEDURE — 80179 DRUG ASSAY SALICYLATE: CPT | Performed by: STUDENT IN AN ORGANIZED HEALTH CARE EDUCATION/TRAINING PROGRAM

## 2024-11-13 PROCEDURE — 90792 PSYCH DIAG EVAL W/MED SRVCS: CPT | Performed by: PSYCHIATRY & NEUROLOGY

## 2024-11-13 PROCEDURE — 10002800 HB RX 250 W HCPCS: Performed by: STUDENT IN AN ORGANIZED HEALTH CARE EDUCATION/TRAINING PROGRAM

## 2024-11-13 PROCEDURE — 0241U COVID/FLU/RSV PANEL: CPT | Performed by: STUDENT IN AN ORGANIZED HEALTH CARE EDUCATION/TRAINING PROGRAM

## 2024-11-13 PROCEDURE — 96375 TX/PRO/DX INJ NEW DRUG ADDON: CPT

## 2024-11-13 RX ORDER — LORAZEPAM 2 MG/ML
2 INJECTION INTRAMUSCULAR ONCE
Status: COMPLETED | OUTPATIENT
Start: 2024-11-13 | End: 2024-11-13

## 2024-11-13 RX ORDER — ACETAMINOPHEN 325 MG/1
650 TABLET ORAL EVERY 4 HOURS PRN
Status: CANCELLED | OUTPATIENT
Start: 2024-11-13

## 2024-11-13 RX ORDER — ONDANSETRON 4 MG/1
4 TABLET, ORALLY DISINTEGRATING ORAL EVERY 12 HOURS PRN
Status: CANCELLED | OUTPATIENT
Start: 2024-11-13

## 2024-11-13 RX ORDER — 0.9 % SODIUM CHLORIDE 0.9 %
10 VIAL (ML) INJECTION PRN
Status: CANCELLED | OUTPATIENT
Start: 2024-11-13

## 2024-11-13 RX ORDER — HALOPERIDOL 5 MG/ML
5 INJECTION INTRAMUSCULAR ONCE
Status: COMPLETED | OUTPATIENT
Start: 2024-11-13 | End: 2024-11-13

## 2024-11-13 RX ORDER — 0.9 % SODIUM CHLORIDE 0.9 %
2 VIAL (ML) INJECTION EVERY 12 HOURS SCHEDULED
Status: CANCELLED | OUTPATIENT
Start: 2024-11-13

## 2024-11-13 RX ORDER — 0.9 % SODIUM CHLORIDE 0.9 %
10 VIAL (ML) INJECTION PRN
Status: DISCONTINUED | OUTPATIENT
Start: 2024-11-13 | End: 2024-11-13 | Stop reason: HOSPADM

## 2024-11-13 RX ORDER — POLYETHYLENE GLYCOL 3350 17 G/17G
17 POWDER, FOR SOLUTION ORAL DAILY PRN
Status: CANCELLED | OUTPATIENT
Start: 2024-11-13

## 2024-11-13 RX ORDER — 0.9 % SODIUM CHLORIDE 0.9 %
2 VIAL (ML) INJECTION EVERY 12 HOURS SCHEDULED
Status: DISCONTINUED | OUTPATIENT
Start: 2024-11-13 | End: 2024-11-13

## 2024-11-13 RX ORDER — ONDANSETRON 2 MG/ML
4 INJECTION INTRAMUSCULAR; INTRAVENOUS EVERY 12 HOURS PRN
Status: CANCELLED | OUTPATIENT
Start: 2024-11-13

## 2024-11-13 RX ORDER — ACETAMINOPHEN 650 MG/1
650 SUPPOSITORY RECTAL EVERY 4 HOURS PRN
Status: CANCELLED | OUTPATIENT
Start: 2024-11-13

## 2024-11-13 RX ADMIN — HALOPERIDOL LACTATE 5 MG: 5 INJECTION, SOLUTION INTRAMUSCULAR at 13:12

## 2024-11-13 RX ADMIN — LORAZEPAM 2 MG: 2 INJECTION INTRAMUSCULAR; INTRAVENOUS at 13:12

## 2024-11-13 SDOH — SOCIAL STABILITY: SOCIAL INSECURITY: HOW OFTEN DOES ANYONE, INCLUDING FAMILY AND FRIENDS, THREATEN YOU WITH HARM?: NEVER

## 2024-11-13 SDOH — ECONOMIC STABILITY: HOUSING INSECURITY: WHAT IS YOUR LIVING SITUATION TODAY?: I HAVE A STEADY PLACE TO LIVE

## 2024-11-13 SDOH — ECONOMIC STABILITY: HOUSING INSECURITY: DO YOU HAVE PROBLEMS WITH ANY OF THE FOLLOWING?: NONE OF THE ABOVE

## 2024-11-13 SDOH — SOCIAL STABILITY: SOCIAL INSECURITY: HOW OFTEN DOES ANYONE, INCLUDING FAMILY AND FRIENDS, SCREAM OR CURSE AT YOU?: NEVER

## 2024-11-13 SDOH — ECONOMIC STABILITY: FOOD INSECURITY: WITHIN THE PAST 12 MONTHS, THE FOOD YOU BOUGHT JUST DIDN'T LAST AND YOU DIDN'T HAVE MONEY TO GET MORE.: NEVER TRUE

## 2024-11-13 SDOH — SOCIAL STABILITY: SOCIAL INSECURITY: HOW OFTEN DOES ANYONE, INCLUDING FAMILY AND FRIENDS, INSULT OR TALK DOWN TO YOU?: NEVER

## 2024-11-13 SDOH — ECONOMIC STABILITY: GENERAL

## 2024-11-13 SDOH — SOCIAL STABILITY: SOCIAL INSECURITY: HOW OFTEN DOES ANYONE, INCLUDING FAMILY AND FRIENDS, PHYSICALLY HURT YOU?: NEVER

## 2024-11-13 ASSESSMENT — COGNITIVE AND FUNCTIONAL STATUS - GENERAL
BEHAVIOR: APPROPRIATE TO SITUATION
ORIENTATION: ORIENTED TO PERSON;ORIENTED TO PLACE;ORIENTED TO TIME
SPEECH: TANGENTIAL
ATTENTION_CALCULATED: MAINTAINS ATTENTION
AFFECT: LABILE;FLAT
MEMORY: INTACT
MOOD: LABILE;BLUNTED
MOTOR_BEHAVIOR-RETARDATION_CALCULATED: CALM AND PURPOSEFUL
MOTOR_BEHAVIOR-AGITATION_CALCULATED: CALM AND PURPOSEFUL
LEVEL_OF_CONSCIOUSNESS_CALCULATED: ALERT
PERCEPTUAL_MISINTERPRETATIONS_HALLUCINATIONS: CLEAR REALITY BASED PERCEPTIONS

## 2024-11-13 ASSESSMENT — LIFESTYLE VARIABLES: ALCOHOL_USE: DENIES

## 2024-11-13 ASSESSMENT — PAIN SCALES - GENERAL: PAINLEVEL_OUTOF10: 0

## 2024-11-13 ASSESSMENT — ENCOUNTER SYMPTOMS: HALLUCINATIONS: 1

## 2024-11-17 ENCOUNTER — HOSPITAL ENCOUNTER (EMERGENCY)
Age: 49
Discharge: HOME | End: 2024-11-17
Attending: EMERGENCY MEDICINE

## 2024-11-17 VITALS
TEMPERATURE: 99 F | SYSTOLIC BLOOD PRESSURE: 122 MMHG | OXYGEN SATURATION: 98 % | DIASTOLIC BLOOD PRESSURE: 67 MMHG | RESPIRATION RATE: 16 BRPM | HEART RATE: 56 BPM

## 2024-11-17 DIAGNOSIS — F43.0 ACUTE STRESS REACTION: Primary | ICD-10-CM

## 2024-11-17 LAB
ALBUMIN SERPL-MCNC: 3.6 G/DL (ref 3.4–5)
ALP SERPL-CCNC: 64 UNITS/L (ref 45–117)
ALT SERPL-CCNC: 33 UNITS/L
AMPHETAMINES UR QL SCN>500 NG/ML: NEGATIVE
ANION GAP SERPL CALC-SCNC: 9 MMOL/L (ref 7–19)
AST SERPL-CCNC: 17 UNITS/L
BARBITURATES UR QL SCN>200 NG/ML: NEGATIVE
BASOPHILS # BLD: 0 K/MCL (ref 0–0.3)
BASOPHILS NFR BLD: 0 %
BENZODIAZ UR QL SCN>200 NG/ML: POSITIVE
BILIRUB CONJ SERPL-MCNC: 0.1 MG/DL (ref 0–0.2)
BILIRUB SERPL-MCNC: 0.3 MG/DL (ref 0.2–1)
BUN SERPL-MCNC: 14 MG/DL (ref 6–20)
BUN/CREAT SERPL: 16 (ref 7–25)
BZE UR QL SCN>150 NG/ML: NEGATIVE
CALCIUM SERPL-MCNC: 8.8 MG/DL (ref 8.4–10.2)
CANNABINOIDS UR QL SCN>50 NG/ML: NEGATIVE
CHLORIDE SERPL-SCNC: 107 MMOL/L (ref 97–110)
CO2 SERPL-SCNC: 26 MMOL/L (ref 21–32)
CREAT SERPL-MCNC: 0.89 MG/DL (ref 0.67–1.17)
DEPRECATED RDW RBC: 44.5 FL (ref 39–50)
EGFRCR SERPLBLD CKD-EPI 2021: >90 ML/MIN/{1.73_M2}
EOSINOPHIL # BLD: 0.2 K/MCL (ref 0–0.5)
EOSINOPHIL NFR BLD: 3 %
ERYTHROCYTE [DISTWIDTH] IN BLOOD: 12.3 % (ref 11–15)
ETHANOL SERPL-MCNC: NORMAL MG/DL
FASTING DURATION TIME PATIENT: ABNORMAL H
FENTANYL UR QL SCN: NEGATIVE
GLUCOSE SERPL-MCNC: 106 MG/DL (ref 70–99)
HCT VFR BLD CALC: 40.8 % (ref 39–51)
HGB BLD-MCNC: 14.3 G/DL (ref 13–17)
IMM GRANULOCYTES # BLD AUTO: 0 K/MCL (ref 0–0.2)
IMM GRANULOCYTES # BLD: 0 %
LYMPHOCYTES # BLD: 2 K/MCL (ref 1–4.8)
LYMPHOCYTES NFR BLD: 27 %
MAGNESIUM SERPL-MCNC: 2.1 MG/DL (ref 1.7–2.4)
MCH RBC QN AUTO: 34 PG (ref 26–34)
MCHC RBC AUTO-ENTMCNC: 35 G/DL (ref 32–36.5)
MCV RBC AUTO: 96.9 FL (ref 78–100)
MONOCYTES # BLD: 0.8 K/MCL (ref 0.3–0.9)
MONOCYTES NFR BLD: 10 %
NEUTROPHILS # BLD: 4.4 K/MCL (ref 1.8–7.7)
NEUTROPHILS NFR BLD: 60 %
NRBC BLD MANUAL-RTO: 0 /100 WBC
OPIATES UR QL SCN>300 NG/ML: NEGATIVE
PCP UR QL SCN>25 NG/ML: NEGATIVE
PLATELET # BLD AUTO: 164 K/MCL (ref 140–450)
POTASSIUM SERPL-SCNC: 4 MMOL/L (ref 3.4–5.1)
PROT SERPL-MCNC: 6.7 G/DL (ref 6.4–8.2)
RBC # BLD: 4.21 MIL/MCL (ref 4.5–5.9)
SODIUM SERPL-SCNC: 138 MMOL/L (ref 135–145)
WBC # BLD: 7.5 K/MCL (ref 4.2–11)

## 2024-11-17 PROCEDURE — 82077 ASSAY SPEC XCP UR&BREATH IA: CPT | Performed by: EMERGENCY MEDICINE

## 2024-11-17 PROCEDURE — 80048 BASIC METABOLIC PNL TOTAL CA: CPT | Performed by: EMERGENCY MEDICINE

## 2024-11-17 PROCEDURE — 10002803 HB RX 637: Performed by: EMERGENCY MEDICINE

## 2024-11-17 PROCEDURE — 80307 DRUG TEST PRSMV CHEM ANLYZR: CPT | Performed by: EMERGENCY MEDICINE

## 2024-11-17 PROCEDURE — 83735 ASSAY OF MAGNESIUM: CPT | Performed by: EMERGENCY MEDICINE

## 2024-11-17 PROCEDURE — 85025 COMPLETE CBC W/AUTO DIFF WBC: CPT | Performed by: EMERGENCY MEDICINE

## 2024-11-17 PROCEDURE — 36415 COLL VENOUS BLD VENIPUNCTURE: CPT

## 2024-11-17 PROCEDURE — 80076 HEPATIC FUNCTION PANEL: CPT | Performed by: EMERGENCY MEDICINE

## 2024-11-17 PROCEDURE — 99284 EMERGENCY DEPT VISIT MOD MDM: CPT

## 2024-11-17 RX ORDER — DIAZEPAM 5 MG/1
5-10 TABLET ORAL DAILY PRN
Qty: 11 TABLET | Refills: 0 | Status: SHIPPED | OUTPATIENT
Start: 2024-11-17 | End: 2024-11-24

## 2024-11-17 RX ORDER — DIAZEPAM 5 MG/1
10 TABLET ORAL ONCE
Status: COMPLETED | OUTPATIENT
Start: 2024-11-17 | End: 2024-11-17

## 2024-11-17 RX ADMIN — DIAZEPAM 10 MG: 5 TABLET ORAL at 18:19

## 2024-11-17 ASSESSMENT — PAIN SCALES - GENERAL: PAINLEVEL_OUTOF10: 0

## 2024-11-19 ENCOUNTER — APPOINTMENT (OUTPATIENT)
Dept: BEHAVIORAL HEALTH | Age: 49
End: 2024-11-19

## 2024-11-30 ENCOUNTER — HOSPITAL ENCOUNTER (INPATIENT)
Age: 49
DRG: 885 | End: 2024-11-30
Attending: EMERGENCY MEDICINE | Admitting: PSYCHIATRY & NEUROLOGY

## 2024-11-30 DIAGNOSIS — F99 PSYCHIATRIC PROBLEM: Primary | ICD-10-CM

## 2024-11-30 LAB
ANION GAP SERPL CALC-SCNC: 10 MMOL/L (ref 7–19)
BASOPHILS # BLD: 0 K/MCL (ref 0–0.3)
BASOPHILS NFR BLD: 0 %
BUN SERPL-MCNC: 11 MG/DL (ref 6–20)
BUN/CREAT SERPL: 16 (ref 7–25)
CALCIUM SERPL-MCNC: 8.7 MG/DL (ref 8.4–10.2)
CHLORIDE SERPL-SCNC: 109 MMOL/L (ref 97–110)
CHOLEST SERPL-MCNC: 175 MG/DL
CHOLEST/HDLC SERPL: 4.2 {RATIO}
CO2 SERPL-SCNC: 24 MMOL/L (ref 21–32)
CREAT SERPL-MCNC: 0.68 MG/DL (ref 0.67–1.17)
DEPRECATED RDW RBC: 43.2 FL (ref 39–50)
EGFRCR SERPLBLD CKD-EPI 2021: >90 ML/MIN/{1.73_M2}
EOSINOPHIL # BLD: 0.2 K/MCL (ref 0–0.5)
EOSINOPHIL NFR BLD: 2 %
ERYTHROCYTE [DISTWIDTH] IN BLOOD: 11.9 % (ref 11–15)
ETHANOL SERPL-MCNC: NORMAL MG/DL
FASTING DURATION TIME PATIENT: ABNORMAL H
FLUAV RNA RESP QL NAA+PROBE: NOT DETECTED
FLUBV RNA RESP QL NAA+PROBE: NOT DETECTED
GLUCOSE SERPL-MCNC: 124 MG/DL (ref 70–99)
HCT VFR BLD CALC: 44.8 % (ref 39–51)
HDLC SERPL-MCNC: 42 MG/DL
HGB BLD-MCNC: 15.6 G/DL (ref 13–17)
IMM GRANULOCYTES # BLD AUTO: 0 K/MCL (ref 0–0.2)
IMM GRANULOCYTES # BLD: 0 %
LDLC SERPL CALC-MCNC: 104 MG/DL
LYMPHOCYTES # BLD: 2.2 K/MCL (ref 1–4.8)
LYMPHOCYTES NFR BLD: 27 %
MCH RBC QN AUTO: 33.8 PG (ref 26–34)
MCHC RBC AUTO-ENTMCNC: 34.8 G/DL (ref 32–36.5)
MCV RBC AUTO: 97 FL (ref 78–100)
MONOCYTES # BLD: 1 K/MCL (ref 0.3–0.9)
MONOCYTES NFR BLD: 12 %
NEUTROPHILS # BLD: 4.8 K/MCL (ref 1.8–7.7)
NEUTROPHILS NFR BLD: 59 %
NONHDLC SERPL-MCNC: 133 MG/DL
NRBC BLD MANUAL-RTO: 0 /100 WBC
PLATELET # BLD AUTO: 184 K/MCL (ref 140–450)
POTASSIUM SERPL-SCNC: 4 MMOL/L (ref 3.4–5.1)
RBC # BLD: 4.62 MIL/MCL (ref 4.5–5.9)
RSV AG NPH QL IA.RAPID: NOT DETECTED
SARS-COV-2 RNA RESP QL NAA+PROBE: NOT DETECTED
SERVICE CMNT-IMP: NORMAL
SERVICE CMNT-IMP: NORMAL
SODIUM SERPL-SCNC: 139 MMOL/L (ref 135–145)
TRIGL SERPL-MCNC: 145 MG/DL
TSH SERPL-ACNC: 2 MCUNITS/ML (ref 0.35–5)
VALPROATE SERPL-MCNC: 32 MCG/ML (ref 50–125)
WBC # BLD: 8.2 K/MCL (ref 4.2–11)

## 2024-11-30 PROCEDURE — 83036 HEMOGLOBIN GLYCOSYLATED A1C: CPT | Performed by: PSYCHIATRY & NEUROLOGY

## 2024-11-30 PROCEDURE — 84443 ASSAY THYROID STIM HORMONE: CPT | Performed by: PSYCHIATRY & NEUROLOGY

## 2024-11-30 PROCEDURE — 0241U COVID/FLU/RSV PANEL: CPT | Performed by: EMERGENCY MEDICINE

## 2024-11-30 PROCEDURE — 10002800 HB RX 250 W HCPCS: Performed by: EMERGENCY MEDICINE

## 2024-11-30 PROCEDURE — 13003244 HB ROOM CHARGE INTENSIVE PSYCH

## 2024-11-30 PROCEDURE — 90839 PSYTX CRISIS INITIAL 60 MIN: CPT

## 2024-11-30 PROCEDURE — 80061 LIPID PANEL: CPT | Performed by: PSYCHIATRY & NEUROLOGY

## 2024-11-30 PROCEDURE — 80048 BASIC METABOLIC PNL TOTAL CA: CPT | Performed by: EMERGENCY MEDICINE

## 2024-11-30 PROCEDURE — 96372 THER/PROPH/DIAG INJ SC/IM: CPT | Performed by: EMERGENCY MEDICINE

## 2024-11-30 PROCEDURE — 82077 ASSAY SPEC XCP UR&BREATH IA: CPT | Performed by: EMERGENCY MEDICINE

## 2024-11-30 PROCEDURE — 80164 ASSAY DIPROPYLACETIC ACD TOT: CPT | Performed by: EMERGENCY MEDICINE

## 2024-11-30 PROCEDURE — 36415 COLL VENOUS BLD VENIPUNCTURE: CPT

## 2024-11-30 PROCEDURE — 85025 COMPLETE CBC W/AUTO DIFF WBC: CPT | Performed by: EMERGENCY MEDICINE

## 2024-11-30 PROCEDURE — 99284 EMERGENCY DEPT VISIT MOD MDM: CPT

## 2024-11-30 RX ORDER — HALOPERIDOL 5 MG/1
5 TABLET ORAL EVERY 4 HOURS PRN
Status: DISCONTINUED | OUTPATIENT
Start: 2024-11-30 | End: 2024-12-11 | Stop reason: HOSPADM

## 2024-11-30 RX ORDER — LORAZEPAM 2 MG/ML
1 INJECTION INTRAMUSCULAR EVERY 4 HOURS PRN
Status: DISCONTINUED | OUTPATIENT
Start: 2024-11-30 | End: 2024-12-01

## 2024-11-30 RX ORDER — BENZTROPINE MESYLATE 1 MG/ML
1 INJECTION, SOLUTION INTRAMUSCULAR; INTRAVENOUS EVERY 8 HOURS PRN
Status: DISCONTINUED | OUTPATIENT
Start: 2024-11-30 | End: 2024-12-11 | Stop reason: HOSPADM

## 2024-11-30 RX ORDER — NICOTINE 21 MG/24HR
1 PATCH, TRANSDERMAL 24 HOURS TRANSDERMAL DAILY
Status: DISCONTINUED | OUTPATIENT
Start: 2024-12-01 | End: 2024-12-01

## 2024-11-30 RX ORDER — TRAZODONE HYDROCHLORIDE 50 MG/1
50 TABLET, FILM COATED ORAL NIGHTLY PRN
Status: DISCONTINUED | OUTPATIENT
Start: 2024-11-30 | End: 2024-12-01

## 2024-11-30 RX ORDER — ACETAMINOPHEN 500 MG
500 TABLET ORAL EVERY 4 HOURS PRN
Status: DISCONTINUED | OUTPATIENT
Start: 2024-11-30 | End: 2024-12-11 | Stop reason: HOSPADM

## 2024-11-30 RX ORDER — HALOPERIDOL 5 MG/ML
2 INJECTION INTRAMUSCULAR ONCE
Status: COMPLETED | OUTPATIENT
Start: 2024-11-30 | End: 2024-11-30

## 2024-11-30 RX ORDER — LORAZEPAM 1 MG/1
1 TABLET ORAL EVERY 4 HOURS PRN
Status: DISCONTINUED | OUTPATIENT
Start: 2024-11-30 | End: 2024-12-01

## 2024-11-30 RX ORDER — MAGNESIUM HYDROXIDE/ALUMINUM HYDROXICE/SIMETHICONE 120; 1200; 1200 MG/30ML; MG/30ML; MG/30ML
30 SUSPENSION ORAL 4 TIMES DAILY PRN
Status: DISCONTINUED | OUTPATIENT
Start: 2024-11-30 | End: 2024-12-11 | Stop reason: HOSPADM

## 2024-11-30 RX ORDER — HYDROXYZINE HYDROCHLORIDE 25 MG/1
50 TABLET, FILM COATED ORAL EVERY 4 HOURS PRN
Status: DISCONTINUED | OUTPATIENT
Start: 2024-11-30 | End: 2024-12-01

## 2024-11-30 RX ORDER — LORAZEPAM 2 MG/ML
2 INJECTION INTRAMUSCULAR ONCE
Status: DISCONTINUED | OUTPATIENT
Start: 2024-11-30 | End: 2024-11-30

## 2024-11-30 RX ORDER — LORAZEPAM 2 MG/ML
INJECTION INTRAMUSCULAR
Status: DISCONTINUED
Start: 2024-11-30 | End: 2024-12-01 | Stop reason: WASHOUT

## 2024-11-30 RX ORDER — ACETAMINOPHEN 500 MG
1000 TABLET ORAL EVERY 6 HOURS PRN
Status: DISCONTINUED | OUTPATIENT
Start: 2024-11-30 | End: 2024-12-11 | Stop reason: HOSPADM

## 2024-11-30 RX ORDER — CELECOXIB 200 MG/1
1 CAPSULE ORAL DAILY
Status: ON HOLD | COMMUNITY
Start: 2024-10-21

## 2024-11-30 RX ORDER — HALOPERIDOL 5 MG/ML
5 INJECTION INTRAMUSCULAR EVERY 4 HOURS PRN
Status: DISCONTINUED | OUTPATIENT
Start: 2024-11-30 | End: 2024-12-11 | Stop reason: HOSPADM

## 2024-11-30 RX ORDER — FINASTERIDE 1 MG/1
1 TABLET, FILM COATED ORAL DAILY
Status: ON HOLD | COMMUNITY
Start: 2024-10-17

## 2024-11-30 RX ORDER — HALOPERIDOL 5 MG/ML
INJECTION INTRAMUSCULAR
Status: DISPENSED
Start: 2024-11-30 | End: 2024-12-01

## 2024-11-30 RX ORDER — ACETAMINOPHEN 325 MG/1
650 TABLET ORAL EVERY 4 HOURS PRN
Status: DISCONTINUED | OUTPATIENT
Start: 2024-11-30 | End: 2024-12-11 | Stop reason: HOSPADM

## 2024-11-30 RX ORDER — BENZTROPINE MESYLATE 1 MG/1
1 TABLET ORAL EVERY 8 HOURS PRN
Status: DISCONTINUED | OUTPATIENT
Start: 2024-11-30 | End: 2024-12-11 | Stop reason: HOSPADM

## 2024-11-30 RX ADMIN — HALOPERIDOL LACTATE 2 MG: 5 INJECTION, SOLUTION INTRAMUSCULAR at 21:56

## 2024-11-30 SDOH — ECONOMIC STABILITY: HOUSING INSECURITY: DO YOU HAVE PROBLEMS WITH ANY OF THE FOLLOWING?: NONE OF THE ABOVE

## 2024-11-30 SDOH — SOCIAL STABILITY: SOCIAL INSECURITY: HOW OFTEN DOES ANYONE, INCLUDING FAMILY AND FRIENDS, SCREAM OR CURSE AT YOU?: NEVER

## 2024-11-30 SDOH — ECONOMIC STABILITY: HOUSING INSECURITY: WHAT IS YOUR LIVING SITUATION TODAY?: I HAVE A STEADY PLACE TO LIVE

## 2024-11-30 SDOH — ECONOMIC STABILITY: FOOD INSECURITY: WITHIN THE PAST 12 MONTHS, THE FOOD YOU BOUGHT JUST DIDN'T LAST AND YOU DIDN'T HAVE MONEY TO GET MORE.: NEVER TRUE

## 2024-11-30 SDOH — ECONOMIC STABILITY: INCOME INSECURITY: IN THE PAST 12 MONTHS, HAS THE ELECTRIC, GAS, OIL, OR WATER COMPANY THREATENED TO SHUT OFF SERVICE IN YOUR HOME?: NO

## 2024-11-30 SDOH — SOCIAL STABILITY: SOCIAL INSECURITY: HOW OFTEN DOES ANYONE, INCLUDING FAMILY AND FRIENDS, INSULT OR TALK DOWN TO YOU?: NEVER

## 2024-11-30 SDOH — SOCIAL STABILITY: SOCIAL INSECURITY: HOW OFTEN DOES ANYONE, INCLUDING FAMILY AND FRIENDS, PHYSICALLY HURT YOU?: NEVER

## 2024-11-30 SDOH — SOCIAL STABILITY: SOCIAL NETWORK
HOW OFTEN DO YOU SEE OR TALK TO PEOPLE THAT YOU CARE ABOUT AND FEEL CLOSE TO? (FOR EXAMPLE: TALKING TO FRIENDS ON THE PHONE, VISITING FRIENDS OR FAMILY, GOING TO CHURCH OR CLUB MEETINGS): 3 TO 5 TIMES A WEEK

## 2024-11-30 SDOH — SOCIAL STABILITY: SOCIAL NETWORK: SUPPORT SYSTEMS: FAMILY MEMBERS

## 2024-11-30 SDOH — SOCIAL STABILITY: SOCIAL INSECURITY: HOW OFTEN DOES ANYONE, INCLUDING FAMILY AND FRIENDS, THREATEN YOU WITH HARM?: NEVER

## 2024-11-30 SDOH — ECONOMIC STABILITY: HOUSING INSECURITY: WHAT IS YOUR LIVING SITUATION TODAY?: HOUSE

## 2024-11-30 SDOH — ECONOMIC STABILITY: HOUSING INSECURITY: WHAT IS YOUR LIVING SITUATION TODAY?: ALONE

## 2024-11-30 SDOH — ECONOMIC STABILITY: GENERAL: WOULD YOU LIKE HELP WITH ANY OF THE FOLLOWING NEEDS?: I DON'T WANT HELP WITH ANY OF THESE

## 2024-11-30 SDOH — ECONOMIC STABILITY: GENERAL

## 2024-11-30 ASSESSMENT — PATIENT HEALTH QUESTIONNAIRE - PHQ9
IS PATIENT ABLE TO COMPLETE PHQ2 OR PHQ9: YES
CLINICAL INTERPRETATION OF PHQ9 SCORE: MODERATELY SEVERE DEPRESSION
SUM OF ALL RESPONSES TO PHQ9 QUESTIONS 1 AND 2: 4
8. MOVING OR SPEAKING SO SLOWLY THAT OTHER PEOPLE COULD HAVE NOTICED. OR THE OPPOSITE, BEING SO FIGETY OR RESTLESS THAT YOU HAVE BEEN MOVING AROUND A LOT MORE THAN USUAL: MORE THAN HALF THE DAYS
1. LITTLE INTEREST OR PLEASURE IN DOING THINGS: MORE THAN HALF THE DAYS
2. FEELING DOWN, DEPRESSED OR HOPELESS: MORE THAN HALF THE DAYS
6. FEELING BAD ABOUT YOURSELF - OR THAT YOU ARE A FAILURE OR HAVE LET YOURSELF OR YOUR FAMILY DOWN: MORE THAN HALF THE DAYS
SUM OF ALL RESPONSES TO PHQ9 QUESTIONS 1 AND 2: 4
SUM OF ALL RESPONSES TO PHQ QUESTIONS 1-9: 18
5. POOR APPETITE OR OVEREATING: MORE THAN HALF THE DAYS
10. IF YOU CHECKED OFF ANY PROBLEMS, HOW DIFFICULT HAVE THESE PROBLEMS MADE IT FOR YOU TO DO YOUR WORK, TAKE CARE OF THINGS AT HOME, OR GET ALONG WITH OTHER PEOPLE: VERY DIFFICULT
3. TROUBLE FALLING OR STAYING ASLEEP OR SLEEPING TOO MUCH: MORE THAN HALF THE DAYS
9. THOUGHTS THAT YOU WOULD BE BETTER OFF DEAD, OR OF HURTING YOURSELF: MORE THAN HALF THE DAYS
7. TROUBLE CONCENTRATING ON THINGS, SUCH AS READING THE NEWSPAPER OR WATCHING TELEVISION: MORE THAN HALF THE DAYS
4. FEELING TIRED OR HAVING LITTLE ENERGY: MORE THAN HALF THE DAYS
CLINICAL INTERPRETATION OF PHQ2 SCORE: FURTHER SCREENING NEEDED

## 2024-11-30 ASSESSMENT — LIFESTYLE VARIABLES
SHORT OF BREATH OR FATIGUE WITH ADLS: NO
HAVE YOU EVER BEEN EXPOSED TO OTHER VERY DISTURBING, TRAUMATIC OR ANXIETY PROVOKING EVENTS IN YOUR LIFETIME?: NO
OPIATES USE: DENIES
COCAINE USE: DENIES
HOW MANY STANDARD DRINKS CONTAINING ALCOHOL DO YOU HAVE ON A TYPICAL DAY: 0,1 OR 2
HOW OFTEN DO YOU HAVE A DRINK CONTAINING ALCOHOL: 2 TO 4 TIMES PER MONTH
AMPHETAMINES/STIMULANTS USE: DENIES
ADL NEEDS ASSIST: NO
HOW OFTEN DO YOU HAVE 6 OR MORE DRINKS ON ONE OCCASION: NEVER
TYPE_OF_TOBACCO_USED: CIGARETTES
E-CIGARETTE_USE: NO E-CIGARETTES USE IN THE LAST 30 DAYS
VOLATILE SOLVENTS/INHALANTS USE: DENIES
ADL BEFORE ADMISSION: INDEPENDENT
CAFFEINE: DENIES
AUDIT-C TOTAL SCORE: 2
HOW OFTEN DO YOU HAVE A DRINK CONTAINING ALCOHOL: 2 TO 4 TIMES PER MONTH
ALCOHOL_USE_STATUS: NO OR LOW RISK WITH VALIDATED TOOL
TOBACCO_USE_STATUS_IN_THE_LAST_30_DAYS: USED TOBACCO IN THE LAST 30 DAYS
HOW MANY CIGARETTES HAVE YOU SMOKED PER DAY ON AVERAGE?: DENIES
AMOUNT_OF_TOBACCO_USED: FIVE OR MORE CIGARETTES PER DAY AND/OR CIGAR OR PIPE DAILY
HAVE YOU EVER BEEN VERBALLY, EMOTIONALLY, PHYSICALLY OR SEXUALLY ABUSED, OR ABUSED VIA SOCIAL MEDIA?: NO
RECENT DECLINE IN ADLS: NO

## 2024-11-30 ASSESSMENT — PAIN SCALES - GENERAL: PAINLEVEL_OUTOF10: 0

## 2024-11-30 ASSESSMENT — ACTIVITIES OF DAILY LIVING (ADL): ADL_SCORE: 12

## 2024-12-01 LAB — HBA1C MFR BLD: 5.3 % (ref 4.5–5.6)

## 2024-12-01 PROCEDURE — 90792 PSYCH DIAG EVAL W/MED SRVCS: CPT | Performed by: PSYCHIATRY & NEUROLOGY

## 2024-12-01 PROCEDURE — 13003244 HB ROOM CHARGE INTENSIVE PSYCH

## 2024-12-01 PROCEDURE — 10002803 HB RX 637: Performed by: PSYCHIATRY & NEUROLOGY

## 2024-12-01 RX ORDER — DIVALPROEX SODIUM 125 MG/1
500 TABLET, DELAYED RELEASE ORAL EVERY 12 HOURS SCHEDULED
Status: DISCONTINUED | OUTPATIENT
Start: 2024-12-01 | End: 2024-12-07

## 2024-12-01 RX ORDER — LORAZEPAM 2 MG/ML
1 INJECTION INTRAMUSCULAR EVERY 8 HOURS PRN
Status: DISCONTINUED | OUTPATIENT
Start: 2024-12-01 | End: 2024-12-11 | Stop reason: HOSPADM

## 2024-12-01 RX ORDER — HALOPERIDOL 5 MG/1
10 TABLET ORAL EVERY 12 HOURS SCHEDULED
Status: DISCONTINUED | OUTPATIENT
Start: 2024-12-01 | End: 2024-12-05

## 2024-12-01 RX ORDER — HYDROXYZINE HYDROCHLORIDE 25 MG/1
50 TABLET, FILM COATED ORAL EVERY 8 HOURS PRN
Status: DISCONTINUED | OUTPATIENT
Start: 2024-12-01 | End: 2024-12-11 | Stop reason: HOSPADM

## 2024-12-01 RX ORDER — LORAZEPAM 1 MG/1
1 TABLET ORAL EVERY 4 HOURS PRN
Status: DISCONTINUED | OUTPATIENT
Start: 2024-12-01 | End: 2024-12-01

## 2024-12-01 RX ORDER — TRAZODONE HYDROCHLORIDE 100 MG/1
100 TABLET ORAL NIGHTLY PRN
Status: DISCONTINUED | OUTPATIENT
Start: 2024-12-01 | End: 2024-12-05

## 2024-12-01 RX ADMIN — NICOTINE POLACRILEX 2 MG: 2 GUM, CHEWING BUCCAL at 10:59

## 2024-12-01 RX ADMIN — DIVALPROEX SODIUM 500 MG: 125 TABLET, DELAYED RELEASE ORAL at 09:17

## 2024-12-01 RX ADMIN — HALOPERIDOL 10 MG: 5 TABLET ORAL at 21:22

## 2024-12-01 RX ADMIN — HYDROXYZINE HYDROCHLORIDE 50 MG: 25 TABLET ORAL at 12:26

## 2024-12-01 RX ADMIN — NICOTINE POLACRILEX 2 MG: 2 GUM, CHEWING BUCCAL at 17:29

## 2024-12-01 RX ADMIN — HALOPERIDOL 10 MG: 5 TABLET ORAL at 09:18

## 2024-12-01 RX ADMIN — DIVALPROEX SODIUM 500 MG: 125 TABLET, DELAYED RELEASE ORAL at 21:22

## 2024-12-01 ASSESSMENT — PAIN SCALES - GENERAL
PAINLEVEL_OUTOF10: 0
PAINLEVEL_OUTOF10: 0

## 2024-12-02 PROBLEM — F60.81 NARCISSISTIC PERSONALITY DISORDER  (CMD): Status: ACTIVE | Noted: 2024-02-26

## 2024-12-02 PROBLEM — F60.89 CLUSTER B PERSONALITY DISORDER  (CMD): Status: RESOLVED | Noted: 2024-02-26 | Resolved: 2024-12-02

## 2024-12-02 PROCEDURE — 13003244 HB ROOM CHARGE INTENSIVE PSYCH

## 2024-12-02 PROCEDURE — 99233 SBSQ HOSP IP/OBS HIGH 50: CPT | Performed by: PSYCHIATRY & NEUROLOGY

## 2024-12-02 PROCEDURE — 10002803 HB RX 637: Performed by: PSYCHIATRY & NEUROLOGY

## 2024-12-02 RX ADMIN — DIVALPROEX SODIUM 500 MG: 125 TABLET, DELAYED RELEASE ORAL at 08:19

## 2024-12-02 RX ADMIN — HALOPERIDOL 10 MG: 5 TABLET ORAL at 08:19

## 2024-12-02 RX ADMIN — DIVALPROEX SODIUM 500 MG: 125 TABLET, DELAYED RELEASE ORAL at 19:47

## 2024-12-02 RX ADMIN — NICOTINE POLACRILEX 2 MG: 2 GUM, CHEWING BUCCAL at 08:21

## 2024-12-02 RX ADMIN — HYDROXYZINE HYDROCHLORIDE 50 MG: 25 TABLET ORAL at 03:26

## 2024-12-02 RX ADMIN — HALOPERIDOL 10 MG: 5 TABLET ORAL at 19:47

## 2024-12-02 RX ADMIN — NICOTINE POLACRILEX 2 MG: 2 GUM, CHEWING BUCCAL at 19:47

## 2024-12-02 RX ADMIN — NICOTINE POLACRILEX 2 MG: 2 GUM, CHEWING BUCCAL at 06:46

## 2024-12-02 RX ADMIN — NICOTINE POLACRILEX 2 MG: 2 GUM, CHEWING BUCCAL at 17:44

## 2024-12-02 RX ADMIN — NICOTINE POLACRILEX 2 MG: 2 GUM, CHEWING BUCCAL at 10:37

## 2024-12-02 SDOH — SOCIAL STABILITY: SOCIAL INSECURITY: HOW OFTEN DOES ANYONE, INCLUDING FAMILY AND FRIENDS, INSULT OR TALK DOWN TO YOU?: NEVER

## 2024-12-02 SDOH — SOCIAL STABILITY: SOCIAL INSECURITY: HOW OFTEN DOES ANYONE, INCLUDING FAMILY AND FRIENDS, SCREAM OR CURSE AT YOU?: NEVER

## 2024-12-02 SDOH — SOCIAL STABILITY: SOCIAL INSECURITY: HOW OFTEN DOES ANYONE, INCLUDING FAMILY AND FRIENDS, THREATEN YOU WITH HARM?: NEVER

## 2024-12-02 SDOH — SOCIAL STABILITY: SOCIAL INSECURITY: HOW OFTEN DOES ANYONE, INCLUDING FAMILY AND FRIENDS, PHYSICALLY HURT YOU?: NEVER

## 2024-12-02 ASSESSMENT — PAIN SCALES - GENERAL
PAINLEVEL_OUTOF10: 0

## 2024-12-03 PROCEDURE — 10002803 HB RX 637: Performed by: PSYCHIATRY & NEUROLOGY

## 2024-12-03 PROCEDURE — 99233 SBSQ HOSP IP/OBS HIGH 50: CPT | Performed by: PSYCHIATRY & NEUROLOGY

## 2024-12-03 PROCEDURE — 13003244 HB ROOM CHARGE INTENSIVE PSYCH

## 2024-12-03 RX ADMIN — DIVALPROEX SODIUM 500 MG: 125 TABLET, DELAYED RELEASE ORAL at 19:36

## 2024-12-03 RX ADMIN — NICOTINE POLACRILEX 2 MG: 2 GUM, CHEWING BUCCAL at 11:12

## 2024-12-03 RX ADMIN — DIVALPROEX SODIUM 500 MG: 125 TABLET, DELAYED RELEASE ORAL at 08:23

## 2024-12-03 RX ADMIN — HYDROXYZINE HYDROCHLORIDE 50 MG: 25 TABLET ORAL at 19:36

## 2024-12-03 RX ADMIN — HALOPERIDOL 10 MG: 5 TABLET ORAL at 08:23

## 2024-12-03 RX ADMIN — NICOTINE POLACRILEX 2 MG: 2 GUM, CHEWING BUCCAL at 17:28

## 2024-12-03 RX ADMIN — HALOPERIDOL 10 MG: 5 TABLET ORAL at 19:36

## 2024-12-03 ASSESSMENT — PAIN SCALES - GENERAL
PAINLEVEL_OUTOF10: 0
PAINLEVEL_OUTOF10: 0

## 2024-12-04 LAB
ALBUMIN SERPL-MCNC: 3.4 G/DL (ref 3.4–5)
ALBUMIN/GLOB SERPL: 1.1 {RATIO} (ref 1–2.4)
ALP SERPL-CCNC: 66 UNITS/L (ref 45–117)
ALT SERPL-CCNC: 27 UNITS/L
ANION GAP SERPL CALC-SCNC: 3 MMOL/L (ref 7–19)
AST SERPL-CCNC: 19 UNITS/L
BILIRUB SERPL-MCNC: 0.6 MG/DL (ref 0.2–1)
BUN SERPL-MCNC: 14 MG/DL (ref 6–20)
BUN/CREAT SERPL: 16 (ref 7–25)
CALCIUM SERPL-MCNC: 8.8 MG/DL (ref 8.4–10.2)
CHLORIDE SERPL-SCNC: 109 MMOL/L (ref 97–110)
CO2 SERPL-SCNC: 27 MMOL/L (ref 21–32)
CREAT SERPL-MCNC: 0.87 MG/DL (ref 0.67–1.17)
EGFRCR SERPLBLD CKD-EPI 2021: >90 ML/MIN/{1.73_M2}
FASTING DURATION TIME PATIENT: ABNORMAL H
GLOBULIN SER-MCNC: 3.1 G/DL (ref 2–4)
GLUCOSE SERPL-MCNC: 88 MG/DL (ref 70–99)
POTASSIUM SERPL-SCNC: 4.4 MMOL/L (ref 3.4–5.1)
PROT SERPL-MCNC: 6.5 G/DL (ref 6.4–8.2)
SODIUM SERPL-SCNC: 135 MMOL/L (ref 135–145)
VALPROATE SERPL-MCNC: 55 MCG/ML (ref 50–125)

## 2024-12-04 PROCEDURE — 80053 COMPREHEN METABOLIC PANEL: CPT | Performed by: PSYCHIATRY & NEUROLOGY

## 2024-12-04 PROCEDURE — 80164 ASSAY DIPROPYLACETIC ACD TOT: CPT | Performed by: PSYCHIATRY & NEUROLOGY

## 2024-12-04 PROCEDURE — 99233 SBSQ HOSP IP/OBS HIGH 50: CPT | Performed by: PSYCHIATRY & NEUROLOGY

## 2024-12-04 PROCEDURE — 36415 COLL VENOUS BLD VENIPUNCTURE: CPT | Performed by: PSYCHIATRY & NEUROLOGY

## 2024-12-04 PROCEDURE — 10002803 HB RX 637: Performed by: PSYCHIATRY & NEUROLOGY

## 2024-12-04 PROCEDURE — 13003244 HB ROOM CHARGE INTENSIVE PSYCH

## 2024-12-04 RX ADMIN — HYDROXYZINE HYDROCHLORIDE 50 MG: 25 TABLET ORAL at 08:57

## 2024-12-04 RX ADMIN — HALOPERIDOL 10 MG: 5 TABLET ORAL at 07:50

## 2024-12-04 RX ADMIN — TRAZODONE HYDROCHLORIDE 100 MG: 100 TABLET ORAL at 20:27

## 2024-12-04 RX ADMIN — NICOTINE POLACRILEX 2 MG: 2 GUM, CHEWING BUCCAL at 15:39

## 2024-12-04 RX ADMIN — NICOTINE POLACRILEX 2 MG: 2 GUM, CHEWING BUCCAL at 07:51

## 2024-12-04 RX ADMIN — HALOPERIDOL 10 MG: 5 TABLET ORAL at 19:38

## 2024-12-04 RX ADMIN — DIVALPROEX SODIUM 500 MG: 125 TABLET, DELAYED RELEASE ORAL at 08:18

## 2024-12-04 RX ADMIN — DIVALPROEX SODIUM 500 MG: 125 TABLET, DELAYED RELEASE ORAL at 19:39

## 2024-12-04 RX ADMIN — HYDROXYZINE HYDROCHLORIDE 50 MG: 25 TABLET ORAL at 19:37

## 2024-12-04 ASSESSMENT — PAIN SCALES - GENERAL
PAINLEVEL_OUTOF10: 0
PAINLEVEL_OUTOF10: 0

## 2024-12-05 PROCEDURE — 99233 SBSQ HOSP IP/OBS HIGH 50: CPT | Performed by: PSYCHIATRY & NEUROLOGY

## 2024-12-05 PROCEDURE — 10002803 HB RX 637: Performed by: PSYCHIATRY & NEUROLOGY

## 2024-12-05 PROCEDURE — 13003244 HB ROOM CHARGE INTENSIVE PSYCH

## 2024-12-05 RX ORDER — ARIPIPRAZOLE 10 MG/1
20 TABLET ORAL DAILY
Status: DISCONTINUED | OUTPATIENT
Start: 2024-12-05 | End: 2024-12-09

## 2024-12-05 RX ADMIN — HYDROXYZINE HYDROCHLORIDE 50 MG: 25 TABLET ORAL at 20:50

## 2024-12-05 RX ADMIN — HYDROXYZINE HYDROCHLORIDE 50 MG: 25 TABLET ORAL at 03:49

## 2024-12-05 RX ADMIN — NICOTINE POLACRILEX 2 MG: 2 GUM, CHEWING BUCCAL at 09:36

## 2024-12-05 RX ADMIN — HALOPERIDOL 10 MG: 5 TABLET ORAL at 08:36

## 2024-12-05 RX ADMIN — DIVALPROEX SODIUM 500 MG: 125 TABLET, DELAYED RELEASE ORAL at 20:50

## 2024-12-05 RX ADMIN — TRAZODONE HYDROCHLORIDE 150 MG: 100 TABLET ORAL at 20:50

## 2024-12-05 RX ADMIN — NICOTINE POLACRILEX 2 MG: 2 GUM, CHEWING BUCCAL at 18:39

## 2024-12-05 RX ADMIN — DIVALPROEX SODIUM 500 MG: 125 TABLET, DELAYED RELEASE ORAL at 08:36

## 2024-12-05 RX ADMIN — HYDROXYZINE HYDROCHLORIDE 50 MG: 25 TABLET ORAL at 13:02

## 2024-12-05 RX ADMIN — ARIPIPRAZOLE 20 MG: 10 TABLET ORAL at 17:21

## 2024-12-05 ASSESSMENT — PAIN SCALES - GENERAL: PAINLEVEL_OUTOF10: 0

## 2024-12-06 PROCEDURE — 99232 SBSQ HOSP IP/OBS MODERATE 35: CPT | Performed by: PSYCHIATRY & NEUROLOGY

## 2024-12-06 PROCEDURE — 13003244 HB ROOM CHARGE INTENSIVE PSYCH

## 2024-12-06 PROCEDURE — 10002803 HB RX 637: Performed by: PSYCHIATRY & NEUROLOGY

## 2024-12-06 PROCEDURE — 10004651 HB RX, NO CHARGE ITEM: Performed by: PSYCHIATRY & NEUROLOGY

## 2024-12-06 RX ADMIN — HALOPERIDOL 5 MG: 5 TABLET ORAL at 17:52

## 2024-12-06 RX ADMIN — NICOTINE POLACRILEX 2 MG: 2 GUM, CHEWING BUCCAL at 09:36

## 2024-12-06 RX ADMIN — MAGNESIUM HYDROXIDE 20 ML: 400 SUSPENSION ORAL at 13:42

## 2024-12-06 RX ADMIN — HYDROXYZINE HYDROCHLORIDE 50 MG: 25 TABLET ORAL at 21:54

## 2024-12-06 RX ADMIN — DIVALPROEX SODIUM 500 MG: 125 TABLET, DELAYED RELEASE ORAL at 21:54

## 2024-12-06 RX ADMIN — ARIPIPRAZOLE 20 MG: 10 TABLET ORAL at 08:06

## 2024-12-06 RX ADMIN — NICOTINE POLACRILEX 2 MG: 2 GUM, CHEWING BUCCAL at 19:18

## 2024-12-06 RX ADMIN — ACETAMINOPHEN 650 MG: 325 TABLET ORAL at 13:39

## 2024-12-06 RX ADMIN — HYDROXYZINE HYDROCHLORIDE 50 MG: 25 TABLET ORAL at 15:05

## 2024-12-06 RX ADMIN — ACETAMINOPHEN 1000 MG: 500 TABLET ORAL at 09:43

## 2024-12-06 RX ADMIN — DIVALPROEX SODIUM 500 MG: 125 TABLET, DELAYED RELEASE ORAL at 08:06

## 2024-12-06 ASSESSMENT — PAIN SCALES - GENERAL
PAINLEVEL_OUTOF10: 6
PAINLEVEL_OUTOF10: 0
PAINLEVEL_OUTOF10: 0
PAINLEVEL_OUTOF10: 5

## 2024-12-07 VITALS
HEIGHT: 74 IN | TEMPERATURE: 97.3 F | BODY MASS INDEX: 30.56 KG/M2 | DIASTOLIC BLOOD PRESSURE: 65 MMHG | WEIGHT: 238.1 LBS | OXYGEN SATURATION: 99 % | SYSTOLIC BLOOD PRESSURE: 114 MMHG | RESPIRATION RATE: 16 BRPM | HEART RATE: 55 BPM

## 2024-12-07 PROCEDURE — 10004651 HB RX, NO CHARGE ITEM: Performed by: PSYCHIATRY & NEUROLOGY

## 2024-12-07 PROCEDURE — 10002803 HB RX 637: Performed by: PSYCHIATRY & NEUROLOGY

## 2024-12-07 PROCEDURE — 10002803 HB RX 637: Performed by: HOSPITALIST

## 2024-12-07 PROCEDURE — 13003244 HB ROOM CHARGE INTENSIVE PSYCH

## 2024-12-07 PROCEDURE — 99232 SBSQ HOSP IP/OBS MODERATE 35: CPT | Performed by: PSYCHIATRY & NEUROLOGY

## 2024-12-07 RX ORDER — DIVALPROEX SODIUM 500 MG/1
1000 TABLET, DELAYED RELEASE ORAL EVERY 12 HOURS SCHEDULED
Status: DISCONTINUED | OUTPATIENT
Start: 2024-12-07 | End: 2024-12-07

## 2024-12-07 RX ORDER — DIVALPROEX SODIUM 125 MG/1
500 TABLET, DELAYED RELEASE ORAL DAILY
Status: DISCONTINUED | OUTPATIENT
Start: 2024-12-08 | End: 2024-12-11 | Stop reason: HOSPADM

## 2024-12-07 RX ORDER — DIVALPROEX SODIUM 500 MG/1
1000 TABLET, DELAYED RELEASE ORAL AT BEDTIME
Status: DISCONTINUED | OUTPATIENT
Start: 2024-12-07 | End: 2024-12-11 | Stop reason: HOSPADM

## 2024-12-07 RX ORDER — CARBOXYMETHYLCELLULOSE SODIUM 5 MG/ML
1 SOLUTION/ DROPS OPHTHALMIC 3 TIMES DAILY
Status: DISCONTINUED | OUTPATIENT
Start: 2024-12-07 | End: 2024-12-11 | Stop reason: HOSPADM

## 2024-12-07 RX ADMIN — DIVALPROEX SODIUM 500 MG: 125 TABLET, DELAYED RELEASE ORAL at 08:55

## 2024-12-07 RX ADMIN — NICOTINE POLACRILEX 2 MG: 2 GUM, CHEWING BUCCAL at 12:54

## 2024-12-07 RX ADMIN — CARBOXYMETHYLCELLULOSE SODIUM 1 DROP: 0.5 SOLUTION/ DROPS OPHTHALMIC at 22:08

## 2024-12-07 RX ADMIN — CARBOXYMETHYLCELLULOSE SODIUM 1 DROP: 0.5 SOLUTION/ DROPS OPHTHALMIC at 14:25

## 2024-12-07 RX ADMIN — NICOTINE POLACRILEX 2 MG: 2 GUM, CHEWING BUCCAL at 18:51

## 2024-12-07 RX ADMIN — ACETAMINOPHEN 650 MG: 325 TABLET ORAL at 14:24

## 2024-12-07 RX ADMIN — NICOTINE POLACRILEX 2 MG: 2 GUM, CHEWING BUCCAL at 20:56

## 2024-12-07 RX ADMIN — ARIPIPRAZOLE 20 MG: 10 TABLET ORAL at 08:55

## 2024-12-07 RX ADMIN — TRAZODONE HYDROCHLORIDE 150 MG: 100 TABLET ORAL at 22:06

## 2024-12-07 RX ADMIN — ACETAMINOPHEN 650 MG: 325 TABLET ORAL at 08:55

## 2024-12-07 RX ADMIN — DIVALPROEX SODIUM 1000 MG: 500 TABLET, DELAYED RELEASE ORAL at 22:06

## 2024-12-07 ASSESSMENT — PAIN SCALES - PAIN ASSESSMENT IN ADVANCED DEMENTIA (PAINAD)
BREATHING: NORMAL
BODYLANGUAGE: RELAXED
BREATHING: NORMAL
BREATHING: NORMAL

## 2024-12-07 ASSESSMENT — PAIN SCALES - GENERAL
PAINLEVEL_OUTOF10: 0
PAINLEVEL_OUTOF10: 6
PAINLEVEL_OUTOF10: 0

## 2024-12-08 PROCEDURE — 99232 SBSQ HOSP IP/OBS MODERATE 35: CPT | Performed by: PSYCHIATRY & NEUROLOGY

## 2024-12-08 PROCEDURE — 10002803 HB RX 637: Performed by: PSYCHIATRY & NEUROLOGY

## 2024-12-08 PROCEDURE — 10004577 HB ROOM CHARGE PSYCH

## 2024-12-08 PROCEDURE — 10004651 HB RX, NO CHARGE ITEM: Performed by: PSYCHIATRY & NEUROLOGY

## 2024-12-08 RX ADMIN — ARIPIPRAZOLE 20 MG: 10 TABLET ORAL at 08:47

## 2024-12-08 RX ADMIN — DIVALPROEX SODIUM 1000 MG: 500 TABLET, DELAYED RELEASE ORAL at 22:44

## 2024-12-08 RX ADMIN — ACETAMINOPHEN 500 MG: 500 TABLET ORAL at 10:04

## 2024-12-08 RX ADMIN — HYDROXYZINE HYDROCHLORIDE 50 MG: 25 TABLET ORAL at 20:59

## 2024-12-08 RX ADMIN — TRAZODONE HYDROCHLORIDE 150 MG: 100 TABLET ORAL at 22:44

## 2024-12-08 RX ADMIN — DIVALPROEX SODIUM 500 MG: 125 TABLET, DELAYED RELEASE ORAL at 08:47

## 2024-12-08 RX ADMIN — NICOTINE POLACRILEX 2 MG: 2 GUM, CHEWING BUCCAL at 12:38

## 2024-12-08 RX ADMIN — NICOTINE POLACRILEX 2 MG: 2 GUM, CHEWING BUCCAL at 18:32

## 2024-12-08 RX ADMIN — NICOTINE POLACRILEX 2 MG: 2 GUM, CHEWING BUCCAL at 16:15

## 2024-12-08 ASSESSMENT — PAIN SCALES - GENERAL
PAINLEVEL_OUTOF10: 6
PAINLEVEL_OUTOF10: 0

## 2024-12-09 PROBLEM — F60.81 NARCISSISTIC PERSONALITY DISORDER  (CMD): Status: RESOLVED | Noted: 2024-02-26 | Resolved: 2024-12-09

## 2024-12-09 PROCEDURE — 10002803 HB RX 637: Performed by: PSYCHIATRY & NEUROLOGY

## 2024-12-09 PROCEDURE — 96372 THER/PROPH/DIAG INJ SC/IM: CPT | Performed by: PSYCHIATRY & NEUROLOGY

## 2024-12-09 PROCEDURE — 10004577 HB ROOM CHARGE PSYCH

## 2024-12-09 PROCEDURE — 99233 SBSQ HOSP IP/OBS HIGH 50: CPT | Performed by: PSYCHIATRY & NEUROLOGY

## 2024-12-09 PROCEDURE — 10004651 HB RX, NO CHARGE ITEM: Performed by: PSYCHIATRY & NEUROLOGY

## 2024-12-09 RX ORDER — ARIPIPRAZOLE 15 MG/1
30 TABLET ORAL DAILY
Status: DISCONTINUED | OUTPATIENT
Start: 2024-12-10 | End: 2024-12-11 | Stop reason: HOSPADM

## 2024-12-09 RX ADMIN — CARBOXYMETHYLCELLULOSE SODIUM 1 DROP: 0.5 SOLUTION/ DROPS OPHTHALMIC at 21:43

## 2024-12-09 RX ADMIN — ARIPIPRAZOLE 20 MG: 10 TABLET ORAL at 09:09

## 2024-12-09 RX ADMIN — ACETAMINOPHEN 500 MG: 500 TABLET ORAL at 03:15

## 2024-12-09 RX ADMIN — CARBOXYMETHYLCELLULOSE SODIUM 1 DROP: 0.5 SOLUTION/ DROPS OPHTHALMIC at 09:09

## 2024-12-09 RX ADMIN — DIVALPROEX SODIUM 500 MG: 125 TABLET, DELAYED RELEASE ORAL at 09:10

## 2024-12-09 RX ADMIN — NICOTINE POLACRILEX 2 MG: 2 GUM, CHEWING BUCCAL at 18:15

## 2024-12-09 RX ADMIN — NICOTINE POLACRILEX 2 MG: 2 GUM, CHEWING BUCCAL at 09:19

## 2024-12-09 RX ADMIN — TRAZODONE HYDROCHLORIDE 150 MG: 100 TABLET ORAL at 21:42

## 2024-12-09 RX ADMIN — DIVALPROEX SODIUM 1000 MG: 500 TABLET, DELAYED RELEASE ORAL at 21:42

## 2024-12-09 RX ADMIN — NICOTINE POLACRILEX 2 MG: 2 GUM, CHEWING BUCCAL at 13:14

## 2024-12-09 ASSESSMENT — PAIN SCALES - GENERAL
PAINLEVEL_OUTOF10: 4
PAINLEVEL_OUTOF10: 2
PAINLEVEL_OUTOF10: 6
PAINLEVEL_OUTOF10: 5

## 2024-12-10 ENCOUNTER — OFF PREMISE (OUTPATIENT)
Age: 49
End: 2024-12-10

## 2024-12-10 LAB
ALBUMIN SERPL-MCNC: 3.6 G/DL (ref 3.4–5)
ALBUMIN/GLOB SERPL: 1.2 {RATIO} (ref 1–2.4)
ALP SERPL-CCNC: 73 UNITS/L (ref 45–117)
ALT SERPL-CCNC: 20 UNITS/L
ANION GAP SERPL CALC-SCNC: 4 MMOL/L (ref 7–19)
AST SERPL-CCNC: 12 UNITS/L
BILIRUB SERPL-MCNC: 0.4 MG/DL (ref 0.2–1)
BUN SERPL-MCNC: 12 MG/DL (ref 6–20)
BUN/CREAT SERPL: 13 (ref 7–25)
CALCIUM SERPL-MCNC: 9 MG/DL (ref 8.4–10.2)
CHLORIDE SERPL-SCNC: 108 MMOL/L (ref 97–110)
CO2 SERPL-SCNC: 32 MMOL/L (ref 21–32)
CREAT SERPL-MCNC: 0.9 MG/DL (ref 0.67–1.17)
EGFRCR SERPLBLD CKD-EPI 2021: >90 ML/MIN/{1.73_M2}
FASTING DURATION TIME PATIENT: ABNORMAL H
GLOBULIN SER-MCNC: 3 G/DL (ref 2–4)
GLUCOSE SERPL-MCNC: 90 MG/DL (ref 70–99)
POTASSIUM SERPL-SCNC: 4.3 MMOL/L (ref 3.4–5.1)
PROT SERPL-MCNC: 6.6 G/DL (ref 6.4–8.2)
SODIUM SERPL-SCNC: 140 MMOL/L (ref 135–145)
VALPROATE SERPL-MCNC: 93 MCG/ML (ref 50–125)

## 2024-12-10 PROCEDURE — 80053 COMPREHEN METABOLIC PANEL: CPT | Performed by: PSYCHIATRY & NEUROLOGY

## 2024-12-10 PROCEDURE — 10004651 HB RX, NO CHARGE ITEM: Performed by: PSYCHIATRY & NEUROLOGY

## 2024-12-10 PROCEDURE — 10002803 HB RX 637: Performed by: PSYCHIATRY & NEUROLOGY

## 2024-12-10 PROCEDURE — 99233 SBSQ HOSP IP/OBS HIGH 50: CPT | Performed by: PSYCHIATRY & NEUROLOGY

## 2024-12-10 PROCEDURE — 36415 COLL VENOUS BLD VENIPUNCTURE: CPT | Performed by: PSYCHIATRY & NEUROLOGY

## 2024-12-10 PROCEDURE — 80164 ASSAY DIPROPYLACETIC ACD TOT: CPT | Performed by: PSYCHIATRY & NEUROLOGY

## 2024-12-10 PROCEDURE — 10004577 HB ROOM CHARGE PSYCH

## 2024-12-10 RX ADMIN — TRAZODONE HYDROCHLORIDE 150 MG: 100 TABLET ORAL at 21:57

## 2024-12-10 RX ADMIN — CARBOXYMETHYLCELLULOSE SODIUM 1 DROP: 0.5 SOLUTION/ DROPS OPHTHALMIC at 08:43

## 2024-12-10 RX ADMIN — ACETAMINOPHEN 650 MG: 325 TABLET ORAL at 18:24

## 2024-12-10 RX ADMIN — CARBOXYMETHYLCELLULOSE SODIUM 1 DROP: 0.5 SOLUTION/ DROPS OPHTHALMIC at 14:00

## 2024-12-10 RX ADMIN — HYDROXYZINE HYDROCHLORIDE 50 MG: 25 TABLET ORAL at 00:53

## 2024-12-10 RX ADMIN — NICOTINE POLACRILEX 2 MG: 2 GUM, CHEWING BUCCAL at 18:44

## 2024-12-10 RX ADMIN — CARBOXYMETHYLCELLULOSE SODIUM 1 DROP: 0.5 SOLUTION/ DROPS OPHTHALMIC at 22:00

## 2024-12-10 RX ADMIN — ACETAMINOPHEN 500 MG: 500 TABLET ORAL at 21:57

## 2024-12-10 RX ADMIN — ARIPIPRAZOLE 30 MG: 15 TABLET ORAL at 08:43

## 2024-12-10 RX ADMIN — DIVALPROEX SODIUM 1000 MG: 500 TABLET, DELAYED RELEASE ORAL at 21:57

## 2024-12-10 RX ADMIN — HYDROXYZINE HYDROCHLORIDE 50 MG: 25 TABLET ORAL at 15:07

## 2024-12-10 RX ADMIN — NICOTINE POLACRILEX 2 MG: 2 GUM, CHEWING BUCCAL at 10:35

## 2024-12-10 RX ADMIN — DIVALPROEX SODIUM 500 MG: 125 TABLET, DELAYED RELEASE ORAL at 08:43

## 2024-12-10 ASSESSMENT — PAIN SCALES - GENERAL
PAINLEVEL_OUTOF10: 4
PAINLEVEL_OUTOF10: 1
PAINLEVEL_OUTOF10: 0

## 2024-12-11 VITALS
BODY MASS INDEX: 30.56 KG/M2 | WEIGHT: 238.1 LBS | HEIGHT: 74 IN | HEART RATE: 61 BPM | OXYGEN SATURATION: 99 % | DIASTOLIC BLOOD PRESSURE: 65 MMHG | TEMPERATURE: 97.2 F | SYSTOLIC BLOOD PRESSURE: 104 MMHG | RESPIRATION RATE: 16 BRPM

## 2024-12-11 DIAGNOSIS — F25.0 SCHIZOAFFECTIVE DISORDER, BIPOLAR TYPE  (CMD): ICD-10-CM

## 2024-12-11 PROCEDURE — 10002803 HB RX 637: Performed by: PSYCHIATRY & NEUROLOGY

## 2024-12-11 PROCEDURE — 99239 HOSP IP/OBS DSCHRG MGMT >30: CPT | Performed by: PSYCHIATRY & NEUROLOGY

## 2024-12-11 PROCEDURE — 10004651 HB RX, NO CHARGE ITEM: Performed by: PSYCHIATRY & NEUROLOGY

## 2024-12-11 RX ORDER — DIVALPROEX SODIUM 500 MG/1
TABLET, DELAYED RELEASE ORAL
Qty: 180 TABLET | Refills: 0 | OUTPATIENT
Start: 2024-12-11

## 2024-12-11 RX ORDER — ARIPIPRAZOLE 30 MG/1
30 TABLET ORAL DAILY
Qty: 30 TABLET | Refills: 0 | Status: ON HOLD | OUTPATIENT
Start: 2024-12-12

## 2024-12-11 RX ORDER — TRAZODONE HYDROCHLORIDE 150 MG/1
150 TABLET ORAL NIGHTLY
Qty: 30 TABLET | Refills: 0 | Status: ON HOLD | OUTPATIENT
Start: 2024-12-11

## 2024-12-11 RX ORDER — DIVALPROEX SODIUM 500 MG/1
1000 TABLET, DELAYED RELEASE ORAL AT BEDTIME
Qty: 14 TABLET | Refills: 0 | Status: ON HOLD | OUTPATIENT
Start: 2024-12-11

## 2024-12-11 RX ORDER — DIVALPROEX SODIUM 500 MG/1
500 TABLET, DELAYED RELEASE ORAL DAILY
Qty: 7 TABLET | Refills: 0 | Status: ON HOLD | OUTPATIENT
Start: 2024-12-12

## 2024-12-11 RX ADMIN — NICOTINE POLACRILEX 2 MG: 2 GUM, CHEWING BUCCAL at 10:48

## 2024-12-11 RX ADMIN — ARIPIPRAZOLE 30 MG: 15 TABLET ORAL at 08:20

## 2024-12-11 RX ADMIN — CARBOXYMETHYLCELLULOSE SODIUM 1 DROP: 0.5 SOLUTION/ DROPS OPHTHALMIC at 12:34

## 2024-12-11 RX ADMIN — CARBOXYMETHYLCELLULOSE SODIUM 1 DROP: 0.5 SOLUTION/ DROPS OPHTHALMIC at 08:20

## 2024-12-11 RX ADMIN — HYDROXYZINE HYDROCHLORIDE 50 MG: 25 TABLET ORAL at 14:15

## 2024-12-11 RX ADMIN — ACETAMINOPHEN 650 MG: 325 TABLET ORAL at 12:33

## 2024-12-11 RX ADMIN — ACETAMINOPHEN 650 MG: 325 TABLET ORAL at 06:09

## 2024-12-11 RX ADMIN — DIVALPROEX SODIUM 500 MG: 125 TABLET, DELAYED RELEASE ORAL at 08:20

## 2024-12-11 ASSESSMENT — PAIN SCALES - GENERAL
PAINLEVEL_OUTOF10: 0
PAINLEVEL_OUTOF10: 8
PAINLEVEL_OUTOF10: 5
PAINLEVEL_OUTOF10: 3

## 2024-12-16 ENCOUNTER — HOSPITAL ENCOUNTER (INPATIENT)
Age: 49
DRG: 885 | End: 2024-12-16
Attending: EMERGENCY MEDICINE | Admitting: PSYCHIATRY & NEUROLOGY

## 2024-12-16 DIAGNOSIS — F48.9 MENTAL HEALTH PROBLEM: Primary | ICD-10-CM

## 2024-12-16 DIAGNOSIS — F06.8: ICD-10-CM

## 2024-12-16 DIAGNOSIS — F31.9: ICD-10-CM

## 2024-12-16 DIAGNOSIS — F31.60 BIPOLAR 1 DISORDER, MIXED  (CMD): ICD-10-CM

## 2024-12-16 LAB
ALBUMIN SERPL-MCNC: 3.5 G/DL (ref 3.4–5)
ALBUMIN/GLOB SERPL: 1.1 {RATIO} (ref 1–2.4)
ALP SERPL-CCNC: 76 UNITS/L (ref 45–117)
ALT SERPL-CCNC: 22 UNITS/L
AMPHETAMINES UR QL SCN>500 NG/ML: NEGATIVE
ANION GAP SERPL CALC-SCNC: 7 MMOL/L (ref 7–19)
AST SERPL-CCNC: 16 UNITS/L
BARBITURATES UR QL SCN>200 NG/ML: NEGATIVE
BASOPHILS # BLD: 0 K/MCL (ref 0–0.3)
BASOPHILS NFR BLD: 1 %
BENZODIAZ UR QL SCN>200 NG/ML: NEGATIVE
BILIRUB SERPL-MCNC: 0.4 MG/DL (ref 0.2–1)
BUN SERPL-MCNC: 21 MG/DL (ref 6–20)
BUN/CREAT SERPL: 29 (ref 7–25)
BZE UR QL SCN>150 NG/ML: NEGATIVE
CALCIUM SERPL-MCNC: 8.9 MG/DL (ref 8.4–10.2)
CANNABINOIDS UR QL SCN>50 NG/ML: NEGATIVE
CHLORIDE SERPL-SCNC: 108 MMOL/L (ref 97–110)
CO2 SERPL-SCNC: 27 MMOL/L (ref 21–32)
CREAT SERPL-MCNC: 0.72 MG/DL (ref 0.67–1.17)
DEPRECATED RDW RBC: 42.4 FL (ref 39–50)
EGFRCR SERPLBLD CKD-EPI 2021: >90 ML/MIN/{1.73_M2}
EOSINOPHIL # BLD: 0.2 K/MCL (ref 0–0.5)
EOSINOPHIL NFR BLD: 4 %
ERYTHROCYTE [DISTWIDTH] IN BLOOD: 12 % (ref 11–15)
ETHANOL SERPL-MCNC: NORMAL MG/DL
FASTING DURATION TIME PATIENT: ABNORMAL H
FENTANYL UR QL SCN: NEGATIVE
GLOBULIN SER-MCNC: 3.3 G/DL (ref 2–4)
GLUCOSE SERPL-MCNC: 89 MG/DL (ref 70–99)
HCT VFR BLD CALC: 38.5 % (ref 39–51)
HGB BLD-MCNC: 13.3 G/DL (ref 13–17)
IMM GRANULOCYTES # BLD AUTO: 0 K/MCL (ref 0–0.2)
IMM GRANULOCYTES # BLD: 0 %
LYMPHOCYTES # BLD: 1.6 K/MCL (ref 1–4.8)
LYMPHOCYTES NFR BLD: 27 %
MCH RBC QN AUTO: 32.8 PG (ref 26–34)
MCHC RBC AUTO-ENTMCNC: 34.5 G/DL (ref 32–36.5)
MCV RBC AUTO: 95.1 FL (ref 78–100)
MONOCYTES # BLD: 1 K/MCL (ref 0.3–0.9)
MONOCYTES NFR BLD: 17 %
NEUTROPHILS # BLD: 3 K/MCL (ref 1.8–7.7)
NEUTROPHILS NFR BLD: 51 %
NRBC BLD MANUAL-RTO: 0 /100 WBC
OPIATES UR QL SCN>300 NG/ML: NEGATIVE
PCP UR QL SCN>25 NG/ML: NEGATIVE
PLATELET # BLD AUTO: 163 K/MCL (ref 140–450)
POTASSIUM SERPL-SCNC: 3.9 MMOL/L (ref 3.4–5.1)
PROT SERPL-MCNC: 6.8 G/DL (ref 6.4–8.2)
RAINBOW EXTRA TUBES HOLD SPECIMEN: NORMAL
RAINBOW EXTRA TUBES HOLD SPECIMEN: NORMAL
RBC # BLD: 4.05 MIL/MCL (ref 4.5–5.9)
SARS-COV-2 RNA RESP QL NAA+PROBE: NOT DETECTED
SERVICE CMNT-IMP: NORMAL
SERVICE CMNT-IMP: NORMAL
SODIUM SERPL-SCNC: 138 MMOL/L (ref 135–145)
VALPROATE SERPL-MCNC: 82 MCG/ML (ref 50–125)
WBC # BLD: 5.8 K/MCL (ref 4.2–11)

## 2024-12-16 PROCEDURE — 10002803 HB RX 637: Performed by: PSYCHIATRY & NEUROLOGY

## 2024-12-16 PROCEDURE — 90792 PSYCH DIAG EVAL W/MED SRVCS: CPT | Performed by: PSYCHIATRY & NEUROLOGY

## 2024-12-16 PROCEDURE — 90839 PSYTX CRISIS INITIAL 60 MIN: CPT

## 2024-12-16 PROCEDURE — 96374 THER/PROPH/DIAG INJ IV PUSH: CPT

## 2024-12-16 PROCEDURE — 13003244 HB ROOM CHARGE INTENSIVE PSYCH

## 2024-12-16 PROCEDURE — 99285 EMERGENCY DEPT VISIT HI MDM: CPT

## 2024-12-16 PROCEDURE — 10002800 HB RX 250 W HCPCS: Performed by: EMERGENCY MEDICINE

## 2024-12-16 PROCEDURE — 82077 ASSAY SPEC XCP UR&BREATH IA: CPT | Performed by: EMERGENCY MEDICINE

## 2024-12-16 PROCEDURE — 90656 IIV3 VACC NO PRSV 0.5 ML IM: CPT | Performed by: PSYCHIATRY & NEUROLOGY

## 2024-12-16 PROCEDURE — 80164 ASSAY DIPROPYLACETIC ACD TOT: CPT | Performed by: EMERGENCY MEDICINE

## 2024-12-16 PROCEDURE — 80053 COMPREHEN METABOLIC PANEL: CPT | Performed by: EMERGENCY MEDICINE

## 2024-12-16 PROCEDURE — 90471 IMMUNIZATION ADMIN: CPT | Performed by: PSYCHIATRY & NEUROLOGY

## 2024-12-16 PROCEDURE — 87635 SARS-COV-2 COVID-19 AMP PRB: CPT | Performed by: EMERGENCY MEDICINE

## 2024-12-16 PROCEDURE — 85025 COMPLETE CBC W/AUTO DIFF WBC: CPT | Performed by: EMERGENCY MEDICINE

## 2024-12-16 PROCEDURE — 10002800 HB RX 250 W HCPCS: Performed by: PSYCHIATRY & NEUROLOGY

## 2024-12-16 PROCEDURE — 80307 DRUG TEST PRSMV CHEM ANLYZR: CPT | Performed by: EMERGENCY MEDICINE

## 2024-12-16 RX ORDER — LORAZEPAM 1 MG/1
1 TABLET ORAL EVERY 4 HOURS PRN
Status: DISCONTINUED | OUTPATIENT
Start: 2024-12-16 | End: 2024-12-19

## 2024-12-16 RX ORDER — LORAZEPAM 1 MG/1
2 TABLET ORAL EVERY 4 HOURS PRN
Status: DISCONTINUED | OUTPATIENT
Start: 2024-12-16 | End: 2024-12-19

## 2024-12-16 RX ORDER — BENZTROPINE MESYLATE 1 MG/ML
1 INJECTION, SOLUTION INTRAMUSCULAR; INTRAVENOUS EVERY 4 HOURS PRN
Status: DISCONTINUED | OUTPATIENT
Start: 2024-12-16 | End: 2025-01-03 | Stop reason: HOSPADM

## 2024-12-16 RX ORDER — HALOPERIDOL 5 MG/ML
5 INJECTION INTRAMUSCULAR EVERY 4 HOURS PRN
Status: DISCONTINUED | OUTPATIENT
Start: 2024-12-16 | End: 2025-01-03 | Stop reason: HOSPADM

## 2024-12-16 RX ORDER — BENZTROPINE MESYLATE 1 MG/1
1 TABLET ORAL EVERY 4 HOURS PRN
Status: DISCONTINUED | OUTPATIENT
Start: 2024-12-16 | End: 2025-01-03 | Stop reason: HOSPADM

## 2024-12-16 RX ORDER — HYDROXYZINE HYDROCHLORIDE 25 MG/1
50 TABLET, FILM COATED ORAL EVERY 4 HOURS PRN
Status: DISCONTINUED | OUTPATIENT
Start: 2024-12-16 | End: 2025-01-03 | Stop reason: HOSPADM

## 2024-12-16 RX ORDER — HALOPERIDOL 5 MG/1
5 TABLET ORAL EVERY 4 HOURS PRN
Status: DISCONTINUED | OUTPATIENT
Start: 2024-12-16 | End: 2025-01-03 | Stop reason: HOSPADM

## 2024-12-16 RX ORDER — LORAZEPAM 2 MG/ML
1 INJECTION INTRAMUSCULAR EVERY 4 HOURS PRN
Status: DISCONTINUED | OUTPATIENT
Start: 2024-12-16 | End: 2024-12-19

## 2024-12-16 RX ORDER — IBUPROFEN 400 MG/1
600 TABLET, FILM COATED ORAL EVERY 6 HOURS PRN
Status: DISCONTINUED | OUTPATIENT
Start: 2024-12-16 | End: 2025-01-03 | Stop reason: HOSPADM

## 2024-12-16 RX ORDER — DIAZEPAM 10 MG/2ML
5 INJECTION, SOLUTION INTRAMUSCULAR; INTRAVENOUS ONCE
Status: COMPLETED | OUTPATIENT
Start: 2024-12-16 | End: 2024-12-16

## 2024-12-16 RX ORDER — DIVALPROEX SODIUM 500 MG/1
500 TABLET, DELAYED RELEASE ORAL DAILY
Status: DISCONTINUED | OUTPATIENT
Start: 2024-12-17 | End: 2025-01-03 | Stop reason: HOSPADM

## 2024-12-16 RX ORDER — DIVALPROEX SODIUM 500 MG/1
1000 TABLET, DELAYED RELEASE ORAL AT BEDTIME
Status: DISCONTINUED | OUTPATIENT
Start: 2024-12-16 | End: 2025-01-03 | Stop reason: HOSPADM

## 2024-12-16 RX ORDER — TRAZODONE HYDROCHLORIDE 50 MG/1
50 TABLET, FILM COATED ORAL NIGHTLY PRN
Status: DISCONTINUED | OUTPATIENT
Start: 2024-12-16 | End: 2025-01-03 | Stop reason: HOSPADM

## 2024-12-16 RX ORDER — LORAZEPAM 2 MG/ML
2 INJECTION INTRAMUSCULAR EVERY 4 HOURS PRN
Status: DISCONTINUED | OUTPATIENT
Start: 2024-12-16 | End: 2024-12-19

## 2024-12-16 RX ORDER — ARIPIPRAZOLE 10 MG/1
30 TABLET ORAL DAILY
Status: DISCONTINUED | OUTPATIENT
Start: 2024-12-17 | End: 2025-01-03 | Stop reason: HOSPADM

## 2024-12-16 RX ADMIN — TRAZODONE HYDROCHLORIDE 50 MG: 50 TABLET ORAL at 21:30

## 2024-12-16 RX ADMIN — DIVALPROEX SODIUM 1000 MG: 500 TABLET, DELAYED RELEASE ORAL at 21:30

## 2024-12-16 RX ADMIN — HYDROXYZINE HYDROCHLORIDE 50 MG: 25 TABLET ORAL at 21:30

## 2024-12-16 RX ADMIN — DIAZEPAM 5 MG: 5 INJECTION INTRAMUSCULAR; INTRAVENOUS at 06:10

## 2024-12-16 RX ADMIN — INFLUENZA VIRUS VACCINE 0.5 ML: 15; 15; 15 SUSPENSION INTRAMUSCULAR at 17:59

## 2024-12-16 SDOH — ECONOMIC STABILITY: GENERAL: WOULD YOU LIKE HELP WITH ANY OF THE FOLLOWING NEEDS?: FINANCIAL RESOURCES;TRANSPORTATION;FOOD

## 2024-12-16 SDOH — ECONOMIC STABILITY: HOUSING INSECURITY: WHAT IS YOUR LIVING SITUATION TODAY?: I HAVE A STEADY PLACE TO LIVE

## 2024-12-16 SDOH — SOCIAL STABILITY: SOCIAL INSECURITY: HOW OFTEN DOES ANYONE, INCLUDING FAMILY AND FRIENDS, THREATEN YOU WITH HARM?: NEVER

## 2024-12-16 SDOH — ECONOMIC STABILITY: HOUSING INSECURITY: WHAT IS YOUR LIVING SITUATION TODAY?: ALONE

## 2024-12-16 SDOH — SOCIAL STABILITY: SOCIAL INSECURITY: HOW OFTEN DOES ANYONE, INCLUDING FAMILY AND FRIENDS, INSULT OR TALK DOWN TO YOU?: NEVER

## 2024-12-16 SDOH — SOCIAL STABILITY: SOCIAL INSECURITY: HOW OFTEN DOES ANYONE, INCLUDING FAMILY AND FRIENDS, PHYSICALLY HURT YOU?: NEVER

## 2024-12-16 SDOH — SOCIAL STABILITY: SOCIAL NETWORK
HOW OFTEN DO YOU SEE OR TALK TO PEOPLE THAT YOU CARE ABOUT AND FEEL CLOSE TO? (FOR EXAMPLE: TALKING TO FRIENDS ON THE PHONE, VISITING FRIENDS OR FAMILY, GOING TO CHURCH OR CLUB MEETINGS): LESS THAN ONCE A WEEK

## 2024-12-16 SDOH — ECONOMIC STABILITY: FOOD INSECURITY: WITHIN THE PAST 12 MONTHS, THE FOOD YOU BOUGHT JUST DIDN'T LAST AND YOU DIDN'T HAVE MONEY TO GET MORE.: NEVER TRUE

## 2024-12-16 SDOH — ECONOMIC STABILITY: HOUSING INSECURITY: DO YOU HAVE PROBLEMS WITH ANY OF THE FOLLOWING?: NONE OF THE ABOVE

## 2024-12-16 SDOH — ECONOMIC STABILITY: INCOME INSECURITY: IN THE PAST 12 MONTHS, HAS THE ELECTRIC, GAS, OIL, OR WATER COMPANY THREATENED TO SHUT OFF SERVICE IN YOUR HOME?: NO

## 2024-12-16 SDOH — SOCIAL STABILITY: SOCIAL INSECURITY: HOW OFTEN DOES ANYONE, INCLUDING FAMILY AND FRIENDS, SCREAM OR CURSE AT YOU?: NEVER

## 2024-12-16 SDOH — ECONOMIC STABILITY: HOUSING INSECURITY: WHAT IS YOUR LIVING SITUATION TODAY?: APARTMENT

## 2024-12-16 SDOH — ECONOMIC STABILITY: GENERAL

## 2024-12-16 SDOH — ECONOMIC STABILITY: TRANSPORTATION INSECURITY
IN THE PAST 12 MONTHS, HAS LACK OF RELIABLE TRANSPORTATION KEPT YOU FROM MEDICAL APPOINTMENTS, MEETINGS, WORK OR FROM GETTING THINGS NEEDED FOR DAILY LIVING?: YES

## 2024-12-16 SDOH — ECONOMIC STABILITY: GENERAL
IN THE PAST YEAR, HAVE YOU OR ANY FAMILY MEMBERS YOU LIVE WITH BEEN UNABLE TO GET ANY OF THE FOLLOWING WHEN IT WAS REALLY NEEDED? CHECK ALL THAT APPLY.: CLOTHING

## 2024-12-16 SDOH — ECONOMIC STABILITY: HOUSING INSECURITY: WHAT IS YOUR LIVING SITUATION TODAY?: I HAVE A PLACE TO LIVE TODAY, BUT I AM WORRIED ABOUT LOSING IT IN THE FUTURE

## 2024-12-16 SDOH — SOCIAL STABILITY: SOCIAL NETWORK: SUPPORT SYSTEMS: FAMILY MEMBERS

## 2024-12-16 ASSESSMENT — COGNITIVE AND FUNCTIONAL STATUS - GENERAL
MOTOR_BEHAVIOR-RETARDATION_CALCULATED: CALM AND PURPOSEFUL
MEMORY: INTACT
PERCEPTUAL_MISINTERPRETATIONS_HALLUCINATIONS: CLEAR REALITY BASED PERCEPTIONS
LEVEL_OF_CONSCIOUSNESS_CALCULATED: ALERT
MOTOR_BEHAVIOR-AGITATION_CALCULATED: CALM AND PURPOSEFUL
ORIENTATION: ORIENTED TO PERSON;ORIENTED TO PLACE;ORIENTED TO TIME
MOOD: FLAT
AFFECT: FLAT
BEHAVIOR: APPROPRIATE TO SITUATION
ATTENTION_CALCULATED: MAINTAINS ATTENTION
SPEECH: CLEAR/UNDERSTANDABLE

## 2024-12-16 ASSESSMENT — ENCOUNTER SYMPTOMS
SORE THROAT: 0
NAUSEA: 0
DIZZINESS: 0
SPUTUM PRODUCTION: 0
SHORTNESS OF BREATH: 0
TINGLING: 0
COUGH: 0
CHILLS: 0
HEARTBURN: 0
CONSTIPATION: 0
FEVER: 0
VOMITING: 0
DIARRHEA: 0
ORTHOPNEA: 0
FOCAL WEAKNESS: 0
SPEECH CHANGE: 0
HEADACHES: 0
ABDOMINAL PAIN: 0
WHEEZING: 0
WEAKNESS: 0

## 2024-12-16 ASSESSMENT — PATIENT HEALTH QUESTIONNAIRE - PHQ9
IS PATIENT ABLE TO COMPLETE PHQ2 OR PHQ9: YES
3. TROUBLE FALLING OR STAYING ASLEEP OR SLEEPING TOO MUCH: MORE THAN HALF THE DAYS
5. POOR APPETITE OR OVEREATING: NEARLY EVERY DAY
SUM OF ALL RESPONSES TO PHQ9 QUESTIONS 1 AND 2: 6
SUM OF ALL RESPONSES TO PHQ9 QUESTIONS 1 AND 2: 6
1. LITTLE INTEREST OR PLEASURE IN DOING THINGS: NEARLY EVERY DAY
4. FEELING TIRED OR HAVING LITTLE ENERGY: NOT AT ALL
9. THOUGHTS THAT YOU WOULD BE BETTER OFF DEAD, OR OF HURTING YOURSELF: NEARLY EVERY DAY
8. MOVING OR SPEAKING SO SLOWLY THAT OTHER PEOPLE COULD HAVE NOTICED. OR THE OPPOSITE, BEING SO FIGETY OR RESTLESS THAT YOU HAVE BEEN MOVING AROUND A LOT MORE THAN USUAL: NEARLY EVERY DAY
6. FEELING BAD ABOUT YOURSELF - OR THAT YOU ARE A FAILURE OR HAVE LET YOURSELF OR YOUR FAMILY DOWN: NEARLY EVERY DAY
CLINICAL INTERPRETATION OF PHQ2 SCORE: FURTHER SCREENING NEEDED
SUM OF ALL RESPONSES TO PHQ QUESTIONS 1-9: 23
10. IF YOU CHECKED OFF ANY PROBLEMS, HOW DIFFICULT HAVE THESE PROBLEMS MADE IT FOR YOU TO DO YOUR WORK, TAKE CARE OF THINGS AT HOME, OR GET ALONG WITH OTHER PEOPLE: EXTREMELY DIFFICULT
CLINICAL INTERPRETATION OF PHQ9 SCORE: SEVERE DEPRESSION
2. FEELING DOWN, DEPRESSED OR HOPELESS: NEARLY EVERY DAY
7. TROUBLE CONCENTRATING ON THINGS, SUCH AS READING THE NEWSPAPER OR WATCHING TELEVISION: NEARLY EVERY DAY

## 2024-12-16 ASSESSMENT — COLUMBIA-SUICIDE SEVERITY RATING SCALE - C-SSRS
REASONS FOR IDEATION PAST MONTH: MOSTLY TO END OR STOP THE PAIN (YOU COULDN'T GO ON LIVING WITH THE PAIN OR HOW YOU WERE FEELING)
TOTAL  NUMBER OF ABORTED OR SELF INTERRUPTED ATTEMPTS PAST 3 MONTHS: NO
LETHALITY/MEDICAL DAMAGE CODE MOST RECENT POTENTIAL ATTEMPT: BEHAVIOR LIKELY TO RESULT IN INJURY BUT NOT LIKELY TO CAUSE DEATH
LETHALITY/MEDICAL DAMAGE CODE MOST RECENT ACTUAL ATTEMPT: SEVERE PHYSICAL DAMAGE, MEDICAL HOSPITALIZATION WITH INTENSIVE CARE REQUIRED
TOTAL  NUMBER OF ACTUAL ATTEMPTS LIFETIME: 1
LETHALITY/MEDICAL DAMAGE CODE MOST LETHAL ACTUAL ATTEMPT: SEVERE PHYSICAL DAMAGE, MEDICAL HOSPITALIZATION WITH INTENSIVE CARE REQUIRED
TOTAL  NUMBER OF INTERRUPTED ATTEMPTS LIFETIME: NO
REASONS FOR IDEATION LIFETIME: MOSTLY TO END OR STOP THE PAIN (YOU COULDN'T GO ON LIVING WITH THE PAIN OR HOW YOU WERE FEELING)
6. HAVE YOU EVER DONE ANYTHING, STARTED TO DO ANYTHING, OR PREPARED TO DO ANYTHING TO END YOUR LIFE?: NO
ATTEMPT LIFETIME: YES
ATTEMPT PAST THREE MONTHS: NO
LETHALITY/MEDICAL DAMAGE CODE FIRST ACTUAL ATTEMPT: SEVERE PHYSICAL DAMAGE, MEDICAL HOSPITALIZATION WITH INTENSIVE CARE REQUIRED
TOTAL  NUMBER OF INTERRUPTED ATTEMPTS PAST 3 MONTHS: NO
6. HAVE YOU EVER DONE ANYTHING, STARTED TO DO ANYTHING, OR PREPARED TO DO ANYTHING TO END YOUR LIFE?: NO

## 2024-12-16 ASSESSMENT — LIFESTYLE VARIABLES
RECENT DECLINE IN ADLS: NO
HOW MANY STANDARD DRINKS CONTAINING ALCOHOL DO YOU HAVE ON A TYPICAL DAY: 0,1 OR 2
HAVE YOU EVER BEEN VERBALLY, EMOTIONALLY, PHYSICALLY OR SEXUALLY ABUSED, OR ABUSED VIA SOCIAL MEDIA?: NO
SHORT OF BREATH OR FATIGUE WITH ADLS: NO
ADL BEFORE ADMISSION: INDEPENDENT
ALCOHOL_USE_STATUS: NO OR LOW RISK WITH VALIDATED TOOL
ALCOHOL_USE: DENIES
HAVE YOU EVER BEEN EXPOSED TO OTHER VERY DISTURBING, TRAUMATIC OR ANXIETY PROVOKING EVENTS IN YOUR LIFETIME?: NO
ADL NEEDS ASSIST: NO
HOW OFTEN DO YOU HAVE 6 OR MORE DRINKS ON ONE OCCASION: NEVER
HOW OFTEN DO YOU HAVE A DRINK CONTAINING ALCOHOL: NEVER
AUDIT-C TOTAL SCORE: 0

## 2024-12-16 ASSESSMENT — ACTIVITIES OF DAILY LIVING (ADL): ADL_SCORE: 12

## 2024-12-16 ASSESSMENT — PAIN SCALES - GENERAL
PAINLEVEL_OUTOF10: 0

## 2024-12-17 PROBLEM — F60.89 CLUSTER B PERSONALITY DISORDER  (CMD): Status: RESOLVED | Noted: 2024-02-26 | Resolved: 2024-12-17

## 2024-12-17 LAB
APPEARANCE UR: CLEAR
BACTERIA #/AREA URNS HPF: ABNORMAL /HPF
BILIRUB UR QL STRIP: NEGATIVE
COLOR UR: YELLOW
GLUCOSE UR STRIP-MCNC: NEGATIVE MG/DL
HGB UR QL STRIP: NEGATIVE
HYALINE CASTS #/AREA URNS LPF: ABNORMAL /LPF
KETONES UR STRIP-MCNC: 20 MG/DL
LEUKOCYTE ESTERASE UR QL STRIP: NEGATIVE
MUCOUS THREADS URNS QL MICRO: PRESENT
NITRITE UR QL STRIP: NEGATIVE
PH UR STRIP: 6 [PH] (ref 5–7)
PROT UR STRIP-MCNC: ABNORMAL MG/DL
RBC #/AREA URNS HPF: ABNORMAL /HPF
SP GR UR STRIP: 1.03 (ref 1–1.03)
SQUAMOUS #/AREA URNS HPF: ABNORMAL /HPF
UROBILINOGEN UR STRIP-MCNC: 0.2 MG/DL
WBC #/AREA URNS HPF: ABNORMAL /HPF

## 2024-12-17 PROCEDURE — 10002803 HB RX 637: Performed by: PSYCHIATRY & NEUROLOGY

## 2024-12-17 PROCEDURE — 99233 SBSQ HOSP IP/OBS HIGH 50: CPT | Performed by: PSYCHIATRY & NEUROLOGY

## 2024-12-17 PROCEDURE — 13003244 HB ROOM CHARGE INTENSIVE PSYCH

## 2024-12-17 PROCEDURE — 81001 URINALYSIS AUTO W/SCOPE: CPT | Performed by: PSYCHIATRY & NEUROLOGY

## 2024-12-17 RX ADMIN — ARIPIPRAZOLE 30 MG: 10 TABLET ORAL at 08:49

## 2024-12-17 RX ADMIN — DIVALPROEX SODIUM 1000 MG: 500 TABLET, DELAYED RELEASE ORAL at 21:16

## 2024-12-17 RX ADMIN — HYDROXYZINE HYDROCHLORIDE 50 MG: 25 TABLET ORAL at 21:17

## 2024-12-17 RX ADMIN — HALOPERIDOL 5 MG: 5 TABLET ORAL at 16:35

## 2024-12-17 RX ADMIN — DIVALPROEX SODIUM 500 MG: 500 TABLET, DELAYED RELEASE ORAL at 08:49

## 2024-12-17 RX ADMIN — HYDROXYZINE HYDROCHLORIDE 50 MG: 25 TABLET ORAL at 16:35

## 2024-12-17 RX ADMIN — TRAZODONE HYDROCHLORIDE 50 MG: 50 TABLET ORAL at 21:17

## 2024-12-17 ASSESSMENT — PAIN SCALES - GENERAL: PAINLEVEL_OUTOF10: 0

## 2024-12-18 PROCEDURE — 10002803 HB RX 637: Performed by: PSYCHIATRY & NEUROLOGY

## 2024-12-18 PROCEDURE — 99233 SBSQ HOSP IP/OBS HIGH 50: CPT | Performed by: PSYCHIATRY & NEUROLOGY

## 2024-12-18 PROCEDURE — 13003244 HB ROOM CHARGE INTENSIVE PSYCH

## 2024-12-18 RX ORDER — HALOPERIDOL 5 MG/1
5 TABLET ORAL 2 TIMES DAILY
Status: DISCONTINUED | OUTPATIENT
Start: 2024-12-18 | End: 2024-12-21

## 2024-12-18 RX ORDER — BENZTROPINE MESYLATE 1 MG/1
0.5 TABLET ORAL 2 TIMES DAILY
Status: DISCONTINUED | OUTPATIENT
Start: 2024-12-18 | End: 2025-01-03 | Stop reason: HOSPADM

## 2024-12-18 RX ADMIN — HALOPERIDOL 5 MG: 5 TABLET ORAL at 14:07

## 2024-12-18 RX ADMIN — HALOPERIDOL 5 MG: 5 TABLET ORAL at 20:00

## 2024-12-18 RX ADMIN — HYDROXYZINE HYDROCHLORIDE 50 MG: 25 TABLET ORAL at 17:44

## 2024-12-18 RX ADMIN — BENZTROPINE MESYLATE 0.5 MG: 1 TABLET ORAL at 20:00

## 2024-12-18 RX ADMIN — DIVALPROEX SODIUM 1000 MG: 500 TABLET, DELAYED RELEASE ORAL at 20:00

## 2024-12-18 RX ADMIN — DIVALPROEX SODIUM 500 MG: 500 TABLET, DELAYED RELEASE ORAL at 07:33

## 2024-12-18 RX ADMIN — HYDROXYZINE HYDROCHLORIDE 50 MG: 25 TABLET ORAL at 07:33

## 2024-12-18 RX ADMIN — LORAZEPAM 1 MG: 1 TABLET ORAL at 20:00

## 2024-12-18 RX ADMIN — TRAZODONE HYDROCHLORIDE 50 MG: 50 TABLET ORAL at 20:00

## 2024-12-18 RX ADMIN — ARIPIPRAZOLE 30 MG: 10 TABLET ORAL at 07:32

## 2024-12-18 ASSESSMENT — PAIN SCALES - GENERAL
PAINLEVEL_OUTOF10: 0
PAINLEVEL_OUTOF10: 0

## 2024-12-19 PROCEDURE — 10002803 HB RX 637: Performed by: PSYCHIATRY & NEUROLOGY

## 2024-12-19 PROCEDURE — 99233 SBSQ HOSP IP/OBS HIGH 50: CPT | Performed by: PSYCHIATRY & NEUROLOGY

## 2024-12-19 PROCEDURE — 13003244 HB ROOM CHARGE INTENSIVE PSYCH

## 2024-12-19 RX ORDER — LORAZEPAM 2 MG/ML
1 INJECTION INTRAMUSCULAR EVERY 8 HOURS PRN
Status: DISCONTINUED | OUTPATIENT
Start: 2024-12-19 | End: 2025-01-03 | Stop reason: HOSPADM

## 2024-12-19 RX ADMIN — HYDROXYZINE HYDROCHLORIDE 50 MG: 25 TABLET ORAL at 20:56

## 2024-12-19 RX ADMIN — ARIPIPRAZOLE 30 MG: 10 TABLET ORAL at 08:50

## 2024-12-19 RX ADMIN — BENZTROPINE MESYLATE 0.5 MG: 1 TABLET ORAL at 08:50

## 2024-12-19 RX ADMIN — HALOPERIDOL 5 MG: 5 TABLET ORAL at 20:56

## 2024-12-19 RX ADMIN — BENZTROPINE MESYLATE 0.5 MG: 1 TABLET ORAL at 20:56

## 2024-12-19 RX ADMIN — DIVALPROEX SODIUM 1000 MG: 500 TABLET, DELAYED RELEASE ORAL at 20:56

## 2024-12-19 RX ADMIN — TRAZODONE HYDROCHLORIDE 50 MG: 50 TABLET ORAL at 20:56

## 2024-12-19 RX ADMIN — DIVALPROEX SODIUM 500 MG: 500 TABLET, DELAYED RELEASE ORAL at 08:50

## 2024-12-19 RX ADMIN — HYDROXYZINE HYDROCHLORIDE 50 MG: 25 TABLET ORAL at 03:40

## 2024-12-19 RX ADMIN — HALOPERIDOL 5 MG: 5 TABLET ORAL at 08:50

## 2024-12-19 ASSESSMENT — PAIN SCALES - GENERAL: PAINLEVEL_OUTOF10: 0

## 2024-12-20 ENCOUNTER — APPOINTMENT (OUTPATIENT)
Dept: CT IMAGING | Age: 49
DRG: 885 | End: 2024-12-20
Attending: PSYCHIATRY & NEUROLOGY

## 2024-12-20 PROCEDURE — 70450 CT HEAD/BRAIN W/O DYE: CPT

## 2024-12-20 PROCEDURE — 10002803 HB RX 637: Performed by: PSYCHIATRY & NEUROLOGY

## 2024-12-20 PROCEDURE — 13003244 HB ROOM CHARGE INTENSIVE PSYCH

## 2024-12-20 PROCEDURE — 99233 SBSQ HOSP IP/OBS HIGH 50: CPT | Performed by: PSYCHIATRY & NEUROLOGY

## 2024-12-20 RX ADMIN — TRAZODONE HYDROCHLORIDE 50 MG: 50 TABLET ORAL at 21:15

## 2024-12-20 RX ADMIN — DIVALPROEX SODIUM 500 MG: 500 TABLET, DELAYED RELEASE ORAL at 08:11

## 2024-12-20 RX ADMIN — HALOPERIDOL 5 MG: 5 TABLET ORAL at 08:12

## 2024-12-20 RX ADMIN — BENZTROPINE MESYLATE 0.5 MG: 1 TABLET ORAL at 08:08

## 2024-12-20 RX ADMIN — HYDROXYZINE HYDROCHLORIDE 50 MG: 25 TABLET ORAL at 16:08

## 2024-12-20 RX ADMIN — HALOPERIDOL 5 MG: 5 TABLET ORAL at 21:15

## 2024-12-20 RX ADMIN — DIVALPROEX SODIUM 1000 MG: 500 TABLET, DELAYED RELEASE ORAL at 21:15

## 2024-12-20 RX ADMIN — ARIPIPRAZOLE 30 MG: 10 TABLET ORAL at 08:10

## 2024-12-20 RX ADMIN — HYDROXYZINE HYDROCHLORIDE 50 MG: 25 TABLET ORAL at 21:15

## 2024-12-20 RX ADMIN — BENZTROPINE MESYLATE 0.5 MG: 1 TABLET ORAL at 21:15

## 2024-12-20 ASSESSMENT — PAIN SCALES - GENERAL
PAINLEVEL_OUTOF10: 0
PAINLEVEL_OUTOF10: 0

## 2024-12-21 VITALS
SYSTOLIC BLOOD PRESSURE: 105 MMHG | TEMPERATURE: 98.1 F | OXYGEN SATURATION: 99 % | RESPIRATION RATE: 16 BRPM | HEART RATE: 55 BPM | WEIGHT: 231.48 LBS | BODY MASS INDEX: 29.71 KG/M2 | HEIGHT: 74 IN | DIASTOLIC BLOOD PRESSURE: 64 MMHG

## 2024-12-21 PROBLEM — F25.0 SCHIZOAFFECTIVE DISORDER, BIPOLAR TYPE  (CMD): Chronic | Status: RESOLVED | Noted: 2020-04-23 | Resolved: 2024-12-21

## 2024-12-21 PROBLEM — F31.9: Status: ACTIVE | Noted: 2024-12-21

## 2024-12-21 PROBLEM — F31.60 BIPOLAR 1 DISORDER, MIXED  (CMD): Status: ACTIVE | Noted: 2024-12-21

## 2024-12-21 PROBLEM — F06.8: Status: ACTIVE | Noted: 2024-12-21

## 2024-12-21 PROCEDURE — 10002803 HB RX 637: Performed by: PSYCHIATRY & NEUROLOGY

## 2024-12-21 PROCEDURE — 99233 SBSQ HOSP IP/OBS HIGH 50: CPT | Performed by: PSYCHIATRY & NEUROLOGY

## 2024-12-21 PROCEDURE — 13003244 HB ROOM CHARGE INTENSIVE PSYCH

## 2024-12-21 RX ORDER — POLYETHYLENE GLYCOL 3350 17 G/17G
17 POWDER, FOR SOLUTION ORAL DAILY PRN
Status: DISCONTINUED | OUTPATIENT
Start: 2024-12-21 | End: 2025-01-03 | Stop reason: HOSPADM

## 2024-12-21 RX ORDER — HALOPERIDOL 5 MG/1
10 TABLET ORAL 2 TIMES DAILY
Status: DISCONTINUED | OUTPATIENT
Start: 2024-12-21 | End: 2025-01-03 | Stop reason: HOSPADM

## 2024-12-21 RX ADMIN — HYDROXYZINE HYDROCHLORIDE 50 MG: 25 TABLET ORAL at 20:47

## 2024-12-21 RX ADMIN — BENZTROPINE MESYLATE 0.5 MG: 1 TABLET ORAL at 08:37

## 2024-12-21 RX ADMIN — BENZTROPINE MESYLATE 0.5 MG: 1 TABLET ORAL at 20:48

## 2024-12-21 RX ADMIN — HALOPERIDOL 10 MG: 5 TABLET ORAL at 20:47

## 2024-12-21 RX ADMIN — IBUPROFEN 600 MG: 400 TABLET ORAL at 08:38

## 2024-12-21 RX ADMIN — DIVALPROEX SODIUM 500 MG: 500 TABLET, DELAYED RELEASE ORAL at 08:38

## 2024-12-21 RX ADMIN — DIVALPROEX SODIUM 1000 MG: 500 TABLET, DELAYED RELEASE ORAL at 20:46

## 2024-12-21 RX ADMIN — ARIPIPRAZOLE 30 MG: 10 TABLET ORAL at 08:38

## 2024-12-21 RX ADMIN — HALOPERIDOL 5 MG: 5 TABLET ORAL at 08:38

## 2024-12-21 RX ADMIN — HYDROXYZINE HYDROCHLORIDE 50 MG: 25 TABLET ORAL at 14:22

## 2024-12-21 SDOH — ECONOMIC STABILITY: INCOME INSECURITY: IN THE PAST 12 MONTHS, HAS THE ELECTRIC, GAS, OIL, OR WATER COMPANY THREATENED TO SHUT OFF SERVICE IN YOUR HOME?: NO

## 2024-12-21 SDOH — SOCIAL STABILITY: SOCIAL NETWORK: SUPPORT SYSTEMS: PARENT

## 2024-12-21 SDOH — ECONOMIC STABILITY: HOUSING INSECURITY: WHAT IS YOUR LIVING SITUATION TODAY?: I HAVE A STEADY PLACE TO LIVE

## 2024-12-21 SDOH — ECONOMIC STABILITY: HOUSING INSECURITY: DO YOU HAVE PROBLEMS WITH ANY OF THE FOLLOWING?: NONE OF THE ABOVE

## 2024-12-21 SDOH — ECONOMIC STABILITY: HOUSING INSECURITY: WHAT IS YOUR LIVING SITUATION TODAY?: PARENT

## 2024-12-21 SDOH — ECONOMIC STABILITY: GENERAL

## 2024-12-21 SDOH — ECONOMIC STABILITY: HOUSING INSECURITY: WHAT IS YOUR LIVING SITUATION TODAY?: HOUSE

## 2024-12-21 ASSESSMENT — LIFESTYLE VARIABLES
TOBACCO_USE_STATUS_IN_THE_LAST_30_DAYS: USED TOBACCO IN THE LAST 30 DAYS
AMOUNT_OF_TOBACCO_USED: FIVE OR MORE CIGARETTES PER DAY AND/OR CIGAR OR PIPE DAILY
TYPE_OF_TOBACCO_USED: CIGARETTES

## 2024-12-21 ASSESSMENT — PAIN SCALES - GENERAL
PAINLEVEL_OUTOF10: 5
PAINLEVEL_OUTOF10: 0

## 2024-12-21 ASSESSMENT — PAIN SCALES - WONG BAKER: WONGBAKER_NUMERICALRESPONSE: 0

## 2024-12-21 ASSESSMENT — PAIN SCALES - PAIN ASSESSMENT IN ADVANCED DEMENTIA (PAINAD)
BREATHING: NORMAL
BODYLANGUAGE: RELAXED

## 2024-12-22 ENCOUNTER — APPOINTMENT (OUTPATIENT)
Dept: GENERAL RADIOLOGY | Age: 49
DRG: 885 | End: 2024-12-22
Attending: PSYCHIATRY & NEUROLOGY

## 2024-12-22 PROCEDURE — 13003244 HB ROOM CHARGE INTENSIVE PSYCH

## 2024-12-22 PROCEDURE — 10002803 HB RX 637: Performed by: PSYCHIATRY & NEUROLOGY

## 2024-12-22 PROCEDURE — 71046 X-RAY EXAM CHEST 2 VIEWS: CPT

## 2024-12-22 PROCEDURE — 72110 X-RAY EXAM L-2 SPINE 4/>VWS: CPT

## 2024-12-22 PROCEDURE — 99233 SBSQ HOSP IP/OBS HIGH 50: CPT | Performed by: PSYCHIATRY & NEUROLOGY

## 2024-12-22 PROCEDURE — 72072 X-RAY EXAM THORAC SPINE 3VWS: CPT

## 2024-12-22 PROCEDURE — 72050 X-RAY EXAM NECK SPINE 4/5VWS: CPT

## 2024-12-22 RX ADMIN — ARIPIPRAZOLE 30 MG: 10 TABLET ORAL at 08:05

## 2024-12-22 RX ADMIN — HALOPERIDOL 10 MG: 5 TABLET ORAL at 20:56

## 2024-12-22 RX ADMIN — DIVALPROEX SODIUM 500 MG: 500 TABLET, DELAYED RELEASE ORAL at 08:05

## 2024-12-22 RX ADMIN — HYDROXYZINE HYDROCHLORIDE 50 MG: 25 TABLET ORAL at 13:01

## 2024-12-22 RX ADMIN — BENZTROPINE MESYLATE 0.5 MG: 1 TABLET ORAL at 08:05

## 2024-12-22 RX ADMIN — HALOPERIDOL 10 MG: 5 TABLET ORAL at 08:05

## 2024-12-22 RX ADMIN — BENZTROPINE MESYLATE 0.5 MG: 1 TABLET ORAL at 20:57

## 2024-12-22 ASSESSMENT — PAIN SCALES - GENERAL: PAINLEVEL_OUTOF10: 0

## 2024-12-23 PROCEDURE — 10002803 HB RX 637: Performed by: PSYCHIATRY & NEUROLOGY

## 2024-12-23 PROCEDURE — 99233 SBSQ HOSP IP/OBS HIGH 50: CPT | Performed by: PSYCHIATRY & NEUROLOGY

## 2024-12-23 PROCEDURE — 13003244 HB ROOM CHARGE INTENSIVE PSYCH

## 2024-12-23 RX ORDER — HALOPERIDOL 10 MG/1
10 TABLET ORAL 2 TIMES DAILY
Qty: 60 TABLET | Refills: 0 | Status: SHIPPED | OUTPATIENT
Start: 2024-12-23 | End: 2025-01-03

## 2024-12-23 RX ORDER — BENZTROPINE MESYLATE 0.5 MG/1
0.5 TABLET ORAL 2 TIMES DAILY
Qty: 60 TABLET | Refills: 0 | Status: SHIPPED | OUTPATIENT
Start: 2024-12-23 | End: 2025-01-03

## 2024-12-23 RX ADMIN — HALOPERIDOL 10 MG: 5 TABLET ORAL at 22:15

## 2024-12-23 RX ADMIN — DIVALPROEX SODIUM 500 MG: 500 TABLET, DELAYED RELEASE ORAL at 09:20

## 2024-12-23 RX ADMIN — BENZTROPINE MESYLATE 0.5 MG: 1 TABLET ORAL at 09:19

## 2024-12-23 RX ADMIN — HALOPERIDOL 10 MG: 5 TABLET ORAL at 09:20

## 2024-12-23 RX ADMIN — DIVALPROEX SODIUM 1000 MG: 500 TABLET, DELAYED RELEASE ORAL at 22:14

## 2024-12-23 RX ADMIN — BENZTROPINE MESYLATE 0.5 MG: 1 TABLET ORAL at 22:14

## 2024-12-23 RX ADMIN — HYDROXYZINE HYDROCHLORIDE 50 MG: 25 TABLET ORAL at 18:53

## 2024-12-23 RX ADMIN — ARIPIPRAZOLE 30 MG: 10 TABLET ORAL at 09:19

## 2024-12-23 ASSESSMENT — PAIN SCALES - GENERAL
PAINLEVEL_OUTOF10: 0
PAINLEVEL_OUTOF10: 0

## 2024-12-24 PROCEDURE — 10002803 HB RX 637: Performed by: PSYCHIATRY & NEUROLOGY

## 2024-12-24 PROCEDURE — 99233 SBSQ HOSP IP/OBS HIGH 50: CPT | Performed by: PSYCHIATRY & NEUROLOGY

## 2024-12-24 PROCEDURE — 13003244 HB ROOM CHARGE INTENSIVE PSYCH

## 2024-12-24 RX ADMIN — HALOPERIDOL 10 MG: 5 TABLET ORAL at 08:26

## 2024-12-24 RX ADMIN — DIVALPROEX SODIUM 500 MG: 500 TABLET, DELAYED RELEASE ORAL at 08:26

## 2024-12-24 RX ADMIN — HALOPERIDOL 10 MG: 5 TABLET ORAL at 20:04

## 2024-12-24 RX ADMIN — BENZTROPINE MESYLATE 0.5 MG: 1 TABLET ORAL at 20:05

## 2024-12-24 RX ADMIN — HYDROXYZINE HYDROCHLORIDE 50 MG: 25 TABLET ORAL at 19:31

## 2024-12-24 RX ADMIN — ARIPIPRAZOLE 30 MG: 10 TABLET ORAL at 08:26

## 2024-12-24 RX ADMIN — DIVALPROEX SODIUM 1000 MG: 500 TABLET, DELAYED RELEASE ORAL at 20:04

## 2024-12-24 RX ADMIN — BENZTROPINE MESYLATE 0.5 MG: 1 TABLET ORAL at 08:26

## 2024-12-24 RX ADMIN — TRAZODONE HYDROCHLORIDE 50 MG: 50 TABLET ORAL at 23:31

## 2024-12-24 ASSESSMENT — PAIN SCALES - GENERAL
PAINLEVEL_OUTOF10: 0
PAINLEVEL_OUTOF10: 0

## 2024-12-25 PROCEDURE — 10002803 HB RX 637: Performed by: PSYCHIATRY & NEUROLOGY

## 2024-12-25 PROCEDURE — 13003244 HB ROOM CHARGE INTENSIVE PSYCH

## 2024-12-25 PROCEDURE — 99233 SBSQ HOSP IP/OBS HIGH 50: CPT | Performed by: PSYCHIATRY & NEUROLOGY

## 2024-12-25 RX ADMIN — HYDROXYZINE HYDROCHLORIDE 50 MG: 25 TABLET ORAL at 22:10

## 2024-12-25 RX ADMIN — BENZTROPINE MESYLATE 0.5 MG: 1 TABLET ORAL at 08:25

## 2024-12-25 RX ADMIN — ARIPIPRAZOLE 30 MG: 10 TABLET ORAL at 08:24

## 2024-12-25 RX ADMIN — BENZTROPINE MESYLATE 0.5 MG: 1 TABLET ORAL at 22:11

## 2024-12-25 RX ADMIN — DIVALPROEX SODIUM 500 MG: 500 TABLET, DELAYED RELEASE ORAL at 08:21

## 2024-12-25 RX ADMIN — HALOPERIDOL 10 MG: 5 TABLET ORAL at 08:26

## 2024-12-25 RX ADMIN — TRAZODONE HYDROCHLORIDE 50 MG: 50 TABLET ORAL at 22:10

## 2024-12-25 RX ADMIN — DIVALPROEX SODIUM 1000 MG: 500 TABLET, DELAYED RELEASE ORAL at 22:10

## 2024-12-25 RX ADMIN — HALOPERIDOL 10 MG: 5 TABLET ORAL at 22:11

## 2024-12-25 ASSESSMENT — PAIN SCALES - GENERAL
PAINLEVEL_OUTOF10: 0
PAINLEVEL_OUTOF10: 0

## 2024-12-26 PROCEDURE — 99233 SBSQ HOSP IP/OBS HIGH 50: CPT | Performed by: PSYCHIATRY & NEUROLOGY

## 2024-12-26 PROCEDURE — 13003244 HB ROOM CHARGE INTENSIVE PSYCH

## 2024-12-26 PROCEDURE — 10002803 HB RX 637: Performed by: PSYCHIATRY & NEUROLOGY

## 2024-12-26 RX ADMIN — BENZTROPINE MESYLATE 0.5 MG: 1 TABLET ORAL at 20:35

## 2024-12-26 RX ADMIN — IBUPROFEN 600 MG: 400 TABLET ORAL at 08:32

## 2024-12-26 RX ADMIN — HALOPERIDOL 10 MG: 5 TABLET ORAL at 08:32

## 2024-12-26 RX ADMIN — ARIPIPRAZOLE 30 MG: 10 TABLET ORAL at 08:32

## 2024-12-26 RX ADMIN — HALOPERIDOL 5 MG: 5 TABLET ORAL at 16:29

## 2024-12-26 RX ADMIN — HALOPERIDOL 10 MG: 5 TABLET ORAL at 20:35

## 2024-12-26 RX ADMIN — DIVALPROEX SODIUM 500 MG: 500 TABLET, DELAYED RELEASE ORAL at 08:32

## 2024-12-26 RX ADMIN — HYDROXYZINE HYDROCHLORIDE 50 MG: 25 TABLET ORAL at 11:51

## 2024-12-26 RX ADMIN — BENZTROPINE MESYLATE 0.5 MG: 1 TABLET ORAL at 08:33

## 2024-12-26 ASSESSMENT — PAIN SCALES - GENERAL
PAINLEVEL_OUTOF10: 6
PAINLEVEL_OUTOF10: 0
PAINLEVEL_OUTOF10: 0

## 2024-12-27 ENCOUNTER — ANESTHESIA (OUTPATIENT)
Dept: GASTROENTEROLOGY | Age: 49
End: 2024-12-27

## 2024-12-27 ENCOUNTER — ANESTHESIA EVENT (OUTPATIENT)
Dept: GASTROENTEROLOGY | Age: 49
End: 2024-12-27

## 2024-12-27 ENCOUNTER — APPOINTMENT (OUTPATIENT)
Dept: GASTROENTEROLOGY | Age: 49
DRG: 885 | End: 2024-12-27
Attending: PSYCHIATRY & NEUROLOGY

## 2024-12-27 DIAGNOSIS — F32.9 MAJOR DEPRESSION: Primary | ICD-10-CM

## 2024-12-27 PROCEDURE — 90870 ELECTROCONVULSIVE THERAPY: CPT

## 2024-12-27 PROCEDURE — 13000001 HB PHASE II RECOVERY EA 30 MINUTES

## 2024-12-27 PROCEDURE — 13000004 HB  ANESTHESIA  GENERAL OUTSIDE OR

## 2024-12-27 PROCEDURE — 10002800 HB RX 250 W HCPCS: Performed by: PSYCHIATRY & NEUROLOGY

## 2024-12-27 PROCEDURE — 10002801 HB RX 250 W/O HCPCS: Performed by: SPECIALIST

## 2024-12-27 PROCEDURE — GZB2ZZZ ELECTROCONVULSIVE THERAPY, BILATERAL-SINGLE SEIZURE: ICD-10-PCS | Performed by: PSYCHIATRY & NEUROLOGY

## 2024-12-27 PROCEDURE — 13003244 HB ROOM CHARGE INTENSIVE PSYCH

## 2024-12-27 PROCEDURE — 10004451 HB PACU RECOVERY 1ST 30 MINUTES

## 2024-12-27 PROCEDURE — 10002803 HB RX 637: Performed by: PSYCHIATRY & NEUROLOGY

## 2024-12-27 PROCEDURE — 10002800 HB RX 250 W HCPCS: Performed by: SPECIALIST

## 2024-12-27 PROCEDURE — 90870 ELECTROCONVULSIVE THERAPY: CPT | Performed by: PSYCHIATRY & NEUROLOGY

## 2024-12-27 PROCEDURE — 10002807 HB RX 258: Performed by: PSYCHIATRY & NEUROLOGY

## 2024-12-27 RX ORDER — GLYCOPYRROLATE 0.2 MG/ML
0.2 INJECTION, SOLUTION INTRAMUSCULAR; INTRAVENOUS ONCE
Status: COMPLETED | OUTPATIENT
Start: 2024-12-27 | End: 2024-12-27

## 2024-12-27 RX ORDER — SODIUM CHLORIDE 9 MG/ML
INJECTION, SOLUTION INTRAVENOUS CONTINUOUS
Status: DISCONTINUED | OUTPATIENT
Start: 2024-12-27 | End: 2024-12-28

## 2024-12-27 RX ADMIN — GLYCOPYRROLATE 0.2 MG: 0.2 INJECTION INTRAMUSCULAR; INTRAVENOUS at 10:45

## 2024-12-27 RX ADMIN — BENZTROPINE MESYLATE 0.5 MG: 1 TABLET ORAL at 20:57

## 2024-12-27 RX ADMIN — HALOPERIDOL 10 MG: 5 TABLET ORAL at 20:57

## 2024-12-27 RX ADMIN — ARIPIPRAZOLE 30 MG: 10 TABLET ORAL at 12:18

## 2024-12-27 RX ADMIN — DIVALPROEX SODIUM 1000 MG: 500 TABLET, DELAYED RELEASE ORAL at 20:57

## 2024-12-27 RX ADMIN — Medication 120 MG: at 11:11

## 2024-12-27 RX ADMIN — SODIUM CHLORIDE: 9 INJECTION, SOLUTION INTRAVENOUS at 10:45

## 2024-12-27 RX ADMIN — DIVALPROEX SODIUM 500 MG: 500 TABLET, DELAYED RELEASE ORAL at 12:18

## 2024-12-27 RX ADMIN — HALOPERIDOL 10 MG: 5 TABLET ORAL at 12:18

## 2024-12-27 RX ADMIN — BENZTROPINE MESYLATE 0.5 MG: 1 TABLET ORAL at 12:18

## 2024-12-27 RX ADMIN — KETOROLAC TROMETHAMINE 30 MG: 30 INJECTION, SOLUTION INTRAMUSCULAR at 11:16

## 2024-12-27 RX ADMIN — METHOHEXITAL SODIUM 130 MG: 500 INJECTION, POWDER, LYOPHILIZED, FOR SOLUTION INTRAMUSCULAR; INTRAVENOUS; RECTAL at 11:10

## 2024-12-27 ASSESSMENT — PAIN SCALES - GENERAL
PAINLEVEL_OUTOF10: 0

## 2024-12-28 PROCEDURE — 10002803 HB RX 637: Performed by: PSYCHIATRY & NEUROLOGY

## 2024-12-28 PROCEDURE — 13003244 HB ROOM CHARGE INTENSIVE PSYCH

## 2024-12-28 PROCEDURE — 99232 SBSQ HOSP IP/OBS MODERATE 35: CPT | Performed by: PATHOLOGY

## 2024-12-28 RX ADMIN — IBUPROFEN 600 MG: 400 TABLET ORAL at 16:31

## 2024-12-28 RX ADMIN — ARIPIPRAZOLE 30 MG: 10 TABLET ORAL at 08:25

## 2024-12-28 RX ADMIN — HALOPERIDOL 10 MG: 5 TABLET ORAL at 08:25

## 2024-12-28 RX ADMIN — BENZTROPINE MESYLATE 0.5 MG: 1 TABLET ORAL at 08:25

## 2024-12-28 RX ADMIN — DIVALPROEX SODIUM 1000 MG: 500 TABLET, DELAYED RELEASE ORAL at 21:09

## 2024-12-28 RX ADMIN — BENZTROPINE MESYLATE 0.5 MG: 1 TABLET ORAL at 21:10

## 2024-12-28 RX ADMIN — DIVALPROEX SODIUM 500 MG: 500 TABLET, DELAYED RELEASE ORAL at 08:25

## 2024-12-28 RX ADMIN — HALOPERIDOL 10 MG: 5 TABLET ORAL at 21:09

## 2024-12-28 ASSESSMENT — PAIN SCALES - GENERAL
PAINLEVEL_OUTOF10: 0
PAINLEVEL_OUTOF10: 7
PAINLEVEL_OUTOF10: 0
PAINLEVEL_OUTOF10: 0

## 2024-12-29 PROCEDURE — 10002803 HB RX 637: Performed by: PSYCHIATRY & NEUROLOGY

## 2024-12-29 PROCEDURE — 99232 SBSQ HOSP IP/OBS MODERATE 35: CPT | Performed by: PATHOLOGY

## 2024-12-29 PROCEDURE — 10004577 HB ROOM CHARGE PSYCH

## 2024-12-29 RX ADMIN — HYDROXYZINE HYDROCHLORIDE 50 MG: 25 TABLET ORAL at 21:26

## 2024-12-29 RX ADMIN — BENZTROPINE MESYLATE 0.5 MG: 1 TABLET ORAL at 08:24

## 2024-12-29 RX ADMIN — HALOPERIDOL 10 MG: 5 TABLET ORAL at 08:24

## 2024-12-29 RX ADMIN — ARIPIPRAZOLE 30 MG: 10 TABLET ORAL at 08:24

## 2024-12-29 RX ADMIN — TRAZODONE HYDROCHLORIDE 50 MG: 50 TABLET ORAL at 21:26

## 2024-12-29 RX ADMIN — BENZTROPINE MESYLATE 0.5 MG: 1 TABLET ORAL at 21:26

## 2024-12-29 RX ADMIN — HALOPERIDOL 10 MG: 5 TABLET ORAL at 21:26

## 2024-12-29 RX ADMIN — DIVALPROEX SODIUM 500 MG: 500 TABLET, DELAYED RELEASE ORAL at 08:24

## 2024-12-29 ASSESSMENT — PAIN SCALES - PAIN ASSESSMENT IN ADVANCED DEMENTIA (PAINAD)
BREATHING: NORMAL
BODYLANGUAGE: RELAXED

## 2024-12-29 ASSESSMENT — PAIN SCALES - GENERAL
PAINLEVEL_OUTOF10: 0
PAINLEVEL_OUTOF10: 0

## 2024-12-29 ASSESSMENT — PAIN SCALES - WONG BAKER: WONGBAKER_NUMERICALRESPONSE: 0

## 2024-12-30 ENCOUNTER — APPOINTMENT (OUTPATIENT)
Dept: GASTROENTEROLOGY | Age: 49
DRG: 885 | End: 2024-12-30
Attending: PSYCHIATRY & NEUROLOGY

## 2024-12-30 PROCEDURE — 10002803 HB RX 637: Performed by: PSYCHIATRY & NEUROLOGY

## 2024-12-30 PROCEDURE — 10004577 HB ROOM CHARGE PSYCH

## 2024-12-30 PROCEDURE — 99233 SBSQ HOSP IP/OBS HIGH 50: CPT | Performed by: PSYCHIATRY & NEUROLOGY

## 2024-12-30 RX ORDER — SODIUM CHLORIDE 9 MG/ML
INJECTION, SOLUTION INTRAVENOUS ONCE
Status: DISCONTINUED | OUTPATIENT
Start: 2024-12-30 | End: 2025-01-01

## 2024-12-30 RX ADMIN — BENZTROPINE MESYLATE 0.5 MG: 1 TABLET ORAL at 21:36

## 2024-12-30 RX ADMIN — DIVALPROEX SODIUM 1000 MG: 500 TABLET, DELAYED RELEASE ORAL at 21:36

## 2024-12-30 RX ADMIN — BENZTROPINE MESYLATE 0.5 MG: 1 TABLET ORAL at 12:07

## 2024-12-30 RX ADMIN — HALOPERIDOL 10 MG: 5 TABLET ORAL at 21:36

## 2024-12-30 RX ADMIN — HALOPERIDOL 10 MG: 5 TABLET ORAL at 12:07

## 2024-12-30 RX ADMIN — DIVALPROEX SODIUM 500 MG: 500 TABLET, DELAYED RELEASE ORAL at 12:07

## 2024-12-30 RX ADMIN — ARIPIPRAZOLE 30 MG: 10 TABLET ORAL at 12:07

## 2024-12-30 RX ADMIN — IBUPROFEN 600 MG: 400 TABLET ORAL at 17:04

## 2024-12-30 ASSESSMENT — PAIN SCALES - GENERAL
PAINLEVEL_OUTOF10: 0
PAINLEVEL_OUTOF10: 6

## 2024-12-30 ASSESSMENT — PAIN SCALES - PAIN ASSESSMENT IN ADVANCED DEMENTIA (PAINAD): BREATHING: NORMAL

## 2024-12-31 LAB
ALBUMIN SERPL-MCNC: 3.5 G/DL (ref 3.4–5)
ALBUMIN/GLOB SERPL: 1.1 {RATIO} (ref 1–2.4)
ALP SERPL-CCNC: 78 UNITS/L (ref 45–117)
ALT SERPL-CCNC: 23 UNITS/L
ANION GAP SERPL CALC-SCNC: 8 MMOL/L (ref 7–19)
AST SERPL-CCNC: 14 UNITS/L
BILIRUB SERPL-MCNC: 0.4 MG/DL (ref 0.2–1)
BUN SERPL-MCNC: 19 MG/DL (ref 6–20)
BUN/CREAT SERPL: 20 (ref 7–25)
CALCIUM SERPL-MCNC: 9.1 MG/DL (ref 8.4–10.2)
CHLORIDE SERPL-SCNC: 107 MMOL/L (ref 97–110)
CO2 SERPL-SCNC: 29 MMOL/L (ref 21–32)
CREAT SERPL-MCNC: 0.97 MG/DL (ref 0.67–1.17)
EGFRCR SERPLBLD CKD-EPI 2021: >90 ML/MIN/{1.73_M2}
FASTING DURATION TIME PATIENT: NORMAL H
GLOBULIN SER-MCNC: 3.2 G/DL (ref 2–4)
GLUCOSE SERPL-MCNC: 91 MG/DL (ref 70–99)
POTASSIUM SERPL-SCNC: 4.8 MMOL/L (ref 3.4–5.1)
PROT SERPL-MCNC: 6.7 G/DL (ref 6.4–8.2)
RAINBOW EXTRA TUBES HOLD SPECIMEN: NORMAL
RAINBOW EXTRA TUBES HOLD SPECIMEN: NORMAL
SODIUM SERPL-SCNC: 139 MMOL/L (ref 135–145)

## 2024-12-31 PROCEDURE — 10004577 HB ROOM CHARGE PSYCH

## 2024-12-31 PROCEDURE — 36415 COLL VENOUS BLD VENIPUNCTURE: CPT | Performed by: PSYCHIATRY & NEUROLOGY

## 2024-12-31 PROCEDURE — 99233 SBSQ HOSP IP/OBS HIGH 50: CPT | Performed by: PSYCHIATRY & NEUROLOGY

## 2024-12-31 PROCEDURE — 10002803 HB RX 637: Performed by: PSYCHIATRY & NEUROLOGY

## 2024-12-31 PROCEDURE — 80053 COMPREHEN METABOLIC PANEL: CPT | Performed by: PSYCHIATRY & NEUROLOGY

## 2024-12-31 RX ADMIN — ARIPIPRAZOLE 30 MG: 10 TABLET ORAL at 08:35

## 2024-12-31 RX ADMIN — BENZTROPINE MESYLATE 0.5 MG: 1 TABLET ORAL at 21:25

## 2024-12-31 RX ADMIN — BENZTROPINE MESYLATE 0.5 MG: 1 TABLET ORAL at 08:35

## 2024-12-31 RX ADMIN — HYDROXYZINE HYDROCHLORIDE 50 MG: 25 TABLET ORAL at 14:16

## 2024-12-31 RX ADMIN — HALOPERIDOL 10 MG: 5 TABLET ORAL at 21:26

## 2024-12-31 RX ADMIN — DIVALPROEX SODIUM 500 MG: 500 TABLET, DELAYED RELEASE ORAL at 08:35

## 2024-12-31 RX ADMIN — NICOTINE POLACRILEX 2 MG: 2 GUM, CHEWING BUCCAL at 16:18

## 2024-12-31 RX ADMIN — NICOTINE POLACRILEX 2 MG: 2 GUM, CHEWING BUCCAL at 10:29

## 2024-12-31 RX ADMIN — DIVALPROEX SODIUM 1000 MG: 500 TABLET, DELAYED RELEASE ORAL at 21:26

## 2024-12-31 RX ADMIN — HALOPERIDOL 10 MG: 5 TABLET ORAL at 08:35

## 2024-12-31 ASSESSMENT — PAIN SCALES - GENERAL
PAINLEVEL_OUTOF10: 0
PAINLEVEL_OUTOF10: 0

## 2025-01-01 PROCEDURE — 10002803 HB RX 637: Performed by: PSYCHIATRY & NEUROLOGY

## 2025-01-01 PROCEDURE — 99233 SBSQ HOSP IP/OBS HIGH 50: CPT | Performed by: PATHOLOGY

## 2025-01-01 PROCEDURE — 10004577 HB ROOM CHARGE PSYCH

## 2025-01-01 RX ADMIN — NICOTINE POLACRILEX 2 MG: 2 GUM, CHEWING BUCCAL at 09:14

## 2025-01-01 RX ADMIN — IBUPROFEN 600 MG: 400 TABLET ORAL at 11:21

## 2025-01-01 RX ADMIN — BENZTROPINE MESYLATE 0.5 MG: 1 TABLET ORAL at 21:18

## 2025-01-01 RX ADMIN — ARIPIPRAZOLE 30 MG: 10 TABLET ORAL at 08:19

## 2025-01-01 RX ADMIN — DIVALPROEX SODIUM 500 MG: 500 TABLET, DELAYED RELEASE ORAL at 08:19

## 2025-01-01 RX ADMIN — HALOPERIDOL 10 MG: 5 TABLET ORAL at 21:17

## 2025-01-01 RX ADMIN — HALOPERIDOL 10 MG: 5 TABLET ORAL at 08:19

## 2025-01-01 RX ADMIN — DIVALPROEX SODIUM 1000 MG: 500 TABLET, DELAYED RELEASE ORAL at 21:17

## 2025-01-01 RX ADMIN — HYDROXYZINE HYDROCHLORIDE 50 MG: 25 TABLET ORAL at 16:32

## 2025-01-01 RX ADMIN — BENZTROPINE MESYLATE 0.5 MG: 1 TABLET ORAL at 08:19

## 2025-01-01 RX ADMIN — NICOTINE POLACRILEX 2 MG: 2 GUM, CHEWING BUCCAL at 14:56

## 2025-01-01 ASSESSMENT — PAIN SCALES - WONG BAKER: WONGBAKER_NUMERICALRESPONSE: 6

## 2025-01-01 ASSESSMENT — PAIN SCALES - GENERAL: PAINLEVEL_OUTOF10: 0

## 2025-01-02 DIAGNOSIS — F99 PSYCHIATRIC PROBLEM: ICD-10-CM

## 2025-01-02 PROCEDURE — 10002803 HB RX 637: Performed by: PSYCHIATRY & NEUROLOGY

## 2025-01-02 PROCEDURE — 10002803 HB RX 637: Performed by: INTERNAL MEDICINE

## 2025-01-02 PROCEDURE — 99232 SBSQ HOSP IP/OBS MODERATE 35: CPT | Performed by: PSYCHIATRY & NEUROLOGY

## 2025-01-02 PROCEDURE — 10004577 HB ROOM CHARGE PSYCH

## 2025-01-02 RX ORDER — ARIPIPRAZOLE 30 MG/1
30 TABLET ORAL DAILY
Qty: 30 TABLET | Refills: 0 | Status: SHIPPED | OUTPATIENT
Start: 2025-01-02

## 2025-01-02 RX ORDER — TRAZODONE HYDROCHLORIDE 150 MG/1
150 TABLET ORAL NIGHTLY
Qty: 30 TABLET | Refills: 0 | Status: SHIPPED | OUTPATIENT
Start: 2025-01-02 | End: 2025-01-03 | Stop reason: HOSPADM

## 2025-01-02 RX ADMIN — POLYETHYLENE GLYCOL (3350) 17 G: 17 POWDER, FOR SOLUTION ORAL at 15:20

## 2025-01-02 RX ADMIN — ARIPIPRAZOLE 30 MG: 10 TABLET ORAL at 08:34

## 2025-01-02 RX ADMIN — NICOTINE POLACRILEX 2 MG: 2 GUM, CHEWING BUCCAL at 08:39

## 2025-01-02 RX ADMIN — HYDROXYZINE HYDROCHLORIDE 50 MG: 25 TABLET ORAL at 09:49

## 2025-01-02 RX ADMIN — IBUPROFEN 600 MG: 400 TABLET ORAL at 08:35

## 2025-01-02 RX ADMIN — NICOTINE POLACRILEX 2 MG: 2 GUM, CHEWING BUCCAL at 12:37

## 2025-01-02 RX ADMIN — BENZTROPINE MESYLATE 0.5 MG: 1 TABLET ORAL at 08:35

## 2025-01-02 RX ADMIN — HALOPERIDOL 10 MG: 5 TABLET ORAL at 08:34

## 2025-01-02 RX ADMIN — DIVALPROEX SODIUM 500 MG: 500 TABLET, DELAYED RELEASE ORAL at 08:35

## 2025-01-02 RX ADMIN — DIVALPROEX SODIUM 1000 MG: 500 TABLET, DELAYED RELEASE ORAL at 21:05

## 2025-01-02 RX ADMIN — BENZTROPINE MESYLATE 0.5 MG: 1 TABLET ORAL at 21:07

## 2025-01-02 RX ADMIN — NICOTINE POLACRILEX 2 MG: 2 GUM, CHEWING BUCCAL at 19:48

## 2025-01-02 RX ADMIN — HALOPERIDOL 10 MG: 5 TABLET ORAL at 21:05

## 2025-01-02 ASSESSMENT — PAIN SCALES - GENERAL
PAINLEVEL_OUTOF10: 0
PAINLEVEL_OUTOF10: 0
PAINLEVEL_OUTOF10: 5

## 2025-01-03 VITALS
RESPIRATION RATE: 16 BRPM | HEIGHT: 74 IN | BODY MASS INDEX: 29.71 KG/M2 | HEART RATE: 52 BPM | DIASTOLIC BLOOD PRESSURE: 59 MMHG | WEIGHT: 231.48 LBS | OXYGEN SATURATION: 99 % | TEMPERATURE: 95.9 F | SYSTOLIC BLOOD PRESSURE: 101 MMHG

## 2025-01-03 PROCEDURE — 10002803 HB RX 637: Performed by: PSYCHIATRY & NEUROLOGY

## 2025-01-03 PROCEDURE — 96372 THER/PROPH/DIAG INJ SC/IM: CPT | Performed by: PSYCHIATRY & NEUROLOGY

## 2025-01-03 PROCEDURE — 99239 HOSP IP/OBS DSCHRG MGMT >30: CPT | Performed by: PSYCHIATRY & NEUROLOGY

## 2025-01-03 RX ORDER — HALOPERIDOL 10 MG/1
10 TABLET ORAL 2 TIMES DAILY
Status: SHIPPED | COMMUNITY
Start: 2025-01-03 | End: 2025-01-03

## 2025-01-03 RX ORDER — BENZTROPINE MESYLATE 0.5 MG/1
0.5 TABLET ORAL 2 TIMES DAILY
Status: SHIPPED | COMMUNITY
Start: 2025-01-03 | End: 2025-01-03

## 2025-01-03 RX ORDER — HALOPERIDOL 10 MG/1
10 TABLET ORAL 2 TIMES DAILY
Qty: 60 TABLET | Refills: 0 | Status: SHIPPED | OUTPATIENT
Start: 2025-01-03 | End: 2025-01-06 | Stop reason: SDUPTHER

## 2025-01-03 RX ORDER — DIVALPROEX SODIUM 500 MG/1
1000 TABLET, DELAYED RELEASE ORAL AT BEDTIME
Qty: 28 TABLET | Refills: 0 | Status: SHIPPED | OUTPATIENT
Start: 2025-01-03 | End: 2025-01-03 | Stop reason: SDUPTHER

## 2025-01-03 RX ORDER — ARIPIPRAZOLE 30 MG/1
30 TABLET ORAL DAILY
Qty: 30 TABLET | Refills: 0 | Status: SHIPPED | OUTPATIENT
Start: 2025-01-03

## 2025-01-03 RX ORDER — DIVALPROEX SODIUM 500 MG/1
TABLET, DELAYED RELEASE ORAL
Qty: 42 TABLET | Refills: 0 | Status: SHIPPED | OUTPATIENT
Start: 2025-01-03 | End: 2025-01-06 | Stop reason: SDUPTHER

## 2025-01-03 RX ORDER — BENZTROPINE MESYLATE 0.5 MG/1
0.5 TABLET ORAL 2 TIMES DAILY
Qty: 60 TABLET | Refills: 0 | Status: SHIPPED | OUTPATIENT
Start: 2025-01-03 | End: 2025-01-06 | Stop reason: SDUPTHER

## 2025-01-03 RX ADMIN — ARIPIPRAZOLE 30 MG: 10 TABLET ORAL at 08:26

## 2025-01-03 RX ADMIN — DIVALPROEX SODIUM 500 MG: 500 TABLET, DELAYED RELEASE ORAL at 08:26

## 2025-01-03 RX ADMIN — BENZTROPINE MESYLATE 0.5 MG: 1 TABLET ORAL at 08:26

## 2025-01-03 RX ADMIN — IBUPROFEN 600 MG: 400 TABLET ORAL at 08:26

## 2025-01-03 RX ADMIN — NICOTINE POLACRILEX 2 MG: 2 GUM, CHEWING BUCCAL at 08:47

## 2025-01-03 RX ADMIN — NICOTINE POLACRILEX 2 MG: 2 GUM, CHEWING BUCCAL at 11:51

## 2025-01-03 RX ADMIN — HALOPERIDOL 10 MG: 5 TABLET ORAL at 08:26

## 2025-01-03 ASSESSMENT — PAIN SCALES - GENERAL: PAINLEVEL_OUTOF10: 6

## 2025-01-06 ENCOUNTER — APPOINTMENT (OUTPATIENT)
Age: 50
End: 2025-01-06

## 2025-01-06 DIAGNOSIS — F41.1 GENERALIZED ANXIETY DISORDER: ICD-10-CM

## 2025-01-06 DIAGNOSIS — F31.9: ICD-10-CM

## 2025-01-06 DIAGNOSIS — F25.0 SCHIZOAFFECTIVE DISORDER, BIPOLAR TYPE  (CMD): ICD-10-CM

## 2025-01-06 DIAGNOSIS — G47.00 INSOMNIA, UNSPECIFIED TYPE: ICD-10-CM

## 2025-01-06 DIAGNOSIS — F31.60 BIPOLAR 1 DISORDER, MIXED  (CMD): Primary | ICD-10-CM

## 2025-01-06 DIAGNOSIS — F48.9 MENTAL HEALTH PROBLEM: ICD-10-CM

## 2025-01-06 DIAGNOSIS — F33.1 MODERATE EPISODE OF RECURRENT MAJOR DEPRESSIVE DISORDER (CMD): ICD-10-CM

## 2025-01-06 DIAGNOSIS — F99 PSYCHIATRIC PROBLEM: ICD-10-CM

## 2025-01-06 DIAGNOSIS — F06.8: ICD-10-CM

## 2025-01-06 RX ORDER — HALOPERIDOL 10 MG/1
10 TABLET ORAL 2 TIMES DAILY PRN
Qty: 60 TABLET | Refills: 3 | Status: SHIPPED | OUTPATIENT
Start: 2025-01-06

## 2025-01-06 RX ORDER — DIVALPROEX SODIUM 500 MG/1
TABLET, DELAYED RELEASE ORAL
Qty: 360 TABLET | Refills: 0 | Status: SHIPPED | OUTPATIENT
Start: 2025-01-06

## 2025-01-06 RX ORDER — BENZTROPINE MESYLATE 0.5 MG/1
0.5 TABLET ORAL 2 TIMES DAILY PRN
Qty: 60 TABLET | Refills: 0 | Status: SHIPPED | OUTPATIENT
Start: 2025-01-06

## 2025-01-06 RX ORDER — ARIPIPRAZOLE 30 MG/1
30 TABLET ORAL DAILY
Qty: 30 TABLET | Refills: 90 | Status: SHIPPED | OUTPATIENT
Start: 2025-01-06

## 2025-01-22 ENCOUNTER — TELEPHONE (OUTPATIENT)
Age: 50
End: 2025-01-22

## 2025-01-23 ENCOUNTER — BEHAVIORAL HEALTH (OUTPATIENT)
Age: 50
End: 2025-01-23

## 2025-01-23 DIAGNOSIS — F33.1 MODERATE EPISODE OF RECURRENT MAJOR DEPRESSIVE DISORDER (CMD): ICD-10-CM

## 2025-01-23 DIAGNOSIS — F41.1 GENERALIZED ANXIETY DISORDER: ICD-10-CM

## 2025-01-23 DIAGNOSIS — F31.60 BIPOLAR 1 DISORDER, MIXED  (CMD): Primary | ICD-10-CM

## 2025-01-23 DIAGNOSIS — G47.00 INSOMNIA, UNSPECIFIED TYPE: ICD-10-CM

## 2025-01-23 DIAGNOSIS — F25.0 SCHIZOAFFECTIVE DISORDER, BIPOLAR TYPE  (CMD): ICD-10-CM

## 2025-01-23 RX ORDER — OLANZAPINE 20 MG/1
20 TABLET ORAL NIGHTLY
Qty: 30 TABLET | Refills: 2 | Status: SHIPPED | OUTPATIENT
Start: 2025-01-23 | End: 2025-01-24

## 2025-01-24 DIAGNOSIS — F31.60 BIPOLAR 1 DISORDER, MIXED  (CMD): ICD-10-CM

## 2025-01-24 RX ORDER — OLANZAPINE 20 MG/1
20 TABLET ORAL NIGHTLY
Qty: 90 TABLET | Refills: 0 | Status: SHIPPED | OUTPATIENT
Start: 2025-01-24

## 2025-01-27 ENCOUNTER — APPOINTMENT (OUTPATIENT)
Age: 50
End: 2025-01-27

## 2025-02-02 ENCOUNTER — HOSPITAL ENCOUNTER (EMERGENCY)
Age: 50
Discharge: HOME | End: 2025-02-02
Attending: EMERGENCY MEDICINE | Admitting: PATHOLOGY

## 2025-02-02 VITALS
HEART RATE: 78 BPM | DIASTOLIC BLOOD PRESSURE: 63 MMHG | SYSTOLIC BLOOD PRESSURE: 113 MMHG | TEMPERATURE: 98.1 F | OXYGEN SATURATION: 97 % | RESPIRATION RATE: 16 BRPM

## 2025-02-02 DIAGNOSIS — F25.9 SCHIZOAFFECTIVE DISORDER, UNSPECIFIED TYPE (CMD): Primary | ICD-10-CM

## 2025-02-02 LAB
ALBUMIN SERPL-MCNC: 3.9 G/DL (ref 3.4–5)
ALBUMIN/GLOB SERPL: 1.1 {RATIO} (ref 1–2.4)
ALP SERPL-CCNC: 91 UNITS/L (ref 45–117)
ALT SERPL-CCNC: 33 UNITS/L
AMPHETAMINES UR QL SCN>500 NG/ML: NEGATIVE
ANION GAP SERPL CALC-SCNC: 8 MMOL/L (ref 7–19)
APPEARANCE UR: CLEAR
AST SERPL-CCNC: 23 UNITS/L
BARBITURATES UR QL SCN>200 NG/ML: NEGATIVE
BASOPHILS # BLD: 0 K/MCL (ref 0–0.3)
BASOPHILS NFR BLD: 0 %
BENZODIAZ UR QL SCN>200 NG/ML: NEGATIVE
BILIRUB SERPL-MCNC: 0.3 MG/DL (ref 0.2–1)
BILIRUB UR QL STRIP: NEGATIVE
BUN SERPL-MCNC: 18 MG/DL (ref 6–20)
BUN/CREAT SERPL: 26 (ref 7–25)
BZE UR QL SCN>150 NG/ML: NEGATIVE
CALCIUM SERPL-MCNC: 8.9 MG/DL (ref 8.4–10.2)
CANNABINOIDS UR QL SCN>50 NG/ML: NEGATIVE
CHLORIDE SERPL-SCNC: 107 MMOL/L (ref 97–110)
CO2 SERPL-SCNC: 25 MMOL/L (ref 21–32)
COLOR UR: YELLOW
CREAT SERPL-MCNC: 0.7 MG/DL (ref 0.67–1.17)
DEPRECATED RDW RBC: 48.7 FL (ref 39–50)
EGFRCR SERPLBLD CKD-EPI 2021: >90 ML/MIN/{1.73_M2}
EOSINOPHIL # BLD: 0.3 K/MCL (ref 0–0.5)
EOSINOPHIL NFR BLD: 3 %
ERYTHROCYTE [DISTWIDTH] IN BLOOD: 13.2 % (ref 11–15)
ETHANOL SERPL-MCNC: NORMAL MG/DL
FASTING DURATION TIME PATIENT: ABNORMAL H
FENTANYL UR QL SCN: NEGATIVE
FLUAV RNA RESP QL NAA+PROBE: NOT DETECTED
FLUBV RNA RESP QL NAA+PROBE: NOT DETECTED
GLOBULIN SER-MCNC: 3.6 G/DL (ref 2–4)
GLUCOSE SERPL-MCNC: 98 MG/DL (ref 70–99)
GLUCOSE UR STRIP-MCNC: NEGATIVE MG/DL
HCT VFR BLD CALC: 43 % (ref 39–51)
HGB BLD-MCNC: 14.7 G/DL (ref 13–17)
HGB UR QL STRIP: NEGATIVE
IMM GRANULOCYTES # BLD AUTO: 0 K/MCL (ref 0–0.2)
IMM GRANULOCYTES # BLD: 0 %
KETONES UR STRIP-MCNC: 15 MG/DL
LEUKOCYTE ESTERASE UR QL STRIP: NEGATIVE
LYMPHOCYTES # BLD: 2.4 K/MCL (ref 1–4.8)
LYMPHOCYTES NFR BLD: 28 %
MCH RBC QN AUTO: 33.8 PG (ref 26–34)
MCHC RBC AUTO-ENTMCNC: 34.2 G/DL (ref 32–36.5)
MCV RBC AUTO: 98.9 FL (ref 78–100)
MONOCYTES # BLD: 0.9 K/MCL (ref 0.3–0.9)
MONOCYTES NFR BLD: 11 %
NEUTROPHILS # BLD: 4.9 K/MCL (ref 1.8–7.7)
NEUTROPHILS NFR BLD: 58 %
NITRITE UR QL STRIP: NEGATIVE
NRBC BLD MANUAL-RTO: 0 /100 WBC
OPIATES UR QL SCN>300 NG/ML: NEGATIVE
PCP UR QL SCN>25 NG/ML: NEGATIVE
PH UR STRIP: 6.5 [PH] (ref 5–7)
PLATELET # BLD AUTO: 208 K/MCL (ref 140–450)
POTASSIUM SERPL-SCNC: 4.2 MMOL/L (ref 3.4–5.1)
PROT SERPL-MCNC: 7.5 G/DL (ref 6.4–8.2)
PROT UR STRIP-MCNC: ABNORMAL MG/DL
RBC # BLD: 4.35 MIL/MCL (ref 4.5–5.9)
RSV AG NPH QL IA.RAPID: NOT DETECTED
SARS-COV-2 RNA RESP QL NAA+PROBE: NOT DETECTED
SERVICE CMNT-IMP: NORMAL
SERVICE CMNT-IMP: NORMAL
SODIUM SERPL-SCNC: 136 MMOL/L (ref 135–145)
SP GR UR STRIP: >1.03 (ref 1–1.03)
UROBILINOGEN UR STRIP-MCNC: 2 MG/DL
VALPROATE SERPL-MCNC: 60 MCG/ML (ref 50–125)
WBC # BLD: 8.5 K/MCL (ref 4.2–11)

## 2025-02-02 PROCEDURE — 36415 COLL VENOUS BLD VENIPUNCTURE: CPT

## 2025-02-02 PROCEDURE — 0241U COVID/FLU/RSV PANEL: CPT | Performed by: EMERGENCY MEDICINE

## 2025-02-02 PROCEDURE — 80164 ASSAY DIPROPYLACETIC ACD TOT: CPT | Performed by: EMERGENCY MEDICINE

## 2025-02-02 PROCEDURE — 85025 COMPLETE CBC W/AUTO DIFF WBC: CPT | Performed by: EMERGENCY MEDICINE

## 2025-02-02 PROCEDURE — 99283 EMERGENCY DEPT VISIT LOW MDM: CPT

## 2025-02-02 PROCEDURE — 10002803 HB RX 637: Performed by: EMERGENCY MEDICINE

## 2025-02-02 PROCEDURE — 90839 PSYTX CRISIS INITIAL 60 MIN: CPT

## 2025-02-02 PROCEDURE — 80307 DRUG TEST PRSMV CHEM ANLYZR: CPT | Performed by: EMERGENCY MEDICINE

## 2025-02-02 PROCEDURE — 82077 ASSAY SPEC XCP UR&BREATH IA: CPT | Performed by: EMERGENCY MEDICINE

## 2025-02-02 PROCEDURE — 80053 COMPREHEN METABOLIC PANEL: CPT | Performed by: EMERGENCY MEDICINE

## 2025-02-02 PROCEDURE — 81003 URINALYSIS AUTO W/O SCOPE: CPT | Performed by: EMERGENCY MEDICINE

## 2025-02-02 RX ORDER — ALPRAZOLAM 0.25 MG/1
0.25 TABLET ORAL ONCE
Status: COMPLETED | OUTPATIENT
Start: 2025-02-02 | End: 2025-02-02

## 2025-02-02 RX ADMIN — ALPRAZOLAM 0.25 MG: 0.25 TABLET ORAL at 15:57

## 2025-02-02 SDOH — SOCIAL STABILITY: SOCIAL INSECURITY: HOW OFTEN DOES ANYONE, INCLUDING FAMILY AND FRIENDS, PHYSICALLY HURT YOU?: NEVER

## 2025-02-02 SDOH — SOCIAL STABILITY: SOCIAL INSECURITY: HOW OFTEN DOES ANYONE, INCLUDING FAMILY AND FRIENDS, THREATEN YOU WITH HARM?: NEVER

## 2025-02-02 SDOH — ECONOMIC STABILITY: HOUSING INSECURITY: WHAT IS YOUR LIVING SITUATION TODAY?: I HAVE A STEADY PLACE TO LIVE

## 2025-02-02 SDOH — ECONOMIC STABILITY: GENERAL
IN THE PAST YEAR, HAVE YOU OR ANY FAMILY MEMBERS YOU LIVE WITH BEEN UNABLE TO GET ANY OF THE FOLLOWING WHEN IT WAS REALLY NEEDED? CHECK ALL THAT APPLY.: PATIENT DECLINED

## 2025-02-02 SDOH — ECONOMIC STABILITY: TRANSPORTATION INSECURITY
IN THE PAST 12 MONTHS, HAS LACK OF RELIABLE TRANSPORTATION KEPT YOU FROM MEDICAL APPOINTMENTS, MEETINGS, WORK OR FROM GETTING THINGS NEEDED FOR DAILY LIVING?: PATIENT DECLINED

## 2025-02-02 SDOH — SOCIAL STABILITY: SOCIAL INSECURITY: HOW OFTEN DOES ANYONE, INCLUDING FAMILY AND FRIENDS, INSULT OR TALK DOWN TO YOU?: NEVER

## 2025-02-02 SDOH — ECONOMIC STABILITY: FOOD INSECURITY: WITHIN THE PAST 12 MONTHS, THE FOOD YOU BOUGHT JUST DIDN'T LAST AND YOU DIDN'T HAVE MONEY TO GET MORE.: PATIENT DECLINED

## 2025-02-02 SDOH — SOCIAL STABILITY: SOCIAL INSECURITY: HOW OFTEN DOES ANYONE, INCLUDING FAMILY AND FRIENDS, SCREAM OR CURSE AT YOU?: NEVER

## 2025-02-02 SDOH — ECONOMIC STABILITY: HOUSING INSECURITY: DO YOU HAVE PROBLEMS WITH ANY OF THE FOLLOWING?: PATIENT DECLINED

## 2025-02-02 ASSESSMENT — COGNITIVE AND FUNCTIONAL STATUS - GENERAL
ORIENTATION: ORIENTED TO PERSON;ORIENTED TO PLACE;ORIENTED TO TIME
PERCEPTUAL_MISINTERPRETATIONS_HALLUCINATIONS: AUDITORY;DEREALIZATION
MOTOR_BEHAVIOR-AGITATION_CALCULATED: 1;3
LEVEL_OF_CONSCIOUSNESS_CALCULATED: ALERT
MEMORY: INTACT
ATTENTION_CALCULATED: MAINTAINS ATTENTION
MOOD: FLAT;ANXIOUS
BEHAVIOR: APPROPRIATE TO SITUATION
MOTOR_BEHAVIOR-RETARDATION_CALCULATED: CALM AND PURPOSEFUL

## 2025-02-02 ASSESSMENT — ENCOUNTER SYMPTOMS
DIZZINESS: 0
HALLUCINATIONS: 1
FEVER: 0
ABDOMINAL DISTENTION: 0
CHILLS: 0
BACK PAIN: 0
SHORTNESS OF BREATH: 0
AGITATION: 1
NERVOUS/ANXIOUS: 1

## 2025-02-02 ASSESSMENT — LIFESTYLE VARIABLES
ALCOHOL_USE_STATUS: NO OR LOW RISK WITH VALIDATED TOOL
HOW MANY STANDARD DRINKS CONTAINING ALCOHOL DO YOU HAVE ON A TYPICAL DAY: 0,1 OR 2
HOW OFTEN DO YOU HAVE A DRINK CONTAINING ALCOHOL: MONTHLY OR LESS
ALCOHOL_USE: DENIES
HOW OFTEN DO YOU HAVE 6 OR MORE DRINKS ON ONE OCCASION: NEVER
AUDIT-C TOTAL SCORE: 1

## 2025-02-02 ASSESSMENT — PAIN DESCRIPTION - PAIN TYPE: TYPE: CHRONIC PAIN

## 2025-02-02 ASSESSMENT — PAIN SCALES - GENERAL: PAINLEVEL_OUTOF10: 6

## 2025-02-03 LAB
RAINBOW EXTRA TUBES HOLD SPECIMEN: NORMAL

## 2025-03-17 ENCOUNTER — TELEPHONE (OUTPATIENT)
Age: 50
End: 2025-03-17

## 2025-03-18 ENCOUNTER — BEHAVIORAL HEALTH (OUTPATIENT)
Age: 50
End: 2025-03-18

## 2025-03-18 DIAGNOSIS — G47.00 INSOMNIA, UNSPECIFIED TYPE: ICD-10-CM

## 2025-03-18 DIAGNOSIS — F41.1 GENERALIZED ANXIETY DISORDER: ICD-10-CM

## 2025-03-18 DIAGNOSIS — F25.0 SCHIZOAFFECTIVE DISORDER, BIPOLAR TYPE  (CMD): ICD-10-CM

## 2025-03-18 DIAGNOSIS — F31.60 BIPOLAR 1 DISORDER, MIXED  (CMD): Primary | ICD-10-CM

## 2025-03-18 PROCEDURE — 99214 OFFICE O/P EST MOD 30 MIN: CPT | Performed by: PSYCHIATRY & NEUROLOGY

## 2025-03-18 RX ORDER — VARENICLINE TARTRATE 1 MG/1
1 TABLET, FILM COATED ORAL 2 TIMES DAILY
Qty: 60 TABLET | Refills: 1 | Status: SHIPPED | OUTPATIENT
Start: 2025-03-18

## 2025-04-08 RX ORDER — DIVALPROEX SODIUM 500 MG/1
TABLET, DELAYED RELEASE ORAL
Qty: 270 TABLET | Refills: 0 | Status: SHIPPED | OUTPATIENT
Start: 2025-04-08

## 2025-04-09 RX ORDER — VARENICLINE TARTRATE 1 MG/1
1 TABLET, FILM COATED ORAL 2 TIMES DAILY
Qty: 180 TABLET | Refills: 1 | OUTPATIENT
Start: 2025-04-09

## 2025-04-14 ENCOUNTER — APPOINTMENT (OUTPATIENT)
Age: 50
End: 2025-04-14

## 2025-05-09 RX ORDER — VARENICLINE TARTRATE 1 MG/1
1 TABLET, FILM COATED ORAL 2 TIMES DAILY
Qty: 180 TABLET | Refills: 1 | Status: SHIPPED | OUTPATIENT
Start: 2025-05-09 | End: 2025-05-20 | Stop reason: SDUPTHER

## 2025-05-20 ENCOUNTER — APPOINTMENT (OUTPATIENT)
Age: 50
End: 2025-05-20

## 2025-05-20 DIAGNOSIS — G47.00 INSOMNIA, UNSPECIFIED TYPE: ICD-10-CM

## 2025-05-20 DIAGNOSIS — F17.210 CIGARETTE NICOTINE DEPENDENCE WITHOUT COMPLICATION: ICD-10-CM

## 2025-05-20 DIAGNOSIS — F31.60 BIPOLAR 1 DISORDER, MIXED  (CMD): ICD-10-CM

## 2025-05-20 DIAGNOSIS — F33.1 MODERATE EPISODE OF RECURRENT MAJOR DEPRESSIVE DISORDER (CMD): ICD-10-CM

## 2025-05-20 DIAGNOSIS — F41.1 GENERALIZED ANXIETY DISORDER: ICD-10-CM

## 2025-05-20 DIAGNOSIS — F25.9 SCHIZOAFFECTIVE DISORDER, UNSPECIFIED TYPE (CMD): Primary | ICD-10-CM

## 2025-05-20 DIAGNOSIS — F25.0 SCHIZOAFFECTIVE DISORDER, BIPOLAR TYPE  (CMD): ICD-10-CM

## 2025-05-20 RX ORDER — LURASIDONE HYDROCHLORIDE 20 MG/1
20 TABLET, FILM COATED ORAL
Qty: 30 TABLET | Refills: 1 | Status: SHIPPED | OUTPATIENT
Start: 2025-05-20

## 2025-05-20 RX ORDER — NICOTINE 21 MG/24HR
1 PATCH, TRANSDERMAL 24 HOURS TRANSDERMAL EVERY 24 HOURS
Qty: 30 PATCH | Refills: 1 | Status: SHIPPED | OUTPATIENT
Start: 2025-05-20

## 2025-06-08 ENCOUNTER — HOSPITAL ENCOUNTER (EMERGENCY)
Age: 50
Discharge: HOME | End: 2025-06-08
Attending: STUDENT IN AN ORGANIZED HEALTH CARE EDUCATION/TRAINING PROGRAM

## 2025-06-08 VITALS
RESPIRATION RATE: 18 BRPM | BODY MASS INDEX: 29.72 KG/M2 | SYSTOLIC BLOOD PRESSURE: 150 MMHG | DIASTOLIC BLOOD PRESSURE: 77 MMHG | TEMPERATURE: 98.4 F | HEIGHT: 74 IN | HEART RATE: 72 BPM | OXYGEN SATURATION: 99 %

## 2025-06-08 DIAGNOSIS — F41.9 ANXIETY: Primary | ICD-10-CM

## 2025-06-08 PROCEDURE — 99283 EMERGENCY DEPT VISIT LOW MDM: CPT | Performed by: STUDENT IN AN ORGANIZED HEALTH CARE EDUCATION/TRAINING PROGRAM

## 2025-06-08 PROCEDURE — 10002803 HB RX 637: Performed by: STUDENT IN AN ORGANIZED HEALTH CARE EDUCATION/TRAINING PROGRAM

## 2025-06-08 RX ORDER — HYDROXYZINE HYDROCHLORIDE 25 MG/1
25 TABLET, FILM COATED ORAL EVERY 6 HOURS PRN
Qty: 30 TABLET | Refills: 0 | Status: SHIPPED | OUTPATIENT
Start: 2025-06-08 | End: 2025-06-10 | Stop reason: SDUPTHER

## 2025-06-08 RX ORDER — HYDROXYZINE HYDROCHLORIDE 25 MG/1
25 TABLET, FILM COATED ORAL ONCE
Status: COMPLETED | OUTPATIENT
Start: 2025-06-08 | End: 2025-06-08

## 2025-06-08 RX ADMIN — HYDROXYZINE HYDROCHLORIDE 25 MG: 25 TABLET ORAL at 18:50

## 2025-06-08 ASSESSMENT — PAIN SCALES - GENERAL: PAINLEVEL_OUTOF10: 0

## 2025-06-08 ASSESSMENT — ENCOUNTER SYMPTOMS: NERVOUS/ANXIOUS: 1

## 2025-06-10 ENCOUNTER — APPOINTMENT (OUTPATIENT)
Age: 50
End: 2025-06-10

## 2025-06-10 DIAGNOSIS — F17.210 CIGARETTE NICOTINE DEPENDENCE WITHOUT COMPLICATION: ICD-10-CM

## 2025-06-10 DIAGNOSIS — F31.60 BIPOLAR 1 DISORDER, MIXED  (CMD): ICD-10-CM

## 2025-06-10 DIAGNOSIS — F25.9 SCHIZOAFFECTIVE DISORDER, UNSPECIFIED TYPE (CMD): Primary | ICD-10-CM

## 2025-06-10 DIAGNOSIS — F41.1 GENERALIZED ANXIETY DISORDER: ICD-10-CM

## 2025-06-10 PROCEDURE — 99214 OFFICE O/P EST MOD 30 MIN: CPT | Performed by: PSYCHIATRY & NEUROLOGY

## 2025-06-10 RX ORDER — VARENICLINE TARTRATE 1 MG/1
1 TABLET, FILM COATED ORAL 2 TIMES DAILY
Qty: 60 TABLET | Refills: 1 | Status: SHIPPED | OUTPATIENT
Start: 2025-06-10

## 2025-06-10 RX ORDER — HYDROXYZINE HYDROCHLORIDE 25 MG/1
25 TABLET, FILM COATED ORAL EVERY 6 HOURS PRN
Qty: 120 TABLET | Refills: 2 | Status: SHIPPED | OUTPATIENT
Start: 2025-06-10

## 2025-07-03 RX ORDER — DIVALPROEX SODIUM 500 MG/1
TABLET, DELAYED RELEASE ORAL
Qty: 270 TABLET | Refills: 0 | Status: SHIPPED | OUTPATIENT
Start: 2025-07-03

## 2025-07-16 DIAGNOSIS — F25.9 SCHIZOAFFECTIVE DISORDER, UNSPECIFIED TYPE (CMD): ICD-10-CM

## 2025-07-16 RX ORDER — LURASIDONE HYDROCHLORIDE 20 MG/1
20 TABLET, FILM COATED ORAL
Qty: 30 TABLET | Refills: 1 | Status: SHIPPED | OUTPATIENT
Start: 2025-07-16

## 2025-08-22 ENCOUNTER — TELEPHONE (OUTPATIENT)
Age: 50
End: 2025-08-22

## 2025-09-02 DIAGNOSIS — F25.9 SCHIZOAFFECTIVE DISORDER, UNSPECIFIED TYPE (CMD): ICD-10-CM

## 2025-09-02 RX ORDER — LURASIDONE HYDROCHLORIDE 20 MG/1
20 TABLET, FILM COATED ORAL
Qty: 30 TABLET | Refills: 1 | Status: ON HOLD | OUTPATIENT
Start: 2025-09-02

## 2025-09-05 ENCOUNTER — HOSPITAL ENCOUNTER (INPATIENT)
Age: 50
End: 2025-09-05
Attending: EMERGENCY MEDICINE | Admitting: PSYCHIATRY & NEUROLOGY

## 2025-09-05 DIAGNOSIS — R45.851 SUICIDAL IDEATION: Primary | ICD-10-CM

## 2025-09-05 LAB
ALBUMIN SERPL-MCNC: 3.8 G/DL (ref 3.4–5)
ALBUMIN/GLOB SERPL: 1.1 {RATIO} (ref 1–2.4)
ALP SERPL-CCNC: 75 UNITS/L (ref 45–117)
ALT SERPL-CCNC: 34 UNITS/L
AMPHETAMINES UR QL SCN>500 NG/ML: NEGATIVE
ANION GAP SERPL CALC-SCNC: 11 MMOL/L (ref 7–19)
APPEARANCE UR: CLEAR
AST SERPL-CCNC: 26 UNITS/L
BACTERIA #/AREA URNS HPF: ABNORMAL /HPF
BARBITURATES UR QL SCN>200 NG/ML: NEGATIVE
BASOPHILS # BLD: 0 K/MCL (ref 0–0.3)
BASOPHILS NFR BLD: 0 %
BENZODIAZ UR QL SCN>200 NG/ML: NEGATIVE
BILIRUB SERPL-MCNC: 0.5 MG/DL (ref 0.2–1)
BILIRUB UR QL STRIP: NEGATIVE
BUN SERPL-MCNC: 16 MG/DL (ref 6–20)
BUN/CREAT SERPL: 19 (ref 7–25)
BZE UR QL SCN>150 NG/ML: NEGATIVE
CALCIUM SERPL-MCNC: 9 MG/DL (ref 8.4–10.2)
CANNABINOIDS UR QL SCN>50 NG/ML: NEGATIVE
CHLORIDE SERPL-SCNC: 104 MMOL/L (ref 97–110)
CO2 SERPL-SCNC: 25 MMOL/L (ref 21–32)
COLOR UR: YELLOW
CREAT SERPL-MCNC: 0.85 MG/DL (ref 0.67–1.17)
DEPRECATED RDW RBC: 37.8 FL (ref 39–50)
EGFRCR SERPLBLD CKD-EPI 2021: >90 ML/MIN/{1.73_M2}
EOSINOPHIL # BLD: 0 K/MCL (ref 0–0.5)
EOSINOPHIL NFR BLD: 0 %
ERYTHROCYTE [DISTWIDTH] IN BLOOD: 11.2 % (ref 11–15)
ETHANOL SERPL-MCNC: NORMAL MG/DL
FASTING DURATION TIME PATIENT: NORMAL H
FENTANYL UR QL SCN: NEGATIVE
GLOBULIN SER-MCNC: 3.4 G/DL (ref 2–4)
GLUCOSE SERPL-MCNC: 91 MG/DL (ref 70–99)
GLUCOSE UR STRIP-MCNC: NEGATIVE MG/DL
HCT VFR BLD CALC: 38.6 % (ref 39–51)
HGB BLD-MCNC: 14 G/DL (ref 13–17)
HGB UR QL STRIP: NEGATIVE
HYALINE CASTS #/AREA URNS LPF: ABNORMAL /LPF
IMM GRANULOCYTES # BLD AUTO: 0 K/MCL (ref 0–0.2)
IMM GRANULOCYTES # BLD: 0 %
KETONES UR STRIP-MCNC: ABNORMAL MG/DL
LEUKOCYTE ESTERASE UR QL STRIP: NEGATIVE
LYMPHOCYTES # BLD: 1.4 K/MCL (ref 1–4.8)
LYMPHOCYTES NFR BLD: 20 %
MCH RBC QN AUTO: 33.1 PG (ref 26–34)
MCHC RBC AUTO-ENTMCNC: 36.3 G/DL (ref 32–36.5)
MCV RBC AUTO: 91.3 FL (ref 78–100)
MONOCYTES # BLD: 0.6 K/MCL (ref 0.3–0.9)
MONOCYTES NFR BLD: 9 %
MUCOUS THREADS URNS QL MICRO: PRESENT
NEUTROPHILS # BLD: 4.9 K/MCL (ref 1.8–7.7)
NEUTROPHILS NFR BLD: 71 %
NITRITE UR QL STRIP: NEGATIVE
NRBC BLD MANUAL-RTO: 0 /100 WBC
OPIATES UR QL SCN>300 NG/ML: NEGATIVE
PCP UR QL SCN>25 NG/ML: NEGATIVE
PH UR STRIP: 6.5 [PH] (ref 5–7)
PLATELET # BLD AUTO: 187 K/MCL (ref 140–450)
POTASSIUM SERPL-SCNC: 4.3 MMOL/L (ref 3.4–5.1)
PROT SERPL-MCNC: 7.2 G/DL (ref 6.4–8.2)
PROT UR STRIP-MCNC: NEGATIVE MG/DL
RBC # BLD: 4.23 MIL/MCL (ref 4.5–5.9)
RBC #/AREA URNS HPF: ABNORMAL /HPF
SODIUM SERPL-SCNC: 136 MMOL/L (ref 135–145)
SP GR UR STRIP: 1.01 (ref 1–1.03)
SQUAMOUS #/AREA URNS HPF: ABNORMAL /HPF
UROBILINOGEN UR STRIP-MCNC: 0.2 MG/DL
VALPROATE SERPL-MCNC: 93 MCG/ML (ref 50–125)
WBC # BLD: 6.9 K/MCL (ref 4.2–11)
WBC #/AREA URNS HPF: ABNORMAL /HPF

## 2025-09-05 PROCEDURE — 80061 LIPID PANEL: CPT | Performed by: PSYCHIATRY & NEUROLOGY

## 2025-09-05 PROCEDURE — 82077 ASSAY SPEC XCP UR&BREATH IA: CPT | Performed by: EMERGENCY MEDICINE

## 2025-09-05 PROCEDURE — 80164 ASSAY DIPROPYLACETIC ACD TOT: CPT | Performed by: EMERGENCY MEDICINE

## 2025-09-05 PROCEDURE — 85025 COMPLETE CBC W/AUTO DIFF WBC: CPT | Performed by: EMERGENCY MEDICINE

## 2025-09-05 PROCEDURE — 83036 HEMOGLOBIN GLYCOSYLATED A1C: CPT | Performed by: PSYCHIATRY & NEUROLOGY

## 2025-09-05 PROCEDURE — 80053 COMPREHEN METABOLIC PANEL: CPT | Performed by: EMERGENCY MEDICINE

## 2025-09-05 PROCEDURE — 84443 ASSAY THYROID STIM HORMONE: CPT | Performed by: PSYCHIATRY & NEUROLOGY

## 2025-09-05 PROCEDURE — 80307 DRUG TEST PRSMV CHEM ANLYZR: CPT | Performed by: EMERGENCY MEDICINE

## 2025-09-05 PROCEDURE — 10002807 HB RX 258: Performed by: EMERGENCY MEDICINE

## 2025-09-05 PROCEDURE — 81003 URINALYSIS AUTO W/O SCOPE: CPT | Performed by: EMERGENCY MEDICINE

## 2025-09-05 PROCEDURE — 99285 EMERGENCY DEPT VISIT HI MDM: CPT | Performed by: EMERGENCY MEDICINE

## 2025-09-05 RX ADMIN — SODIUM CHLORIDE 500 ML: 9 INJECTION, SOLUTION INTRAVENOUS at 20:54

## 2025-09-05 ASSESSMENT — PAIN SCALES - GENERAL: PAINLEVEL_OUTOF10: 0

## 2025-09-06 LAB
CHOLEST SERPL-MCNC: 234 MG/DL
CHOLEST/HDLC SERPL: 4.5 {RATIO}
HBA1C MFR BLD: 5.3 % (ref 4.5–5.6)
HDLC SERPL-MCNC: 52 MG/DL
LDLC SERPL CALC-MCNC: 162 MG/DL
NONHDLC SERPL-MCNC: 182 MG/DL
RAINBOW EXTRA TUBES HOLD SPECIMEN: NORMAL
RAINBOW EXTRA TUBES HOLD SPECIMEN: NORMAL
TRIGL SERPL-MCNC: 100 MG/DL
TSH SERPL-ACNC: 1.75 MCUNITS/ML (ref 0.35–5)

## 2025-09-06 PROCEDURE — 13003244 HB ROOM CHARGE INTENSIVE PSYCH

## 2025-09-06 PROCEDURE — 90839 PSYTX CRISIS INITIAL 60 MIN: CPT

## 2025-09-06 PROCEDURE — 10002803 HB RX 637: Performed by: PSYCHIATRY & NEUROLOGY

## 2025-09-06 PROCEDURE — 10002803 HB RX 637

## 2025-09-06 RX ORDER — LURASIDONE HYDROCHLORIDE 20 MG/1
40 TABLET, FILM COATED ORAL AT BEDTIME
Status: DISPENSED | OUTPATIENT
Start: 2025-09-07

## 2025-09-06 RX ORDER — HALOPERIDOL 5 MG/ML
5 INJECTION INTRAMUSCULAR EVERY 6 HOURS PRN
Status: DISCONTINUED | OUTPATIENT
Start: 2025-09-06 | End: 2025-09-07

## 2025-09-06 RX ORDER — LURASIDONE HYDROCHLORIDE 20 MG/1
20 TABLET, FILM COATED ORAL
Status: DISCONTINUED | OUTPATIENT
Start: 2025-09-06 | End: 2025-09-06

## 2025-09-06 RX ORDER — MAGNESIUM HYDROXIDE/ALUMINUM HYDROXICE/SIMETHICONE 120; 1200; 1200 MG/30ML; MG/30ML; MG/30ML
30 SUSPENSION ORAL 4 TIMES DAILY PRN
Status: ACTIVE | OUTPATIENT
Start: 2025-09-06

## 2025-09-06 RX ORDER — VARENICLINE TARTRATE 1 MG/1
1 TABLET, FILM COATED ORAL 2 TIMES DAILY
Status: ON HOLD | COMMUNITY

## 2025-09-06 RX ORDER — HYDROXYZINE HYDROCHLORIDE 25 MG/1
50 TABLET, FILM COATED ORAL EVERY 4 HOURS PRN
Status: DISPENSED | OUTPATIENT
Start: 2025-09-06

## 2025-09-06 RX ORDER — DIPHENHYDRAMINE HYDROCHLORIDE 50 MG/ML
50 INJECTION, SOLUTION INTRAMUSCULAR; INTRAVENOUS EVERY 4 HOURS PRN
Status: DISCONTINUED | OUTPATIENT
Start: 2025-09-06 | End: 2025-09-06

## 2025-09-06 RX ORDER — BENZTROPINE MESYLATE 1 MG/ML
1 INJECTION, SOLUTION INTRAMUSCULAR; INTRAVENOUS EVERY 8 HOURS PRN
Status: ACTIVE | OUTPATIENT
Start: 2025-09-06

## 2025-09-06 RX ORDER — DIVALPROEX SODIUM 500 MG/1
1000 TABLET, DELAYED RELEASE ORAL AT BEDTIME
Status: DISPENSED | OUTPATIENT
Start: 2025-09-06

## 2025-09-06 RX ORDER — TRAZODONE HYDROCHLORIDE 50 MG/1
50 TABLET ORAL NIGHTLY PRN
Status: DISPENSED | OUTPATIENT
Start: 2025-09-06

## 2025-09-06 RX ORDER — ACETAMINOPHEN 325 MG/1
650 TABLET ORAL EVERY 4 HOURS PRN
Status: ACTIVE | OUTPATIENT
Start: 2025-09-06

## 2025-09-06 RX ORDER — LORAZEPAM 1 MG/1
TABLET ORAL
Status: COMPLETED
Start: 2025-09-06 | End: 2025-09-06

## 2025-09-06 RX ORDER — HALOPERIDOL 5 MG/1
5 TABLET ORAL EVERY 6 HOURS PRN
Status: DISCONTINUED | OUTPATIENT
Start: 2025-09-06 | End: 2025-09-07

## 2025-09-06 RX ORDER — LORAZEPAM 1 MG/1
2 TABLET ORAL EVERY 6 HOURS PRN
Status: DISCONTINUED | OUTPATIENT
Start: 2025-09-06 | End: 2025-09-06

## 2025-09-06 RX ORDER — ACETAMINOPHEN 500 MG
500 TABLET ORAL EVERY 4 HOURS PRN
Status: ACTIVE | OUTPATIENT
Start: 2025-09-06

## 2025-09-06 RX ORDER — BENZTROPINE MESYLATE 1 MG/1
1 TABLET ORAL EVERY 8 HOURS PRN
Status: ACTIVE | OUTPATIENT
Start: 2025-09-06

## 2025-09-06 RX ORDER — DIAZEPAM 10 MG/2ML
10 INJECTION, SOLUTION INTRAMUSCULAR; INTRAVENOUS EVERY 6 HOURS PRN
Status: DISCONTINUED | OUTPATIENT
Start: 2025-09-06 | End: 2025-09-06

## 2025-09-06 RX ORDER — ACETAMINOPHEN 500 MG
1000 TABLET ORAL EVERY 6 HOURS PRN
Status: ACTIVE | OUTPATIENT
Start: 2025-09-06

## 2025-09-06 RX ORDER — LORAZEPAM 1 MG/1
2 TABLET ORAL ONCE
Status: COMPLETED | OUTPATIENT
Start: 2025-09-06 | End: 2025-09-06

## 2025-09-06 RX ORDER — DIVALPROEX SODIUM 500 MG/1
500 TABLET, DELAYED RELEASE ORAL DAILY
Status: DISPENSED | OUTPATIENT
Start: 2025-09-06

## 2025-09-06 RX ADMIN — HYDROXYZINE HYDROCHLORIDE 50 MG: 25 TABLET ORAL at 20:23

## 2025-09-06 RX ADMIN — LORAZEPAM 2 MG: 1 TABLET ORAL at 00:09

## 2025-09-06 RX ADMIN — DIVALPROEX SODIUM 500 MG: 500 TABLET, DELAYED RELEASE ORAL at 08:37

## 2025-09-06 RX ADMIN — DIVALPROEX SODIUM 1000 MG: 500 TABLET, DELAYED RELEASE ORAL at 20:23

## 2025-09-06 RX ADMIN — LURASIDONE HYDROCHLORIDE 20 MG: 20 TABLET, FILM COATED ORAL at 08:37

## 2025-09-06 RX ADMIN — TRAZODONE HYDROCHLORIDE 50 MG: 50 TABLET ORAL at 20:23

## 2025-09-06 SDOH — ECONOMIC STABILITY: HOUSING INSECURITY: WHAT IS YOUR LIVING SITUATION TODAY?: I HAVE A STEADY PLACE TO LIVE

## 2025-09-06 SDOH — SOCIAL STABILITY: SOCIAL INSECURITY: HOW OFTEN DOES ANYONE, INCLUDING FAMILY AND FRIENDS, PHYSICALLY HURT YOU?: NEVER

## 2025-09-06 SDOH — SOCIAL STABILITY: SOCIAL INSECURITY: HOW OFTEN DOES ANYONE, INCLUDING FAMILY AND FRIENDS, THREATEN YOU WITH HARM?: NEVER

## 2025-09-06 SDOH — ECONOMIC STABILITY: FOOD INSECURITY: WITHIN THE PAST 12 MONTHS, THE FOOD YOU BOUGHT JUST DIDN'T LAST AND YOU DIDN'T HAVE MONEY TO GET MORE.: NEVER TRUE

## 2025-09-06 SDOH — ECONOMIC STABILITY: INCOME INSECURITY: IN THE PAST 12 MONTHS, HAS THE ELECTRIC, GAS, OIL, OR WATER COMPANY THREATENED TO SHUT OFF SERVICE IN YOUR HOME?: NO

## 2025-09-06 SDOH — ECONOMIC STABILITY: HOUSING INSECURITY: DO YOU HAVE PROBLEMS WITH ANY OF THE FOLLOWING?: NONE OF THE ABOVE

## 2025-09-06 SDOH — SOCIAL STABILITY: SOCIAL NETWORK
HOW OFTEN DO YOU SEE OR TALK TO PEOPLE THAT YOU CARE ABOUT AND FEEL CLOSE TO? (FOR EXAMPLE: TALKING TO FRIENDS ON THE PHONE, VISITING FRIENDS OR FAMILY, GOING TO CHURCH OR CLUB MEETINGS): 5 OR MORE TIMES A WEEK

## 2025-09-06 SDOH — SOCIAL STABILITY: SOCIAL INSECURITY: HOW OFTEN DOES ANYONE, INCLUDING FAMILY AND FRIENDS, SCREAM OR CURSE AT YOU?: NEVER

## 2025-09-06 SDOH — SOCIAL STABILITY: SOCIAL INSECURITY: HOW OFTEN DOES ANYONE, INCLUDING FAMILY AND FRIENDS, INSULT OR TALK DOWN TO YOU?: NEVER

## 2025-09-06 SDOH — ECONOMIC STABILITY: GENERAL

## 2025-09-06 SDOH — ECONOMIC STABILITY: HOUSING INSECURITY: WHAT IS YOUR LIVING SITUATION TODAY?: HOUSE

## 2025-09-06 SDOH — ECONOMIC STABILITY: HOUSING INSECURITY: WHAT IS YOUR LIVING SITUATION TODAY?: ALONE

## 2025-09-06 SDOH — SOCIAL STABILITY: SOCIAL NETWORK: SUPPORT SYSTEMS: FAMILY MEMBERS

## 2025-09-06 ASSESSMENT — COGNITIVE AND FUNCTIONAL STATUS - GENERAL
MEMORY: INTACT
AFFECT: OTHER (COMMENT)
MOTOR_BEHAVIOR-RETARDATION_CALCULATED: CALM AND PURPOSEFUL
SPEECH: CLEAR/UNDERSTANDABLE
ATTENTION_CALCULATED: MAINTAINS ATTENTION
COGNITIVE_CONTENT: APPROPRIATE TO CIRCUMSTANCE
LEVEL_OF_CONSCIOUSNESS_CALCULATED: ALERT
COGNITIVE_PROCESS: ORGANIZED/COHERENT
INSIGHT: APPROPRIATE TO CIRCUMSTANCE
ORIENTATION: ORIENTED TO PERSON;ORIENTED TO PLACE;ORIENTED TO TIME
JUDGEMENT: POOR
MOOD: HAPPY
RELIABILITY: APPEARS TO BE TRUTHFUL/RELIABLE
APPEARANCE_AND_DRESS: APPROPRIATE TO SITUATION
PERCEPTUAL_MISINTERPRETATIONS_HALLUCINATIONS: CLEAR REALITY BASED PERCEPTIONS
BEHAVIOR: SUICIDAL/SUICIDAL IDEATION
MOTOR_BEHAVIOR-AGITATION_CALCULATED: CALM AND PURPOSEFUL

## 2025-09-06 ASSESSMENT — LIFESTYLE VARIABLES
RECENT DECLINE IN ADLS: NO
VOLATILE SOLVENTS/INHALANTS USE: DENIES
ADL BEFORE ADMISSION: INDEPENDENT
HOW OFTEN DO YOU HAVE 6 OR MORE DRINKS ON ONE OCCASION: NEVER
HOW MANY STANDARD DRINKS CONTAINING ALCOHOL DO YOU HAVE ON A TYPICAL DAY: 0,1 OR 2
HOW OFTEN DO YOU HAVE A DRINK CONTAINING ALCOHOL: NEVER
TOBACCO_USE_STATUS_IN_THE_LAST_30_DAYS: NO TOBACCO USED IN THE LAST 30 DAYS
HOW OFTEN DO YOU HAVE A DRINK CONTAINING ALCOHOL: NEVER
HOW MANY CIGARETTES HAVE YOU SMOKED PER DAY ON AVERAGE?: DENIES
SHORT OF BREATH OR FATIGUE WITH ADLS: NO
AMPHETAMINES/STIMULANTS USE: DENIES
ALCOHOL_USE: DENIES
ADL NEEDS ASSIST: NO
ALCOHOL_USE_STATUS: NO OR LOW RISK WITH VALIDATED TOOL
HAVE YOU EVER BEEN VERBALLY, EMOTIONALLY, PHYSICALLY OR SEXUALLY ABUSED, OR ABUSED VIA SOCIAL MEDIA?: NO
ALCOHOL_USE: DENIES
COCAINE USE: DENIES
OPIATES USE: DENIES
E-CIGARETTE_USE: NO E-CIGARETTES USE IN THE LAST 30 DAYS
AUDIT-C TOTAL SCORE: 0
ALCOHOL_USE_STATUS: NO OR LOW RISK WITH VALIDATED TOOL
HAVE YOU EVER BEEN EXPOSED TO OTHER VERY DISTURBING, TRAUMATIC OR ANXIETY PROVOKING EVENTS IN YOUR LIFETIME?: NO

## 2025-09-06 ASSESSMENT — PATIENT HEALTH QUESTIONNAIRE - PHQ9
6. FEELING BAD ABOUT YOURSELF - OR THAT YOU ARE A FAILURE OR HAVE LET YOURSELF OR YOUR FAMILY DOWN: MORE THAN HALF THE DAYS
1. LITTLE INTEREST OR PLEASURE IN DOING THINGS: MORE THAN HALF THE DAYS
IS PATIENT ABLE TO COMPLETE PHQ2 OR PHQ9: YES
SUM OF ALL RESPONSES TO PHQ9 QUESTIONS 1 AND 2: 4
SUM OF ALL RESPONSES TO PHQ QUESTIONS 1-9: 16
8. MOVING OR SPEAKING SO SLOWLY THAT OTHER PEOPLE COULD HAVE NOTICED. OR THE OPPOSITE, BEING SO FIGETY OR RESTLESS THAT YOU HAVE BEEN MOVING AROUND A LOT MORE THAN USUAL: NOT AT ALL
SUM OF ALL RESPONSES TO PHQ9 QUESTIONS 1 AND 2: 4
4. FEELING TIRED OR HAVING LITTLE ENERGY: MORE THAN HALF THE DAYS
5. POOR APPETITE OR OVEREATING: MORE THAN HALF THE DAYS
9. THOUGHTS THAT YOU WOULD BE BETTER OFF DEAD, OR OF HURTING YOURSELF: MORE THAN HALF THE DAYS
2. FEELING DOWN, DEPRESSED OR HOPELESS: MORE THAN HALF THE DAYS
7. TROUBLE CONCENTRATING ON THINGS, SUCH AS READING THE NEWSPAPER OR WATCHING TELEVISION: MORE THAN HALF THE DAYS
3. TROUBLE FALLING OR STAYING ASLEEP OR SLEEPING TOO MUCH: MORE THAN HALF THE DAYS
CLINICAL INTERPRETATION OF PHQ9 SCORE: POSITIVE, MODERATELY SEVERE DEPRESSION
10. IF YOU CHECKED OFF ANY PROBLEMS, HOW DIFFICULT HAVE THESE PROBLEMS MADE IT FOR YOU TO DO YOUR WORK, TAKE CARE OF THINGS AT HOME, OR GET ALONG WITH OTHER PEOPLE: SOMEWHAT DIFFICULT

## 2025-09-06 ASSESSMENT — PAIN SCALES - GENERAL
PAINLEVEL_OUTOF10: 0

## 2025-09-06 ASSESSMENT — ACTIVITIES OF DAILY LIVING (ADL): ADL_SCORE: 12

## 2025-09-07 VITALS
OXYGEN SATURATION: 99 % | HEIGHT: 73 IN | DIASTOLIC BLOOD PRESSURE: 79 MMHG | SYSTOLIC BLOOD PRESSURE: 123 MMHG | HEART RATE: 64 BPM | RESPIRATION RATE: 16 BRPM | BODY MASS INDEX: 31.81 KG/M2 | TEMPERATURE: 97.2 F | WEIGHT: 240 LBS

## 2025-09-07 PROCEDURE — 10002803 HB RX 637: Performed by: PSYCHIATRY & NEUROLOGY

## 2025-09-07 PROCEDURE — 13003244 HB ROOM CHARGE INTENSIVE PSYCH

## 2025-09-07 RX ORDER — HALOPERIDOL 5 MG/ML
10 INJECTION INTRAMUSCULAR EVERY 6 HOURS PRN
Status: ACTIVE | OUTPATIENT
Start: 2025-09-07

## 2025-09-07 RX ORDER — HALOPERIDOL 5 MG/1
10 TABLET ORAL EVERY 6 HOURS PRN
Status: ACTIVE | OUTPATIENT
Start: 2025-09-07

## 2025-09-07 RX ORDER — SERTRALINE HYDROCHLORIDE 25 MG/1
50 TABLET, FILM COATED ORAL DAILY
Status: DISPENSED | OUTPATIENT
Start: 2025-09-07

## 2025-09-07 RX ADMIN — LURASIDONE HYDROCHLORIDE 40 MG: 20 TABLET, FILM COATED ORAL at 20:39

## 2025-09-07 RX ADMIN — SERTRALINE HYDROCHLORIDE 50 MG: 25 TABLET, FILM COATED ORAL at 12:28

## 2025-09-07 RX ADMIN — DIVALPROEX SODIUM 1000 MG: 500 TABLET, DELAYED RELEASE ORAL at 20:39

## 2025-09-07 RX ADMIN — TRAZODONE HYDROCHLORIDE 50 MG: 50 TABLET ORAL at 20:39

## 2025-09-07 RX ADMIN — DIVALPROEX SODIUM 500 MG: 500 TABLET, DELAYED RELEASE ORAL at 08:56

## 2025-09-07 RX ADMIN — HYDROXYZINE HYDROCHLORIDE 50 MG: 25 TABLET ORAL at 20:39

## 2025-09-07 ASSESSMENT — PAIN SCALES - GENERAL
PAINLEVEL_OUTOF10: 0
PAINLEVEL_OUTOF10: 0

## 2025-09-09 ENCOUNTER — APPOINTMENT (OUTPATIENT)
Age: 50
End: 2025-09-09

## 2025-09-16 ENCOUNTER — APPOINTMENT (OUTPATIENT)
Age: 50
End: 2025-09-16